# Patient Record
Sex: MALE | Race: WHITE | Employment: UNEMPLOYED | URBAN - METROPOLITAN AREA
[De-identification: names, ages, dates, MRNs, and addresses within clinical notes are randomized per-mention and may not be internally consistent; named-entity substitution may affect disease eponyms.]

---

## 2018-04-23 ENCOUNTER — HOSPITAL ENCOUNTER (OUTPATIENT)
Age: 43
Discharge: HOME | End: 2018-04-23
Payer: MEDICARE

## 2018-04-23 LAB
-: ABNORMAL
ABSOLUTE EOS #: 0.3 K/UL (ref 0–0.4)
ABSOLUTE IMMATURE GRANULOCYTE: ABNORMAL K/UL (ref 0–0.3)
ABSOLUTE LYMPH #: 1.9 K/UL (ref 1–4.8)
ABSOLUTE MONO #: 0.5 K/UL (ref 0.1–1.3)
ALBUMIN SERPL-MCNC: 4.3 G/DL (ref 3.5–5.2)
ALBUMIN/GLOBULIN RATIO: ABNORMAL (ref 1–2.5)
ALP BLD-CCNC: 121 U/L (ref 40–129)
ALT SERPL-CCNC: 29 U/L (ref 5–41)
AMORPHOUS: ABNORMAL
ANION GAP SERPL CALCULATED.3IONS-SCNC: 13 MMOL/L (ref 9–17)
AST SERPL-CCNC: 15 U/L
BACTERIA: ABNORMAL
BASOPHILS # BLD: 1 % (ref 0–2)
BASOPHILS ABSOLUTE: 0.1 K/UL (ref 0–0.2)
BILIRUB SERPL-MCNC: 0.73 MG/DL (ref 0.3–1.2)
BILIRUBIN URINE: ABNORMAL
BUN BLDV-MCNC: 9 MG/DL (ref 6–20)
BUN/CREAT BLD: ABNORMAL (ref 9–20)
CALCIUM SERPL-MCNC: 9.7 MG/DL (ref 8.6–10.4)
CASTS UA: ABNORMAL /LPF
CHLORIDE BLD-SCNC: 102 MMOL/L (ref 98–107)
CHOLESTEROL/HDL RATIO: 5.3
CHOLESTEROL: 155 MG/DL
CO2: 27 MMOL/L (ref 20–31)
COLOR: ABNORMAL
COMMENT UA: ABNORMAL
CREAT SERPL-MCNC: 0.9 MG/DL (ref 0.7–1.2)
CRYSTALS, UA: ABNORMAL /HPF
DIFFERENTIAL TYPE: ABNORMAL
EOSINOPHILS RELATIVE PERCENT: 3 % (ref 0–4)
EPITHELIAL CELLS UA: ABNORMAL /HPF
GFR AFRICAN AMERICAN: >60 ML/MIN
GFR NON-AFRICAN AMERICAN: >60 ML/MIN
GFR SERPL CREATININE-BSD FRML MDRD: ABNORMAL ML/MIN/{1.73_M2}
GFR SERPL CREATININE-BSD FRML MDRD: ABNORMAL ML/MIN/{1.73_M2}
GLUCOSE BLD-MCNC: 146 MG/DL (ref 70–99)
GLUCOSE URINE: NEGATIVE
HCT VFR BLD CALC: 50.1 % (ref 41–53)
HDLC SERPL-MCNC: 29 MG/DL
HEMOGLOBIN: 17.4 G/DL (ref 13.5–17.5)
IMMATURE GRANULOCYTES: ABNORMAL %
KETONES, URINE: NEGATIVE
LDL CHOLESTEROL: 63 MG/DL (ref 0–130)
LEUKOCYTE ESTERASE, URINE: NEGATIVE
LYMPHOCYTES # BLD: 19 % (ref 24–44)
MCH RBC QN AUTO: 31.2 PG (ref 26–34)
MCHC RBC AUTO-ENTMCNC: 34.7 G/DL (ref 31–37)
MCV RBC AUTO: 89.9 FL (ref 80–100)
MONOCYTES # BLD: 5 % (ref 1–7)
MUCUS: ABNORMAL
NITRITE, URINE: NEGATIVE
NRBC AUTOMATED: ABNORMAL PER 100 WBC
OTHER OBSERVATIONS UA: ABNORMAL
PDW BLD-RTO: 13.7 % (ref 11.5–14.9)
PH UA: 5.5 (ref 5–8)
PLATELET # BLD: 246 K/UL (ref 150–450)
PLATELET ESTIMATE: ABNORMAL
PMV BLD AUTO: 8.3 FL (ref 6–12)
POTASSIUM SERPL-SCNC: 3.9 MMOL/L (ref 3.7–5.3)
PROTEIN UA: NEGATIVE
RBC # BLD: 5.57 M/UL (ref 4.5–5.9)
RBC # BLD: ABNORMAL 10*6/UL
RBC UA: ABNORMAL /HPF
RENAL EPITHELIAL, UA: ABNORMAL /HPF
SEG NEUTROPHILS: 72 % (ref 36–66)
SEGMENTED NEUTROPHILS ABSOLUTE COUNT: 7.1 K/UL (ref 1.3–9.1)
SODIUM BLD-SCNC: 142 MMOL/L (ref 135–144)
SPECIFIC GRAVITY UA: 1.02 (ref 1–1.03)
THYROXINE, FREE: 1.47 NG/DL (ref 0.93–1.7)
TOTAL PROTEIN: 7.1 G/DL (ref 6.4–8.3)
TRICHOMONAS: ABNORMAL
TRIGL SERPL-MCNC: 314 MG/DL
TSH SERPL DL<=0.05 MIU/L-ACNC: 2.07 MIU/L (ref 0.3–5)
TURBIDITY: CLEAR
URINE HGB: NEGATIVE
UROBILINOGEN, URINE: NORMAL
VITAMIN D 25-HYDROXY: 23.3 NG/ML (ref 30–100)
VLDLC SERPL CALC-MCNC: ABNORMAL MG/DL (ref 1–30)
WBC # BLD: 10 K/UL (ref 3.5–11)
WBC # BLD: ABNORMAL 10*3/UL
WBC UA: ABNORMAL /HPF
YEAST: ABNORMAL

## 2018-04-23 PROCEDURE — 80061 LIPID PANEL: CPT

## 2018-04-23 PROCEDURE — 82306 VITAMIN D 25 HYDROXY: CPT

## 2018-04-23 PROCEDURE — 36415 COLL VENOUS BLD VENIPUNCTURE: CPT

## 2018-04-23 PROCEDURE — 80053 COMPREHEN METABOLIC PANEL: CPT

## 2018-04-23 PROCEDURE — 85025 COMPLETE CBC W/AUTO DIFF WBC: CPT

## 2018-04-23 PROCEDURE — 84443 ASSAY THYROID STIM HORMONE: CPT

## 2018-04-23 PROCEDURE — 81001 URINALYSIS AUTO W/SCOPE: CPT

## 2018-04-23 PROCEDURE — 84439 ASSAY OF FREE THYROXINE: CPT

## 2018-04-23 PROCEDURE — 83036 HEMOGLOBIN GLYCOSYLATED A1C: CPT

## 2018-04-24 LAB
ESTIMATED AVERAGE GLUCOSE: 114 MG/DL
HBA1C MFR BLD: 5.6 % (ref 4–6)

## 2018-07-12 ENCOUNTER — HOSPITAL ENCOUNTER (OUTPATIENT)
Dept: GENERAL RADIOLOGY | Age: 43
Discharge: HOME | End: 2018-07-14
Payer: MEDICARE

## 2018-07-12 ENCOUNTER — HOSPITAL ENCOUNTER (OUTPATIENT)
Age: 43
Discharge: HOME | End: 2018-07-12
Payer: MEDICARE

## 2018-07-12 ENCOUNTER — HOSPITAL ENCOUNTER (OUTPATIENT)
Age: 43
Discharge: HOME | End: 2018-07-14
Payer: MEDICARE

## 2018-07-12 DIAGNOSIS — R07.9 CHEST PAIN, UNSPECIFIED TYPE: ICD-10-CM

## 2018-07-12 LAB
EKG ATRIAL RATE: 83 BPM
EKG P AXIS: 23 DEGREES
EKG P-R INTERVAL: 166 MS
EKG Q-T INTERVAL: 390 MS
EKG QRS DURATION: 98 MS
EKG QTC CALCULATION (BAZETT): 458 MS
EKG R AXIS: 20 DEGREES
EKG T AXIS: 28 DEGREES
EKG VENTRICULAR RATE: 83 BPM

## 2018-07-12 PROCEDURE — 71046 X-RAY EXAM CHEST 2 VIEWS: CPT

## 2018-07-12 PROCEDURE — 93005 ELECTROCARDIOGRAM TRACING: CPT

## 2020-08-17 ENCOUNTER — HOSPITAL ENCOUNTER (OUTPATIENT)
Age: 45
Discharge: HOME | End: 2020-08-17
Payer: MEDICARE

## 2020-08-17 LAB
ABSOLUTE EOS #: 0.2 K/UL (ref 0–0.4)
ABSOLUTE IMMATURE GRANULOCYTE: ABNORMAL K/UL (ref 0–0.3)
ABSOLUTE LYMPH #: 2.3 K/UL (ref 1–4.8)
ABSOLUTE MONO #: 0.8 K/UL (ref 0.1–1.3)
ALBUMIN SERPL-MCNC: 3.9 G/DL (ref 3.5–5.2)
ALBUMIN/GLOBULIN RATIO: ABNORMAL (ref 1–2.5)
ALP BLD-CCNC: 108 U/L (ref 40–129)
ALT SERPL-CCNC: 31 U/L (ref 5–41)
ANION GAP SERPL CALCULATED.3IONS-SCNC: 9 MMOL/L (ref 9–17)
AST SERPL-CCNC: 14 U/L
BASOPHILS # BLD: 1 % (ref 0–2)
BASOPHILS ABSOLUTE: 0.1 K/UL (ref 0–0.2)
BILIRUB SERPL-MCNC: 0.6 MG/DL (ref 0.3–1.2)
BILIRUBIN URINE: NEGATIVE
BUN BLDV-MCNC: 12 MG/DL (ref 6–20)
BUN/CREAT BLD: ABNORMAL (ref 9–20)
CALCIUM SERPL-MCNC: 9.4 MG/DL (ref 8.6–10.4)
CHLORIDE BLD-SCNC: 99 MMOL/L (ref 98–107)
CHOLESTEROL/HDL RATIO: 6.1
CHOLESTEROL: 152 MG/DL
CO2: 27 MMOL/L (ref 20–31)
COLOR: YELLOW
COMMENT UA: NORMAL
CREAT SERPL-MCNC: 0.96 MG/DL (ref 0.7–1.2)
DIFFERENTIAL TYPE: ABNORMAL
EOSINOPHILS RELATIVE PERCENT: 2 % (ref 0–4)
ESTIMATED AVERAGE GLUCOSE: 134 MG/DL
GFR AFRICAN AMERICAN: >60 ML/MIN
GFR NON-AFRICAN AMERICAN: >60 ML/MIN
GFR SERPL CREATININE-BSD FRML MDRD: ABNORMAL ML/MIN/{1.73_M2}
GFR SERPL CREATININE-BSD FRML MDRD: ABNORMAL ML/MIN/{1.73_M2}
GLUCOSE BLD-MCNC: 181 MG/DL (ref 70–99)
GLUCOSE URINE: NEGATIVE
HBA1C MFR BLD: 6.3 % (ref 4–6)
HCT VFR BLD CALC: 46.8 % (ref 41–53)
HDLC SERPL-MCNC: 25 MG/DL
HEMOGLOBIN: 16.6 G/DL (ref 13.5–17.5)
IMMATURE GRANULOCYTES: ABNORMAL %
KETONES, URINE: NEGATIVE
LDL CHOLESTEROL DIRECT: 73 MG/DL
LDL CHOLESTEROL: ABNORMAL MG/DL (ref 0–130)
LEUKOCYTE ESTERASE, URINE: NEGATIVE
LYMPHOCYTES # BLD: 23 % (ref 24–44)
MCH RBC QN AUTO: 31.9 PG (ref 26–34)
MCHC RBC AUTO-ENTMCNC: 35.5 G/DL (ref 31–37)
MCV RBC AUTO: 90.1 FL (ref 80–100)
MONOCYTES # BLD: 8 % (ref 1–7)
NITRITE, URINE: NEGATIVE
NRBC AUTOMATED: ABNORMAL PER 100 WBC
PDW BLD-RTO: 13.5 % (ref 11.5–14.9)
PH UA: 6 (ref 5–8)
PLATELET # BLD: 234 K/UL (ref 150–450)
PLATELET ESTIMATE: ABNORMAL
PMV BLD AUTO: 7.7 FL (ref 6–12)
POTASSIUM SERPL-SCNC: 4.1 MMOL/L (ref 3.7–5.3)
PROTEIN UA: NEGATIVE
RBC # BLD: 5.19 M/UL (ref 4.5–5.9)
RBC # BLD: ABNORMAL 10*6/UL
SEG NEUTROPHILS: 66 % (ref 36–66)
SEGMENTED NEUTROPHILS ABSOLUTE COUNT: 6.7 K/UL (ref 1.3–9.1)
SODIUM BLD-SCNC: 135 MMOL/L (ref 135–144)
SPECIFIC GRAVITY UA: 1.02 (ref 1–1.03)
THYROXINE, FREE: 1.3 NG/DL (ref 0.93–1.7)
TOTAL PROTEIN: 6.8 G/DL (ref 6.4–8.3)
TRIGL SERPL-MCNC: 421 MG/DL
TSH SERPL DL<=0.05 MIU/L-ACNC: 1.42 MIU/L (ref 0.3–5)
TURBIDITY: CLEAR
URINE HGB: NEGATIVE
UROBILINOGEN, URINE: NORMAL
VITAMIN D 25-HYDROXY: 59.8 NG/ML (ref 30–100)
VLDLC SERPL CALC-MCNC: ABNORMAL MG/DL (ref 1–30)
WBC # BLD: 10.1 K/UL (ref 3.5–11)
WBC # BLD: ABNORMAL 10*3/UL

## 2020-08-17 PROCEDURE — 81003 URINALYSIS AUTO W/O SCOPE: CPT

## 2020-08-17 PROCEDURE — 84443 ASSAY THYROID STIM HORMONE: CPT

## 2020-08-17 PROCEDURE — 83036 HEMOGLOBIN GLYCOSYLATED A1C: CPT

## 2020-08-17 PROCEDURE — 80053 COMPREHEN METABOLIC PANEL: CPT

## 2020-08-17 PROCEDURE — 84439 ASSAY OF FREE THYROXINE: CPT

## 2020-08-17 PROCEDURE — 85025 COMPLETE CBC W/AUTO DIFF WBC: CPT

## 2020-08-17 PROCEDURE — 36415 COLL VENOUS BLD VENIPUNCTURE: CPT

## 2020-08-17 PROCEDURE — 82306 VITAMIN D 25 HYDROXY: CPT

## 2020-08-17 PROCEDURE — 80061 LIPID PANEL: CPT

## 2020-08-17 PROCEDURE — 83721 ASSAY OF BLOOD LIPOPROTEIN: CPT

## 2022-04-19 ENCOUNTER — HOSPITAL ENCOUNTER (OUTPATIENT)
Age: 47
Discharge: HOME | End: 2022-04-19
Payer: MEDICARE

## 2022-04-19 LAB
ABSOLUTE EOS #: 0.2 K/UL (ref 0–0.4)
ABSOLUTE LYMPH #: 2.4 K/UL (ref 1–4.8)
ABSOLUTE MONO #: 0.5 K/UL (ref 0.1–1.3)
ALBUMIN SERPL-MCNC: 4.2 G/DL (ref 3.5–5.2)
ALP BLD-CCNC: 130 U/L (ref 40–129)
ALT SERPL-CCNC: 24 U/L (ref 5–41)
ANION GAP SERPL CALCULATED.3IONS-SCNC: 14 MMOL/L (ref 9–17)
AST SERPL-CCNC: 13 U/L
BASOPHILS # BLD: 1 % (ref 0–2)
BASOPHILS ABSOLUTE: 0.1 K/UL (ref 0–0.2)
BILIRUB SERPL-MCNC: 0.97 MG/DL (ref 0.3–1.2)
BILIRUBIN URINE: NEGATIVE
BUN BLDV-MCNC: 8 MG/DL (ref 6–20)
CALCIUM SERPL-MCNC: 10 MG/DL (ref 8.6–10.4)
CHLORIDE BLD-SCNC: 97 MMOL/L (ref 98–107)
CHOLESTEROL/HDL RATIO: 6.1
CHOLESTEROL: 170 MG/DL
CO2: 27 MMOL/L (ref 20–31)
COLOR: YELLOW
COMMENT UA: ABNORMAL
CREAT SERPL-MCNC: 0.7 MG/DL (ref 0.7–1.2)
CREATININE URINE: 114.9 MG/DL (ref 39–259)
EOSINOPHILS RELATIVE PERCENT: 2 % (ref 0–4)
ESTIMATED AVERAGE GLUCOSE: 358 MG/DL
GFR AFRICAN AMERICAN: >60 ML/MIN
GFR NON-AFRICAN AMERICAN: >60 ML/MIN
GFR SERPL CREATININE-BSD FRML MDRD: ABNORMAL ML/MIN/{1.73_M2}
GLUCOSE BLD-MCNC: 350 MG/DL (ref 70–99)
GLUCOSE URINE: ABNORMAL
HBA1C MFR BLD: 14.1 % (ref 4–6)
HCT VFR BLD CALC: 51.2 % (ref 41–53)
HDLC SERPL-MCNC: 28 MG/DL
HEMOGLOBIN: 17.6 G/DL (ref 13.5–17.5)
KETONES, URINE: ABNORMAL
LDL CHOLESTEROL DIRECT: 83 MG/DL
LDL CHOLESTEROL: ABNORMAL MG/DL (ref 0–130)
LEUKOCYTE ESTERASE, URINE: NEGATIVE
LYMPHOCYTES # BLD: 27 % (ref 24–44)
MCH RBC QN AUTO: 30.6 PG (ref 26–34)
MCHC RBC AUTO-ENTMCNC: 34.5 G/DL (ref 31–37)
MCV RBC AUTO: 88.6 FL (ref 80–100)
MICROALBUMIN/CREAT 24H UR: <12 MG/L
MICROALBUMIN/CREAT UR-RTO: NORMAL MCG/MG CREAT
MONOCYTES # BLD: 5 % (ref 1–7)
NITRITE, URINE: NEGATIVE
PDW BLD-RTO: 13.6 % (ref 11.5–14.9)
PH UA: 5 (ref 5–8)
PLATELET # BLD: 272 K/UL (ref 150–450)
PMV BLD AUTO: 8.3 FL (ref 6–12)
POTASSIUM SERPL-SCNC: 4.3 MMOL/L (ref 3.7–5.3)
PROTEIN UA: NEGATIVE
RBC # BLD: 5.78 M/UL (ref 4.5–5.9)
SEG NEUTROPHILS: 65 % (ref 36–66)
SEGMENTED NEUTROPHILS ABSOLUTE COUNT: 5.8 K/UL (ref 1.3–9.1)
SODIUM BLD-SCNC: 138 MMOL/L (ref 135–144)
SPECIFIC GRAVITY UA: 1.05 (ref 1–1.03)
THYROXINE, FREE: 1.53 NG/DL (ref 0.93–1.7)
TOTAL PROTEIN: 7.2 G/DL (ref 6.4–8.3)
TRIGL SERPL-MCNC: 406 MG/DL
TSH SERPL DL<=0.05 MIU/L-ACNC: 1.27 UIU/ML (ref 0.3–5)
TURBIDITY: CLEAR
URINE HGB: NEGATIVE
UROBILINOGEN, URINE: NORMAL
VITAMIN D 25-HYDROXY: 66.6 NG/ML
WBC # BLD: 8.8 K/UL (ref 3.5–11)

## 2022-04-19 PROCEDURE — 80061 LIPID PANEL: CPT

## 2022-04-19 PROCEDURE — 36415 COLL VENOUS BLD VENIPUNCTURE: CPT

## 2022-04-19 PROCEDURE — 82043 UR ALBUMIN QUANTITATIVE: CPT

## 2022-04-19 PROCEDURE — 85025 COMPLETE CBC W/AUTO DIFF WBC: CPT

## 2022-04-19 PROCEDURE — 84443 ASSAY THYROID STIM HORMONE: CPT

## 2022-04-19 PROCEDURE — 82306 VITAMIN D 25 HYDROXY: CPT

## 2022-04-19 PROCEDURE — 84439 ASSAY OF FREE THYROXINE: CPT

## 2022-04-19 PROCEDURE — 80053 COMPREHEN METABOLIC PANEL: CPT

## 2022-04-19 PROCEDURE — 83721 ASSAY OF BLOOD LIPOPROTEIN: CPT

## 2022-04-19 PROCEDURE — 83036 HEMOGLOBIN GLYCOSYLATED A1C: CPT

## 2022-04-19 PROCEDURE — 82570 ASSAY OF URINE CREATININE: CPT

## 2022-04-19 PROCEDURE — 81003 URINALYSIS AUTO W/O SCOPE: CPT

## 2022-05-22 ENCOUNTER — APPOINTMENT (OUTPATIENT)
Dept: CT IMAGING | Age: 47
End: 2022-05-22
Payer: MEDICARE

## 2022-05-22 ENCOUNTER — HOSPITAL ENCOUNTER (OUTPATIENT)
Age: 47
Setting detail: OBSERVATION
Discharge: HOME | End: 2022-05-23
Attending: EMERGENCY MEDICINE | Admitting: INTERNAL MEDICINE
Payer: MEDICARE

## 2022-05-22 DIAGNOSIS — E83.42 HYPOMAGNESEMIA: Primary | ICD-10-CM

## 2022-05-22 LAB
ABSOLUTE EOS #: 0.1 K/UL (ref 0–0.4)
ABSOLUTE LYMPH #: 2.6 K/UL (ref 1–4.8)
ABSOLUTE MONO #: 0.7 K/UL (ref 0.1–1.3)
ALBUMIN SERPL-MCNC: 4 G/DL (ref 3.5–5.2)
ALP BLD-CCNC: 101 U/L (ref 40–129)
ALT SERPL-CCNC: 15 U/L (ref 5–41)
ANION GAP SERPL CALCULATED.3IONS-SCNC: 15 MMOL/L (ref 9–17)
AST SERPL-CCNC: 14 U/L
BASOPHILS # BLD: 1 % (ref 0–2)
BASOPHILS ABSOLUTE: 0.1 K/UL (ref 0–0.2)
BILIRUB SERPL-MCNC: 1.57 MG/DL (ref 0.3–1.2)
BILIRUBIN URINE: NEGATIVE
BUN BLDV-MCNC: 4 MG/DL (ref 6–20)
CALCIUM SERPL-MCNC: 9.1 MG/DL (ref 8.6–10.4)
CHLORIDE BLD-SCNC: 102 MMOL/L (ref 98–107)
CO2: 22 MMOL/L (ref 20–31)
COLOR: YELLOW
COMMENT UA: NORMAL
CREAT SERPL-MCNC: 0.51 MG/DL (ref 0.7–1.2)
EOSINOPHILS RELATIVE PERCENT: 1 % (ref 0–4)
GFR AFRICAN AMERICAN: >60 ML/MIN
GFR NON-AFRICAN AMERICAN: >60 ML/MIN
GFR SERPL CREATININE-BSD FRML MDRD: ABNORMAL ML/MIN/{1.73_M2}
GLUCOSE BLD-MCNC: 185 MG/DL (ref 75–110)
GLUCOSE BLD-MCNC: 187 MG/DL (ref 70–99)
GLUCOSE URINE: NEGATIVE
HCT VFR BLD CALC: 45.9 % (ref 41–53)
HEMOGLOBIN: 15.9 G/DL (ref 13.5–17.5)
INFLUENZA A: NOT DETECTED
INFLUENZA B: NOT DETECTED
INR BLD: 1
KETONES, URINE: NEGATIVE
LACTIC ACID: 1.8 MMOL/L (ref 0.5–2.2)
LEUKOCYTE ESTERASE, URINE: NEGATIVE
LIPASE: 17 U/L (ref 13–60)
LYMPHOCYTES # BLD: 23 % (ref 24–44)
MAGNESIUM: 1 MG/DL (ref 1.6–2.6)
MAGNESIUM: <0.4 MG/DL (ref 1.6–2.6)
MCH RBC QN AUTO: 30.5 PG (ref 26–34)
MCHC RBC AUTO-ENTMCNC: 34.6 G/DL (ref 31–37)
MCV RBC AUTO: 88.2 FL (ref 80–100)
MONOCYTES # BLD: 6 % (ref 1–7)
NITRITE, URINE: NEGATIVE
PARTIAL THROMBOPLASTIN TIME: 27.3 SEC (ref 24–36)
PDW BLD-RTO: 14 % (ref 11.5–14.9)
PH UA: 7.5 (ref 5–8)
PLATELET # BLD: 243 K/UL (ref 150–450)
PMV BLD AUTO: 8.4 FL (ref 6–12)
POTASSIUM SERPL-SCNC: 3.1 MMOL/L (ref 3.7–5.3)
PROTEIN UA: NEGATIVE
PROTHROMBIN TIME: 13 SEC (ref 11.8–14.6)
RBC # BLD: 5.21 M/UL (ref 4.5–5.9)
SARS-COV-2 RNA, RT PCR: NOT DETECTED
SEG NEUTROPHILS: 69 % (ref 36–66)
SEGMENTED NEUTROPHILS ABSOLUTE COUNT: 8.1 K/UL (ref 1.3–9.1)
SODIUM BLD-SCNC: 139 MMOL/L (ref 135–144)
SOURCE: NORMAL
SPECIFIC GRAVITY UA: 1.01 (ref 1–1.03)
SPECIMEN DESCRIPTION: NORMAL
TOTAL PROTEIN: 6.5 G/DL (ref 6.4–8.3)
TROPONIN, HIGH SENSITIVITY: 10 NG/L (ref 0–22)
TSH SERPL DL<=0.05 MIU/L-ACNC: 1.82 UIU/ML (ref 0.3–5)
TURBIDITY: CLEAR
URINE HGB: NEGATIVE
UROBILINOGEN, URINE: NORMAL
WBC # BLD: 11.6 K/UL (ref 3.5–11)

## 2022-05-22 PROCEDURE — 83735 ASSAY OF MAGNESIUM: CPT

## 2022-05-22 PROCEDURE — 6370000000 HC RX 637 (ALT 250 FOR IP): Performed by: STUDENT IN AN ORGANIZED HEALTH CARE EDUCATION/TRAINING PROGRAM

## 2022-05-22 PROCEDURE — 36415 COLL VENOUS BLD VENIPUNCTURE: CPT

## 2022-05-22 PROCEDURE — 83605 ASSAY OF LACTIC ACID: CPT

## 2022-05-22 PROCEDURE — 87636 SARSCOV2 & INF A&B AMP PRB: CPT

## 2022-05-22 PROCEDURE — 85730 THROMBOPLASTIN TIME PARTIAL: CPT

## 2022-05-22 PROCEDURE — 96366 THER/PROPH/DIAG IV INF ADDON: CPT

## 2022-05-22 PROCEDURE — 85610 PROTHROMBIN TIME: CPT

## 2022-05-22 PROCEDURE — 2060000000 HC ICU INTERMEDIATE R&B

## 2022-05-22 PROCEDURE — 82947 ASSAY GLUCOSE BLOOD QUANT: CPT

## 2022-05-22 PROCEDURE — 81003 URINALYSIS AUTO W/O SCOPE: CPT

## 2022-05-22 PROCEDURE — 83690 ASSAY OF LIPASE: CPT

## 2022-05-22 PROCEDURE — 87086 URINE CULTURE/COLONY COUNT: CPT

## 2022-05-22 PROCEDURE — 99223 1ST HOSP IP/OBS HIGH 75: CPT | Performed by: INTERNAL MEDICINE

## 2022-05-22 PROCEDURE — 2580000003 HC RX 258: Performed by: STUDENT IN AN ORGANIZED HEALTH CARE EDUCATION/TRAINING PROGRAM

## 2022-05-22 PROCEDURE — 6360000002 HC RX W HCPCS: Performed by: INTERNAL MEDICINE

## 2022-05-22 PROCEDURE — 84443 ASSAY THYROID STIM HORMONE: CPT

## 2022-05-22 PROCEDURE — 6370000000 HC RX 637 (ALT 250 FOR IP): Performed by: NURSE PRACTITIONER

## 2022-05-22 PROCEDURE — 70450 CT HEAD/BRAIN W/O DYE: CPT

## 2022-05-22 PROCEDURE — 99285 EMERGENCY DEPT VISIT HI MDM: CPT

## 2022-05-22 PROCEDURE — 96372 THER/PROPH/DIAG INJ SC/IM: CPT

## 2022-05-22 PROCEDURE — 6370000000 HC RX 637 (ALT 250 FOR IP): Performed by: INTERNAL MEDICINE

## 2022-05-22 PROCEDURE — 84484 ASSAY OF TROPONIN QUANT: CPT

## 2022-05-22 PROCEDURE — 2500000003 HC RX 250 WO HCPCS: Performed by: STUDENT IN AN ORGANIZED HEALTH CARE EDUCATION/TRAINING PROGRAM

## 2022-05-22 PROCEDURE — 85025 COMPLETE CBC W/AUTO DIFF WBC: CPT

## 2022-05-22 PROCEDURE — 93005 ELECTROCARDIOGRAM TRACING: CPT | Performed by: STUDENT IN AN ORGANIZED HEALTH CARE EDUCATION/TRAINING PROGRAM

## 2022-05-22 PROCEDURE — 80053 COMPREHEN METABOLIC PANEL: CPT

## 2022-05-22 PROCEDURE — 96365 THER/PROPH/DIAG IV INF INIT: CPT

## 2022-05-22 RX ORDER — NICOTINE 21 MG/24HR
1 PATCH, TRANSDERMAL 24 HOURS TRANSDERMAL DAILY
Status: DISCONTINUED | OUTPATIENT
Start: 2022-05-22 | End: 2022-05-23 | Stop reason: HOSPADM

## 2022-05-22 RX ORDER — ONDANSETRON 2 MG/ML
4 INJECTION INTRAMUSCULAR; INTRAVENOUS EVERY 6 HOURS PRN
Status: DISCONTINUED | OUTPATIENT
Start: 2022-05-22 | End: 2022-05-23 | Stop reason: HOSPADM

## 2022-05-22 RX ORDER — POTASSIUM CHLORIDE 7.45 MG/ML
10 INJECTION INTRAVENOUS PRN
Status: DISCONTINUED | OUTPATIENT
Start: 2022-05-22 | End: 2022-05-23 | Stop reason: HOSPADM

## 2022-05-22 RX ORDER — IBUPROFEN 200 MG
1250 CAPSULE ORAL 2 TIMES DAILY
Status: DISCONTINUED | OUTPATIENT
Start: 2022-05-22 | End: 2022-05-22 | Stop reason: RX

## 2022-05-22 RX ORDER — MAGNESIUM SULFATE 1 G/100ML
1000 INJECTION INTRAVENOUS PRN
Status: DISCONTINUED | OUTPATIENT
Start: 2022-05-22 | End: 2022-05-23 | Stop reason: HOSPADM

## 2022-05-22 RX ORDER — ENOXAPARIN SODIUM 100 MG/ML
30 INJECTION SUBCUTANEOUS 2 TIMES DAILY
Status: DISCONTINUED | OUTPATIENT
Start: 2022-05-22 | End: 2022-05-23 | Stop reason: HOSPADM

## 2022-05-22 RX ORDER — SODIUM CHLORIDE 9 MG/ML
INJECTION, SOLUTION INTRAVENOUS PRN
Status: DISCONTINUED | OUTPATIENT
Start: 2022-05-22 | End: 2022-05-23 | Stop reason: HOSPADM

## 2022-05-22 RX ORDER — ACETAMINOPHEN 650 MG/1
650 SUPPOSITORY RECTAL EVERY 6 HOURS PRN
Status: DISCONTINUED | OUTPATIENT
Start: 2022-05-22 | End: 2022-05-23 | Stop reason: HOSPADM

## 2022-05-22 RX ORDER — ONDANSETRON 4 MG/1
4 TABLET, ORALLY DISINTEGRATING ORAL EVERY 8 HOURS PRN
Status: DISCONTINUED | OUTPATIENT
Start: 2022-05-22 | End: 2022-05-23 | Stop reason: HOSPADM

## 2022-05-22 RX ORDER — ERGOCALCIFEROL 1.25 MG/1
50000 CAPSULE ORAL WEEKLY
Status: DISCONTINUED | OUTPATIENT
Start: 2022-05-22 | End: 2022-05-23 | Stop reason: HOSPADM

## 2022-05-22 RX ORDER — SODIUM CHLORIDE 0.9 % (FLUSH) 0.9 %
5-40 SYRINGE (ML) INJECTION EVERY 12 HOURS SCHEDULED
Status: DISCONTINUED | OUTPATIENT
Start: 2022-05-22 | End: 2022-05-23 | Stop reason: HOSPADM

## 2022-05-22 RX ORDER — CALCIUM CARBONATE 500(1250)
500 TABLET ORAL 2 TIMES DAILY
Status: DISCONTINUED | OUTPATIENT
Start: 2022-05-22 | End: 2022-05-23

## 2022-05-22 RX ORDER — POTASSIUM CHLORIDE 20 MEQ/1
40 TABLET, EXTENDED RELEASE ORAL PRN
Status: DISCONTINUED | OUTPATIENT
Start: 2022-05-22 | End: 2022-05-23 | Stop reason: HOSPADM

## 2022-05-22 RX ORDER — ACETAMINOPHEN 325 MG/1
650 TABLET ORAL EVERY 6 HOURS PRN
Status: DISCONTINUED | OUTPATIENT
Start: 2022-05-22 | End: 2022-05-23 | Stop reason: HOSPADM

## 2022-05-22 RX ORDER — POLYETHYLENE GLYCOL 3350 17 G/17G
17 POWDER, FOR SOLUTION ORAL DAILY PRN
Status: DISCONTINUED | OUTPATIENT
Start: 2022-05-22 | End: 2022-05-23 | Stop reason: HOSPADM

## 2022-05-22 RX ORDER — MAGNESIUM SULFATE HEPTAHYDRATE 40 MG/ML
2000 INJECTION, SOLUTION INTRAVENOUS ONCE
Status: COMPLETED | OUTPATIENT
Start: 2022-05-22 | End: 2022-05-22

## 2022-05-22 RX ORDER — POTASSIUM BICARBONATE 25 MEQ/1
50 TABLET, EFFERVESCENT ORAL ONCE
Status: COMPLETED | OUTPATIENT
Start: 2022-05-22 | End: 2022-05-22

## 2022-05-22 RX ORDER — 0.9 % SODIUM CHLORIDE 0.9 %
1000 INTRAVENOUS SOLUTION INTRAVENOUS ONCE
Status: COMPLETED | OUTPATIENT
Start: 2022-05-22 | End: 2022-05-22

## 2022-05-22 RX ORDER — SODIUM CHLORIDE 0.9 % (FLUSH) 0.9 %
10 SYRINGE (ML) INJECTION PRN
Status: DISCONTINUED | OUTPATIENT
Start: 2022-05-22 | End: 2022-05-23 | Stop reason: HOSPADM

## 2022-05-22 RX ORDER — LANOLIN ALCOHOL/MO/W.PET/CERES
3 CREAM (GRAM) TOPICAL NIGHTLY PRN
Status: DISCONTINUED | OUTPATIENT
Start: 2022-05-22 | End: 2022-05-23 | Stop reason: HOSPADM

## 2022-05-22 RX ORDER — MAGNESIUM SULFATE HEPTAHYDRATE 40 MG/ML
2000 INJECTION, SOLUTION INTRAVENOUS 3 TIMES DAILY PRN
Status: DISCONTINUED | OUTPATIENT
Start: 2022-05-22 | End: 2022-05-23 | Stop reason: HOSPADM

## 2022-05-22 RX ADMIN — CALCIUM 500 MG: 500 TABLET ORAL at 20:35

## 2022-05-22 RX ADMIN — MAGNESIUM SULFATE HEPTAHYDRATE 2000 MG: 40 INJECTION, SOLUTION INTRAVENOUS at 18:21

## 2022-05-22 RX ADMIN — ERGOCALCIFEROL 50000 UNITS: 1.25 CAPSULE ORAL at 20:39

## 2022-05-22 RX ADMIN — Medication 5 DROP: at 18:06

## 2022-05-22 RX ADMIN — MAGNESIUM SULFATE HEPTAHYDRATE 1000 MG: 1 INJECTION, SOLUTION INTRAVENOUS at 20:38

## 2022-05-22 RX ADMIN — SODIUM CHLORIDE 1000 ML: 9 INJECTION, SOLUTION INTRAVENOUS at 17:24

## 2022-05-22 RX ADMIN — ENOXAPARIN SODIUM 30 MG: 100 INJECTION SUBCUTANEOUS at 20:35

## 2022-05-22 RX ADMIN — POTASSIUM BICARBONATE 50 MEQ: 978 TABLET, EFFERVESCENT ORAL at 18:06

## 2022-05-22 RX ADMIN — MAGNESIUM SULFATE HEPTAHYDRATE 1000 MG: 1 INJECTION, SOLUTION INTRAVENOUS at 22:05

## 2022-05-22 RX ADMIN — MAGNESIUM SULFATE HEPTAHYDRATE 1000 MG: 1 INJECTION, SOLUTION INTRAVENOUS at 23:20

## 2022-05-22 ASSESSMENT — PAIN - FUNCTIONAL ASSESSMENT: PAIN_FUNCTIONAL_ASSESSMENT: NONE - DENIES PAIN

## 2022-05-22 ASSESSMENT — ENCOUNTER SYMPTOMS
DIARRHEA: 0
NAUSEA: 0
VOMITING: 0
SORE THROAT: 0
BACK PAIN: 0
SINUS PAIN: 0
COUGH: 0
SHORTNESS OF BREATH: 0
ABDOMINAL PAIN: 0
EYE PAIN: 0

## 2022-05-22 ASSESSMENT — LIFESTYLE VARIABLES: HOW OFTEN DO YOU HAVE A DRINK CONTAINING ALCOHOL: NEVER

## 2022-05-22 NOTE — ED TRIAGE NOTES
Dad brought pt for altered level of consciousness, dizziness and unsteady gait. Dad states pt has had vomiting and an illness for about a week and has steadily declined. Pt is unsteady in triage and needed to be assisted to a wheelchair.  Glucose done in triage and was 185

## 2022-05-22 NOTE — ED PROVIDER NOTES
[address]  Emergency Department Encounter  EmergencyMedicineResident     This patient was seen during the COVID-19 crisis. There were limited resources and those resources we did have had to be conserved for the sickest of patients. Pt Name: Gaurang England  MRN: [id number]  [place] 1975  Date of evaluation: 5/22/22  PCP: Doug Mercedes, [facility]       Chief Complaint   Patient presents with    Dizziness    Altered Mental Status       HISTORY OF PRESENT ILLNESS  (Location/Symptom, Timing/Onset, Context/Setting, Quality, Duration, Modifying Factors, Severity.)      Gaurang England is a 52 y.o. male who presents evaluation of altered mental status and unsteady gait. Patient is having past medical history of schizophrenia on risperidone and Cogentin. Patient also has history of type 2 diabetes on metformin. Dose was recently increased. Over the last 3 days the patient has had progressively worsening symptoms. He also had a few episodes of nausea and vomiting. The dad who is a primary guardian for the patient and who lives with the patient states that this is a typical behavior for him. He is concerned that his eyes are bugged out and that he is unable to walk. He states that he does not believe that the patient is safe to go home if he cannot walk safely. PAST MEDICAL / SURGICAL /SOCIAL / FAMILY HISTORY      has a past medical history of Hypertension and Schizophrenia ([facility] [address] has no past surgical history on file.       Social History     Socioeconomic History    Marital status: Single     Spouse name: Not on file    Number of children: Not on file    Years of education: Not on file    Highest education level: Not on file   Occupational History    Not on file   Tobacco Use    Smoking status: Current Every Day Smoker     Packs/day: 2.00     Years: 20.00     Pack years: 40.00    Smokeless tobacco: Never Used   Vaping Use    Vaping Use: Never used Substance and Sexual Activity    Alcohol use: No    Drug use: No    Sexual activity: Never   Other Topics Concern    Not on file   Social History Narrative    Not on file     Social Determinants of Health     Financial Resource Strain:     Difficulty of Paying Living Expenses: Not on file   Food Insecurity:     Worried About Running Out of Food in the Last Year: Not on file    Rehan of Food in the Last Year: Not on file   Transportation Needs:     Lack of Transportation (Medical): Not on file    Lack of Transportation (Non-Medical): Not on file   Physical Activity:     Days of Exercise per Week: Not on file    Minutes of Exercise per Session: Not on file   Stress:     Feeling of Stress : Not on file   Social Connections:     Frequency of Communication with Friends and Family: Not on file    Frequency of Social Gatherings with Friends and Family: Not on file    Attends [employer]ism Services: Not on file    Active Member of [address] [place] [address] [employer] or Organizations: Not on file    Attends Club or Organization Meetings: Not on file    Marital Status: Not on file   Intimate Partner Violence:     Fear of Current or Ex-Partner: Not on file    Emotionally Abused: Not on file    Physically Abused: Not on file    Sexually Abused: Not on file   Housing Stability:     Unable to Pay for Housing in the Last Year: Not on file    Number of [place] in the Last Year: Not on file    Unstable Housing in the Last Year: Not on file       Family History   Problem Relation Age of Onset    Diabetes Father     Substance Abuse Brother        Allergies:  Patient has no known allergies. Home Medications:  Prior to Admission medications    Medication Sig Start Date End Date Taking? Authorizing Provider   benztropine (COGENTIN) 1 MG tablet Take 1 tablet by mouth 2 times daily. 11/11/14   Maryanne Boateng MD   risperiDONE (RISPERDAL) 2 MG tablet Take 1 tablet by mouth 2 times daily.  11/11/14   Maraynne Boateng MD   risperiDONE (RISPERDAL) 3 MG tablet Take 1 tablet by mouth nightly. For total of 5 mg qhs 11/11/14   Harleen Garcia MD       REVIEW OF SYSTEMS    (2-9 systems for level 4, 10 or more forlevel 5)      Review of Systems   Constitutional: Negative for activity change, chills and fever. HENT: Negative for congestion, sinus pain and sore throat. Eyes: Negative for pain and visual disturbance. Proptosis   Respiratory: Negative for cough and shortness of breath. Cardiovascular: Negative for chest pain. Gastrointestinal: Negative for abdominal pain, diarrhea, nausea and vomiting. Genitourinary: Negative for difficulty urinating, dysuria and hematuria. Musculoskeletal: Positive for gait problem. Negative for back pain and myalgias. Skin: Negative for rash and wound. Neurological: Positive for dizziness. Negative for tremors, seizures, syncope, weakness, light-headedness, numbness and headaches. Psychiatric/Behavioral: Positive for agitation. Negative for confusion. PHYSICAL EXAM   (up to 7 for level 4, 8 or more forlevel 5)      ED TRIAGE VITALS BP: (!) 123/91, Temp: 98 °F (36.7 °C), Pulse: 71, Resp: 17, SpO2: 99 %    Vitals:    05/22/22 1623 05/22/22 1645 05/22/22 1651 05/22/22 1700   BP: (!) 123/91 (!) 125/100  (!) 127/90   Pulse: 71 118 122 117   Resp: 17 20 22   Temp: 98 °F (36.7 °C)      TempSrc: Tympanic      SpO2: 99% 97%  96%   Weight: 250 lb (113.4 kg)      Height: 6' 2\" (1.88 m)            Physical Exam  Vitals and nursing note reviewed. Constitutional:       Appearance: Normal appearance. HENT:      Head: Normocephalic and atraumatic. Nose: Nose normal.      Mouth/Throat:      Mouth: Mucous membranes are moist.   Eyes:      General: No visual field deficit. Extraocular Movements: Extraocular movements intact. Pupils: Pupils are equal, round, and reactive to light. Comments: Proptosis   Cardiovascular:      Rate and Rhythm: Normal rate and regular rhythm.       Pulses: Normal pulses. Heart sounds: Normal heart sounds. Pulmonary:      Effort: Pulmonary effort is normal.      Breath sounds: Normal breath sounds. Abdominal:      General: Abdomen is flat. Palpations: Abdomen is soft. Musculoskeletal:         General: Normal range of motion. Cervical back: Normal range of motion. Skin:     General: Skin is warm and dry. Capillary Refill: Capillary refill takes less than 2 seconds. Neurological:      General: No focal deficit present. Mental Status: He is alert and oriented to person, place, and time. Mental status is at baseline. GCS: GCS eye subscore is 4. GCS verbal subscore is 5. GCS motor subscore is 6. Cranial Nerves: No cranial nerve deficit or facial asymmetry. Sensory: No sensory deficit. Motor: No weakness.       Coordination: Coordination normal.   Psychiatric:         Mood and Affect: Mood normal.         Behavior: Behavior normal.           DIFFERENTIAL  DIAGNOSIS     PLAN (LABS / IMAGING / EKG):  Orders Placed This Encounter   Procedures    COVID-19 & Influenza Combo    Culture, Urine    CT Head WO Contrast    CBC with Auto Differential    Comprehensive Metabolic Panel w/ Reflex to MG    Lipase    Urinalysis with Reflex to Culture    TSH w/reflex to FT4    Troponin    Protime-INR    APTT    Lactic Acid    Magnesium    Inpatient consult to Internal Medicine    POC Glucose Fingerstick    EKG 12 Lead    ADMIT TO INPATIENT       MEDICATIONS ORDERED:  ED Medication Orders (From admission, onward)    Start Ordered     Status Ordering Provider    05/22/22 1815 05/22/22 1811  magnesium sulfate 2000 mg in dextrose 50 mL IVPB  ONCE         Last MAR action: New Bag - by Verl Seat on 05/22/22 at 2800 MYRON Amor    05/22/22 1800 05/22/22 1750  potassium bicarbonate (K-LYTE) disintegrating tablet 50 mEq  ONCE         Last MAR action: Given - by Verl Seat on 05/22/22 at 2 RuMYRON Raymond 05/22/22 1700 05/22/22 1653  0.9 % sodium chloride bolus  ONCE         Last MAR action: Stopped - by Ag Al on 05/22/22 at 3250 MYRON Tobar    05/22/22 1700 05/22/22 1653  carbamide peroxide (DEBROX) 6.5 % otic solution 5 drop  ONCE         Last MAR action: Given - by Ag Al on 05/22/22 at [address] [facility] / EMERGENCY DEPARTMENT COURSE / MDM     IMPRESSION & INITIAL PLAN:  49-year-old male seen for past medical history of schizophrenia on risperidone and Cogentin. Presenting today for evaluation of altered mental status and gait abnormality. He is also had nausea vomiting and discloses to me in private he had diarrhea for the last 3 days. 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 Concern for proptosis which is evident on exam and unstable gait which is evident on exam.  His reflexes are equivocal, he is got no abnormality in coordination with finger-nose testing or heel-to-shin testing. CT head was negative for any posterior circulation mass. Concern for NMS versus electrolyte abnormality. Patient found to have a magnesium level less than 0.4. Patient will be admitted to the medicine service for management of hypomagnesemia. He was provided 2 g of magnesium sulfate IV in the emergency department. LABS:  Results for orders placed or performed during the hospital encounter of 05/22/22   COVID-19 & Influenza Combo    Specimen: Nasopharyngeal Swab   Result Value Ref Range    Specimen Description . NASOPHARYNGEAL SWAB     Source . NASOPHARYNGEAL SWAB     SARS-CoV-2 RNA, RT PCR Not Detected Not Detected    INFLUENZA A Not Detected Not Detected    INFLUENZA B Not Detected Not Detected   CBC with Auto Differential   Result Value Ref Range    WBC 11.6 (H) 3.5 - 11.0 k/uL    RBC 5.21 4.5 - 5.9 m/uL    Hemoglobin 15.9 13.5 - 17.5 g/dL    Hematocrit 45.9 41 - 53 %    MCV 88.2 80 - 100 fL    MCH 30.5 26 - 34 pg    MCHC 34.6 31 - 37 Range    Protime 13.0 11.8 - 14.6 sec    INR 1.0    APTT   Result Value Ref Range    PTT 27.3 24.0 - 36.0 sec   Lactic Acid   Result Value Ref Range    Lactic Acid 1.8 0.5 - 2.2 mmol/L   Magnesium   Result Value Ref Range    Magnesium <0.4 (LL) 1.6 - 2.6 mg/dL   POC Glucose Fingerstick   Result Value Ref Range    POC Glucose 185 (H) 75 - 110 mg/dL   EKG 12 Lead   Result Value Ref Range    Ventricular Rate 120 BPM    Atrial Rate 120 BPM    P-R Interval 160 ms    QRS Duration 96 ms    Q-T Interval 324 ms    QTc Calculation (Bazett) 457 ms    P Axis 32 degrees    R Axis 47 degrees    T Axis 41 degrees       RADIOLOGY:  CT Head WO Contrast   Final Result   No acute intracranial abnormality. ECG: Sinus tachycardia ventricular 120, QTc 457, incomplete right bundle branch block, no other repolarization abnormalities, no T wave inversions, no ST elevation or depression    All EKG's are interpreted by the Emergency Department Physician who either signsor Co-signs this chart in the absence of a cardiologist.    ED Course as of 05/22/22 1859   Sun May 22, 2022   1717 WBC(!): 11.6 [TJ]      ED Course User Index  [TJ] Marge Ibarra MD        CONSULTS:  IP CONSULT TO INTERNAL MEDICINE  IP CONSULT TO GI    CRITICAL CARE:  See attending physician note    FINAL IMPRESSION      1. Hypomagnesemia          DISPOSITION / PLAN     DISPOSITION Admitted 05/22/2022 06:43:17 PM      PATIENT REFERRED TO:  No follow-up provider specified.     DISCHARGE MEDICATIONS:  New Prescriptions    No medications on file     Modified Medications    No medications on file        Marge Ibarra MD  Emergency Medicine Resident    (Please note that portions of this note were completed with a voice recognition program.  Efforts were made to edit the dictations but occasionally words are mis-transcribed.)       Marge Ibarra MD  Resident  05/22/22 1909

## 2022-05-22 NOTE — PROGRESS NOTES
Patient received to room 2101. Patient in bed, bed in lowest position and locked, side rails up x2. Patient provided call light and instructed on its use. Telemetry placed on patient and is on.

## 2022-05-22 NOTE — H&P
[address] [facility] Internal Medicine  Marita Sanchez MD; Polo Ireland MD; Edgard Chowdary MD; MD Truman Elliott MD; MD MARIO Griggs [facility] Internal Medicine   [facility]    HISTORY AND PHYSICAL EXAMINATION            Date:   5/22/2022  Patient name:  Faiza Borges  Date of admission:  5/22/2022  4:40 PM  MRN:   [id number]  Account:  [de-identified]  YOB: 1975  PCP:    GOYO Fraser CNP  Room:   12/12  Code Status:    Prior    Chief Complaint:     Chief Complaint   Patient presents with    Dizziness    Altered Mental Status       History Obtained From:     Patient his father medical records and ER attending    History of Present Illness:     Faiza Borges is a 52 y.o. Non- / non  male who presents with Dizziness and Altered Mental Status   and is admitted to the hospital for the management of Hypomagnesemia syndrome. 49-year-old gentleman class I obese who lives with his father history of gastroesophageal reflux disease his father has Ibarra's esophagus who has underlying schizophrenia  Admitted for severe fatigue not feeling well electrolytes check showed severe hypomagnesemia hypokalemia patient denies any nausea vomiting diarrhea admits to be overusing PPI for gastroesophageal reflux    Past Medical History:     Past Medical History:   Diagnosis Date    Hypertension     Schizophrenia ([address]         Past Surgical History:     History reviewed. No pertinent surgical history. Medications Prior to Admission:     Prior to Admission medications    Medication Sig Start Date End Date Taking? Authorizing Provider   benztropine (COGENTIN) 1 MG tablet Take 1 tablet by mouth 2 times daily. 11/11/14   Sonia Baptiste MD   risperiDONE (RISPERDAL) 2 MG tablet Take 1 tablet by mouth 2 times daily. 11/11/14   Sonia Baptiste MD   risperiDONE (RISPERDAL) 3 MG tablet Take 1 tablet by mouth nightly.  For total of 5 mg qhs 14   Andres Bae MD        Allergies:     Patient has no known allergies. Social History:     Tobacco:    reports that he has been smoking. He has a 40.00 pack-year smoking history. He has never used smokeless tobacco.  Alcohol:      reports no history of alcohol use. Drug Use:  reports no history of drug use. Family History:     Family History   Problem Relation Age of Onset    Diabetes Father     Substance Abuse Brother        Review of Systems:     Positive and Negative as described in HPI.     CONSTITUTIONAL:  negative for fevers, chills, sweats, positive for fatigue weight loss  HEENT:  negative for vision, hearing changes, runny nose, throat pain  RESPIRATORY:  negative for shortness of breath, cough, congestion, wheezing  CARDIOVASCULAR:  negative for chest pain, palpitations  GASTROINTESTINAL:  negative for nausea, vomiting, diarrhea, constipation, change in bowel habits, abdominal pain   GENITOURINARY:  negative for difficulty of urination, burning with urination, frequency   INTEGUMENT:  negative for rash, skin lesions, easy bruising   HEMATOLOGIC/LYMPHATIC:  negative for swelling/edema   ALLERGIC/IMMUNOLOGIC:  negative for urticaria , itching  ENDOCRINE:  negative increase in drinking, increase in urination, hot or cold intolerance  MUSCULOSKELETAL:  negative joint pains, muscle aches, swelling of joints  NEUROLOGICAL:  negative for headaches, dizziness, lightheadedness, numbness, pain, tingling extremities  BEHAVIOR/PSYCH: Patient has schizophrenia    Physical Exam:   BP (!) 127/90   Pulse 117   Temp 98 °F (36.7 °C) (Tympanic)   Resp 22   Ht 6' 2\" (1.88 m)   Wt 250 lb (113.4 kg)   SpO2 96%   BMI 32.10 kg/m²   Temp (24hrs), Av °F (36.7 °C), Min:98 °F (36.7 °C), Max:98 °F (36.7 °C)    Recent Labs     22  1623   POCGLU 185*       Intake/Output Summary (Last 24 hours) at 2022 1843  Last data filed at 2022 1814  Gross per 24 hour   Intake 1000 ml Output    Net 1000 ml     Poor personal hygiene  General Appearance: alert, well appearing, and in no acute distress  Mental status: oriented to person, place, and time  Head: normocephalic, atraumatic  Eye: no icterus, redness, pupils equal and reactive, extraocular eye movements intact, conjunctiva clear  Ear: normal external ear, no discharge, hearing intact  Nose: no drainage noted  Mouth: mucous membranes moist  Neck: supple, no carotid bruits, thyroid not palpable  Lungs: Bilateral equal air entry, clear to ausculation, no wheezing, rales or rhonchi, normal effort  Cardiovascular: normal rate, regular rhythm, no murmur, gallop, rub  Abdomen: Soft, nontender, nondistended, normal bowel sounds, no hepatomegaly or splenomegaly  Neurologic: There are no new focal motor or sensory deficits, normal muscle tone and bulk, no abnormal sensation, normal speech, cranial nerves II through XII grossly intact  Skin: No gross lesions, rashes, bruising or bleeding on exposed skin area  Extremities: peripheral pulses palpable, no pedal edema or calf pain with palpation  Psych: Flat affect    Investigations:      Laboratory Testing:  Recent Results (from the past 24 hour(s))   POC Glucose Fingerstick    Collection Time: 05/22/22  4:23 PM   Result Value Ref Range    POC Glucose 185 (H) 75 - 110 mg/dL   CBC with Auto Differential    Collection Time: 05/22/22  4:28 PM   Result Value Ref Range    WBC 11.6 (H) 3.5 - 11.0 k/uL    RBC 5.21 4.5 - 5.9 m/uL    Hemoglobin 15.9 13.5 - 17.5 g/dL    Hematocrit 45.9 41 - 53 %    MCV 88.2 80 - 100 fL    MCH 30.5 26 - 34 pg    MCHC 34.6 31 - 37 g/dL    RDW 14.0 11.5 - 14.9 %    Platelets 577 731 - 007 k/uL    MPV 8.4 6.0 - 12.0 fL    Seg Neutrophils 69 (H) 36 - 66 %    Lymphocytes 23 (L) 24 - 44 %    Monocytes 6 1 - 7 %    Eosinophils % 1 0 - 4 %    Basophils 1 0 - 2 %    Segs Absolute 8.10 1.3 - 9.1 k/uL    Absolute Lymph # 2.60 1.0 - 4.8 k/uL    Absolute Mono # 0.70 0.1 - 1.3 k/uL Value Ref Range    Specimen Description . NASOPHARYNGEAL SWAB     Source . NASOPHARYNGEAL SWAB     SARS-CoV-2 RNA, RT PCR Not Detected Not Detected    INFLUENZA A Not Detected Not Detected    INFLUENZA B Not Detected Not Detected   Urinalysis with Reflex to Culture    Collection Time: 05/22/22  6:12 PM    Specimen: Urine   Result Value Ref Range    Color, UA Yellow Yellow    Turbidity UA Clear Clear    Glucose, Ur NEGATIVE NEGATIVE    Bilirubin Urine NEGATIVE NEGATIVE    Ketones, Urine NEGATIVE NEGATIVE    Specific Gravity, UA 1.006 1.000 - 1.030    Urine Hgb NEGATIVE NEGATIVE    pH, UA 7.5 5.0 - 8.0    Protein, UA NEGATIVE NEGATIVE    Urobilinogen, Urine Normal Normal    Nitrite, Urine NEGATIVE NEGATIVE    Leukocyte Esterase, Urine NEGATIVE NEGATIVE    Urinalysis Comments       Microscopic exam not performed based on chemical results unless requested in original order. Imaging/Diagnostics:  CT Head WO Contrast    Result Date: 5/22/2022  No acute intracranial abnormality. Assessment :      Hospital Problems           Last Modified POA    * (Principal) Hypomagnesemia syndrome 5/22/2022 Yes          Plan:     Patient status inpatient in the Progressive Unit/Step down    Admitted with fatigue diagnosed with severe hypomagnesemia  Hypokalemia secondary to overuse of PPI  Discontinue PPI start famotidine  GI consult for possible EGD  Replace magnesium potassium  Admit inpatient for IV medication requires inpatient admission for above life-threatening hypomagnesemia at risk for cardiac arrhythmias    Consultations:   IP CONSULT TO INTERNAL MEDICINE  IP CONSULT TO GI     Patient is admitted as inpatient status because of co-morbidities listed above, severity of signs and symptoms as outlined, requirement for current medical therapies and most importantly because of direct risk to patient if care not provided in a hospital setting. Expected length of stay > 48 hours.     Amado Capellan MD  5/22/2022  6:43 PM    Copy sent to Dr. Levi Mcbride, APRN - CNP    Please note that this chart was generated using voice recognition Dragon dictation software. Although every effort was made to ensure the accuracy of this automated transcription, some errors in transcription may have occurred.

## 2022-05-22 NOTE — ED NOTES
SBAR report called to Carmelo Loyloa RN, updated to patient condition and plan of care, updated to pertinent diagnostic tests and labs, pt is ready for transport upstairs.       Olga Coppola RN  05/22/22 [id number]

## 2022-05-22 NOTE — ED PROVIDER NOTES
[address] [facility]  eMERGENCY dEPARTMENT eNCOUnter   Attending Attestation     Pt Name: Demetris Tillman  MRN: [id number]  [place] 1975  Date of evaluation: 5/22/22    History, EXAM, MDM:    Demetris Tillman is a 52 y.o. male who presents with Dizziness and Altered Mental Status  Pt has h/o schizophrenia. 3 days of acting strange. Unsteady on feet. Vomiting. Found to have hypomagnesemia. EKG shows a sinus tachycardia rhythm. HR is 120, , QRS 96, , no KASANDRA, No STD, No TWI, the axis is normal.  Patient started on IV magnesium supplementation. Discussed with internal medicine service. And admitted to the hospital.      Vitals:   Vitals:    05/22/22 1651 05/22/22 1700 05/22/22 1923 05/22/22 1952   BP:  (!) 127/90  (!) 133/90   Pulse: 122 117 108 102   Resp:  22 22 18   Temp:    99 °F (37.2 °C)   TempSrc:    Oral   SpO2:  96% 95% 95%   Weight:       Height:       PF:    106 L/min     I performed a history and physical examination of the patient and discussed management with the resident. I reviewed the residents note and agree with the documented findings and plan of care. Any areas of disagreement are noted on the chart. I was personally present for the key portions of any procedures. I have documented in the chart those procedures where I was not present during the key portions. I have personally reviewed all images and agree with the resident's interpretation. I have reviewed the emergency nurses triage note. I agree with the chief complaint, past medical history, past surgical history, allergies, medications, social and family history as documented unless otherwise noted below. Documentation of the HPI, Physical Exam and Medical Decision Making performed by medical students or scribes is based on my personal performance of the HPI, PE and MDM.  For Phys Assistant/ Nurse Practitioner cases/documentation I have had a face to face evaluation of this patient and have completed at least one if not all key elements of the E/M (history, physical exam, and MDM). Additional findings are as noted.     Lynda Bui MD  Attending Emergency  Physician               Lynda Bui MD  05/22/22 [id number]

## 2022-05-23 VITALS
WEIGHT: 172.4 LBS | RESPIRATION RATE: 18 BRPM | OXYGEN SATURATION: 97 % | BODY MASS INDEX: 22.13 KG/M2 | DIASTOLIC BLOOD PRESSURE: 95 MMHG | SYSTOLIC BLOOD PRESSURE: 133 MMHG | HEART RATE: 112 BPM | TEMPERATURE: 97.7 F | HEIGHT: 74 IN

## 2022-05-23 LAB
ANION GAP SERPL CALCULATED.3IONS-SCNC: 11 MMOL/L (ref 9–17)
BUN BLDV-MCNC: 3 MG/DL (ref 6–20)
CALCIUM SERPL-MCNC: 8.4 MG/DL (ref 8.6–10.4)
CHLORIDE BLD-SCNC: 103 MMOL/L (ref 98–107)
CO2: 26 MMOL/L (ref 20–31)
CREAT SERPL-MCNC: 0.49 MG/DL (ref 0.7–1.2)
CULTURE: NORMAL
EKG ATRIAL RATE: 120 BPM
EKG P AXIS: 32 DEGREES
EKG P-R INTERVAL: 160 MS
EKG Q-T INTERVAL: 324 MS
EKG QRS DURATION: 96 MS
EKG QTC CALCULATION (BAZETT): 457 MS
EKG R AXIS: 47 DEGREES
EKG T AXIS: 41 DEGREES
EKG VENTRICULAR RATE: 120 BPM
GFR AFRICAN AMERICAN: >60 ML/MIN
GFR NON-AFRICAN AMERICAN: >60 ML/MIN
GFR SERPL CREATININE-BSD FRML MDRD: ABNORMAL ML/MIN/{1.73_M2}
GLUCOSE BLD-MCNC: 134 MG/DL (ref 70–99)
INR BLD: 1
MAGNESIUM: 1.8 MG/DL (ref 1.6–2.6)
POTASSIUM SERPL-SCNC: 3 MMOL/L (ref 3.7–5.3)
PROTHROMBIN TIME: 13.1 SEC (ref 11.8–14.6)
SODIUM BLD-SCNC: 140 MMOL/L (ref 135–144)
SPECIMEN DESCRIPTION: NORMAL

## 2022-05-23 PROCEDURE — G0378 HOSPITAL OBSERVATION PER HR: HCPCS

## 2022-05-23 PROCEDURE — 6370000000 HC RX 637 (ALT 250 FOR IP): Performed by: NURSE PRACTITIONER

## 2022-05-23 PROCEDURE — 99239 HOSP IP/OBS DSCHRG MGMT >30: CPT | Performed by: INTERNAL MEDICINE

## 2022-05-23 PROCEDURE — 96366 THER/PROPH/DIAG IV INF ADDON: CPT

## 2022-05-23 PROCEDURE — 6360000002 HC RX W HCPCS: Performed by: INTERNAL MEDICINE

## 2022-05-23 PROCEDURE — 99222 1ST HOSP IP/OBS MODERATE 55: CPT | Performed by: INTERNAL MEDICINE

## 2022-05-23 PROCEDURE — 80048 BASIC METABOLIC PNL TOTAL CA: CPT

## 2022-05-23 PROCEDURE — 85610 PROTHROMBIN TIME: CPT

## 2022-05-23 PROCEDURE — 96375 TX/PRO/DX INJ NEW DRUG ADDON: CPT

## 2022-05-23 PROCEDURE — 97166 OT EVAL MOD COMPLEX 45 MIN: CPT

## 2022-05-23 PROCEDURE — APPNB30 APP NON BILLABLE TIME 0-30 MINS: Performed by: NURSE PRACTITIONER

## 2022-05-23 PROCEDURE — 36415 COLL VENOUS BLD VENIPUNCTURE: CPT

## 2022-05-23 PROCEDURE — 6370000000 HC RX 637 (ALT 250 FOR IP): Performed by: INTERNAL MEDICINE

## 2022-05-23 PROCEDURE — 93010 ELECTROCARDIOGRAM REPORT: CPT | Performed by: INTERNAL MEDICINE

## 2022-05-23 PROCEDURE — 83735 ASSAY OF MAGNESIUM: CPT

## 2022-05-23 PROCEDURE — 96376 TX/PRO/DX INJ SAME DRUG ADON: CPT

## 2022-05-23 PROCEDURE — 2580000003 HC RX 258: Performed by: INTERNAL MEDICINE

## 2022-05-23 PROCEDURE — 97161 PT EVAL LOW COMPLEX 20 MIN: CPT

## 2022-05-23 PROCEDURE — 96372 THER/PROPH/DIAG INJ SC/IM: CPT

## 2022-05-23 RX ORDER — FAMOTIDINE 20 MG/1
20 TABLET, FILM COATED ORAL 2 TIMES DAILY
Qty: 60 TABLET | Refills: 3 | Status: SHIPPED | OUTPATIENT
Start: 2022-05-23

## 2022-05-23 RX ORDER — BENZTROPINE MESYLATE 1 MG/1
1 TABLET ORAL 2 TIMES DAILY
Status: DISCONTINUED | OUTPATIENT
Start: 2022-05-23 | End: 2022-05-23 | Stop reason: HOSPADM

## 2022-05-23 RX ORDER — POTASSIUM CHLORIDE 20 MEQ/1
60 TABLET, EXTENDED RELEASE ORAL ONCE
Status: COMPLETED | OUTPATIENT
Start: 2022-05-23 | End: 2022-05-23

## 2022-05-23 RX ORDER — RISPERIDONE 1 MG/1
2 TABLET, FILM COATED ORAL 2 TIMES DAILY
Status: DISCONTINUED | OUTPATIENT
Start: 2022-05-23 | End: 2022-05-23 | Stop reason: HOSPADM

## 2022-05-23 RX ORDER — POTASSIUM BICARBONATE 25 MEQ/1
50 TABLET, EFFERVESCENT ORAL ONCE
Status: COMPLETED | OUTPATIENT
Start: 2022-05-23 | End: 2022-05-23

## 2022-05-23 RX ORDER — MAGNESIUM SULFATE HEPTAHYDRATE 40 MG/ML
2000 INJECTION, SOLUTION INTRAVENOUS ONCE
Status: DISCONTINUED | OUTPATIENT
Start: 2022-05-23 | End: 2022-05-23 | Stop reason: HOSPADM

## 2022-05-23 RX ORDER — LANOLIN ALCOHOL/MO/W.PET/CERES
800 CREAM (GRAM) TOPICAL DAILY
Status: DISCONTINUED | OUTPATIENT
Start: 2022-05-23 | End: 2022-05-23 | Stop reason: HOSPADM

## 2022-05-23 RX ORDER — ERGOCALCIFEROL 1.25 MG/1
50000 CAPSULE ORAL WEEKLY
Qty: 5 CAPSULE | Refills: 0 | Status: SHIPPED | OUTPATIENT
Start: 2022-05-29

## 2022-05-23 RX ADMIN — Medication 3 MG: at 01:19

## 2022-05-23 RX ADMIN — POTASSIUM CHLORIDE 10 MEQ: 7.46 INJECTION, SOLUTION INTRAVENOUS at 09:09

## 2022-05-23 RX ADMIN — RISPERIDONE 2 MG: 1 TABLET ORAL at 11:54

## 2022-05-23 RX ADMIN — POTASSIUM CHLORIDE 10 MEQ: 7.46 INJECTION, SOLUTION INTRAVENOUS at 10:36

## 2022-05-23 RX ADMIN — POTASSIUM BICARBONATE 50 MEQ: 978 TABLET, EFFERVESCENT ORAL at 11:49

## 2022-05-23 RX ADMIN — CALCIUM 500 MG: 500 TABLET ORAL at 08:58

## 2022-05-23 RX ADMIN — ENOXAPARIN SODIUM 30 MG: 100 INJECTION SUBCUTANEOUS at 08:57

## 2022-05-23 RX ADMIN — BENZTROPINE MESYLATE 1 MG: 1 TABLET ORAL at 11:48

## 2022-05-23 RX ADMIN — POTASSIUM CHLORIDE 10 MEQ: 7.46 INJECTION, SOLUTION INTRAVENOUS at 14:20

## 2022-05-23 RX ADMIN — POTASSIUM CHLORIDE 60 MEQ: 20 TABLET, EXTENDED RELEASE ORAL at 15:44

## 2022-05-23 RX ADMIN — Medication 800 MG: at 15:44

## 2022-05-23 RX ADMIN — POTASSIUM CHLORIDE 10 MEQ: 7.46 INJECTION, SOLUTION INTRAVENOUS at 13:02

## 2022-05-23 RX ADMIN — MAGNESIUM SULFATE HEPTAHYDRATE 1000 MG: 1 INJECTION, SOLUTION INTRAVENOUS at 00:26

## 2022-05-23 RX ADMIN — SODIUM CHLORIDE, PRESERVATIVE FREE 10 ML: 5 INJECTION INTRAVENOUS at 08:58

## 2022-05-23 RX ADMIN — POTASSIUM CHLORIDE 10 MEQ: 7.46 INJECTION, SOLUTION INTRAVENOUS at 11:48

## 2022-05-23 NOTE — PROGRESS NOTES
Patient refuses to keep telemetry leads on. Redirection multiple times was unsuccessful. RN left message with Sean Coley (father) to call hospital. Patient is being discharged.

## 2022-05-23 NOTE — PROGRESS NOTES
Patients father had expressed to this RN that patient has a tendency to wander. Wander mat has been placed in front of patients door for safety.

## 2022-05-23 NOTE — CONSULTS
INITIAL CONSULTATION     HISTORY OF PRESENT ILLNESS: Talita Frankel is a 52 y.o. male admitted to the hospital on 5/22/2022 for dizziness. He was found to have hypomagnesemia. Reportedly he has been taking acid reflux medications for a while. Indication is not clear. No reports of any prior EGD. Currently has no symptoms. No abdominal pain. No nausea or vomiting. No heartburn. No dysphagia. No blood in the stool or melena. No weight loss. No prior EGD or colonoscopy. No family history of GI pathology. He smokes. No alcohol or drugs. PAST MEDICAL HISTORY:     Past Medical History:   Diagnosis Date    Hypertension     Schizophrenia ([facility] [address]         History reviewed. No pertinent surgical history.        CURRENT MEDICATIONS:       Current Facility-Administered Medications:     benztropine (COGENTIN) tablet 1 mg, 1 mg, Oral, BID, Jeevan Shaneka Gustafson MD    risperiDONE (RISPERDAL) tablet 2 mg, 2 mg, Oral, BID, Omar Tillman MD    potassium bicarbonate (K-LYTE) disintegrating tablet 50 mEq, 50 mEq, Oral, Once, Omar Tillman MD    magnesium sulfate 2000 mg in dextrose 50 mL IVPB, 2,000 mg, IntraVENous, Once, Omar Tillman MD    sodium chloride flush 0.9 % injection 5-40 mL, 5-40 mL, IntraVENous, 2 times per day, Omar Tillman MD, 10 mL at 05/23/22 0858    sodium chloride flush 0.9 % injection 10 mL, 10 mL, IntraVENous, PRN, Omar Tillman MD    0.9 % sodium chloride infusion, , IntraVENous, PRN, Omar Tillman MD    potassium chloride (KLOR-CON M) extended release tablet 40 mEq, 40 mEq, Oral, PRN **OR** potassium bicarb-citric acid (EFFER-K) effervescent tablet 40 mEq, 40 mEq, Oral, PRN **OR** potassium chloride 10 mEq/100 mL IVPB (Peripheral Line), 10 mEq, IntraVENous, PRN, Omar Tillman MD, Last Rate: 100 mL/hr at 05/23/22 1036, 10 mEq at 05/23/22 1036    magnesium sulfate 1000 mg in dextrose 5% 100 mL IVPB, 1,000 mg, IntraVENous, PRN, Omar Tillman MD, Stopped at 05/23/22 0126    enoxaparin Sodium (LOVENOX) injection 30 mg, 30 mg, SubCUTAneous, BID, Deniz Sharma MD, 30 mg at 05/23/22 0857    ondansetron (ZOFRAN-ODT) disintegrating tablet 4 mg, 4 mg, Oral, Q8H PRN **OR** ondansetron (ZOFRAN) injection 4 mg, 4 mg, IntraVENous, Q6H PRN, Deniz Sharma MD    polyethylene glycol (GLYCOLAX) packet 17 g, 17 g, Oral, Daily PRN, Deniz Sharma MD    acetaminophen (TYLENOL) tablet 650 mg, 650 mg, Oral, Q6H PRN **OR** acetaminophen (TYLENOL) suppository 650 mg, 650 mg, Rectal, Q6H PRN, Deniz Sharma MD    magnesium sulfate 2000 mg in dextrose 50 mL IVPB, 2,000 mg, IntraVENous, TID PRN, Deniz Sharma MD    vitamin D (ERGOCALCIFEROL) capsule 50,000 Units, 50,000 Units, Oral, Weekly, Deniz Sharma MD, 50,000 Units at 05/22/22 2039    nicotine (NICODERM CQ) 14 MG/24HR 1 patch, 1 patch, TransDERmal, Daily, Tori Krueger APRN - CNP, 1 patch at 05/23/22 0858    melatonin tablet 3 mg, 3 mg, Oral, Nightly PRN, Ana Garcia APRN - CNP, 3 mg at 05/23/22 0119       ALLERGIES:   No Known Allergies       FAMILY HISTORY: The patient's family history was reviewed.        SOCIAL HISTORY:   Social History     Socioeconomic History    Marital status: Single     Spouse name: Not on file    Number of children: Not on file    Years of education: Not on file    Highest education level: Not on file   Occupational History    Not on file   Tobacco Use    Smoking status: Current Every Day Smoker     Packs/day: 2.00     Years: 20.00     Pack years: 40.00    Smokeless tobacco: Never Used   Vaping Use    Vaping Use: Never used   Substance and Sexual Activity    Alcohol use: No    Drug use: No    Sexual activity: Never   Other Topics Concern    Not on file   Social History Narrative    Not on file     Social Determinants of Health     Financial Resource Strain:     Difficulty of Paying Living Expenses: Not on file   Food Insecurity:     Worried About Running Out of Food in the Last Year: Not on file    Ran Out of Food in the Last Year: Not on file   Transportation Needs:     Lack of Transportation (Medical): Not on file    Lack of Transportation (Non-Medical): Not on file   Physical Activity:     Days of Exercise per Week: Not on file    Minutes of Exercise per Session: Not on file   Stress:     Feeling of Stress : Not on file   Social Connections:     Frequency of Communication with Friends and Family: Not on file    Frequency of Social Gatherings with Friends and Family: Not on file    Attends [employer] Services: Not on file    Active Member of [address] [place] [address] [email or web address] or Organizations: Not on file    Attends Club or Organization Meetings: Not on file    Marital Status: Not on file   Intimate Partner Violence:     Fear of Current or Ex-Partner: Not on file    Emotionally Abused: Not on file    Physically Abused: Not on file    Sexually Abused: Not on file   Housing Stability:     Unable to Pay for Housing in the Last Year: Not on file    Number of [place] in the Last Year: Not on file    Unstable Housing in the Last Year: Not on file         REVIEW OF SYSTEMS: A 14-point review of systems was obtained and pertinent positives andnegatives were enumerated above in the history of present illness. All other reviewed systems / symptoms were negative. Review of Systems      PHYSICAL EXAMINATION: Vital signs reviewed per the nursing documentation. BP (!) 137/93   Pulse 108   Temp 97.9 °F (36.6 °C) (Oral)   Resp 18   Ht 6' 2\" (1.88 m)   Wt 172 lb 6.4 oz (78.2 kg)   SpO2 97%    L/min   BMI 22.13 kg/m²    [unfilled]   Body mass index is 22.13 kg/m². General:  A O x 3 in NAD   Psych: . Normal affect. Mentation normal   HEENT: PERRLA. Clear conjunctivae and sclerae. Moist oral mucosae, no lesions orulcers. The neck is supple, without lymphadenopathy or jugular venous distension. No masses. Normal thyroid.    Cardiovascular: S1 S2 RRR no rubs or murmurs. Pulmonary: clear BL. No accessory muscle usage. Abd Exam: Soft, NT ND, no hepato or spleno megaly, +BS, no ascites. No groin masses or lymphadenopathy. Extremities: No edema. Skin: Warm skin. No skin rash. No spider nevi palmar erythema naildystrophy. Joint: No joint swelling or deformity. Neurological: intact sensory. DTR+. No asterixis     LABORATORY DATA: Reviewed   Lab Results   Component Value Date    WBC 11.6 (H) 05/22/2022    HGB 15.9 05/22/2022    HCT 45.9 05/22/2022    MCV 88.2 05/22/2022     05/22/2022     05/23/2022    K 3.0 (L) 05/23/2022     05/23/2022    CO2 26 05/23/2022    BUN 3 (L) 05/23/2022    CREATININE 0.49 (L) 05/23/2022    LABALBU 4.0 05/22/2022    BILITOT 1.57 (H) 05/22/2022    ALKPHOS 101 05/22/2022    AST 14 05/22/2022    ALT 15 05/22/2022    INR 1.0 05/23/2022      Lab Results   Component Value Date    RBC 5.21 05/22/2022    HGB 15.9 05/22/2022    MCV 88.2 05/22/2022    MCH 30.5 05/22/2022    MCHC 34.6 05/22/2022    RDW 14.0 05/22/2022    MPV 8.4 05/22/2022    BASOPCT 1 05/22/2022    LYMPHSABS 2.60 05/22/2022    MONOSABS 0.70 05/22/2022    NEUTROABS 8.10 05/22/2022    EOSABS 0.10 05/22/2022    BASOSABS 0.10 05/22/2022          DIAGNOSTIC TESTING:   CT Head WO Contrast    Result Date: 5/22/2022  EXAMINATION: CT OF THE HEAD WITHOUT CONTRAST  5/22/2022 5:16 pm TECHNIQUE: CT of the head was performed without the administration of intravenous contrast. Automated exposure control, iterative reconstruction, and/or weight based adjustment of the mA/kV was utilized to reduce the radiation dose to as low as reasonably achievable. COMPARISON: None.  HISTORY: ORDERING SYSTEM PROVIDED HISTORY: AMS, unsteady gait TECHNOLOGIST PROVIDED HISTORY: AMS, unsteady gait Decision Support Exception - unselect if not a suspected or confirmed emergency medical condition->Emergency Medical Condition (MA) Reason for Exam: ams, dizziness FINDINGS: BRAIN/VENTRICLES: There is no acute intracranial hemorrhage, mass effect or midline shift. No abnormal extra-axial fluid collection. The gray-white differentiation is maintained without evidence of an acute infarct. There is no evidence of hydrocephalus. ORBITS: The visualized portion of the orbits demonstrate no acute abnormality. SINUSES: The visualized paranasal sinuses and mastoid air cells demonstrate no acute abnormality. SOFT TISSUES/SKULL:  No acute abnormality of the visualized skull or soft tissues. No acute intracranial abnormality. IMPRESSION: Mr. Dee Joyce is a 52 y.o. male with hypomagnesemia thought to be related to PPI use. He currently does not have any upper GI symptoms. No need for EGD. Keep off acid reflux medications. Increase he has acid related dyspepsia we will start him on low-dose H2 blocker and wean off slowly. Thank you for allowing me to participate in the care of Mr. Dee Joyce. For any further questions please do not hesitate to contact me.        MD Saji Drake

## 2022-05-23 NOTE — DISCHARGE INSTR - COC
otinuity of Care Form  Patient Name: Yves Jackson   :  1975  MRN:  [id number]    Admit date:  2022  Discharge date:  2022    Code Status Order: Full Code   Advance Directives:      Admitting Physician:  Sami Byers MD  PCP: Gaby Bill, APRN - CNP    Discharging Nurse: [facility] Unit/Room#: 2101/2101-01  Discharging Unit Phone Number: [phone]    Emergency Contact:   Extended Emergency Contact Information  Primary Emergency Contact: MANOLO BARTH [facility]  Address: [address]           [place]  Home Phone: [phone]  Mobile Phone: [phone]  Relation: Legal Guardian  Secondary Emergency Contact: Amber [address] Phone: [phone]  Mobile Phone: [phone]  Relation: Parent    Past Surgical History:  History reviewed. No pertinent surgical history. Immunization History: There is no immunization history on file for this patient.     Active Problems:  Patient Active Problem List   Diagnosis Code    Schizophrenia ([facility] [address] F20.9    Hypomagnesemia E83.42    Hypomagnesemia syndrome E83.42    Gastroesophageal reflux disease K21.9       Isolation/Infection:   Isolation            No Isolation          Patient Infection Status       Infection Onset Added Last Indicated Last Indicated By Review Planned Expiration Resolved Resolved By    None active    Resolved    COVID-19 (Rule Out) 22 COVID-19 & Influenza Combo (Ordered)   22 Rule-Out Test Resulted            Nurse Assessment:  Last Vital Signs: BP (!) 158/100   Pulse 122   Temp 97.5 °F (36.4 °C) (Oral)   Resp 18   Ht 6' 2\" (1.88 m)   Wt 172 lb 6.4 oz (78.2 kg)   SpO2 96%    L/min   BMI 22.13 kg/m²     Last documented pain score (0-10 scale):    Last Weight:   Wt Readings from Last 1 Encounters:   22 172 lb 6.4 oz (78.2 kg)     Mental Status:  disoriented    IV Access:  - None    Nursing Mobility/ADLs:  Walking   Independent  Transfer Independent  Bathing  Independent  Dressing  Independent  Toileting  Independent  Feeding  Independent  Med [address]  Independent  Med Delivery   whole    Wound Care Documentation and Therapy:        Elimination:  Continence: Bowel: Yes  Bladder: Yes  Urinary Catheter: None   Colostomy/Ileostomy/Ileal Conduit: No       Date of Last BM: ***    Intake/Output Summary (Last 24 hours) at 5/23/2022 1554  Last data filed at 5/23/2022 0515  Gross per 24 hour   Intake 2200 ml   Output --   Net 2200 ml     I/O last 3 completed shifts: In: 2200 [P.O.:1200; IV Piggyback:1000]  Out: -     Safety Concerns:     None    Impairments/Disabilities:      Speech    Nutrition Therapy:  Current Nutrition Therapy:   - Oral Diet:  General    Routes of Feeding: Oral  Liquids: Thin Liquids  Daily Fluid Restriction: no  Last Modified Barium Swallow with Video (Video Swallowing Test): not done    Treatments at the Time of Hospital Discharge:   Respiratory Treatments: n/a  Oxygen Therapy:  is not on home oxygen therapy.   Ventilator:    - No ventilator support    Rehab Therapies: Physical Therapy and Occupational Therapy  Weight Bearing Status/Restrictions: No weight bearing restrictions  Other Medical Equipment (for information only, NOT a DME order):  ***  Other Treatments: ***    Patient's personal belongings (please select all that are sent with patient):  None    RN SIGNATURE:  Electronically signed by Vernon Dejesus RN on 5/23/22 at 4:01 PM EDT    CASE MANAGEMENT/SOCIAL WORK SECTION    Inpatient Status Date: ***    Readmission Risk Assessment Score:  Readmission Risk              Risk of Unplanned Readmission:  12           Discharging to Facility/ Agency   Name:   Address:  Phone:  Fax:    Dialysis Facility (if applicable)   Name:  Address:  Dialysis Schedule:  Phone:  Fax:    / signature: {Esignature:[id number]}    PHYSICIAN SECTION    Prognosis: {Prognosis:1328994280}    Condition at Discharge: Roscoe Aviles Patient Condition:625509401}    Rehab Potential (if transferring to Rehab): {Prognosis:2156615108}    Recommended Labs or Other Treatments After Discharge: ***    Physician Certification: I certify the above information and transfer of Naga Femi  is necessary for the continuing treatment of the diagnosis listed and that he requires {Admit to Appropriate Level of Care:88032} for {GREATER/LESS:251211590} 30 days.      Update Admission H&P: {CHP DME Changes in WIDNP:853378341}    PHYSICIAN SIGNATURE:  {Esignature:[id number]}

## 2022-05-23 NOTE — PROGRESS NOTES
Occupational Therapy  Facility/Department: [address] [place] [address]  Occupational Therapy Initial Assessment    Name: Jonna Ashley  : 1975  MRN: [id number]  Date of Service: 2022    Discharge Recommendations:  Patient would benefit from continued therapy after discharge          Patient Diagnosis(es): The encounter diagnosis was Hypomagnesemia. Past Medical History:  has a past medical history of Hypertension and Schizophrenia ([facility] [address] Past Surgical History:  has no past surgical history on file. Treatment Diagnosis: Impaired self-care status. Assessment   Performance deficits / Impairments: Decreased functional mobility ; Decreased ADL status; Decreased strength;Decreased safe awareness;Decreased cognition;Decreased endurance;Decreased balance;Decreased high-level IADLs  Treatment Diagnosis: Impaired self-care status. Prognosis: Good  Decision Making: Medium Complexity  Activity Tolerance  Activity Tolerance: Patient Tolerated treatment well        Plan   Plan  Times per Week: 4-6  Times per Day: Daily  Current Treatment Recommendations: Self-Care / ADL,Strengthening,Balance training,Functional mobility training,Endurance training,Pain management,Safety education & training,Patient/Caregiver education & training,Equipment evaluation, education, & procurement,Cognitive reorientation,Cognitive/Perceptual training,Home management training     Restrictions  Restrictions/Precautions  Restrictions/Precautions: Fall Risk  Required Braces or Orthoses?: No    Subjective   General  Chart Reviewed: Yes,Orders,Progress Notes,History and Physical  Patient assessed for rehabilitation services?: Yes  Family / Caregiver Present: No  Referring Practitioner: Ozzy Liriano MD  Diagnosis: Hypomagnesemia  Subjective  Subjective: \"My legs buckled and I fell\" patient reports 1 fall prior to admission. Pain: Patients pain in R ribs this date - states 4/10. Patient reports increased pain with deep breathing or coughing. Social/Functional History  Social/Functional History  Lives With: Family (mom and dad)  Type of Home: House  Home Layout: Two level (patient reports bedroom/bathroom in basement)  Home Access: Stairs to enter with rails  Entrance Stairs - Number of Steps: 20 steps to basement with R HR; patient states level entry to house  Bathroom Shower/Tub: Walk-in shower,Shower chair with back  Bathroom Toilet: Standard  Bathroom Equipment: Shower chair  Home Equipment:  (no DME)  Has the patient had two or more falls in the past year or any fall with injury in the past year?: Yes (fell prior to admission and hurt ribs)  Receives Help From: Family  ADL Assistance: Independent  Homemaking Assistance:  (mom is primary)  Ambulation Assistance: Independent  Transfer Assistance: Independent  Active : No  Patient's  Info: Patient's dad  Mode of Transportation: Truck  Occupation: On disability  Additional Comments: Patient is a questionable historian with no family present to verify above information. Patient reports his parents work and do not provide 24/7 support. Patient denies additional supports to visit during day while parents are working. Objective   Pulse: 108  Heart Rate Source: Monitor  BP: (!) 137/93  BP Location: Left upper arm  Patient Position: Sitting  MAP (Calculated): 107.67  Resp: 18  SpO2: 97 %  O2 Device: None (Room air)  Hearing: Within functional limits          Safety Devices  Type of Devices: Call light within reach; Bed alarm in place; Left in bed;Patient at risk for falls           ADL  Feeding: Setup  Grooming: Stand by assistance  UE Bathing: Stand by assistance  LE Bathing: Contact guard assistance  UE Dressing: Stand by assistance  LE Dressing: Contact guard assistance  Toileting: Contact guard assistance  Additional Comments: CGA for safety wtih all self-care while standing due to balance deficits. Patient doffed/donned bilateral socks with SBA while seated EOB.  ADL scores based on skilled observations and clinical reasonign unless otherwise noted. Tone RUE  RUE Tone: Normotonic  Tone LUE  LUE Tone: Normotonic  Coordination  Movements Are Fluid And Coordinated: No  Coordination and Movement Description: Left UE;Right UE;Fine motor impairments  Activity Tolerance  Activity Tolerance: Patient tolerated evaluation without incident  Bed mobility  Supine to Sit: Modified independent  Sit to Supine: Modified independent  Bed Mobility Comments: head of bed slightly elevated  Transfers  Sit to stand: Contact guard assistance  Stand to sit: Contact guard assistance  Vision - Basic Assessment  Prior Vision: No visual deficits  Patient Visual Report: No visual complaint reported. Perception  Overall Perceptual Status: WFL     Sensation  Overall Sensation Status: WFL (denies)            LUE AROM (degrees)  LUE AROM : WFL  Left Hand AROM (degrees)  Left Hand AROM: WFL  RUE AROM (degrees)  RUE AROM : WFL  Right Hand AROM (degrees)  Right Hand AROM: WFL  LUE Strength  Gross LUE Strength: WFL  L Hand General: 4/5  LUE Strength Comment: Grossly 4/5  RUE Strength  Gross RUE Strength: WFL  R Hand General: 4/5  RUE Strength Comment: Grossly 4/5               AM-PAC Score        AM-[facility] Inpatient Daily Activity Raw Score: 16 (05/23/22 1052)  AM-PAC Inpatient ADL T-Scale Score : 35.96 (05/23/22 1052)  ADL Inpatient CMS 0-100% Score: 53.32 (05/23/22 1052)  ADL Inpatient CMS G-Code Modifier : CK (05/23/22 1052)    Goals  Short Term Goals  Time Frame for Short term goals: By discharge  Short Term Goal 1: Patient will perform verbalize/demonstrate Good understanding of Fall Prevention Strategies for increased IND with self-care and mobility. Short Term Goal 2: Patient will perform BADLs with Supervision and Fair safety. Short Term Goal 3: Patient will perform functional mobility and transfers with Supervision and Fair safety.   Short Term Goal 4: Patient will actively participate in 15-25 minutes of therapeutic exercise/functional activities to promote increased IND with self-care and mobility. Patient Goals   Patient goals :  To feel better       Therapy Time   Individual Concurrent Group Co-treatment   Time In [place] [address]         Time Out 0937         Minutes [address], [place]

## 2022-05-23 NOTE — DISCHARGE INSTR - DIET

## 2022-05-23 NOTE — PROGRESS NOTES
Attempted to review home medications with patient and patients father Jaron Gonzalez. Patient and father unsure of medications and doses patient takes.

## 2022-05-23 NOTE — PLAN OF CARE
PRE CONSULT ROUNDING NOTE  HPI  52year old male with pmh of schizophrenia htn who presented to the ED for dizziness and AMS, he is being treated for severe hypomagnesemia. Our service is consulted for need for egd. The pt is poor historian no family is at the bedside, hpi per electronic record. Per the notes he developed severe fatigue and was found to have mg level of <0.4. apparently he was taking excessive ppi for gerd. The pt does not know what medication he was taking, he currently denies dysphagia dyspepsia odynophagia abdominal pain and nausea. Ct head negative for acute findings, no abdominal imaging has been done. Mg at 1.8 k 3 wbc 11.6 no anemia, bili 1.57. he denies fevers chills weight loss change in the bowel habits rash melena hematemesis hematochezia.      Endoscopy none  Family reports no hx of liver pancreatic stomach or colon cancer no uc/crohns  Social admits to  smoking no etoh or illicit drugs   BP (!) 137/93   Pulse 108   Temp 97.9 °F (36.6 °C) (Oral)   Resp 18   Ht 6' 2\" (1.88 m)   Wt 172 lb 6.4 oz (78.2 kg)   SpO2 97%    L/min   BMI 22.13 kg/m²     ROS as above meds labs imaging and past medical records were reviewed    Exam  General Appearance: alert and oriented to person, place and time, well-developed and well-nourished, in no acute distress  Skin: warm and dry, no rash or erythema  Head: normocephalic and atraumatic  Eyes: pupils equal, round, and reactive to light, extraocular eye movements intact, conjunctivae normal  ENT: hearing grossly normal bilaterally  Neck: neck supple and non tender without mass, no thyromegaly or thyroid nodules, no cervical lymphadenopathy   Pulmonary/Chest: clear to auscultation bilaterally- no wheezes, rales or rhonchi, normal air movement, no respiratory distress  Cardiovascular: normal rate, regular rhythm, normal S1 and S2, no murmurs, rubs, clicks or gallops, distal pulses intact, no carotid bruits  Abdomen: soft,  non tender, non-distended, normal bowel sounds, no masses or organomegaly no ascites  Extremities: no cyanosis, clubbing or edema  Musculoskeletal: normal range of motion, no joint swelling, deformity or tenderness  Neurologic: no cranial nerve deficit and muscle strength normal    Assessment  Severe hypomagnesemia  Hypokalemia  Reported overuse of ppi  Elevated bilirubin  leucocytosis    Plan  Will d/w md, pt ate breakfast today, unable to do egd, he currently is asymptomatic from a gi standpoint  Electrolyte replacement per primary service  Formal gi consult to follow  . Aashish Pike , APRN - CNP

## 2022-05-23 NOTE — PROGRESS NOTES
Physical Therapy  Facility/Department: [address] [facility]  Physical Therapy Initial Assessment    Name: Earline Torres  : 1975  MRN: [id number]  Date of Service: 2022    Discharge Recommendations:  Patient would benefit from continued therapy after discharge   PT Equipment Recommendations  Equipment Needed: No      Patient Diagnosis(es): The encounter diagnosis was Hypomagnesemia. Past Medical History:  has a past medical history of Hypertension and Schizophrenia ([facility] [address] Past Surgical History:  has no past surgical history on file. Assessment   Body Structures, Functions, Activity Limitations Requiring Skilled Therapeutic Intervention: Decreased functional mobility ; Decreased balance;Decreased safe awareness;Decreased cognition  Assessment: Impaired mobility due to safety issues of decreased balance, cognition and safety awareness  Decision Making: Low Complexity  History: Schizophrenia  Exam: decreased balance, cognition  Clinical Presentation: stable  Requires PT Follow-Up: Yes  Activity Tolerance  Activity Tolerance: Patient tolerated evaluation without incident     Plan   Plan  Plan: 5-7 times per week  Current Treatment Recommendations: Balance training,Stair training,Gait training,Transfer training,Functional mobility training  Safety Devices  Type of Devices: Call light within reach,Bed alarm in place,Left in bed,Patient at risk for falls     Restrictions  Restrictions/Precautions  Restrictions/Precautions: Fall Risk  Required Braces or Orthoses?: No     Subjective   Pain: Patients pain in R ribs this date - states 4/10. Patient reports increased pain with deep breathing or coughing.   General  Patient assessed for rehabilitation services?: Yes  Family / Caregiver Present: Yes  Follows Commands: Within Functional Limits  Subjective  Subjective: pleasant and cooperative         Social/Functional History  Social/Functional History  Lives With: Family (mom and dad)  Type of Home: [facility] Layout: Two level (patient reports bedroom/bathroom in basement)  Home Access: Stairs to enter with rails  Entrance Stairs - Number of Steps: 20 steps to basement with R HR; patient states level entry to house  Bathroom Shower/Tub: Walk-in shower,Shower chair with back  Bathroom Toilet: Standard  Bathroom Equipment: Shower chair  Home Equipment:  (no DME)  Has the patient had two or more falls in the past year or any fall with injury in the past year?: Yes (fell prior to admission and hurt ribs)  Receives Help From: Family  ADL Assistance: Independent  Homemaking Assistance:  (mom is primary)  Ambulation Assistance: Independent  Transfer Assistance: Independent  Active : No  Patient's  Info: Patient's dad  Mode of Transportation: Truck  Occupation: On disability  Additional Comments: Patient is a questionable historian with no family present to verify above information. Patient reports his parents work and do not provide 24/7 support. Patient denies additional supports to visit during day while parents are working.   Vision/Hearing  Hearing: Within functional limits        Objective     SpO2: 97 %  O2 Device: None (Room air)     AROM RLE (degrees)  RLE AROM: WNL  AROM LLE (degrees)  LLE AROM : WNL  Strength RLE  Strength RLE: WNL  Strength LLE  Strength LLE: WNL           Bed mobility  Supine to Sit: Modified independent  Sit to Supine: Modified independent  Scooting: Independent  Bed Mobility Comments: head of bed slightly elevated  Transfers  Sit to Stand: Contact guard assistance  Stand to sit: Contact guard assistance  Ambulation  Device: No Device  Assistance: Contact guard assistance  Gait Deviations: Increased SURESH  Distance: 15ft  Comments: pt ambulated in room due to pt difficult to redirect if refuses to return to room  More Ambulation?: No  Stairs/Curb  Stairs?: No     Balance  Sitting - Static: Good  Sitting - Dynamic: Good  Standing - Static: Fair;+ (SBA)  Standing - Dynamic: Fair (CGA) Goals  Short Term Goals  Time Frame for Short term goals: 3-4 days  Short term goal 1: mod-I transfers  Short term goal 2: mod-I ambulation x 200ft for safe mobility  Short term goal 3: standing balance to good for safe ADLs  Short term goal 4: pt able to negotiate flight of stairs with rail for safe navigation within the home           Therapy Time   Individual Concurrent Group Co-treatment   Time In [facility] 58         Time Out 0936         Minutes [facility] Leisa Martin [place], [place]

## 2022-05-23 NOTE — PROGRESS NOTES
CLINICAL PHARMACY NOTE: MEDS TO BEDS    Total # of Prescriptions Filled: 1   The following medications were delivered to the patient:  · Famotidine 20mg    Additional Documentation:  Delivered medication to patients room.   Otc's not covered, Effer-k, Magnesium, Vitamin D

## 2022-05-23 NOTE — CARE COORDINATION
CASE MANAGEMENT NOTE:    Admission Date:  5/22/2022 Ibrahima Armando is a 52 y.o.  male    Admitted for : Hypomagnesemia [E83.42]  Hypomagnesemia syndrome [E83.42]    Spoke with pt's father, Tereso Sever, over the phone. PCP:  Lindsay King NP                                Insurance:  Medicare      Current Residence/ Living Arrangements:  Pt lives at home with his father and is somewhat independent. Has someone with him 24/7. Current Services PTA:  No    Does patient go to outpatient dialysis: No  If yes, location and chair time: n/a    Is patient agreeable to VNS: No    Freedom of choice provided:  Yes    List of [address] provided: No    VNS chosen:  NA    DME:  none    Home Oxygen: No    Nebulizer: No    CPAP/BIPAP: No    Supplier: N/A    Potential Assistance Needed: No    SNF needed: No    Freedom of choice and list provided: NA    Pharmacy:  [facility]       Is patient currently receiving oral anticoagulation therapy? No    Is the Patient an [facility] with Readmission Risk Score greater than 14%? No  If yes, pt needs a follow up appointment made within 7 days. Family Members/Caregivers that pt would like involved in their care:    Yes    If yes, list name here:  Tereso Sever    Transportation Provider:  Family             Discharge Plan:  Home without needs.                  Electronically signed by: Eliseo Taylor RN on 5/23/2022 at 12:03 PM

## 2022-05-23 NOTE — PROGRESS NOTES
Patient has gotten out of bed multiple times without using call light. This RN has educated patient to use the call light when wanting to get out of bed so they can be assisted and to help prevent possible falls or injury. Bed alarm remains on and wander mat in front of door, along with non-skid socks on.

## 2022-06-16 RX ORDER — FAMOTIDINE 20 MG/1
TABLET, FILM COATED ORAL
Qty: 180 TABLET | OUTPATIENT
Start: 2022-06-16

## 2022-06-23 ENCOUNTER — HOSPITAL ENCOUNTER (OUTPATIENT)
Age: 47
Discharge: HOME | End: 2022-06-23
Payer: MEDICARE

## 2022-06-23 LAB
ANION GAP SERPL CALCULATED.3IONS-SCNC: 12 MMOL/L (ref 9–17)
BUN BLDV-MCNC: 8 MG/DL (ref 6–20)
CALCIUM SERPL-MCNC: 9.7 MG/DL (ref 8.6–10.4)
CHLORIDE BLD-SCNC: 100 MMOL/L (ref 98–107)
CO2: 26 MMOL/L (ref 20–31)
CREAT SERPL-MCNC: 0.67 MG/DL (ref 0.7–1.2)
GFR AFRICAN AMERICAN: >60 ML/MIN
GFR NON-AFRICAN AMERICAN: >60 ML/MIN
GFR SERPL CREATININE-BSD FRML MDRD: ABNORMAL ML/MIN/{1.73_M2}
GLUCOSE BLD-MCNC: 100 MG/DL (ref 70–99)
MAGNESIUM: 1.8 MG/DL (ref 1.6–2.6)
POTASSIUM SERPL-SCNC: 4.3 MMOL/L (ref 3.7–5.3)
SODIUM BLD-SCNC: 138 MMOL/L (ref 135–144)

## 2022-06-23 PROCEDURE — 36415 COLL VENOUS BLD VENIPUNCTURE: CPT

## 2022-06-23 PROCEDURE — 83735 ASSAY OF MAGNESIUM: CPT

## 2022-06-23 PROCEDURE — 80048 BASIC METABOLIC PNL TOTAL CA: CPT

## 2022-09-15 RX ORDER — FAMOTIDINE 20 MG/1
TABLET, FILM COATED ORAL
Qty: 60 TABLET | Refills: 3 | OUTPATIENT
Start: 2022-09-15

## 2023-10-05 ENCOUNTER — HOSPITAL ENCOUNTER (OUTPATIENT)
Age: 48
Discharge: HOME | End: 2023-10-05
Payer: MEDICARE

## 2023-10-05 LAB
25(OH)D3 SERPL-MCNC: 56.3 NG/ML
ALBUMIN SERPL-MCNC: 4 G/DL (ref 3.5–5.2)
ALP SERPL-CCNC: 125 U/L (ref 40–129)
ALT SERPL-CCNC: 47 U/L (ref 5–41)
ANION GAP SERPL CALCULATED.3IONS-SCNC: 11 MMOL/L (ref 9–17)
AST SERPL-CCNC: 20 U/L
BASOPHILS # BLD: 0.1 K/UL (ref 0–0.2)
BASOPHILS NFR BLD: 1 % (ref 0–2)
BILIRUB SERPL-MCNC: 1 MG/DL (ref 0.3–1.2)
BUN SERPL-MCNC: 11 MG/DL (ref 6–20)
CALCIUM SERPL-MCNC: 9.4 MG/DL (ref 8.6–10.4)
CHLORIDE SERPL-SCNC: 105 MMOL/L (ref 98–107)
CHOLEST SERPL-MCNC: 116 MG/DL
CHOLESTEROL/HDL RATIO: 4.6
CO2 SERPL-SCNC: 24 MMOL/L (ref 20–31)
CREAT SERPL-MCNC: 0.8 MG/DL (ref 0.7–1.2)
CREAT UR-MCNC: 102.5 MG/DL (ref 39–259)
EOSINOPHIL # BLD: 0.2 K/UL (ref 0–0.4)
EOSINOPHILS RELATIVE PERCENT: 2 % (ref 0–4)
ERYTHROCYTE [DISTWIDTH] IN BLOOD BY AUTOMATED COUNT: 13.9 % (ref 11.5–14.9)
EST. AVERAGE GLUCOSE BLD GHB EST-MCNC: 97 MG/DL
GFR SERPL CREATININE-BSD FRML MDRD: >60 ML/MIN/1.73M2
GLUCOSE SERPL-MCNC: 127 MG/DL (ref 70–99)
HBA1C MFR BLD: 5 % (ref 4–6)
HCT VFR BLD AUTO: 47.6 % (ref 41–53)
HDLC SERPL-MCNC: 25 MG/DL
HGB BLD-MCNC: 16.3 G/DL (ref 13.5–17.5)
LDLC SERPL CALC-MCNC: 46 MG/DL (ref 0–130)
LYMPHOCYTES NFR BLD: 2.3 K/UL (ref 1–4.8)
LYMPHOCYTES RELATIVE PERCENT: 26 % (ref 24–44)
MAGNESIUM SERPL-MCNC: 1.9 MG/DL (ref 1.6–2.6)
MCH RBC QN AUTO: 31.1 PG (ref 26–34)
MCHC RBC AUTO-ENTMCNC: 34.3 G/DL (ref 31–37)
MCV RBC AUTO: 90.7 FL (ref 80–100)
MICROALBUMIN UR-MCNC: <12 MG/L
MICROALBUMIN/CREAT UR-RTO: NORMAL MCG/MG CREAT
MONOCYTES NFR BLD: 0.6 K/UL (ref 0.1–1.3)
MONOCYTES NFR BLD: 7 % (ref 1–7)
NEUTROPHILS NFR BLD: 64 % (ref 36–66)
NEUTS SEG NFR BLD: 6 K/UL (ref 1.3–9.1)
PLATELET # BLD AUTO: 212 K/UL (ref 150–450)
PMV BLD AUTO: 7.7 FL (ref 6–12)
POTASSIUM SERPL-SCNC: 3.9 MMOL/L (ref 3.7–5.3)
PROT SERPL-MCNC: 7 G/DL (ref 6.4–8.3)
RBC # BLD AUTO: 5.25 M/UL (ref 4.5–5.9)
SODIUM SERPL-SCNC: 140 MMOL/L (ref 135–144)
T4 FREE SERPL-MCNC: 1.6 NG/DL (ref 0.9–1.7)
TRIGL SERPL-MCNC: 227 MG/DL
TSH SERPL DL<=0.05 MIU/L-ACNC: 1.33 UIU/ML (ref 0.3–5)
WBC OTHER # BLD: 9.1 K/UL (ref 3.5–11)

## 2023-10-05 PROCEDURE — 82570 ASSAY OF URINE CREATININE: CPT

## 2023-10-05 PROCEDURE — 82043 UR ALBUMIN QUANTITATIVE: CPT

## 2023-10-05 PROCEDURE — 80061 LIPID PANEL: CPT

## 2023-10-05 PROCEDURE — 82306 VITAMIN D 25 HYDROXY: CPT

## 2023-10-05 PROCEDURE — 85025 COMPLETE CBC W/AUTO DIFF WBC: CPT

## 2023-10-05 PROCEDURE — 83735 ASSAY OF MAGNESIUM: CPT

## 2023-10-05 PROCEDURE — 80053 COMPREHEN METABOLIC PANEL: CPT

## 2023-10-05 PROCEDURE — 84439 ASSAY OF FREE THYROXINE: CPT

## 2023-10-05 PROCEDURE — 36415 COLL VENOUS BLD VENIPUNCTURE: CPT

## 2023-10-05 PROCEDURE — 83036 HEMOGLOBIN GLYCOSYLATED A1C: CPT

## 2023-10-05 PROCEDURE — 84443 ASSAY THYROID STIM HORMONE: CPT

## 2024-07-03 ENCOUNTER — HOSPITAL ENCOUNTER (OUTPATIENT)
Age: 49
Discharge: HOME | End: 2024-07-03
Payer: MEDICARE

## 2024-07-03 LAB
25(OH)D3 SERPL-MCNC: 37.4 NG/ML (ref 30–100)
ALBUMIN SERPL-MCNC: 3.6 G/DL (ref 3.5–5.2)
ALP SERPL-CCNC: 139 U/L (ref 40–129)
ALT SERPL-CCNC: 25 U/L (ref 5–41)
ANION GAP SERPL CALCULATED.3IONS-SCNC: 15 MMOL/L (ref 9–17)
AST SERPL-CCNC: 12 U/L
BASOPHILS # BLD: 0 K/UL (ref 0–0.2)
BASOPHILS NFR BLD: 0 % (ref 0–2)
BILIRUB SERPL-MCNC: 0.7 MG/DL (ref 0.3–1.2)
BUN SERPL-MCNC: 5 MG/DL (ref 6–20)
CALCIUM SERPL-MCNC: 9.7 MG/DL (ref 8.6–10.4)
CHLORIDE SERPL-SCNC: 95 MMOL/L (ref 98–107)
CHOLEST SERPL-MCNC: 97 MG/DL
CHOLESTEROL/HDL RATIO: 3.9
CO2 SERPL-SCNC: 25 MMOL/L (ref 20–31)
CREAT SERPL-MCNC: 0.6 MG/DL (ref 0.7–1.2)
CREAT UR-MCNC: 112 MG/DL (ref 39–259)
EOSINOPHIL # BLD: 0.1 K/UL (ref 0–0.4)
EOSINOPHILS RELATIVE PERCENT: 1 % (ref 0–4)
ERYTHROCYTE [DISTWIDTH] IN BLOOD BY AUTOMATED COUNT: 14.3 % (ref 11.5–14.9)
EST. AVERAGE GLUCOSE BLD GHB EST-MCNC: 146 MG/DL
FERRITIN SERPL-MCNC: 637 NG/ML (ref 30–400)
FOLATE SERPL-MCNC: 3.7 NG/ML (ref 4.8–24.2)
GFR, ESTIMATED: >90 ML/MIN/1.73M2
GLUCOSE SERPL-MCNC: 181 MG/DL (ref 70–99)
HBA1C MFR BLD: 6.7 % (ref 4–6)
HCT VFR BLD AUTO: 41.9 % (ref 41–53)
HDLC SERPL-MCNC: 25 MG/DL
HGB BLD-MCNC: 14.3 G/DL (ref 13.5–17.5)
IRON SATN MFR SERPL: 16 % (ref 20–55)
IRON SERPL-MCNC: 30 UG/DL (ref 61–157)
LDLC SERPL CALC-MCNC: 41 MG/DL (ref 0–130)
LYMPHOCYTES NFR BLD: 1.8 K/UL (ref 1–4.8)
LYMPHOCYTES RELATIVE PERCENT: 14 % (ref 24–44)
MAGNESIUM SERPL-MCNC: 1.8 MG/DL (ref 1.6–2.6)
MCH RBC QN AUTO: 29.6 PG (ref 26–34)
MCHC RBC AUTO-ENTMCNC: 34.2 G/DL (ref 31–37)
MCV RBC AUTO: 86.4 FL (ref 80–100)
MICROALBUMIN UR-MCNC: <12 MG/L (ref 0–20)
MICROALBUMIN/CREAT UR-RTO: NORMAL MCG/MG CREAT (ref 0–17)
MONOCYTES NFR BLD: 0.9 K/UL (ref 0.1–1.3)
MONOCYTES NFR BLD: 6 % (ref 1–7)
NEUTROPHILS NFR BLD: 79 % (ref 36–66)
NEUTS SEG NFR BLD: 10.6 K/UL (ref 1.3–9.1)
PLATELET # BLD AUTO: 381 K/UL (ref 150–450)
PMV BLD AUTO: 6.7 FL (ref 6–12)
POTASSIUM SERPL-SCNC: 4.3 MMOL/L (ref 3.7–5.3)
PROT SERPL-MCNC: 7.5 G/DL (ref 6.4–8.3)
PSA SERPL-MCNC: 0.8 NG/ML (ref 0–4)
RBC # BLD AUTO: 4.85 M/UL (ref 4.5–5.9)
SODIUM SERPL-SCNC: 135 MMOL/L (ref 135–144)
T4 FREE SERPL-MCNC: 1.6 NG/DL (ref 0.9–1.7)
TIBC SERPL-MCNC: 192 UG/DL (ref 250–450)
TRIGL SERPL-MCNC: 157 MG/DL
TSH SERPL DL<=0.05 MIU/L-ACNC: 3.1 UIU/ML (ref 0.3–5)
UNSATURATED IRON BINDING CAPACITY: 162 UG/DL (ref 112–347)
VIT B12 SERPL-MCNC: 499 PG/ML (ref 232–1245)
WBC OTHER # BLD: 13.4 K/UL (ref 3.5–11)

## 2024-07-03 PROCEDURE — 82746 ASSAY OF FOLIC ACID SERUM: CPT

## 2024-07-03 PROCEDURE — 82728 ASSAY OF FERRITIN: CPT

## 2024-07-03 PROCEDURE — 85025 COMPLETE CBC W/AUTO DIFF WBC: CPT

## 2024-07-03 PROCEDURE — 82607 VITAMIN B-12: CPT

## 2024-07-03 PROCEDURE — 82043 UR ALBUMIN QUANTITATIVE: CPT

## 2024-07-03 PROCEDURE — 83735 ASSAY OF MAGNESIUM: CPT

## 2024-07-03 PROCEDURE — 83036 HEMOGLOBIN GLYCOSYLATED A1C: CPT

## 2024-07-03 PROCEDURE — 80053 COMPREHEN METABOLIC PANEL: CPT

## 2024-07-03 PROCEDURE — 84439 ASSAY OF FREE THYROXINE: CPT

## 2024-07-03 PROCEDURE — 84443 ASSAY THYROID STIM HORMONE: CPT

## 2024-07-03 PROCEDURE — 80061 LIPID PANEL: CPT

## 2024-07-03 PROCEDURE — 82570 ASSAY OF URINE CREATININE: CPT

## 2024-07-03 PROCEDURE — 83540 ASSAY OF IRON: CPT

## 2024-07-03 PROCEDURE — 36415 COLL VENOUS BLD VENIPUNCTURE: CPT

## 2024-07-03 PROCEDURE — 82306 VITAMIN D 25 HYDROXY: CPT

## 2024-07-03 PROCEDURE — 83550 IRON BINDING TEST: CPT

## 2024-07-03 PROCEDURE — 84153 ASSAY OF PSA TOTAL: CPT

## 2024-07-09 ENCOUNTER — HOSPITAL ENCOUNTER (EMERGENCY)
Age: 49
Discharge: ANOTHER HOSPITAL | End: 2024-07-10
Attending: EMERGENCY MEDICINE
Payer: MEDICARE

## 2024-07-09 ENCOUNTER — APPOINTMENT (OUTPATIENT)
Dept: CT IMAGING | Age: 49
End: 2024-07-09
Payer: MEDICARE

## 2024-07-09 ENCOUNTER — APPOINTMENT (OUTPATIENT)
Dept: GENERAL RADIOLOGY | Age: 49
End: 2024-07-09
Payer: MEDICARE

## 2024-07-09 DIAGNOSIS — M84.551A PATHOLOGICAL FRACTURE OF RIGHT HIP DUE TO NEOPLASTIC DISEASE, INITIAL ENCOUNTER (HCC): Primary | ICD-10-CM

## 2024-07-09 DIAGNOSIS — R91.8 PULMONARY NODULES/LESIONS, MULTIPLE: ICD-10-CM

## 2024-07-09 LAB
ALBUMIN SERPL-MCNC: 3.3 G/DL (ref 3.5–5.2)
ALP SERPL-CCNC: 152 U/L (ref 40–129)
ALT SERPL-CCNC: 44 U/L (ref 5–41)
ANION GAP SERPL CALCULATED.3IONS-SCNC: 14 MMOL/L (ref 9–17)
AST SERPL-CCNC: 12 U/L
BASOPHILS # BLD: 0 K/UL (ref 0–0.2)
BASOPHILS NFR BLD: 0 % (ref 0–2)
BILIRUB SERPL-MCNC: 0.6 MG/DL (ref 0.3–1.2)
BUN SERPL-MCNC: 6 MG/DL (ref 6–20)
CALCIUM SERPL-MCNC: 9.9 MG/DL (ref 8.6–10.4)
CHLORIDE SERPL-SCNC: 94 MMOL/L (ref 98–107)
CO2 SERPL-SCNC: 25 MMOL/L (ref 20–31)
CREAT SERPL-MCNC: 0.6 MG/DL (ref 0.7–1.2)
EOSINOPHIL # BLD: 0 K/UL (ref 0–0.4)
EOSINOPHILS RELATIVE PERCENT: 0 % (ref 0–4)
ERYTHROCYTE [DISTWIDTH] IN BLOOD BY AUTOMATED COUNT: 13.9 % (ref 11.5–14.9)
GFR, ESTIMATED: >90 ML/MIN/1.73M2
GLUCOSE SERPL-MCNC: 187 MG/DL (ref 70–99)
HCT VFR BLD AUTO: 41.5 % (ref 41–53)
HGB BLD-MCNC: 14.1 G/DL (ref 13.5–17.5)
LYMPHOCYTES NFR BLD: 1.36 K/UL (ref 1–4.8)
LYMPHOCYTES RELATIVE PERCENT: 9 % (ref 24–44)
MAGNESIUM SERPL-MCNC: 1.7 MG/DL (ref 1.6–2.6)
MCH RBC QN AUTO: 28.9 PG (ref 26–34)
MCHC RBC AUTO-ENTMCNC: 33.9 G/DL (ref 31–37)
MCV RBC AUTO: 85.4 FL (ref 80–100)
MONOCYTES NFR BLD: 0.45 K/UL (ref 0.1–1.3)
MONOCYTES NFR BLD: 3 % (ref 1–7)
MORPHOLOGY: NORMAL
NEUTROPHILS NFR BLD: 88 % (ref 36–66)
NEUTS SEG NFR BLD: 13.29 K/UL (ref 1.3–9.1)
PLATELET # BLD AUTO: 345 K/UL (ref 150–450)
PMV BLD AUTO: 6.4 FL (ref 6–12)
POTASSIUM SERPL-SCNC: 4.2 MMOL/L (ref 3.7–5.3)
PROT SERPL-MCNC: 7.3 G/DL (ref 6.4–8.3)
RBC # BLD AUTO: 4.86 M/UL (ref 4.5–5.9)
SODIUM SERPL-SCNC: 133 MMOL/L (ref 135–144)
TROPONIN I SERPL HS-MCNC: 8 NG/L (ref 0–22)
TROPONIN I SERPL HS-MCNC: 8 NG/L (ref 0–22)
TSH SERPL DL<=0.05 MIU/L-ACNC: 2.14 UIU/ML (ref 0.3–5)
WBC OTHER # BLD: 15.1 K/UL (ref 3.5–11)

## 2024-07-09 PROCEDURE — 6370000000 HC RX 637 (ALT 250 FOR IP): Performed by: STUDENT IN AN ORGANIZED HEALTH CARE EDUCATION/TRAINING PROGRAM

## 2024-07-09 PROCEDURE — 2580000003 HC RX 258: Performed by: EMERGENCY MEDICINE

## 2024-07-09 PROCEDURE — 71260 CT THORAX DX C+: CPT

## 2024-07-09 PROCEDURE — 36415 COLL VENOUS BLD VENIPUNCTURE: CPT

## 2024-07-09 PROCEDURE — 73502 X-RAY EXAM HIP UNI 2-3 VIEWS: CPT

## 2024-07-09 PROCEDURE — 6360000002 HC RX W HCPCS: Performed by: STUDENT IN AN ORGANIZED HEALTH CARE EDUCATION/TRAINING PROGRAM

## 2024-07-09 PROCEDURE — 84443 ASSAY THYROID STIM HORMONE: CPT

## 2024-07-09 PROCEDURE — 70450 CT HEAD/BRAIN W/O DYE: CPT

## 2024-07-09 PROCEDURE — 96361 HYDRATE IV INFUSION ADD-ON: CPT

## 2024-07-09 PROCEDURE — 83735 ASSAY OF MAGNESIUM: CPT

## 2024-07-09 PROCEDURE — 80053 COMPREHEN METABOLIC PANEL: CPT

## 2024-07-09 PROCEDURE — 84484 ASSAY OF TROPONIN QUANT: CPT

## 2024-07-09 PROCEDURE — 99285 EMERGENCY DEPT VISIT HI MDM: CPT

## 2024-07-09 PROCEDURE — 85025 COMPLETE CBC W/AUTO DIFF WBC: CPT

## 2024-07-09 PROCEDURE — 6360000004 HC RX CONTRAST MEDICATION: Performed by: EMERGENCY MEDICINE

## 2024-07-09 PROCEDURE — 93005 ELECTROCARDIOGRAM TRACING: CPT | Performed by: EMERGENCY MEDICINE

## 2024-07-09 PROCEDURE — 96374 THER/PROPH/DIAG INJ IV PUSH: CPT

## 2024-07-09 RX ORDER — PALIPERIDONE PALMITATE 546 MG/1.75ML
INJECTION, SUSPENSION, EXTENDED RELEASE INTRAMUSCULAR
Status: ON HOLD | COMMUNITY
Start: 2024-05-22

## 2024-07-09 RX ORDER — ATORVASTATIN CALCIUM 80 MG/1
80 TABLET, FILM COATED ORAL NIGHTLY
Status: ON HOLD | COMMUNITY
Start: 2024-07-01

## 2024-07-09 RX ORDER — 0.9 % SODIUM CHLORIDE 0.9 %
100 INTRAVENOUS SOLUTION INTRAVENOUS ONCE
Status: COMPLETED | OUTPATIENT
Start: 2024-07-09 | End: 2024-07-09

## 2024-07-09 RX ORDER — SODIUM CHLORIDE 0.9 % (FLUSH) 0.9 %
10 SYRINGE (ML) INJECTION PRN
Status: DISCONTINUED | OUTPATIENT
Start: 2024-07-09 | End: 2024-07-10 | Stop reason: HOSPADM

## 2024-07-09 RX ORDER — TRAZODONE HYDROCHLORIDE 50 MG/1
100 TABLET ORAL ONCE
Status: COMPLETED | OUTPATIENT
Start: 2024-07-10 | End: 2024-07-09

## 2024-07-09 RX ORDER — PROPRANOLOL HYDROCHLORIDE 20 MG/1
20 TABLET ORAL 2 TIMES DAILY
Status: ON HOLD | COMMUNITY
Start: 2024-06-27

## 2024-07-09 RX ORDER — MORPHINE SULFATE 4 MG/ML
4 INJECTION, SOLUTION INTRAMUSCULAR; INTRAVENOUS ONCE
Status: COMPLETED | OUTPATIENT
Start: 2024-07-10 | End: 2024-07-09

## 2024-07-09 RX ORDER — TRAZODONE HYDROCHLORIDE 100 MG/1
100 TABLET ORAL NIGHTLY
Status: ON HOLD | COMMUNITY
Start: 2024-06-27

## 2024-07-09 RX ADMIN — SODIUM CHLORIDE, PRESERVATIVE FREE 10 ML: 5 INJECTION INTRAVENOUS at 20:51

## 2024-07-09 RX ADMIN — MORPHINE SULFATE 4 MG: 4 INJECTION, SOLUTION INTRAMUSCULAR; INTRAVENOUS at 23:52

## 2024-07-09 RX ADMIN — SODIUM CHLORIDE 100 ML: 9 INJECTION, SOLUTION INTRAVENOUS at 20:51

## 2024-07-09 RX ADMIN — TRAZODONE HYDROCHLORIDE 100 MG: 50 TABLET ORAL at 23:51

## 2024-07-09 RX ADMIN — IOPAMIDOL 100 ML: 755 INJECTION, SOLUTION INTRAVENOUS at 20:50

## 2024-07-09 ASSESSMENT — PAIN SCALES - GENERAL
PAINLEVEL_OUTOF10: 10
PAINLEVEL_OUTOF10: 8

## 2024-07-09 ASSESSMENT — PAIN - FUNCTIONAL ASSESSMENT: PAIN_FUNCTIONAL_ASSESSMENT: 0-10

## 2024-07-09 ASSESSMENT — LIFESTYLE VARIABLES
HOW OFTEN DO YOU HAVE A DRINK CONTAINING ALCOHOL: NEVER
HOW MANY STANDARD DRINKS CONTAINING ALCOHOL DO YOU HAVE ON A TYPICAL DAY: PATIENT DOES NOT DRINK

## 2024-07-09 NOTE — PLAN OF CARE
I signed up for this patient in error. I did not see this patient. I did not participate in the evlauation or treatment of this patient.    Electronically signed by Michel Brooke DO on 7/9/2024 at 7:15 PM

## 2024-07-09 NOTE — ED PROVIDER NOTES
[facility] ED  EMERGENCY DEPARTMENT ENCOUNTER      Pt Name: Isiah Duncan  MRN: [id number]  Birthdate 1975  Date of evaluation: 7/9/24      CHIEF COMPLAINT       Chief Complaint   Patient presents with    Leg Pain    Weight Loss     HISTORY OF PRESENT ILLNESS   HPI 49 y.o. male presents with c/o hip pain, fall, weight loss.   Pt reports that he has been having r. Hip for about 1 to 2 months.  Pain worsened when he tries walking on it.  Pain has been causing him to fall.  His balance has been off for the last month.   Or so.  He has had about a 70 pound unintentional weight loss over the last year.  He says he is just not eating much.  He does have a chronic cough.  He reports a slow most of his adult life.  No vomiting or diarrhea.  No chest pain.  No headache.  Says he does have a bump on his scalp he thinks it was from a fall.  Patient has schizophrenia his dad's guardian and dad accompany him at his bedside.  Dad reports that a few years ago the patient had really low magnesium, he thinks that that might be going on again.    REVIEW OF SYSTEMS       Review of Systems  10 systems reviewed and negative unless otherwise noted in the HPI    PAST MEDICAL HISTORY     Past Medical History:   Diagnosis Date    Hypertension     Schizophrenia (HCC)        SURGICAL HISTORY     History reviewed. No pertinent surgical history.    CURRENT MEDICATIONS       Previous Medications    BENZTROPINE (COGENTIN) 1 MG TABLET    Take 1 tablet by mouth 2 times daily.    ERGOCALCIFEROL (VITAMIN D) 22700 UNITS CAPS    Take 50,000 Units by mouth once a week    FAMOTIDINE (PEPCID) 20 MG TABLET    Take 1 tablet by mouth 2 times daily    MAGNESIUM 400 MG CAPS    Take 1 capsule by mouth in the morning and at bedtime    POTASSIUM BICARB-CITRIC ACID (EFFER-K) 20 MEQ TBEF EFFERVESCENT TABLET    Take 1 tablet by mouth daily for 15 days    RISPERIDONE (RISPERDAL) 2 MG TABLET    Take 1 tablet by mouth 2 times daily.       ALLERGIES     has No  infiltration of the liver.   8. Mild constipation.   Findings discussed with Dr. Brian Chapman on 07/09/2024 at 9:30 p.m.      RECOMMENDATIONS:   Oncology consult.         CT HEAD WO CONTRAST   Final Result   No acute intracranial abnormality.      Large heterogeneous mixed solid and cystic soft tissue mass at the vertex   measuring 10.1 x 9.1 x 5.9 cm resulting in bony destruction of the adjacent   skull and mass effect on the bilateral frontal parietal lobes.  Finding is   concerning for a malignancy.  Correlate with past medical history.  Recommend   tissue sampling if not already obtained.         XR HIP 2-3 VW W PELVIS RIGHT   Preliminary Result   No acute fracture or dislocation.             LABS: Lab orders shown below, the results are reviewed by myself, and all abnormals are listed below.  Labs Reviewed   CBC WITH AUTO DIFFERENTIAL - Abnormal; Notable for the following components:       Result Value    WBC 15.1 (*)     Neutrophils % 88 (*)     Lymphocytes % 9 (*)     Neutrophils Absolute 13.29 (*)     All other components within normal limits   COMPREHENSIVE METABOLIC PANEL - Abnormal; Notable for the following components:    Sodium 133 (*)     Chloride 94 (*)     Glucose 187 (*)     Creatinine 0.6 (*)     Albumin 3.3 (*)     Alkaline Phosphatase 152 (*)     ALT 44 (*)     All other components within normal limits   MAGNESIUM   TSH   TROPONIN   TROPONIN       Vitals Reviewed:    Vitals:    07/09/24 2130 07/09/24 2145 07/09/24 2200 07/09/24 2215   BP: 117/72 (!) 120/90 129/86 112/69   Pulse:       Resp:       Temp:       SpO2: 95% 95% 94% 93%   Weight:       Height:         MEDICATIONS GIVEN TO PATIENT THIS ENCOUNTER:  Orders Placed This Encounter   Medications    iopamidol (ISOVUE-370) 76 % injection 100 mL    sodium chloride 0.9 % bolus 100 mL    sodium chloride flush 0.9 % injection 10 mL     DISCHARGE PRESCRIPTIONS:  New Prescriptions    No medications on file     PHYSICIAN CONSULTS ORDERED THIS

## 2024-07-10 ENCOUNTER — HOSPITAL ENCOUNTER (INPATIENT)
Age: 49
LOS: 8 days | Discharge: NURSING, REHAB OR LONG-TERM CARE | End: 2024-07-18
Attending: FAMILY MEDICINE | Admitting: STUDENT IN AN ORGANIZED HEALTH CARE EDUCATION/TRAINING PROGRAM
Payer: MEDICARE

## 2024-07-10 VITALS
HEIGHT: 74 IN | OXYGEN SATURATION: 95 % | RESPIRATION RATE: 27 BRPM | TEMPERATURE: 98 F | DIASTOLIC BLOOD PRESSURE: 72 MMHG | WEIGHT: 188 LBS | BODY MASS INDEX: 24.13 KG/M2 | HEART RATE: 101 BPM | SYSTOLIC BLOOD PRESSURE: 118 MMHG

## 2024-07-10 DIAGNOSIS — S72.009A CLOSED FRACTURE OF NECK OF FEMUR, UNSPECIFIED LATERALITY, INITIAL ENCOUNTER (HCC): ICD-10-CM

## 2024-07-10 PROBLEM — S72.001A CLOSED RIGHT HIP FRACTURE, INITIAL ENCOUNTER (HCC): Status: ACTIVE | Noted: 2024-07-10

## 2024-07-10 LAB
EKG ATRIAL RATE: 118 BPM
EKG ATRIAL RATE: 118 BPM
EKG ATRIAL RATE: 130 BPM
EKG P AXIS: 33 DEGREES
EKG P AXIS: 48 DEGREES
EKG P AXIS: 49 DEGREES
EKG P-R INTERVAL: 128 MS
EKG P-R INTERVAL: 136 MS
EKG P-R INTERVAL: 146 MS
EKG Q-T INTERVAL: 320 MS
EKG Q-T INTERVAL: 320 MS
EKG Q-T INTERVAL: 330 MS
EKG QRS DURATION: 88 MS
EKG QRS DURATION: 90 MS
EKG QRS DURATION: 98 MS
EKG QTC CALCULATION (BAZETT): 448 MS
EKG QTC CALCULATION (BAZETT): 462 MS
EKG QTC CALCULATION (BAZETT): 470 MS
EKG R AXIS: 42 DEGREES
EKG R AXIS: 43 DEGREES
EKG R AXIS: 47 DEGREES
EKG T AXIS: 45 DEGREES
EKG T AXIS: 55 DEGREES
EKG T AXIS: 60 DEGREES
EKG VENTRICULAR RATE: 118 BPM
EKG VENTRICULAR RATE: 118 BPM
EKG VENTRICULAR RATE: 130 BPM

## 2024-07-10 PROCEDURE — 93010 ELECTROCARDIOGRAM REPORT: CPT | Performed by: INTERNAL MEDICINE

## 2024-07-10 PROCEDURE — 93005 ELECTROCARDIOGRAM TRACING: CPT | Performed by: STUDENT IN AN ORGANIZED HEALTH CARE EDUCATION/TRAINING PROGRAM

## 2024-07-10 PROCEDURE — 6370000000 HC RX 637 (ALT 250 FOR IP): Performed by: EMERGENCY MEDICINE

## 2024-07-10 PROCEDURE — 6370000000 HC RX 637 (ALT 250 FOR IP): Performed by: NURSE PRACTITIONER

## 2024-07-10 PROCEDURE — 1200000000 HC SEMI PRIVATE

## 2024-07-10 PROCEDURE — 6370000000 HC RX 637 (ALT 250 FOR IP): Performed by: STUDENT IN AN ORGANIZED HEALTH CARE EDUCATION/TRAINING PROGRAM

## 2024-07-10 PROCEDURE — 96376 TX/PRO/DX INJ SAME DRUG ADON: CPT

## 2024-07-10 PROCEDURE — 99223 1ST HOSP IP/OBS HIGH 75: CPT | Performed by: INTERNAL MEDICINE

## 2024-07-10 PROCEDURE — 96375 TX/PRO/DX INJ NEW DRUG ADDON: CPT

## 2024-07-10 PROCEDURE — 2580000003 HC RX 258: Performed by: STUDENT IN AN ORGANIZED HEALTH CARE EDUCATION/TRAINING PROGRAM

## 2024-07-10 PROCEDURE — 6360000002 HC RX W HCPCS: Performed by: STUDENT IN AN ORGANIZED HEALTH CARE EDUCATION/TRAINING PROGRAM

## 2024-07-10 PROCEDURE — 96361 HYDRATE IV INFUSION ADD-ON: CPT

## 2024-07-10 PROCEDURE — 2580000003 HC RX 258: Performed by: NURSE PRACTITIONER

## 2024-07-10 RX ORDER — ONDANSETRON 4 MG/1
4 TABLET, ORALLY DISINTEGRATING ORAL EVERY 8 HOURS PRN
Status: DISCONTINUED | OUTPATIENT
Start: 2024-07-10 | End: 2024-07-18 | Stop reason: HOSPADM

## 2024-07-10 RX ORDER — ACETAMINOPHEN 325 MG/1
650 TABLET ORAL EVERY 6 HOURS PRN
Status: DISCONTINUED | OUTPATIENT
Start: 2024-07-10 | End: 2024-07-18 | Stop reason: HOSPADM

## 2024-07-10 RX ORDER — ONDANSETRON 2 MG/ML
4 INJECTION INTRAMUSCULAR; INTRAVENOUS EVERY 6 HOURS PRN
Status: DISCONTINUED | OUTPATIENT
Start: 2024-07-10 | End: 2024-07-18 | Stop reason: HOSPADM

## 2024-07-10 RX ORDER — PROPRANOLOL HYDROCHLORIDE 20 MG/1
20 TABLET ORAL 2 TIMES DAILY
Status: DISCONTINUED | OUTPATIENT
Start: 2024-07-10 | End: 2024-07-10 | Stop reason: HOSPADM

## 2024-07-10 RX ORDER — FENTANYL CITRATE 0.05 MG/ML
50 INJECTION, SOLUTION INTRAMUSCULAR; INTRAVENOUS ONCE
Status: COMPLETED | OUTPATIENT
Start: 2024-07-10 | End: 2024-07-10

## 2024-07-10 RX ORDER — 0.9 % SODIUM CHLORIDE 0.9 %
1000 INTRAVENOUS SOLUTION INTRAVENOUS ONCE
Status: COMPLETED | OUTPATIENT
Start: 2024-07-10 | End: 2024-07-10

## 2024-07-10 RX ORDER — SODIUM CHLORIDE 0.9 % (FLUSH) 0.9 %
10 SYRINGE (ML) INJECTION PRN
Status: DISCONTINUED | OUTPATIENT
Start: 2024-07-10 | End: 2024-07-18 | Stop reason: HOSPADM

## 2024-07-10 RX ORDER — POTASSIUM CHLORIDE 7.45 MG/ML
10 INJECTION INTRAVENOUS PRN
Status: DISCONTINUED | OUTPATIENT
Start: 2024-07-10 | End: 2024-07-18 | Stop reason: HOSPADM

## 2024-07-10 RX ORDER — SODIUM CHLORIDE 9 MG/ML
INJECTION, SOLUTION INTRAVENOUS CONTINUOUS
Status: DISCONTINUED | OUTPATIENT
Start: 2024-07-10 | End: 2024-07-12

## 2024-07-10 RX ORDER — LANOLIN ALCOHOL/MO/W.PET/CERES
400 CREAM (GRAM) TOPICAL DAILY
Status: DISCONTINUED | OUTPATIENT
Start: 2024-07-10 | End: 2024-07-10 | Stop reason: HOSPADM

## 2024-07-10 RX ORDER — SODIUM CHLORIDE 0.9 % (FLUSH) 0.9 %
5-40 SYRINGE (ML) INJECTION EVERY 12 HOURS SCHEDULED
Status: DISCONTINUED | OUTPATIENT
Start: 2024-07-10 | End: 2024-07-18 | Stop reason: HOSPADM

## 2024-07-10 RX ORDER — NICOTINE 21 MG/24HR
1 PATCH, TRANSDERMAL 24 HOURS TRANSDERMAL DAILY
Status: DISCONTINUED | OUTPATIENT
Start: 2024-07-10 | End: 2024-07-10 | Stop reason: HOSPADM

## 2024-07-10 RX ORDER — PROPRANOLOL HYDROCHLORIDE 20 MG/1
20 TABLET ORAL ONCE
Status: COMPLETED | OUTPATIENT
Start: 2024-07-10 | End: 2024-07-10

## 2024-07-10 RX ORDER — MAGNESIUM SULFATE 1 G/100ML
1000 INJECTION INTRAVENOUS PRN
Status: DISCONTINUED | OUTPATIENT
Start: 2024-07-10 | End: 2024-07-18 | Stop reason: HOSPADM

## 2024-07-10 RX ORDER — ENOXAPARIN SODIUM 100 MG/ML
40 INJECTION SUBCUTANEOUS DAILY
Status: DISCONTINUED | OUTPATIENT
Start: 2024-07-11 | End: 2024-07-18 | Stop reason: HOSPADM

## 2024-07-10 RX ORDER — SODIUM CHLORIDE 9 MG/ML
INJECTION, SOLUTION INTRAVENOUS PRN
Status: DISCONTINUED | OUTPATIENT
Start: 2024-07-10 | End: 2024-07-18 | Stop reason: HOSPADM

## 2024-07-10 RX ORDER — ACETAMINOPHEN 650 MG/1
650 SUPPOSITORY RECTAL EVERY 6 HOURS PRN
Status: DISCONTINUED | OUTPATIENT
Start: 2024-07-10 | End: 2024-07-18 | Stop reason: HOSPADM

## 2024-07-10 RX ORDER — POLYETHYLENE GLYCOL 3350 17 G/17G
17 POWDER, FOR SOLUTION ORAL DAILY PRN
Status: DISCONTINUED | OUTPATIENT
Start: 2024-07-10 | End: 2024-07-18 | Stop reason: HOSPADM

## 2024-07-10 RX ORDER — POTASSIUM CHLORIDE 20 MEQ/1
40 TABLET, EXTENDED RELEASE ORAL PRN
Status: DISCONTINUED | OUTPATIENT
Start: 2024-07-10 | End: 2024-07-18 | Stop reason: HOSPADM

## 2024-07-10 RX ORDER — HYDROCODONE BITARTRATE AND ACETAMINOPHEN 5; 325 MG/1; MG/1
2 TABLET ORAL EVERY 4 HOURS PRN
Status: DISCONTINUED | OUTPATIENT
Start: 2024-07-10 | End: 2024-07-18 | Stop reason: HOSPADM

## 2024-07-10 RX ORDER — HYDROCODONE BITARTRATE AND ACETAMINOPHEN 5; 325 MG/1; MG/1
1 TABLET ORAL EVERY 4 HOURS PRN
Status: DISCONTINUED | OUTPATIENT
Start: 2024-07-10 | End: 2024-07-18 | Stop reason: HOSPADM

## 2024-07-10 RX ADMIN — Medication 500 MG: at 17:00

## 2024-07-10 RX ADMIN — Medication 400 MG: at 12:31

## 2024-07-10 RX ADMIN — Medication 500 MG: at 12:31

## 2024-07-10 RX ADMIN — FENTANYL CITRATE 50 MCG: 0.05 INJECTION, SOLUTION INTRAMUSCULAR; INTRAVENOUS at 04:41

## 2024-07-10 RX ADMIN — PROPRANOLOL HYDROCHLORIDE 20 MG: 20 TABLET ORAL at 05:25

## 2024-07-10 RX ADMIN — SODIUM CHLORIDE: 9 INJECTION, SOLUTION INTRAVENOUS at 22:28

## 2024-07-10 RX ADMIN — HYDROCODONE BITARTRATE AND ACETAMINOPHEN 2 TABLET: 5; 325 TABLET ORAL at 22:24

## 2024-07-10 RX ADMIN — PROPRANOLOL HYDROCHLORIDE 20 MG: 20 TABLET ORAL at 21:06

## 2024-07-10 RX ADMIN — FENTANYL CITRATE 50 MCG: 0.05 INJECTION, SOLUTION INTRAMUSCULAR; INTRAVENOUS at 03:10

## 2024-07-10 RX ADMIN — SODIUM CHLORIDE, PRESERVATIVE FREE 10 ML: 5 INJECTION INTRAVENOUS at 22:37

## 2024-07-10 RX ADMIN — PROPRANOLOL HYDROCHLORIDE 20 MG: 20 TABLET ORAL at 12:44

## 2024-07-10 RX ADMIN — SODIUM CHLORIDE 1000 ML: 9 INJECTION, SOLUTION INTRAVENOUS at 05:27

## 2024-07-10 ASSESSMENT — PAIN - FUNCTIONAL ASSESSMENT
PAIN_FUNCTIONAL_ASSESSMENT: PREVENTS OR INTERFERES SOME ACTIVE ACTIVITIES AND ADLS
PAIN_FUNCTIONAL_ASSESSMENT: PREVENTS OR INTERFERES SOME ACTIVE ACTIVITIES AND ADLS

## 2024-07-10 ASSESSMENT — PAIN DESCRIPTION - LOCATION
LOCATION: HIP
LOCATION: LEG

## 2024-07-10 ASSESSMENT — PAIN DESCRIPTION - ORIENTATION
ORIENTATION: RIGHT
ORIENTATION: RIGHT
ORIENTATION: RIGHT;LEFT
ORIENTATION: RIGHT

## 2024-07-10 ASSESSMENT — PAIN SCALES - GENERAL
PAINLEVEL_OUTOF10: 3
PAINLEVEL_OUTOF10: 10
PAINLEVEL_OUTOF10: 10
PAINLEVEL_OUTOF10: 8
PAINLEVEL_OUTOF10: 10

## 2024-07-10 ASSESSMENT — PAIN DESCRIPTION - DESCRIPTORS
DESCRIPTORS: ACHING;SORE;SHARP
DESCRIPTORS: CRAMPING;ACHING;SHARP
DESCRIPTORS: SHARP
DESCRIPTORS: SHARP

## 2024-07-10 NOTE — ED NOTES
Report given to MALIA Freitas from Norma Castellanos RN.   Report method in person   The following was reviewed with receiving RN:   Current vital signs:  BP (!) 148/108   Pulse (!) 107   Temp 98 °F (36.7 °C)   Resp 23   Ht 1.88 m (6' 2.02\")   Wt 85.3 kg (188 lb)   SpO2 95%   BMI 24.13 kg/m²                MEWS Score: 4     Any medication or safety alerts were reviewed. Any pending diagnostics and notifications were also reviewed, as well as any safety concerns or issues, abnormal labs, abnormal imaging, and abnormal assessment findings. Questions were answered.

## 2024-07-10 NOTE — CONSULTS
Today's Date: 7/10/2024  Patient Name: Isiah Duncan  Date of admission: 7/9/2024  6:38 PM  Patient's age: 49 y.o., 1975  Admission Dx: No admission diagnoses are documented for this encounter.    Reason for Consult: Metastatic carcinoma  Requesting Physician: No admitting provider for patient encounter.    CHIEF COMPLAINT: Weight loss and hip pain    History Obtained From:  patient/father    HISTORY OF PRESENT ILLNESS:      The patient is a 49 y.o.  male who is admitted to the hospital for hip pain on the right side which has been going on for 2 months, Pain has been causing him to fall.  His balance has been off for the last month.    has had about a 70 pound unintentional weight loss over the last year.  He says he is just not eating much.  He does have a chronic cough. No vomiting or diarrhea.  No chest pain.  No headache.  Says he does have a bump on his scalp he thinks it was from a fall.  Patient has schizophrenia his dad's guardian  CT chest abdomen pelvis done and did show left upper lobe infiltrate with possible postobstructive changes or metastatic disease there is left hilar mass with left scapular mass and lytic/destructive lesion of the right femoral neck and a CT of the head large soft tissue mass at the vertex      Past Medical History:   has a past medical history of Hypertension and Schizophrenia (HCC).    Past Surgical History:   has no past surgical history on file.     Medications:    Reviewed in Epic     Allergies:  Patient has no known allergies.    Social History:   reports that he has been smoking. He has a 40.0 pack-year smoking history. He has never used smokeless tobacco. He reports that he does not drink alcohol and does not use drugs.     Family History: family history includes Diabetes in his father; Substance Abuse in his brother.    REVIEW OF SYSTEMS:    14 point review of system has been obtained unremarkable although it was mentioned above    PHYSICAL EXAM:      BP  picture compatible with metastatic carcinoma discussed with the patient and his father about metastatic carcinoma in general as not curative disease and recommend palliative systemic treatment pending on pathology and molecular marker  Orthopedic consult and can get the biopsy during the orthopedic intervention otherwise a biopsy of the most accessible area  MRI of the brain with and without contrast  Nurse navigator consult  Follow-up with oncology after discharge      Discussed with patient and Nurse.      Thank you for asking us to see this patient.                                    Calin Strong MD                          [facility]                            This note is created with the assistance of a speech recognition program.  While intending to generate a document that actually reflects the content of the visit, the document can still have some errors including those of syntax and sound a like substitutions which may escape proof reading.  It such instances, actual meaning can be extrapolated by contextual diversion.     Hematologist/Medical Oncologist

## 2024-07-10 NOTE — ED NOTES
Report given to MALIA Ashley from ED .   Report method in person   The following was reviewed with receiving RN:   Current vital signs:  BP (!) 144/98   Pulse (!) 109   Temp 98 °F (36.7 °C)   Resp 27   Ht 1.88 m (6' 2.02\")   Wt 85.3 kg (188 lb)   SpO2 94%   BMI 24.13 kg/m²                MEWS Score: 4     Any medication or safety alerts were reviewed. Any pending diagnostics and notifications were also reviewed, as well as any safety concerns or issues, abnormal labs, abnormal imaging, and abnormal assessment findings. Questions were answered.

## 2024-07-11 ENCOUNTER — APPOINTMENT (OUTPATIENT)
Dept: GENERAL RADIOLOGY | Age: 49
End: 2024-07-11
Attending: FAMILY MEDICINE
Payer: MEDICARE

## 2024-07-11 ENCOUNTER — APPOINTMENT (OUTPATIENT)
Dept: MRI IMAGING | Age: 49
End: 2024-07-11
Attending: FAMILY MEDICINE
Payer: MEDICARE

## 2024-07-11 ENCOUNTER — ANESTHESIA EVENT (OUTPATIENT)
Dept: OPERATING ROOM | Age: 49
End: 2024-07-11
Payer: MEDICARE

## 2024-07-11 ENCOUNTER — HOSPITAL ENCOUNTER (OUTPATIENT)
Dept: RADIATION ONCOLOGY | Age: 49
Discharge: HOME | End: 2024-07-11

## 2024-07-11 ENCOUNTER — ANESTHESIA (OUTPATIENT)
Dept: OPERATING ROOM | Age: 49
End: 2024-07-11
Payer: MEDICARE

## 2024-07-11 ENCOUNTER — TELEPHONE (OUTPATIENT)
Dept: ONCOLOGY | Age: 49
End: 2024-07-11

## 2024-07-11 PROBLEM — R91.8 HILAR MASS: Status: ACTIVE | Noted: 2024-07-11

## 2024-07-11 PROBLEM — S72.009A CLOSED FRACTURE OF NECK OF FEMUR (HCC): Status: ACTIVE | Noted: 2024-07-10

## 2024-07-11 PROBLEM — Z71.89 ACP (ADVANCE CARE PLANNING): Status: ACTIVE | Noted: 2024-07-11

## 2024-07-11 PROBLEM — M79.604 ACUTE LEG PAIN, RIGHT: Status: ACTIVE | Noted: 2024-07-11

## 2024-07-11 PROBLEM — R59.0 AXILLARY LYMPHADENOPATHY: Status: ACTIVE | Noted: 2024-07-11

## 2024-07-11 PROBLEM — Z51.5 PALLIATIVE CARE ENCOUNTER: Status: ACTIVE | Noted: 2024-07-11

## 2024-07-11 LAB
25(OH)D3 SERPL-MCNC: 29 NG/ML (ref 30–100)
ABO + RH BLD: NORMAL
ARM BAND NUMBER: NORMAL
BLOOD BANK SAMPLE EXPIRATION: NORMAL
BLOOD GROUP ANTIBODIES SERPL: NEGATIVE
GLUCOSE BLD-MCNC: 211 MG/DL (ref 75–110)
INR PPP: 1.2
PROCALCITONIN SERPL-MCNC: 0.35 NG/ML (ref 0–0.09)
PROCALCITONIN SERPL-MCNC: 0.41 NG/ML (ref 0–0.09)
PROTHROMBIN TIME: 14.6 SEC (ref 11.7–14.9)

## 2024-07-11 PROCEDURE — 6360000002 HC RX W HCPCS: Performed by: NURSE PRACTITIONER

## 2024-07-11 PROCEDURE — 6360000002 HC RX W HCPCS

## 2024-07-11 PROCEDURE — 36415 COLL VENOUS BLD VENIPUNCTURE: CPT

## 2024-07-11 PROCEDURE — 86900 BLOOD TYPING SEROLOGIC ABO: CPT

## 2024-07-11 PROCEDURE — 6370000000 HC RX 637 (ALT 250 FOR IP): Performed by: INTERNAL MEDICINE

## 2024-07-11 PROCEDURE — 6360000002 HC RX W HCPCS: Performed by: INTERNAL MEDICINE

## 2024-07-11 PROCEDURE — 3700000001 HC ADD 15 MINUTES (ANESTHESIA): Performed by: STUDENT IN AN ORGANIZED HEALTH CARE EDUCATION/TRAINING PROGRAM

## 2024-07-11 PROCEDURE — 3600000014 HC SURGERY LEVEL 4 ADDTL 15MIN: Performed by: STUDENT IN AN ORGANIZED HEALTH CARE EDUCATION/TRAINING PROGRAM

## 2024-07-11 PROCEDURE — 85610 PROTHROMBIN TIME: CPT

## 2024-07-11 PROCEDURE — 6360000002 HC RX W HCPCS: Performed by: STUDENT IN AN ORGANIZED HEALTH CARE EDUCATION/TRAINING PROGRAM

## 2024-07-11 PROCEDURE — 2500000003 HC RX 250 WO HCPCS

## 2024-07-11 PROCEDURE — 99223 1ST HOSP IP/OBS HIGH 75: CPT | Performed by: INTERNAL MEDICINE

## 2024-07-11 PROCEDURE — 99223 1ST HOSP IP/OBS HIGH 75: CPT | Performed by: STUDENT IN AN ORGANIZED HEALTH CARE EDUCATION/TRAINING PROGRAM

## 2024-07-11 PROCEDURE — 1200000000 HC SEMI PRIVATE

## 2024-07-11 PROCEDURE — 7100000001 HC PACU RECOVERY - ADDTL 15 MIN: Performed by: STUDENT IN AN ORGANIZED HEALTH CARE EDUCATION/TRAINING PROGRAM

## 2024-07-11 PROCEDURE — 6370000000 HC RX 637 (ALT 250 FOR IP): Performed by: NURSE PRACTITIONER

## 2024-07-11 PROCEDURE — 87040 BLOOD CULTURE FOR BACTERIA: CPT

## 2024-07-11 PROCEDURE — 27495 REINFORCE THIGH: CPT | Performed by: STUDENT IN AN ORGANIZED HEALTH CARE EDUCATION/TRAINING PROGRAM

## 2024-07-11 PROCEDURE — 73552 X-RAY EXAM OF FEMUR 2/>: CPT

## 2024-07-11 PROCEDURE — 27130 TOTAL HIP ARTHROPLASTY: CPT | Performed by: STUDENT IN AN ORGANIZED HEALTH CARE EDUCATION/TRAINING PROGRAM

## 2024-07-11 PROCEDURE — 6370000000 HC RX 637 (ALT 250 FOR IP)

## 2024-07-11 PROCEDURE — 99223 1ST HOSP IP/OBS HIGH 75: CPT | Performed by: NURSE PRACTITIONER

## 2024-07-11 PROCEDURE — 2500000003 HC RX 250 WO HCPCS: Performed by: NURSE PRACTITIONER

## 2024-07-11 PROCEDURE — 6360000004 HC RX CONTRAST MEDICATION: Performed by: STUDENT IN AN ORGANIZED HEALTH CARE EDUCATION/TRAINING PROGRAM

## 2024-07-11 PROCEDURE — 88342 IMHCHEM/IMCYTCHM 1ST ANTB: CPT

## 2024-07-11 PROCEDURE — 2580000003 HC RX 258: Performed by: NURSE PRACTITIONER

## 2024-07-11 PROCEDURE — 2709999900 HC NON-CHARGEABLE SUPPLY: Performed by: STUDENT IN AN ORGANIZED HEALTH CARE EDUCATION/TRAINING PROGRAM

## 2024-07-11 PROCEDURE — 82306 VITAMIN D 25 HYDROXY: CPT

## 2024-07-11 PROCEDURE — 2580000003 HC RX 258

## 2024-07-11 PROCEDURE — 86850 RBC ANTIBODY SCREEN: CPT

## 2024-07-11 PROCEDURE — 88305 TISSUE EXAM BY PATHOLOGIST: CPT

## 2024-07-11 PROCEDURE — C1776 JOINT DEVICE (IMPLANTABLE): HCPCS | Performed by: STUDENT IN AN ORGANIZED HEALTH CARE EDUCATION/TRAINING PROGRAM

## 2024-07-11 PROCEDURE — 84145 PROCALCITONIN (PCT): CPT

## 2024-07-11 PROCEDURE — 73562 X-RAY EXAM OF KNEE 3: CPT

## 2024-07-11 PROCEDURE — 2580000003 HC RX 258: Performed by: STUDENT IN AN ORGANIZED HEALTH CARE EDUCATION/TRAINING PROGRAM

## 2024-07-11 PROCEDURE — 73502 X-RAY EXAM HIP UNI 2-3 VIEWS: CPT

## 2024-07-11 PROCEDURE — 94640 AIRWAY INHALATION TREATMENT: CPT

## 2024-07-11 PROCEDURE — 73523 X-RAY EXAM HIPS BI 5/> VIEWS: CPT

## 2024-07-11 PROCEDURE — 88311 DECALCIFY TISSUE: CPT

## 2024-07-11 PROCEDURE — 3700000000 HC ANESTHESIA ATTENDED CARE: Performed by: STUDENT IN AN ORGANIZED HEALTH CARE EDUCATION/TRAINING PROGRAM

## 2024-07-11 PROCEDURE — 86901 BLOOD TYPING SEROLOGIC RH(D): CPT

## 2024-07-11 PROCEDURE — 99233 SBSQ HOSP IP/OBS HIGH 50: CPT | Performed by: INTERNAL MEDICINE

## 2024-07-11 PROCEDURE — 70546 MR ANGIOGRAPH HEAD W/O&W/DYE: CPT

## 2024-07-11 PROCEDURE — A9576 INJ PROHANCE MULTIPACK: HCPCS | Performed by: STUDENT IN AN ORGANIZED HEALTH CARE EDUCATION/TRAINING PROGRAM

## 2024-07-11 PROCEDURE — 2700000000 HC OXYGEN THERAPY PER DAY

## 2024-07-11 PROCEDURE — 88341 IMHCHEM/IMCYTCHM EA ADD ANTB: CPT

## 2024-07-11 PROCEDURE — 70553 MRI BRAIN STEM W/O & W/DYE: CPT

## 2024-07-11 PROCEDURE — 0SR90JA REPLACEMENT OF RIGHT HIP JOINT WITH SYNTHETIC SUBSTITUTE, UNCEMENTED, OPEN APPROACH: ICD-10-PCS | Performed by: STUDENT IN AN ORGANIZED HEALTH CARE EDUCATION/TRAINING PROGRAM

## 2024-07-11 PROCEDURE — 7100000000 HC PACU RECOVERY - FIRST 15 MIN: Performed by: STUDENT IN AN ORGANIZED HEALTH CARE EDUCATION/TRAINING PROGRAM

## 2024-07-11 PROCEDURE — P9041 ALBUMIN (HUMAN),5%, 50ML: HCPCS

## 2024-07-11 PROCEDURE — 82947 ASSAY GLUCOSE BLOOD QUANT: CPT

## 2024-07-11 PROCEDURE — 3600000004 HC SURGERY LEVEL 4 BASE: Performed by: STUDENT IN AN ORGANIZED HEALTH CARE EDUCATION/TRAINING PROGRAM

## 2024-07-11 PROCEDURE — 94761 N-INVAS EAR/PLS OXIMETRY MLT: CPT

## 2024-07-11 DEVICE — CABLE SURG L750MM DIA1.7MM CERCLAGE CO CHROM SMOOTH W/ CRMP: Type: IMPLANTABLE DEVICE | Site: HIP | Status: FUNCTIONAL

## 2024-07-11 DEVICE — STEM FEM SZ 6 L107MM 12/14 TAPR HI OFFSET HIP DUOFIX CLLRD: Type: IMPLANTABLE DEVICE | Site: HIP | Status: FUNCTIONAL

## 2024-07-11 DEVICE — HEAD FEM DIA28MM +5MM OFFSET 12/14 TAPR HIP CERAMIC BIOLOX: Type: IMPLANTABLE DEVICE | Site: HIP | Status: FUNCTIONAL

## 2024-07-11 DEVICE — SCREW BNE L40MM DIA6.5MM CANC HIP DOME PINN: Type: IMPLANTABLE DEVICE | Site: HIP | Status: FUNCTIONAL

## 2024-07-11 DEVICE — HIP H3 TOT ADV DUAL MOB IMPL CAPPED H3: Type: IMPLANTABLE DEVICE | Site: HIP | Status: FUNCTIONAL

## 2024-07-11 DEVICE — IMPLANTABLE DEVICE: Type: IMPLANTABLE DEVICE | Site: HIP | Status: FUNCTIONAL

## 2024-07-11 DEVICE — LINER ACETABULAR DM PINNACLE: Type: IMPLANTABLE DEVICE | Site: HIP | Status: FUNCTIONAL

## 2024-07-11 DEVICE — PINNACLE GRIPTION ACETABULAR SHELL SECTOR 54MM OD
Type: IMPLANTABLE DEVICE | Site: HIP | Status: FUNCTIONAL
Brand: PINNACLE GRIPTION

## 2024-07-11 RX ORDER — TRANEXAMIC ACID 10 MG/ML
INJECTION, SOLUTION INTRAVENOUS PRN
Status: DISCONTINUED | OUTPATIENT
Start: 2024-07-11 | End: 2024-07-11 | Stop reason: SDUPTHER

## 2024-07-11 RX ORDER — PROCHLORPERAZINE EDISYLATE 5 MG/ML
5 INJECTION INTRAMUSCULAR; INTRAVENOUS
Status: DISCONTINUED | OUTPATIENT
Start: 2024-07-11 | End: 2024-07-11 | Stop reason: HOSPADM

## 2024-07-11 RX ORDER — SODIUM CHLORIDE 0.9 % (FLUSH) 0.9 %
5-40 SYRINGE (ML) INJECTION 2 TIMES DAILY
Status: DISCONTINUED | OUTPATIENT
Start: 2024-07-11 | End: 2024-07-18 | Stop reason: HOSPADM

## 2024-07-11 RX ORDER — RISPERIDONE 2 MG/1
2 TABLET ORAL 2 TIMES DAILY
Status: DISCONTINUED | OUTPATIENT
Start: 2024-07-11 | End: 2024-07-11

## 2024-07-11 RX ORDER — METOPROLOL TARTRATE 1 MG/ML
5 INJECTION, SOLUTION INTRAVENOUS ONCE
Status: COMPLETED | OUTPATIENT
Start: 2024-07-11 | End: 2024-07-11

## 2024-07-11 RX ORDER — TRAZODONE HYDROCHLORIDE 50 MG/1
100 TABLET ORAL NIGHTLY
Status: DISCONTINUED | OUTPATIENT
Start: 2024-07-11 | End: 2024-07-18 | Stop reason: HOSPADM

## 2024-07-11 RX ORDER — SODIUM CHLORIDE, SODIUM LACTATE, POTASSIUM CHLORIDE, CALCIUM CHLORIDE 600; 310; 30; 20 MG/100ML; MG/100ML; MG/100ML; MG/100ML
INJECTION, SOLUTION INTRAVENOUS CONTINUOUS PRN
Status: DISCONTINUED | OUTPATIENT
Start: 2024-07-11 | End: 2024-07-11 | Stop reason: SDUPTHER

## 2024-07-11 RX ORDER — LANOLIN ALCOHOL/MO/W.PET/CERES
400 CREAM (GRAM) TOPICAL 2 TIMES DAILY
Status: DISCONTINUED | OUTPATIENT
Start: 2024-07-11 | End: 2024-07-11

## 2024-07-11 RX ORDER — TOBRAMYCIN 1.2 G/30ML
INJECTION, POWDER, LYOPHILIZED, FOR SOLUTION INTRAVENOUS PRN
Status: DISCONTINUED | OUTPATIENT
Start: 2024-07-11 | End: 2024-07-11 | Stop reason: HOSPADM

## 2024-07-11 RX ORDER — ESMOLOL HYDROCHLORIDE 10 MG/ML
INJECTION INTRAVENOUS PRN
Status: DISCONTINUED | OUTPATIENT
Start: 2024-07-11 | End: 2024-07-11 | Stop reason: SDUPTHER

## 2024-07-11 RX ORDER — LIDOCAINE HYDROCHLORIDE 10 MG/ML
INJECTION, SOLUTION EPIDURAL; INFILTRATION; INTRACAUDAL; PERINEURAL PRN
Status: DISCONTINUED | OUTPATIENT
Start: 2024-07-11 | End: 2024-07-11 | Stop reason: SDUPTHER

## 2024-07-11 RX ORDER — GUAIFENESIN 600 MG/1
600 TABLET, EXTENDED RELEASE ORAL 2 TIMES DAILY
Status: DISCONTINUED | OUTPATIENT
Start: 2024-07-11 | End: 2024-07-17

## 2024-07-11 RX ORDER — NALOXONE HYDROCHLORIDE 0.4 MG/ML
INJECTION, SOLUTION INTRAMUSCULAR; INTRAVENOUS; SUBCUTANEOUS PRN
Status: DISCONTINUED | OUTPATIENT
Start: 2024-07-11 | End: 2024-07-11 | Stop reason: HOSPADM

## 2024-07-11 RX ORDER — FAMOTIDINE 20 MG/1
20 TABLET, FILM COATED ORAL 2 TIMES DAILY
Status: DISCONTINUED | OUTPATIENT
Start: 2024-07-11 | End: 2024-07-11

## 2024-07-11 RX ORDER — MAGNESIUM HYDROXIDE 1200 MG/15ML
LIQUID ORAL CONTINUOUS PRN
Status: DISCONTINUED | OUTPATIENT
Start: 2024-07-11 | End: 2024-07-11 | Stop reason: HOSPADM

## 2024-07-11 RX ORDER — CEFAZOLIN SODIUM 1 G/3ML
INJECTION, POWDER, FOR SOLUTION INTRAMUSCULAR; INTRAVENOUS PRN
Status: DISCONTINUED | OUTPATIENT
Start: 2024-07-11 | End: 2024-07-11 | Stop reason: SDUPTHER

## 2024-07-11 RX ORDER — PREDNISONE 20 MG/1
40 TABLET ORAL DAILY
Status: COMPLETED | OUTPATIENT
Start: 2024-07-11 | End: 2024-07-15

## 2024-07-11 RX ORDER — METOCLOPRAMIDE HYDROCHLORIDE 5 MG/ML
10 INJECTION INTRAMUSCULAR; INTRAVENOUS
Status: DISCONTINUED | OUTPATIENT
Start: 2024-07-11 | End: 2024-07-11 | Stop reason: HOSPADM

## 2024-07-11 RX ORDER — IPRATROPIUM BROMIDE AND ALBUTEROL SULFATE 2.5; .5 MG/3ML; MG/3ML
1 SOLUTION RESPIRATORY (INHALATION) EVERY 4 HOURS PRN
Status: DISCONTINUED | OUTPATIENT
Start: 2024-07-11 | End: 2024-07-16

## 2024-07-11 RX ORDER — ONDANSETRON 2 MG/ML
INJECTION INTRAMUSCULAR; INTRAVENOUS PRN
Status: DISCONTINUED | OUTPATIENT
Start: 2024-07-11 | End: 2024-07-11 | Stop reason: SDUPTHER

## 2024-07-11 RX ORDER — ALBUMIN, HUMAN INJ 5% 5 %
SOLUTION INTRAVENOUS PRN
Status: DISCONTINUED | OUTPATIENT
Start: 2024-07-11 | End: 2024-07-11 | Stop reason: SDUPTHER

## 2024-07-11 RX ORDER — PROPRANOLOL HYDROCHLORIDE 20 MG/1
20 TABLET ORAL 2 TIMES DAILY
Status: DISCONTINUED | OUTPATIENT
Start: 2024-07-11 | End: 2024-07-18 | Stop reason: HOSPADM

## 2024-07-11 RX ORDER — IPRATROPIUM BROMIDE AND ALBUTEROL SULFATE 2.5; .5 MG/3ML; MG/3ML
1 SOLUTION RESPIRATORY (INHALATION)
Status: DISCONTINUED | OUTPATIENT
Start: 2024-07-11 | End: 2024-07-13

## 2024-07-11 RX ORDER — BENZONATATE 100 MG/1
200 CAPSULE ORAL 3 TIMES DAILY PRN
Status: DISCONTINUED | OUTPATIENT
Start: 2024-07-11 | End: 2024-07-18 | Stop reason: HOSPADM

## 2024-07-11 RX ORDER — LANOLIN ALCOHOL/MO/W.PET/CERES
400 CREAM (GRAM) TOPICAL 2 TIMES DAILY
Status: DISCONTINUED | OUTPATIENT
Start: 2024-07-11 | End: 2024-07-18 | Stop reason: HOSPADM

## 2024-07-11 RX ORDER — ATORVASTATIN CALCIUM 80 MG/1
80 TABLET, FILM COATED ORAL NIGHTLY
Status: DISCONTINUED | OUTPATIENT
Start: 2024-07-11 | End: 2024-07-18 | Stop reason: HOSPADM

## 2024-07-11 RX ORDER — PROPRANOLOL HYDROCHLORIDE 20 MG/1
20 TABLET ORAL 2 TIMES DAILY
Status: DISCONTINUED | OUTPATIENT
Start: 2024-07-11 | End: 2024-07-11

## 2024-07-11 RX ORDER — SODIUM CHLORIDE 0.9 % (FLUSH) 0.9 %
5-40 SYRINGE (ML) INJECTION EVERY 12 HOURS SCHEDULED
Status: DISCONTINUED | OUTPATIENT
Start: 2024-07-11 | End: 2024-07-11 | Stop reason: HOSPADM

## 2024-07-11 RX ORDER — LABETALOL HYDROCHLORIDE 5 MG/ML
10 INJECTION, SOLUTION INTRAVENOUS
Status: DISCONTINUED | OUTPATIENT
Start: 2024-07-11 | End: 2024-07-11 | Stop reason: HOSPADM

## 2024-07-11 RX ORDER — SODIUM CHLORIDE, SODIUM LACTATE, POTASSIUM CHLORIDE, CALCIUM CHLORIDE 600; 310; 30; 20 MG/100ML; MG/100ML; MG/100ML; MG/100ML
INJECTION, SOLUTION INTRAVENOUS CONTINUOUS
Status: DISCONTINUED | OUTPATIENT
Start: 2024-07-11 | End: 2024-07-11 | Stop reason: HOSPADM

## 2024-07-11 RX ORDER — ERGOCALCIFEROL 1.25 MG/1
50000 CAPSULE ORAL WEEKLY
Status: DISCONTINUED | OUTPATIENT
Start: 2024-07-11 | End: 2024-07-18 | Stop reason: HOSPADM

## 2024-07-11 RX ORDER — FENTANYL CITRATE 50 UG/ML
25 INJECTION, SOLUTION INTRAMUSCULAR; INTRAVENOUS EVERY 5 MIN PRN
Status: DISCONTINUED | OUTPATIENT
Start: 2024-07-11 | End: 2024-07-11 | Stop reason: HOSPADM

## 2024-07-11 RX ORDER — VANCOMYCIN HYDROCHLORIDE 1 G/20ML
INJECTION, POWDER, LYOPHILIZED, FOR SOLUTION INTRAVENOUS PRN
Status: DISCONTINUED | OUTPATIENT
Start: 2024-07-11 | End: 2024-07-11 | Stop reason: HOSPADM

## 2024-07-11 RX ORDER — FENTANYL CITRATE 50 UG/ML
INJECTION, SOLUTION INTRAMUSCULAR; INTRAVENOUS PRN
Status: DISCONTINUED | OUTPATIENT
Start: 2024-07-11 | End: 2024-07-11 | Stop reason: SDUPTHER

## 2024-07-11 RX ORDER — MIDAZOLAM HYDROCHLORIDE 1 MG/ML
INJECTION INTRAMUSCULAR; INTRAVENOUS PRN
Status: DISCONTINUED | OUTPATIENT
Start: 2024-07-11 | End: 2024-07-11 | Stop reason: SDUPTHER

## 2024-07-11 RX ORDER — PROPOFOL 10 MG/ML
INJECTION, EMULSION INTRAVENOUS PRN
Status: DISCONTINUED | OUTPATIENT
Start: 2024-07-11 | End: 2024-07-11 | Stop reason: SDUPTHER

## 2024-07-11 RX ORDER — VITAMIN B COMPLEX
1000 TABLET ORAL DAILY
Status: DISCONTINUED | OUTPATIENT
Start: 2024-07-11 | End: 2024-07-18 | Stop reason: HOSPADM

## 2024-07-11 RX ORDER — RISPERIDONE 2 MG/1
2 TABLET ORAL 2 TIMES DAILY
Status: DISCONTINUED | OUTPATIENT
Start: 2024-07-11 | End: 2024-07-15

## 2024-07-11 RX ORDER — DEXAMETHASONE SODIUM PHOSPHATE 10 MG/ML
INJECTION, SOLUTION INTRAMUSCULAR; INTRAVENOUS PRN
Status: DISCONTINUED | OUTPATIENT
Start: 2024-07-11 | End: 2024-07-11 | Stop reason: SDUPTHER

## 2024-07-11 RX ORDER — ROCURONIUM BROMIDE 10 MG/ML
INJECTION, SOLUTION INTRAVENOUS PRN
Status: DISCONTINUED | OUTPATIENT
Start: 2024-07-11 | End: 2024-07-11 | Stop reason: SDUPTHER

## 2024-07-11 RX ORDER — BENZTROPINE MESYLATE 1 MG/1
1 TABLET ORAL 2 TIMES DAILY
Status: DISCONTINUED | OUTPATIENT
Start: 2024-07-11 | End: 2024-07-11

## 2024-07-11 RX ORDER — TRAZODONE HYDROCHLORIDE 50 MG/1
100 TABLET ORAL NIGHTLY
Status: DISCONTINUED | OUTPATIENT
Start: 2024-07-11 | End: 2024-07-11

## 2024-07-11 RX ORDER — GUAIFENESIN 600 MG/1
600 TABLET, EXTENDED RELEASE ORAL 2 TIMES DAILY
Status: DISCONTINUED | OUTPATIENT
Start: 2024-07-11 | End: 2024-07-11

## 2024-07-11 RX ORDER — FAMOTIDINE 20 MG/1
20 TABLET, FILM COATED ORAL 2 TIMES DAILY
Status: DISCONTINUED | OUTPATIENT
Start: 2024-07-11 | End: 2024-07-18 | Stop reason: HOSPADM

## 2024-07-11 RX ORDER — METOPROLOL TARTRATE 1 MG/ML
INJECTION, SOLUTION INTRAVENOUS PRN
Status: DISCONTINUED | OUTPATIENT
Start: 2024-07-11 | End: 2024-07-11 | Stop reason: SDUPTHER

## 2024-07-11 RX ORDER — ATORVASTATIN CALCIUM 80 MG/1
80 TABLET, FILM COATED ORAL NIGHTLY
Status: DISCONTINUED | OUTPATIENT
Start: 2024-07-11 | End: 2024-07-11

## 2024-07-11 RX ORDER — BENZTROPINE MESYLATE 1 MG/1
1 TABLET ORAL 2 TIMES DAILY
Status: DISCONTINUED | OUTPATIENT
Start: 2024-07-11 | End: 2024-07-15

## 2024-07-11 RX ADMIN — ROCURONIUM BROMIDE 20 MG: 10 INJECTION, SOLUTION INTRAVENOUS at 20:10

## 2024-07-11 RX ADMIN — IPRATROPIUM BROMIDE AND ALBUTEROL SULFATE 1 DOSE: 2.5; .5 SOLUTION RESPIRATORY (INHALATION) at 17:10

## 2024-07-11 RX ADMIN — GUAIFENESIN 600 MG: 600 TABLET, EXTENDED RELEASE ORAL at 23:20

## 2024-07-11 RX ADMIN — CHOLECALCIFEROL TAB 25 MCG (1000 UNIT) 1000 UNITS: 25 TAB at 10:01

## 2024-07-11 RX ADMIN — GUAIFENESIN 600 MG: 600 TABLET, EXTENDED RELEASE ORAL at 03:34

## 2024-07-11 RX ADMIN — TRAZODONE HYDROCHLORIDE 100 MG: 50 TABLET ORAL at 23:21

## 2024-07-11 RX ADMIN — PROPRANOLOL HYDROCHLORIDE 20 MG: 20 TABLET ORAL at 10:01

## 2024-07-11 RX ADMIN — ERGOCALCIFEROL 50000 UNITS: 1.25 CAPSULE ORAL at 10:01

## 2024-07-11 RX ADMIN — ACETAMINOPHEN 650 MG: 325 TABLET ORAL at 03:38

## 2024-07-11 RX ADMIN — RISPERIDONE 2 MG: 2 TABLET, FILM COATED ORAL at 23:20

## 2024-07-11 RX ADMIN — METOPROLOL TARTRATE 5 MG: 5 INJECTION INTRAVENOUS at 05:25

## 2024-07-11 RX ADMIN — FENTANYL CITRATE 50 MCG: 0.05 INJECTION, SOLUTION INTRAMUSCULAR; INTRAVENOUS at 20:10

## 2024-07-11 RX ADMIN — FENTANYL CITRATE 50 MCG: 0.05 INJECTION, SOLUTION INTRAMUSCULAR; INTRAVENOUS at 19:18

## 2024-07-11 RX ADMIN — LIDOCAINE HYDROCHLORIDE 50 MG: 10 INJECTION, SOLUTION EPIDURAL; INFILTRATION; INTRACAUDAL; PERINEURAL at 19:15

## 2024-07-11 RX ADMIN — METOPROLOL TARTRATE 1 MG: 5 INJECTION INTRAVENOUS at 19:50

## 2024-07-11 RX ADMIN — FENTANYL CITRATE 50 MCG: 0.05 INJECTION, SOLUTION INTRAMUSCULAR; INTRAVENOUS at 21:06

## 2024-07-11 RX ADMIN — PREDNISONE 40 MG: 20 TABLET ORAL at 10:02

## 2024-07-11 RX ADMIN — ALBUMIN (HUMAN) 25 G: 12.5 INJECTION, SOLUTION INTRAVENOUS at 20:47

## 2024-07-11 RX ADMIN — GUAIFENESIN 600 MG: 600 TABLET, EXTENDED RELEASE ORAL at 10:01

## 2024-07-11 RX ADMIN — FAMOTIDINE 20 MG: 20 TABLET, FILM COATED ORAL at 10:01

## 2024-07-11 RX ADMIN — METFORMIN HYDROCHLORIDE 1000 MG: 500 TABLET ORAL at 10:01

## 2024-07-11 RX ADMIN — METOPROLOL TARTRATE 1 MG: 5 INJECTION INTRAVENOUS at 20:04

## 2024-07-11 RX ADMIN — HYDROCODONE BITARTRATE AND ACETAMINOPHEN 2 TABLET: 5; 325 TABLET ORAL at 23:42

## 2024-07-11 RX ADMIN — ONDANSETRON 4 MG: 2 INJECTION INTRAMUSCULAR; INTRAVENOUS at 21:03

## 2024-07-11 RX ADMIN — FENTANYL CITRATE 150 MCG: 0.05 INJECTION, SOLUTION INTRAMUSCULAR; INTRAVENOUS at 19:15

## 2024-07-11 RX ADMIN — HYDROCODONE BITARTRATE AND ACETAMINOPHEN 2 TABLET: 5; 325 TABLET ORAL at 10:02

## 2024-07-11 RX ADMIN — PROPRANOLOL HYDROCHLORIDE 20 MG: 20 TABLET ORAL at 23:21

## 2024-07-11 RX ADMIN — FAMOTIDINE 20 MG: 20 TABLET, FILM COATED ORAL at 23:20

## 2024-07-11 RX ADMIN — SODIUM CHLORIDE, SODIUM LACTATE, POTASSIUM CHLORIDE, CALCIUM CHLORIDE: 600; 310; 30; 20 INJECTION, SOLUTION INTRAVENOUS at 19:20

## 2024-07-11 RX ADMIN — FENTANYL CITRATE 50 MCG: 0.05 INJECTION, SOLUTION INTRAMUSCULAR; INTRAVENOUS at 19:40

## 2024-07-11 RX ADMIN — ROCURONIUM BROMIDE 50 MG: 10 INJECTION, SOLUTION INTRAVENOUS at 19:15

## 2024-07-11 RX ADMIN — ROCURONIUM BROMIDE 30 MG: 10 INJECTION, SOLUTION INTRAVENOUS at 19:40

## 2024-07-11 RX ADMIN — ESMOLOL HYDROCHLORIDE 10 MG: 100 INJECTION, SOLUTION INTRAVENOUS at 19:39

## 2024-07-11 RX ADMIN — FENTANYL CITRATE 50 MCG: 0.05 INJECTION, SOLUTION INTRAMUSCULAR; INTRAVENOUS at 21:25

## 2024-07-11 RX ADMIN — SODIUM CHLORIDE, PRESERVATIVE FREE 10 ML: 5 INJECTION INTRAVENOUS at 10:02

## 2024-07-11 RX ADMIN — SODIUM CHLORIDE, POTASSIUM CHLORIDE, SODIUM LACTATE AND CALCIUM CHLORIDE: 600; 310; 30; 20 INJECTION, SOLUTION INTRAVENOUS at 19:08

## 2024-07-11 RX ADMIN — RISPERIDONE 2 MG: 2 TABLET, FILM COATED ORAL at 10:01

## 2024-07-11 RX ADMIN — GADOTERIDOL 16 ML: 279.3 INJECTION, SOLUTION INTRAVENOUS at 16:04

## 2024-07-11 RX ADMIN — Medication 1000 MG: at 03:59

## 2024-07-11 RX ADMIN — METOPROLOL TARTRATE 1 MG: 5 INJECTION INTRAVENOUS at 19:47

## 2024-07-11 RX ADMIN — SUGAMMADEX 200 MG: 100 INJECTION, SOLUTION INTRAVENOUS at 21:18

## 2024-07-11 RX ADMIN — MAGNESIUM GLUCONATE 500 MG ORAL TABLET 400 MG: 500 TABLET ORAL at 10:02

## 2024-07-11 RX ADMIN — TRANEXAMIC ACID 1 G: 10 INJECTION, SOLUTION INTRAVENOUS at 19:36

## 2024-07-11 RX ADMIN — ESMOLOL HYDROCHLORIDE 10 MG: 100 INJECTION, SOLUTION INTRAVENOUS at 19:29

## 2024-07-11 RX ADMIN — METFORMIN HYDROCHLORIDE 1000 MG: 500 TABLET ORAL at 23:21

## 2024-07-11 RX ADMIN — PROPOFOL 200 MG: 10 INJECTION, EMULSION INTRAVENOUS at 19:15

## 2024-07-11 RX ADMIN — HYDROCODONE BITARTRATE AND ACETAMINOPHEN 2 TABLET: 5; 325 TABLET ORAL at 02:24

## 2024-07-11 RX ADMIN — IPRATROPIUM BROMIDE AND ALBUTEROL SULFATE 1 DOSE: 2.5; .5 SOLUTION RESPIRATORY (INHALATION) at 09:10

## 2024-07-11 RX ADMIN — DEXAMETHASONE SODIUM PHOSPHATE 10 MG: 10 INJECTION, SOLUTION INTRAMUSCULAR; INTRAVENOUS at 19:25

## 2024-07-11 RX ADMIN — BENZTROPINE MESYLATE 1 MG: 1 TABLET ORAL at 23:49

## 2024-07-11 RX ADMIN — MAGNESIUM GLUCONATE 500 MG ORAL TABLET 400 MG: 500 TABLET ORAL at 23:21

## 2024-07-11 RX ADMIN — CEFAZOLIN 2 G: 1 INJECTION, POWDER, FOR SOLUTION INTRAMUSCULAR; INTRAVENOUS at 19:25

## 2024-07-11 RX ADMIN — IPRATROPIUM BROMIDE AND ALBUTEROL SULFATE 1 DOSE: 2.5; .5 SOLUTION RESPIRATORY (INHALATION) at 12:05

## 2024-07-11 RX ADMIN — MIDAZOLAM 2 MG: 1 INJECTION INTRAMUSCULAR; INTRAVENOUS at 19:10

## 2024-07-11 RX ADMIN — ATORVASTATIN CALCIUM 80 MG: 80 TABLET, FILM COATED ORAL at 23:21

## 2024-07-11 RX ADMIN — FENTANYL CITRATE 50 MCG: 0.05 INJECTION, SOLUTION INTRAMUSCULAR; INTRAVENOUS at 21:30

## 2024-07-11 RX ADMIN — ROCURONIUM BROMIDE 20 MG: 10 INJECTION, SOLUTION INTRAVENOUS at 20:36

## 2024-07-11 RX ADMIN — SODIUM CHLORIDE: 9 INJECTION, SOLUTION INTRAVENOUS at 12:05

## 2024-07-11 RX ADMIN — BENZTROPINE MESYLATE 1 MG: 1 TABLET ORAL at 10:01

## 2024-07-11 ASSESSMENT — PAIN SCALES - GENERAL
PAINLEVEL_OUTOF10: 8
PAINLEVEL_OUTOF10: 10
PAINLEVEL_OUTOF10: 5
PAINLEVEL_OUTOF10: 8
PAINLEVEL_OUTOF10: 3
PAINLEVEL_OUTOF10: 10
PAINLEVEL_OUTOF10: 10

## 2024-07-11 ASSESSMENT — PAIN DESCRIPTION - DESCRIPTORS: DESCRIPTORS: ACHING

## 2024-07-11 ASSESSMENT — PAIN DESCRIPTION - ORIENTATION
ORIENTATION: RIGHT
ORIENTATION: RIGHT

## 2024-07-11 ASSESSMENT — PAIN DESCRIPTION - LOCATION
LOCATION: HIP
LOCATION: HIP
LOCATION: GENERALIZED
LOCATION: HIP

## 2024-07-11 NOTE — PROGRESS NOTES
Comprehensive Nutrition Assessment    Type and Reason for Visit:  Initial, Positive Nutrition Screen    Nutrition Recommendations/Plan:   Recommend Regular diet with able  Recommend standard high calorie, high protein oral nutrition supplement twice daily with meals (once diet advances)     Malnutrition Assessment:  Malnutrition Status:  Insufficient data (07/11/24 1201)    Context:  Acute Illness     Findings of the 6 clinical characteristics of malnutrition:  Energy Intake:  75% or less of estimated energy requirements for 7 or more days  Weight Loss:  Unable to assess     Body Fat Loss:  No significant body fat loss     Muscle Mass Loss:  No significant muscle mass loss    Fluid Accumulation:  No significant fluid accumulation     Strength:  Not Performed    Nutrition Assessment:    Pt admitted with pathalogical fracture of right hip, found to have metastatic disease with Hx HTN and schizophrenia.  Physician notes show large weight loss of ~70# x 1 year, which Pt confirms.  However, Pt seems to change story frequently during visit, first confirming weight loss of 70# over 1 year, than reporting UBW of 208# (94.3 kg) with weight loss over the past 2 weeks, then later stating that he has lost weight over the past 5-6 weeks.  Unfortunately no recent weight history is available for review.  Pt does not appear to be malnourished or to have significant fat/muscle loss upon observation.  Of note, writer does not see any history of DM though labs show A1c of 6.7 on 7/3.  Pt denies any chewing/swallowing problems, nausea or vomiting either PTA or currently.  Pt states he has been eating poorly at home d/t pain with his hip, and this is the only reason for poor appetite/intake.  Pt agrees to try ONS while here, and writer encouraged Pt to keep ONS at home to drink when he is in too much pain to eat.  Currently Pt is NPO, but would benefit from ONS with meals when able.    Nutrition Related Findings:    Meds/Labs  reviewed.  HgbA1c 6.7 (7/3/24) Wound Type: None       Current Nutrition Intake & Therapies:    Average Meal Intake: NPO  Average Supplements Intake: NPO  Diet NPO    Anthropometric Measures:  Height: 188 cm (6' 2.02\")  Ideal Body Weight (IBW): 190 lbs (86 kg)    Admission Body Weight: 85.3 kg (188 lb 0.8 oz)  Current Body Weight: 85.3 kg (188 lb 0.8 oz), 99 % IBW. Weight Source: Standing Scale  Current BMI (kg/m2): 24.1  Usual Body Weight: 94.3 kg (208 lb) (per patient report)  % Weight Change (Calculated): -9.6  Weight Adjustment For: No Adjustment                 BMI Categories: Normal Weight (BMI 22.0 to 24.9) age over 65    Estimated Daily Nutrient Needs:  Energy Requirements Based On: Kcal/kg  Weight Used for Energy Requirements: Admission  Energy (kcal/day): 2038-2825 kcal/day  Weight Used for Protein Requirements: Admission  Protein (g/day): 115-125 g/day  Method Used for Fluid Requirements: ml/Kg  Fluid (ml/day): 5682-9827 ml/day (~25 ml/kg)    Nutrition Diagnosis:   Inadequate oral intake related to pain as evidenced by NPO or clear liquid status due to medical condition, poor intake prior to admission, weight loss    Nutrition Interventions:   Food and/or Nutrient Delivery: Start Oral Diet, Start Oral Nutrition Supplement  Nutrition Education/Counseling: No recommendation at this time  Coordination of Nutrition Care: Continue to monitor while inpatient  Plan of Care discussed with: Patient    Goals:     Goals: Initiate PO diet, by next RD assessment       Nutrition Monitoring and Evaluation:   Behavioral-Environmental Outcomes: None Identified  Food/Nutrient Intake Outcomes: Diet Advancement/Tolerance, Food and Nutrient Intake, Supplement Intake  Physical Signs/Symptoms Outcomes: Biochemical Data, GI Status, Nutrition Focused Physical Findings, Weight    Discharge Planning:    Too soon to determine     Guerline Linda RD  Contact: [phone]

## 2024-07-11 NOTE — CONSULTS
PULMONARY PROGRESS NOTE      Patient:  Isiah Duncan  YOB: 1975    MRN: [id number]     Acct: [id number]     Admit date: 7/10/2024    REASON FOR CONSULT:- Lung mass    Pt seen and Chart reviewed.    Subjective:     49-year-old male with past medical history of schizophrenia, hypertension initially presented to [facility] on 7/9 with worsening right hip pain, fall and unintentional weight loss.    He reports that he has been having hip pain for the past 1 to 2 months and frequent falls.  X-ray of the right hip showed lytic lesion in the right femoral neck with pathologic fracture.      CT of the chest shows possible left upper lobe infiltrate with possible postobstructive changes or metastatic disease, left hilar mass, left scapular mass.  The CT head is also showing large soft tissue mass at the vertex    On exam patient is afebrile, hemodynamically stable, maintaining saturations on 2 L nasal cannula, father is in the room he is also the legal guardian. Patient reports that he had a fall about 5 days ago and has not been able to walk since. He reports productive cough, denies any hemoptysis or any shortness of breath. He smokes 45 cigarettes per day      Review of Systems -   Review of Systems   Constitutional:  Negative for fever.   Respiratory:  Positive for cough. Negative for shortness of breath.    Cardiovascular:  Negative for chest pain and leg swelling.   Gastrointestinal:  Negative for abdominal pain, diarrhea, nausea and vomiting.   Musculoskeletal:  Positive for arthralgias.   Neurological:  Negative for dizziness and weakness.           Physical Exam:  Vitals: /80   Pulse (!) 109   Temp 97.5 °F (36.4 °C) (Oral)   Resp 15   SpO2 97%   24 hour intake/output:  Intake/Output Summary (Last 24 hours) at 7/11/2024 1003  Last data filed at 7/11/2024 0637  Gross per 24 hour   Intake 570.46 ml   Output --   Net 570.46 ml     Last 3 weights:  Wt Readings from Last 3 Encounters:  HYDROcodone 5 mg - acetaminophen **OR** HYDROcodone 5 mg - acetaminophen    Objective:    CBC:   Recent Labs     07/09/24 1920   WBC 15.1*   HGB 14.1        BMP:    Recent Labs     07/09/24 1920   *   K 4.2   CL 94*   CO2 25   BUN 6   CREATININE 0.6*   GLUCOSE 187*     Calcium:  Recent Labs     07/09/24 1920   CALCIUM 9.9     Ionized Calcium:Invalid input(s): \"IONCA\"  Magnesium:  Recent Labs     07/09/24 1920   MG 1.7     Phosphorus:No results for input(s): \"PHOS\" in the last 72 hours.  BNP:No results for input(s): \"BNP\" in the last 72 hours.  Glucose:No results for input(s): \"POCGLU\" in the last 72 hours.  HgbA1C: No results for input(s): \"LABA1C\" in the last 72 hours.  INR:   Recent Labs     07/11/24  0236   INR 1.2     Hepatic:   Recent Labs     07/09/24 1920   ALKPHOS 152*   ALT 44*   AST 12   BILITOT 0.6     Amylase and Lipase:No results for input(s): \"LACTA\", \"AMYLASE\" in the last 72 hours.  Lactic Acid: No results for input(s): \"LACTA\" in the last 72 hours.  CARDIAC ENZYMES:No results for input(s): \"CKTOTAL\", \"CKMB\", \"CKMBINDEX\", \"TROPONINI\" in the last 72 hours.  BNP: No results for input(s): \"BNP\" in the last 72 hours.  Lipids: No results for input(s): \"CHOL\", \"TRIG\", \"HDL\", \"LDL\" in the last 72 hours.    Invalid input(s): \"LDLCALC\"  ABGs: No results found for: \"PH\", \"PCO2\", \"PO2\", \"HCO3\", \"O2SAT\"  Thyroid:   Lab Results   Component Value Date/Time    TSH 2.14 07/09/2024 07:20 PM      Urinalysis: No results for input(s): \"BACTERIA\", \"BLOODU\", \"CLARITYU\", \"COLORU\", \"PHUR\", \"PROTEINU\", \"RBCUA\", \"SPECGRAV\", \"BILIRUBINUR\", \"NITRU\", \"WBCUA\", \"LEUKOCYTESUR\", \"GLUCOSEU\" in the last 72 hours.          Assessment and Plan:    Patient Active Problem List   Diagnosis    Schizophrenia ([facility])    Hypomagnesemia    Hypomagnesemia syndrome    Gastroesophageal reflux disease    Closed right hip fracture, initial encounter ([facility])       Assessment and Plan:     Left hilar mass  Left upper lobe nodular

## 2024-07-11 NOTE — CONSULTS
Department of Neurosurgery                                            Nurse Practitioner Consult Note      Reason for Consult:  Brain mass with metastatic disease   Requesting Physician:  Paola  Neurosurgeon:   [] Dr. Hansen  [] Dr. Rivera  [] Dr. Moore  [x] Dr. Coyle    Neurosurgery notified of consult at:0200  Neurosurgery evaluation begun: 0215    History Obtained From:  patient, electronic medical record    CHIEF COMPLAINT:         No chief complaint on file.      HISTORY OF PRESENT ILLNESS:       The patient is a 49 y.o. male with significant past medical history of hypertension and schizophrenia who presented to [facility] with complaints of right hip pain, fall, weight loss.  At that time patient did report that he had been having right hip pain for approximately 1 to 2 months and had frequent falls when attempting to walk on it he also reported that his balance been off for the last month.  Patient does have a legal guardian and stays with his father who reports that patient has had an approximately 7 pound unintended unintentional weight loss over the last year reports poor appetite and a chronic cough although father does report that he has a 40-pack-year smoker.  CT scan of the head was obtained that showed a large heterogeneous mixed solid and cystic soft tissue mass with bony destruction concerning for malignancy CT of the chest abdomen pelvis showed multiple lesions concerning for malignancy Case was discussed with oncology who suspects primary lung.  Patient was transferred to [facility] for necessary oncology neurosurgery and orthopedic consults.  On exam patient has very flat affect is a poor historian, is able to state name and year disoriented to place and situation.  Patient does deny any blurry vision or diplopia.  No focal neurological deficit noted      PAST MEDICAL HISTORY :       Past Medical History:        Diagnosis Date    Hypertension              A/P:  This is a 49 y.o. male with past medical history of schizophrenia and hypertension with right hip pain, unintentional weight loss found to have a left hilar mass, left scapular mass right lower lobe nodule destructive metastatic bony lesion the pathological fracture of the right femoral neck.  Transferred to [facility] for orthopedic and oncological and neurosurgery workup  Patient care will be discussed with attending, will reevaluated patient along with attending.      - Neurosurgical intervention pending  review by attending neurosurgeon     - HOB: 30 degrees   -Obtain MRI with and without contrast with stealth protocol   - Neuro checks per protocol  - Hold all antiplatelets and anticoagulants  -Further neurosurgical recommendations once additional imaging obtained      Additional recommendations may follow    Please contact neurosurgery with any changes in patients neurologic status.     Thank you for your consult.       [facility]     Neuroscience [place]   7/11/2024  2:48 AM

## 2024-07-11 NOTE — CONSULTS
Today's Date: 7/11/2024  Patient Name: Isiah Duncan  Date of admission: 7/10/2024  9:47 PM  Patient's age: 49 y.o., 1975  Admission Dx: Closed right hip fracture, initial encounter ([facility]) [S72.001A]    Reason for Consult: Metastatic carcinoma  Requesting Physician: Love Finn MD    CHIEF COMPLAINT: Weight loss and hip pain    History Obtained From:  patient/father    HISTORY OF PRESENT ILLNESS:      The patient is a 49 y.o.  male who was initially seen at [facility] by Dr. Strong on 7/10/2024.  He presented to the ED for hip pain on the right side which has been going on for 2 months, causing him to fall.  His balance has been off for the last month.  He also has had about a 70 pound unintentional weight loss over the last year.  He says he is just not eating much.  He does have a chronic cough. No vomiting or diarrhea.  No chest pain.  No headache.  Says he does have a bump on his scalp he thinks it was from a fall.  Patient has schizophrenia, his dad is his legal guardian.    CT chest abdomen pelvis done demonstrated left upper lobe infiltrate with possible postobstructive changes or metastatic disease there is left hilar mass with left scapular mass and lytic/destructive lesion of the right femoral neck and a CT of the head large soft tissue mass at the vertex.  He was ultimately transferred to [facility] for further workup and management of closed right hip fracture.    Past Medical History:   has a past medical history of Hypertension and Schizophrenia ([facility]).    Past Surgical History:   has no past surgical history on file.     Medications:    Reviewed in Epic     Allergies:  Patient has no known allergies.    Social History:   reports that he has been smoking. He has a 40.0 pack-year smoking history. He has never used smokeless tobacco. He reports that he does not drink alcohol and does not use drugs.     Family History: family history includes Diabetes in his father;

## 2024-07-11 NOTE — H&P
VW W PELVIS RIGHT    Result Date: 7/10/2024  1. No acute fracture or dislocation. 2. Lytic lesion in the right femoral neck with pathologic fracture seen on the CT evaluation.     CT CHEST ABDOMEN PELVIS W CONTRAST Additional Contrast? None    Result Date: 7/10/2024  1. Left upper lobe nodular infiltrate may represents atypical pneumonia, postobstructive changes or metastatic disease. 2. Left hilar mass may represent lymphadenopathy or neoplasm.  There is encasement and likely occlusion of the left upper lobe bronchus. 3. Left scapular mass with destruction of a portion of the scapula measures 3.3 x 3.1 cm. This likely represents metastatic disease. 4. Ill-defined nodule right lower lobe measures 7 mm.  Metastatic disease should be considered. 5. Lytic/destructive lesion in the region of the right femoral neck measures 3.2 x 3.2 cm with a pathologic fracture in this area. This likely represents metastatic disease. 6. Cholelithiasis but no acute cholecystitis. 7. Mild fatty infiltration of the liver. 8. Mild constipation. Findings discussed with Dr. Brian Chapman on 07/09/2024 at 9:30 p.m. RECOMMENDATIONS: Oncology consult.     CT HEAD WO CONTRAST    Result Date: 7/9/2024  No acute intracranial abnormality. Large heterogeneous mixed solid and cystic soft tissue mass at the vertex measuring 10.1 x 9.1 x 5.9 cm resulting in bony destruction of the adjacent skull and mass effect on the bilateral frontal parietal lobes.  Finding is concerning for a malignancy.  Correlate with past medical history.  Recommend tissue sampling if not already obtained.       Assessment :      Hospital Problems             Last Modified POA    * (Principal) Closed right hip fracture, initial encounter ([facility]) 7/10/2024 Yes       Plan:     Patient status inpatient in the Progressive Unit/Step down    Pathologic right femoral neck fracture discussed with orthopedics with plan for OR later today.  Continue supportive care.  Discussed with  orthopedic resident oncology recommendation for tissue biopsy intraoperatively with ORIF.   Acute bronchitis with bronchospasms with acute hypoxic respiratory failure, pulmonary metastatic disease with obstructive pneumonia/pulmonary mass.  Empiric antibiotics, pulmonary hygiene, bronchodilators.  Start prednisone burst.  Pending pulmonary evaluation with obstructing lung mass.    Brain metastatic lesion pending neurosurgery evaluation.    Schizophrenia resume home psychiatric medications  Acute tachycardia with history of hypertension resume propranolol.  Suspect tachycardia likely multifactorial.  Consider further cardiac workup if persistent concerns  Metastatic disease new diagnosis pending oncology, radiation oncology to evaluate options.      Patient likely low to moderate risk of perioperative cardiopulmonary complications, RCRI=0.       Discussed with nursing.  Question concerns and treatment plan discussed with patient.  No family present during H&P.  Discussed concerns regarding malignancy pending further workup    Prior records and chart reviewed and pending by myself, Home medications reconciled.      Likely discharge in 3 to 4 days contingent on clinical progress    Consultations:   IP CONSULT TO PALLIATIVE CARE  IP CONSULT TO NEUROSURGERY  IP CONSULT TO ORTHOPEDIC SURGERY  IP CONSULT TO RADIATION ONCOLOGY  IP CONSULT TO PULMONOLOGY     Patient is admitted as inpatient status because of co-morbidities listed above, severity of signs and symptoms as outlined, requirement for current medical therapies and most importantly because of direct risk to patient if care not provided in a hospital setting.  Expected length of stay > 48 hours.    Margarette Guevara MD  7/11/2024  4:26 AM    Copy sent to Lolita Kong APRN - CNP

## 2024-07-11 NOTE — TELEPHONE ENCOUNTER
Name: Isiah Duncan  : 1975  MRN: [id number]    Oncology Navigation- Initial Note:  (Unknown)  Intake-  Contact Type: Telephone    Continuum of Care: Diagnosis/Active Treatment    Smoking hx: Everyday    Notes:   Navigator received referral and adding self to care team. After chart review pt. Remains inpt. Ortho consulted for Bx, otherwise IR most likely area. Need MRI Brain. Father is Legal Guardian.     Electronically signed by Fiona Villafana RN on 2024 at 10:38 AM

## 2024-07-11 NOTE — ED NOTES
Left message with ROSALIA Santana notifying him of transport to [place]   home    PHYSICAL EXAM    (up to 7 for level 4, 8 or more forlevel 5)     ED Triage Vitals [11/13/18 1410]   BP Temp Temp Source Pulse Resp SpO2 Height Weight   112/65 102.9 °F (39.4 °C) Oral 104 18 95 % 5' 10\" (1.778 m) 201 lb (91.2 kg)     Vitals:    Vitals:    11/13/18 2114 11/13/18 2330 11/14/18 0230 11/14/18 0745   BP: (!) 106/55 (!) 95/58  (!) 97/52   Pulse: 114 104  74   Resp: 20 18  18   Temp: 102.2 °F (39 °C) 100 °F (37.8 °C)  98.6 °F (37 °C)   TempSrc: Oral Oral  Oral   SpO2: 96% 96%  97%   Weight: 206 lb 8 oz (93.7 kg)  209 lb (94.8 kg)    Height: 5' 10\" (1.778 m)        Physical Exam   Constitutional/General: Alert and oriented, NAD  Head: NC/AT  Eyes: PERRL, EOMI  Mouth: Normal mucosa, no thrush   Neck: Supple, full ROM,    Pulmonary: Lungs clear to auscultation bilaterally. Not in respiratory distress  Cardiovascular:  Regular rate and rhythm, no murmurs, gallops, or rubs. Abdomen: Soft, + BS. No distension. Nontender. Extremities: Moves all extremities x 4. Warm and well perfused  Pulses:  Distal pulses intact  Skin: Warm and dry without rash tattoos  Neurologic:    No focal deficits  Psych: Normal Affect  Right mediport     DIAGNOSTICRESULTS   RADIOLOGY:   Xr Chest Portable    Result Date: 11/13/2018  LOCATION: [facility] EXAM: XR CHEST PORTABLE COMPARISON: May 19, 2018 HISTORY: Shortness of breath TECHNIQUE: Single frontal view of the chest was obtained. FINDINGS:  SUPPORT DEVICES: Portacatheter projects at the cavoatrial junction. . LUNGS: Clear with no areas of consolidation. PLEURA: No effusions or pneumothorax. LUNG VOLUMES: Satisfactory inspirator effort. MEDIASTINAL STRUCTURES: No lymphadenopathy. Normal aortic contour. HEART SIZE: Normal. BONES AND SOFT TISSUES: No fracture or soft tissue abnormality.      1. Negative portable chest.      LABS:    No results found for: [id number]  No results found for: HAV, HEPAIGM, HEPBIGM, HEPBCAB, HBEAG, HEPCAB  Results for orders placed or performed Array Respiratory Syncitial Virus       Result: Not Detected  *  *  Normal Range: Not Detected      Film Array Rhinovirus/Enterovirus       Result: Not Detected  *  *  Normal Range: Not Detected      Film Array Bordetella Pertusis       Result: Not Detected  *  *  Normal Range: Not Detected      Film Array Chlamydophilia Pneumoniae       Result: Not Detected  *  *  Normal Range: Not Detected      Film Array Mycoplasma Pneumoniae       Result: Not Detected  *  *  Normal Range: Not Detected     CBC Auto Differential   Result Value Ref Range    WBC 1.1 (L) 4.5 - 11.5 E9/L    RBC 4.30 3.80 - 5.80 E12/L    Hemoglobin 11.8 (L) 12.5 - 16.5 g/dL    Hematocrit 36.7 (L) 37.0 - 54.0 %    MCV 85.3 80.0 - 99.9 fL    MCH 27.4 26.0 - 35.0 pg    MCHC 32.2 32.0 - 34.5 %    RDW 13.4 11.5 - 15.0 fL    Platelets 610 455 - 651 E9/L    MPV 8.7 7.0 - 12.0 fL    Neutrophils % 32.0 (L) 43.0 - 80.0 %    Lymphocytes % 49.0 (H) 20.0 - 42.0 %    Monocytes % 16.0 (H) 2.0 - 12.0 %    Eosinophils % 2.0 0.0 - 6.0 %    Basophils % 1.0 0.0 - 2.0 %    Neutrophils # 0.35 (L) 1.80 - 7.30 E9/L    Lymphocytes # 0.54 (L) 1.50 - 4.00 E9/L    Monocytes # 0.18 0.10 - 0.95 E9/L    Eosinophils # 0.02 (L) 0.05 - 0.50 E9/L    Basophils # 0.01 0.00 - 0.20 E9/L    Polychromasia 1+     Poikilocytes 1+     Target Cells 1+    Comprehensive Metabolic Panel   Result Value Ref Range    Sodium 140 132 - 146 mmol/L    Potassium 3.4 (L) 3.5 - 5.0 mmol/L    Chloride 100 98 - 107 mmol/L    CO2 27 22 - 29 mmol/L    Anion Gap 13 7 - 16 mmol/L    Glucose 91 74 - 99 mg/dL    BUN 7 6 - 20 mg/dL    CREATININE 0.9 0.7 - 1.2 mg/dL    GFR Non-African American >60 >=60 mL/min/1.73    GFR African American >60     Calcium 8.8 8.6 - 10.2 mg/dL    Total Protein 6.5 6.4 - 8.3 g/dL    Alb 3.6 3.5 - 5.2 g/dL    Total Bilirubin 0.5 0.0 - 1.2 mg/dL    Alkaline Phosphatase 143 (H) 40 - 129 U/L    ALT 24 0 - 40 U/L    AST 22 0 - 39 U/L   Phosphorus   Result Value Ref Range    Phosphorus 1.4 (L)

## 2024-07-11 NOTE — ED NOTES
Report given to MALIA Stiles from [place].   Report method by phone   The following was reviewed with receiving RN:   Current vital signs:  /72   Pulse (!) 101   Temp 98 °F (36.7 °C)   Resp 27   Ht 1.88 m (6' 2.02\")   Wt 85.3 kg (188 lb)   SpO2 94%   BMI 24.13 kg/m²                MEWS Score: 4     Any medication or safety alerts were reviewed. Any pending diagnostics and notifications were also reviewed, as well as any safety concerns or issues, abnormal labs, abnormal imaging, and abnormal assessment findings. Questions were answered.

## 2024-07-11 NOTE — CONSULTS
IR consulted for biopsy of lung mass/scapular mass/hip.  As per notes it appears patient is going to the OR today for ORIF of pathologic hip fracture.  Per the notes bx will be performed intraoperatively during this time.  Reconsult IR if bx is inconclusive.

## 2024-07-11 NOTE — PLAN OF CARE
Problem: Respiratory - Adult  Goal: Achieves optimal ventilation and oxygenation  Outcome: Progressing   PROVIDE ADEQUATE OXYGENATION WITH ACCEPTABLE SP02/ABG'S    [x]  IDENTIFY APPROPRIATE OXYGEN THERAPY  [x]   MONITOR SP02/ABG'S AS NEEDED   [x]   PATIENT EDUCATION AS NEEDED  BRONCHOSPASM/BRONCHOCONSTRICTION     [x]         IMPROVE AERATION/BREATH SOUNDS  [x]   ADMINISTER BRONCHODILATOR THERAPY AS APPROPRIATE  [x]   ASSESS BREATH SOUNDS  []   IMPLEMENT AEROSOL/MDI PROTOCOL  [x]   PATIENT EDUCATION AS NEEDED

## 2024-07-11 NOTE — DISCHARGE INSTRUCTIONS
Orthopedic Instructions:  Weight bearing status: Weight bearing as tolerated for the right leg, Protected weight bearing with walker to left leg.  Keep dressing clean and dry.  Starting 3 days after surgery, Ok for daily dressing changes until wound is dry. Then leave open to air.  Starting 3 days after surgery, if wound is no longer leaking, Ok to shower but no soaks in a bath, hot tub, pool, etc.  Physical Therapy for strengthening, range of motion, and gait training.  Ice (20 minutes on and off 1 hour) to reduce swelling and throbbing pain.  Drink plenty of fluids.  Call the office or come to Emergency Room if signs of infection appear (hot, swollen, red, draining pus, fever)  Take medications as prescribed.  Wean off narcotics (percocet/norco) as soon as possible. Do not take tylenol if still taking narcotics.  Follow up with Dr. Cary in his office  7/26/24 at 8:45 am  after surgery. Call [phone] to schedule/confirm.

## 2024-07-11 NOTE — ANESTHESIA PRE PROCEDURE
Department of Anesthesiology  Preprocedure Note       Name:  Isiah Duncan   Age:  49 y.o.  :  1975                                          MRN:  [id number]         Date:  2024      Surgeon: Surgeon(s):  Jose Cary DO    Procedure: Procedure(s):  ** ADD ON** RIGHT TOTAL HIP ARTHROPLASTY ( C-ARM, HANA TABLE, DEPUY)  ** ADD ON**FEMORAL INTRAMEDULLARY NAIL INSERTION( HANA TABLE, C-ARM, TFNA)    Medications prior to admission:   Prior to Admission medications    Medication Sig Start Date End Date Taking? Authorizing Provider   traZODone (DESYREL) 100 MG tablet Take 1 tablet by mouth nightly 24   Melecio Guerrero MD   propranolol (INDERAL) 20 MG tablet Take 1 tablet by mouth 2 times daily 24   Melecio Guerrero MD   INVEGA TRINZA 546 MG/1.75ML ALEXSANDER IM injection  24   Melecio Guerrero MD   metFORMIN (GLUCOPHAGE) 1000 MG tablet Take 1 tablet by mouth 2 times daily 24   Melecio Guerrero MD   atorvastatin (LIPITOR) 80 MG tablet Take 1 tablet by mouth nightly at bedtime. 24   Melecio Guerrero MD   Ergocalciferol (VITAMIN D) 67168 units CAPS Take 50,000 Units by mouth once a week 22   Jeevan Rodríguez MD   potassium bicarb-citric acid (EFFER-K) 20 MEQ TBEF effervescent tablet Take 1 tablet by mouth daily for 15 days 22  Jeevan Rodríguez MD   Magnesium 400 MG CAPS Take 1 capsule by mouth in the morning and at bedtime 22  Jeevan Rodríguez MD   famotidine (PEPCID) 20 MG tablet Take 1 tablet by mouth 2 times daily 22   Jeevan Rodríguez MD   benztropine (COGENTIN) 1 MG tablet Take 1 tablet by mouth 2 times daily. 14   Jarret Appiah MD   risperiDONE (RISPERDAL) 2 MG tablet Take 1 tablet by mouth 2 times daily. 14   Jarret Appiah MD       Current medications:    Current Facility-Administered Medications   Medication Dose Route Frequency Provider Last Rate Last Admin   • cefTRIAXone (ROCEPHIN) 1000 mg in

## 2024-07-11 NOTE — CONSULTS
tablet Take 1 tablet by mouth 2 times daily      atorvastatin (LIPITOR) 80 MG tablet Take 1 tablet by mouth nightly at bedtime.      Ergocalciferol (VITAMIN D) 90723 units CAPS Take 50,000 Units by mouth once a week 5 capsule 0    potassium bicarb-citric acid (EFFER-K) 20 MEQ TBEF effervescent tablet Take 1 tablet by mouth daily for 15 days 15 tablet 0    Magnesium 400 MG CAPS Take 1 capsule by mouth in the morning and at bedtime 60 capsule 1    famotidine (PEPCID) 20 MG tablet Take 1 tablet by mouth 2 times daily 60 tablet 3    benztropine (COGENTIN) 1 MG tablet Take 1 tablet by mouth 2 times daily. 60 tablet 0    risperiDONE (RISPERDAL) 2 MG tablet Take 1 tablet by mouth 2 times daily. 60 tablet 0       Data         /80   Pulse (!) 109   Temp 97.5 °F (36.4 °C) (Oral)   Resp 15   SpO2 97%     Wt Readings from Last 3 Encounters:   07/09/24 85.3 kg (188 lb)   05/23/22 78.2 kg (172 lb 6.4 oz)        Lab Results   Component Value Date    WBC 15.1 (H) 07/09/2024    HGB 14.1 07/09/2024    HCT 41.5 07/09/2024    MCV 85.4 07/09/2024     07/09/2024    ,   Lab Results   Component Value Date/Time     07/09/2024 07:20 PM    K 4.2 07/09/2024 07:20 PM    CL 94 07/09/2024 07:20 PM    CO2 25 07/09/2024 07:20 PM    BUN 6 07/09/2024 07:20 PM    CREATININE 0.6 07/09/2024 07:20 PM    GLUCOSE 187 07/09/2024 07:20 PM    CALCIUM 9.9 07/09/2024 07:20 PM    ,   Lab Results   Component Value Date    INR 1.2 07/11/2024    INR 1.0 05/23/2022    INR 1.0 05/22/2022    PROTIME 14.6 07/11/2024    PROTIME 13.1 05/23/2022    PROTIME 13.0 05/22/2022   , .  Lab Results   Component Value Date    ALT 44 (H) 07/09/2024    AST 12 07/09/2024    ALKPHOS 152 (H) 07/09/2024    BILITOT 0.6 07/09/2024   , No results found for: \"CKTOTAL\", \"CKMB\", \"CKMBINDEX\", \"TROPONINI\",   Lab Results   Component Value Date/Time    COLORU Yellow 05/22/2022 06:12 PM    NITRU NEGATIVE 05/22/2022 06:12 PM    GLUCOSEU NEGATIVE 05/22/2022 06:12 PM    KETUA  Bed Bound; Extensive disease; Total care; intake minimal; Drowsy/coma  ___10%  Bed Bound; Extensive disease; Total care; Mouth care only; Drowsy/coma  ___0       Death    Principle Problem/Diagnosis:  Principal Problem:    Closed right hip fracture, initial encounter ([facility])  Resolved Problems:    * No resolved hospital problems. *      Please call with any palliative questions or concerns.  Palliative Care Team is available via perfect serve or via phone.     This note has been dictated by dragon, typing errors may be a possibility.  The total time I spent in seeing the patient, discussing goals of care, advanced directives, code status, greater than 50% time in counseling and other major issues was more than 80 minutes      Total time in talking with family, discussing with patient, chart review, and writing note: 80  Complexity: 2      Electronically signed by     LOLA Garcia  Hospice/Palliative Care  [facility] [place], [place]  7/11/2024 9:33 AM  Palliative care office: [phone]

## 2024-07-11 NOTE — CARE COORDINATION
.Case Management Assessment  Initial Evaluation    Date/Time of Evaluation: 7/11/2024 10:14 AM  Assessment Completed by: Shameka Callejas RN    If patient is discharged prior to next notation, then this note serves as note for discharge by case management.    Patient Name: Isiah Duncan                   YOB: 1975  Diagnosis: Closed right hip fracture, initial encounter ([facility]) [S72.001A]                   Date / Time: 7/10/2024  9:47 PM    Patient Admission Status: Inpatient   Readmission Risk (Low < 19, Mod (19-27), High > 27): Readmission Risk Score: 13.4    Current PCP: Lolita Mcclain APRN - CNP  PCP verified by CM? Yes    Chart Reviewed: Yes      History Provided by: Patient  Patient Orientation:      Patient Cognition: Alert    Hospitalization in the last 30 days (Readmission):  No    If yes, Readmission Assessment in CM Navigator will be completed.    Advance Directives:      Code Status: Full Code   Patient's Primary Decision Maker is: Named in Scanned ACP Document    Primary Decision Maker: Max Duncan - Legal Guardian, Legal Guardian - [phone]    Discharge Planning:    Patient lives with: (P) Family Members Type of Home: (P) House  Primary Care Giver: Family  Patient Support Systems include: Parent   Current Financial resources: (P) Medicare  Current community resources: (P) None  Current services prior to admission: (P) None            Current DME:              Type of Home Care services:  (P) None    ADLS  Prior functional level: (P) Independent in ADLs/IADLs  Current functional level: (P) Assistance with the following:, Mobility, Shopping, Housework, Cooking, Bathing, Dressing, Toileting    PT AM-PAC:   /24  OT AM-PAC:   /24    Family can provide assistance at DC: (P) Yes  Would you like Case Management to discuss the discharge plan with any other family members/significant others, and if so, who? (P) Yes  Plans to Return to Present Housing: (P) No  Other Identified Issues/Barriers

## 2024-07-11 NOTE — CONSULTS
ORTHOPAEDIC SURGERY   CONSULT NOTE  [Dr. Cary]    CC/Reason for Consult:    Right pathologic femoral neck fracture    HPI     49 y.o. male presented to the [facility] Emergency Department on 7/9/24 for evaluation of their right hip pain. He worsening right hip pain over the past couple of days, with soreness about his hip for the last couple of months. The pain causes him to fall, he denies any lightheadedness/dizziness preceding the falls. He reports smoking 1-2 PPD cigarettes, was found to have a lung lesion, and it is suspected that he has metastatic lung cancer, however unconfirmed on chart review. Orthopedic surgery was consulted for his right pathologic femoral neck fracture. He reports being unable to ambulate due to pain, but normally ambulates without any assistance devices. Patient denies any numbness, burning, tingling sensations. There are no other orthopaedic complaints at this time.     Patient denies taking chemical anticoagulation. Denies any history of diabetes. Has history of HTN and schizophrenia. No prior orthopedic history.     Past Medical Hx:    has a past medical history of Hypertension and Schizophrenia (HCC).  has no past surgical history on file.    Past Surgical Hx:  No past surgical history on file.    Medications:  Prior to Admission medications    Medication Sig Start Date End Date Taking? Authorizing Provider   traZODone (DESYREL) 100 MG tablet Take 1 tablet by mouth nightly 6/27/24   Melecio Guerrero MD   propranolol (INDERAL) 20 MG tablet Take 1 tablet by mouth 2 times daily 6/27/24   Provider, Historical, MD   INVEGA TRINZA 546 MG/1.75ML ALEXSANDER IM injection  5/22/24   Melecio Guerrero MD   metFORMIN (GLUCOPHAGE) 1000 MG tablet Take 1 tablet by mouth 2 times daily 7/1/24   Melecio Guerrero MD   atorvastatin (LIPITOR) 80 MG tablet Take 1 tablet by mouth nightly at bedtime. 7/1/24   Melecio Guerrero MD   Ergocalciferol (VITAMIN D) 64557 units CAPS Take 50,000

## 2024-07-12 PROBLEM — J18.9 LEFT UPPER LOBE PNEUMONIA: Status: ACTIVE | Noted: 2024-07-12

## 2024-07-12 PROBLEM — E55.9 VITAMIN D DEFICIENCY: Status: ACTIVE | Noted: 2024-07-12

## 2024-07-12 PROBLEM — C79.9 METASTATIC DISEASE (HCC): Status: ACTIVE | Noted: 2024-07-12

## 2024-07-12 PROBLEM — M89.9 LYTIC BONE LESIONS ON XRAY: Status: ACTIVE | Noted: 2024-07-12

## 2024-07-12 PROBLEM — M89.8X9 LYTIC BONE LESIONS ON XRAY: Status: ACTIVE | Noted: 2024-07-12

## 2024-07-12 PROBLEM — S72.001A FRACTURE OF RIGHT HIP (HCC): Status: ACTIVE | Noted: 2024-07-12

## 2024-07-12 LAB
ALBUMIN SERPL-MCNC: 2.7 G/DL (ref 3.5–5.2)
ALBUMIN/GLOB SERPL: 1 {RATIO} (ref 1–2.5)
ALP SERPL-CCNC: 106 U/L (ref 40–129)
ALT SERPL-CCNC: 18 U/L (ref 10–50)
ANION GAP SERPL CALCULATED.3IONS-SCNC: 11 MMOL/L (ref 9–16)
AST SERPL-CCNC: 22 U/L (ref 10–50)
BASOPHILS # BLD: <0.03 K/UL (ref 0–0.2)
BASOPHILS NFR BLD: 0 % (ref 0–2)
BILIRUB DIRECT SERPL-MCNC: 0.3 MG/DL (ref 0–0.2)
BILIRUB INDIRECT SERPL-MCNC: 0.3 MG/DL (ref 0–1)
BILIRUB SERPL-MCNC: 0.6 MG/DL (ref 0–1.2)
BUN SERPL-MCNC: 16 MG/DL (ref 6–20)
CALCIUM SERPL-MCNC: 8.8 MG/DL (ref 8.6–10.4)
CHLORIDE SERPL-SCNC: 101 MMOL/L (ref 98–107)
CO2 SERPL-SCNC: 24 MMOL/L (ref 20–31)
CREAT SERPL-MCNC: 0.7 MG/DL (ref 0.7–1.2)
EOSINOPHIL # BLD: <0.03 K/UL (ref 0–0.44)
EOSINOPHILS RELATIVE PERCENT: 0 % (ref 1–4)
ERYTHROCYTE [DISTWIDTH] IN BLOOD BY AUTOMATED COUNT: 13.5 % (ref 11.8–14.4)
GFR, ESTIMATED: >90 ML/MIN/1.73M2
GLOBULIN SER CALC-MCNC: 2.9 G/DL
GLUCOSE BLD-MCNC: 166 MG/DL (ref 75–110)
GLUCOSE BLD-MCNC: 168 MG/DL (ref 75–110)
GLUCOSE BLD-MCNC: 208 MG/DL (ref 75–110)
GLUCOSE BLD-MCNC: 260 MG/DL (ref 75–110)
GLUCOSE SERPL-MCNC: 208 MG/DL (ref 74–99)
HCT VFR BLD AUTO: 32.5 % (ref 40.7–50.3)
HGB BLD-MCNC: 10.3 G/DL (ref 13–17)
IMM GRANULOCYTES # BLD AUTO: 0.12 K/UL (ref 0–0.3)
IMM GRANULOCYTES NFR BLD: 1 %
LYMPHOCYTES NFR BLD: 1.21 K/UL (ref 1.1–3.7)
LYMPHOCYTES RELATIVE PERCENT: 8 % (ref 24–43)
MCH RBC QN AUTO: 28.6 PG (ref 25.2–33.5)
MCHC RBC AUTO-ENTMCNC: 31.7 G/DL (ref 28.4–34.8)
MCV RBC AUTO: 90.3 FL (ref 82.6–102.9)
MONOCYTES NFR BLD: 0.71 K/UL (ref 0.1–1.2)
MONOCYTES NFR BLD: 5 % (ref 3–12)
NEUTROPHILS NFR BLD: 86 % (ref 36–65)
NEUTS SEG NFR BLD: 13.09 K/UL (ref 1.5–8.1)
NRBC BLD-RTO: 0 PER 100 WBC
PLATELET # BLD AUTO: 257 K/UL (ref 138–453)
PMV BLD AUTO: 8.7 FL (ref 8.1–13.5)
POTASSIUM SERPL-SCNC: 4.1 MMOL/L (ref 3.7–5.3)
PROT SERPL-MCNC: 5.6 G/DL (ref 6.6–8.7)
RBC # BLD AUTO: 3.6 M/UL (ref 4.21–5.77)
SODIUM SERPL-SCNC: 136 MMOL/L (ref 136–145)
WBC OTHER # BLD: 15.2 K/UL (ref 3.5–11.3)

## 2024-07-12 PROCEDURE — 99232 SBSQ HOSP IP/OBS MODERATE 35: CPT | Performed by: INTERNAL MEDICINE

## 2024-07-12 PROCEDURE — 85025 COMPLETE CBC W/AUTO DIFF WBC: CPT

## 2024-07-12 PROCEDURE — 97162 PT EVAL MOD COMPLEX 30 MIN: CPT

## 2024-07-12 PROCEDURE — 97530 THERAPEUTIC ACTIVITIES: CPT

## 2024-07-12 PROCEDURE — 6360000002 HC RX W HCPCS

## 2024-07-12 PROCEDURE — 6370000000 HC RX 637 (ALT 250 FOR IP)

## 2024-07-12 PROCEDURE — 36415 COLL VENOUS BLD VENIPUNCTURE: CPT

## 2024-07-12 PROCEDURE — 2580000003 HC RX 258

## 2024-07-12 PROCEDURE — 94761 N-INVAS EAR/PLS OXIMETRY MLT: CPT

## 2024-07-12 PROCEDURE — 94640 AIRWAY INHALATION TREATMENT: CPT

## 2024-07-12 PROCEDURE — 80076 HEPATIC FUNCTION PANEL: CPT

## 2024-07-12 PROCEDURE — 80048 BASIC METABOLIC PNL TOTAL CA: CPT

## 2024-07-12 PROCEDURE — 1200000000 HC SEMI PRIVATE

## 2024-07-12 PROCEDURE — 6370000000 HC RX 637 (ALT 250 FOR IP): Performed by: NURSE PRACTITIONER

## 2024-07-12 PROCEDURE — 97166 OT EVAL MOD COMPLEX 45 MIN: CPT

## 2024-07-12 PROCEDURE — 97535 SELF CARE MNGMENT TRAINING: CPT

## 2024-07-12 PROCEDURE — 99232 SBSQ HOSP IP/OBS MODERATE 35: CPT | Performed by: STUDENT IN AN ORGANIZED HEALTH CARE EDUCATION/TRAINING PROGRAM

## 2024-07-12 PROCEDURE — 2700000000 HC OXYGEN THERAPY PER DAY

## 2024-07-12 PROCEDURE — 82947 ASSAY GLUCOSE BLOOD QUANT: CPT

## 2024-07-12 RX ADMIN — FAMOTIDINE 20 MG: 20 TABLET, FILM COATED ORAL at 09:45

## 2024-07-12 RX ADMIN — METFORMIN HYDROCHLORIDE 1000 MG: 500 TABLET ORAL at 21:20

## 2024-07-12 RX ADMIN — TRAZODONE HYDROCHLORIDE 100 MG: 50 TABLET ORAL at 21:20

## 2024-07-12 RX ADMIN — SODIUM CHLORIDE, PRESERVATIVE FREE 10 ML: 5 INJECTION INTRAVENOUS at 20:33

## 2024-07-12 RX ADMIN — BENZTROPINE MESYLATE 1 MG: 1 TABLET ORAL at 09:43

## 2024-07-12 RX ADMIN — PROPRANOLOL HYDROCHLORIDE 20 MG: 20 TABLET ORAL at 09:44

## 2024-07-12 RX ADMIN — BENZTROPINE MESYLATE 1 MG: 1 TABLET ORAL at 21:23

## 2024-07-12 RX ADMIN — CHOLECALCIFEROL TAB 25 MCG (1000 UNIT) 1000 UNITS: 25 TAB at 09:43

## 2024-07-12 RX ADMIN — IPRATROPIUM BROMIDE AND ALBUTEROL SULFATE 1 DOSE: 2.5; .5 SOLUTION RESPIRATORY (INHALATION) at 11:11

## 2024-07-12 RX ADMIN — IPRATROPIUM BROMIDE AND ALBUTEROL SULFATE 1 DOSE: 2.5; .5 SOLUTION RESPIRATORY (INHALATION) at 20:16

## 2024-07-12 RX ADMIN — ATORVASTATIN CALCIUM 80 MG: 80 TABLET, FILM COATED ORAL at 21:21

## 2024-07-12 RX ADMIN — HYDROCODONE BITARTRATE AND ACETAMINOPHEN 2 TABLET: 5; 325 TABLET ORAL at 20:32

## 2024-07-12 RX ADMIN — MAGNESIUM GLUCONATE 500 MG ORAL TABLET 400 MG: 500 TABLET ORAL at 09:43

## 2024-07-12 RX ADMIN — GUAIFENESIN 600 MG: 600 TABLET, EXTENDED RELEASE ORAL at 21:20

## 2024-07-12 RX ADMIN — MAGNESIUM GLUCONATE 500 MG ORAL TABLET 400 MG: 500 TABLET ORAL at 21:20

## 2024-07-12 RX ADMIN — IPRATROPIUM BROMIDE AND ALBUTEROL SULFATE 1 DOSE: 2.5; .5 SOLUTION RESPIRATORY (INHALATION) at 16:10

## 2024-07-12 RX ADMIN — Medication 1000 MG: at 02:45

## 2024-07-12 RX ADMIN — IPRATROPIUM BROMIDE AND ALBUTEROL SULFATE 1 DOSE: 2.5; .5 SOLUTION RESPIRATORY (INHALATION) at 07:41

## 2024-07-12 RX ADMIN — PROPRANOLOL HYDROCHLORIDE 20 MG: 20 TABLET ORAL at 21:23

## 2024-07-12 RX ADMIN — SODIUM CHLORIDE, PRESERVATIVE FREE 10 ML: 5 INJECTION INTRAVENOUS at 09:45

## 2024-07-12 RX ADMIN — RISPERIDONE 2 MG: 2 TABLET, FILM COATED ORAL at 09:43

## 2024-07-12 RX ADMIN — PREDNISONE 40 MG: 20 TABLET ORAL at 09:44

## 2024-07-12 RX ADMIN — METFORMIN HYDROCHLORIDE 1000 MG: 500 TABLET ORAL at 09:44

## 2024-07-12 RX ADMIN — RISPERIDONE 2 MG: 2 TABLET, FILM COATED ORAL at 21:23

## 2024-07-12 RX ADMIN — ENOXAPARIN SODIUM 40 MG: 100 INJECTION SUBCUTANEOUS at 09:41

## 2024-07-12 RX ADMIN — HYDROCODONE BITARTRATE AND ACETAMINOPHEN 2 TABLET: 5; 325 TABLET ORAL at 05:45

## 2024-07-12 RX ADMIN — FAMOTIDINE 20 MG: 20 TABLET, FILM COATED ORAL at 21:21

## 2024-07-12 RX ADMIN — GUAIFENESIN 600 MG: 600 TABLET, EXTENDED RELEASE ORAL at 09:44

## 2024-07-12 RX ADMIN — HYDROCODONE BITARTRATE AND ACETAMINOPHEN 2 TABLET: 5; 325 TABLET ORAL at 14:38

## 2024-07-12 RX ADMIN — HYDROCODONE BITARTRATE AND ACETAMINOPHEN 2 TABLET: 5; 325 TABLET ORAL at 09:44

## 2024-07-12 ASSESSMENT — PAIN SCALES - GENERAL
PAINLEVEL_OUTOF10: 10
PAINLEVEL_OUTOF10: 6
PAINLEVEL_OUTOF10: 10

## 2024-07-12 ASSESSMENT — PAIN DESCRIPTION - DESCRIPTORS
DESCRIPTORS: ACHING
DESCRIPTORS: ACHING

## 2024-07-12 ASSESSMENT — PAIN DESCRIPTION - ORIENTATION
ORIENTATION: RIGHT
ORIENTATION: RIGHT

## 2024-07-12 ASSESSMENT — PAIN DESCRIPTION - LOCATION
LOCATION: HIP
LOCATION: HIP
LOCATION: FLANK
LOCATION: HIP

## 2024-07-12 NOTE — OP NOTE
Operative Note    Isiah Duncan  YOB: 1975  [id number]      DOS: 7/11/24    Pre-operative Diagnosis: Right pathologic femoral neck fracture     Post-operative Diagnosis: Right pathologic femoral neck fracture    Procedure:  Right Total Hip Arthroplasty   Prophylactic cabling right proximal femur    Anesthesia: General    Surgeons: Jose Cary DO    Assistants: Joni Watson    Estimated Blood Loss:  300 cc    IVF: 1400 cc crystalloid    Complications: None    Specimens: Was Obtained: Right femoral neck    Findings: Right pathologic femoral neck fracture    Implants: Depuy Actis Size 6 Stem, HI offset, 54 Acetabulum, Dual Mobility    Indications:  This is a 48 yo Male with initially unknown cancer, that was found to have lesion within the right proximal femur and left proximal femur concerning for metastatic lesion.  Patient had a right femoral neck fracture for which orthopedics was consulted. All treatment options, including conservative and operative, were discussed with the patient in detail including the risks and benefits of each. Risks including but not limited to  bleeding, blood clots, infection, damage to nearby tissues, vessels and nerves, wound healing complications, failed procedure, stiffness, loss of motion, intraoperative fracture, anesthesia risk, leg length inequality, loss of life were all discussed with the patient.  Knowing these risks, the patient wished to proceed with surgery as indicated. Consent was obtained. Site Marked. All questions answered to the patient's satisfaction.          Operative:  The patient was taken to the operative suite. The pateint was administered IV antibiotics perioperatively.  The patient was also  administered 1 gm of IV tranexamic acid just prior to incision and a  second 1-gm TXA just after closure of incision.  General anesthesia  was affected.  The bilateral feet and ankles were well padded and placed in a  traction boot saab for the

## 2024-07-12 NOTE — CONSULTS
80 minutes. This time includes combination of one or more of the following - review of necessary tests, review of pertinent medical records from the EMR, performing medically appropriate examination and evaluation, counseling and educating the patient/family/caregiver, ordering necessary medical tests, procedures etc., documenting the clinical information in the electronic medical record, care coordination, referring and communicating with other health care providers and interpretation of results independently. This note is created with the assistance of a speech recognition program.  While intending to generate a document that actually reflects the content of the visit, the document can still have some errors including those of syntax and sound a like substitutions which may escape proof reading.  It such instances, actual meaning can be extrapolated by contextual diversion.

## 2024-07-12 NOTE — PLAN OF CARE
Problem: Safety - Adult  Goal: Free from fall injury  Outcome: Progressing     Problem: Pain  Goal: Verbalizes/displays adequate comfort level or baseline comfort level  Outcome: Progressing     Problem: Skin/Tissue Integrity  Goal: Absence of new skin breakdown  Description: 1.  Monitor for areas of redness and/or skin breakdown  2.  Assess vascular access sites hourly  3.  Every 4-6 hours minimum:  Change oxygen saturation probe site  4.  Every 4-6 hours:  If on nasal continuous positive airway pressure, respiratory therapy assess nares and determine need for appliance change or resting period.  Outcome: Progressing     Problem: Respiratory - Adult  Goal: Achieves optimal ventilation and oxygenation  Outcome: Progressing     Problem: Nutrition Deficit:  Goal: Optimize nutritional status  Outcome: Progressing     Problem: Discharge Planning  Goal: Discharge to home or other facility with appropriate resources  Outcome: Progressing       Lory Victor RN

## 2024-07-12 NOTE — PLAN OF CARE
Problem: Respiratory - Adult  Goal: Achieves optimal ventilation and oxygenation  7/12/2024 0745 by Amaya Aburto, GEOVANI  Outcome: Progressing   BRONCHOSPASM/BRONCHOCONSTRICTION     [x]         IMPROVE AERATION/BREATH SOUNDS  [x]   ADMINISTER BRONCHODILATOR THERAPY AS APPROPRIATE  [x]   ASSESS BREATH SOUNDS  []   IMPLEMENT AEROSOL/MDI PROTOCOL  [x]   PATIENT EDUCATION AS NEEDED  PROVIDE ADEQUATE OXYGENATION WITH ACCEPTABLE SP02/ABG'S    [x]  IDENTIFY APPROPRIATE OXYGEN THERAPY  [x]   MONITOR SP02/ABG'S AS NEEDED   [x]   PATIENT EDUCATION AS NEEDED

## 2024-07-12 NOTE — ACP (ADVANCE CARE PLANNING)
Advance Care Planning      Palliative Medicine Provider (MD/NP)  Advance Care Planning (ACP) Conversation      Date of Conversation: 07/11/24  The patient and/or authorized decision maker consented to a voluntary Advance Care Planning conversation.   Individuals present for the conversation:   Patient and Legal healthcare agent named below    Legal Healthcare Agent(s):    Primary Decision Maker: JamalneMax salazar - Legal Guardian, Legal Guardian - [phone]    ACP documents available in EMR prior to discussion:  -None    Primary Palliative Diagnosis(es):  Metastatic cancer    Conversation Summary:  Discussed at bedside with patient's Father Max.  He has paperwork at home for legal guardianship and will bring copies to the hospital.  Patient to remain full code at this time.    Resuscitation Status:    Code Status: Full Code    Outcomes / Completed Documentation:  An explanation of advance directives and their importance was provided and the following forms completed:    -No new documents completed.    If new document completed, original was provided to patient and/or family member.    Copy was placed for scanning into the [facility] EMR.      I spent 10 minutes providing separately identifiable ACP services with the patient and/or surrogate decision maker in a voluntary, in-person conversation discussing the patient's wishes and goals as detailed in the above note.       Nadia Dias, APRN - CNP

## 2024-07-12 NOTE — PROGRESS NOTES
Assistance: Independent  Transfer Assistance: Independent  Active : No  Patient's  Info: Father  Occupation: Unemployed  Additional Comments: Father is retired and home at all times     Objective   Safety Devices  Type of Devices: Call light within reach;Nurse notified;Gait belt;Patient at risk for falls;All fall risk precautions in place;Left in bed;Bed alarm in place  Restraints  Restraints Initially in Place: No  Balance  Sitting: Without support (EOB for ~8-9 minutes with 0 LOB, participating in static/dynamic tasks)  Standing: Without support (~4-5 minutes using RW for support at [facility], maintaining B UE support on RW throughout)     AROM: Within functional limits  Strength: Within functional limits  Coordination: Generally decreased, functional  Tone: Normal  Sensation: Intact  ADL  Feeding: Independent;Beverage management  Feeding Skilled Clinical Factors: Pt completed beverage mgmt independently  Grooming: Independent  UE Bathing: Supervision  LE Bathing: Minimal assistance;Increased time to complete  UE Dressing: Supervision  UE Dressing Skilled Clinical Factors: Pt donned gown in supine prior to completing bed mobility at Supervision  LE Dressing: Minimal assistance;Increased time to complete  LE Dressing Skilled Clinical Factors: Pt completed donning sock seated EOB at Max A to maintain precautions d/t approach not yet clarified with surgeon precautions.  Toileting: Contact guard assistance;Increased time to complete  Functional Mobility: Contact guard assistance;Adaptive equipment  Functional Mobility Skilled Clinical Factors: using RW for support; Pt completed side steps to HOB x3 steps with good tolerance at [facility]     Activity Tolerance  Activity Tolerance: Patient tolerated treatment well;Patient limited by fatigue;Patient limited by pain  Bed mobility  Supine to Sit: Contact guard assistance  Sit to Supine: Minimal assistance  Scooting: Contact guard assistance  Transfers  Sit to stand:  demo toileting tasks at Supervision to participate in ADL tasks  Short Term Goal 3: demo 10+ min of dynamic standing tolerance at Supervision to participate in ADLs  Short Term Goal 4: demo functional transfers/mobility using LRAD at Supervision to participate in ADL tasks  Short Term Goal 5: verbalize LOLLY precautions independently with 0 VCs     Therapy Time   Individual Concurrent Group Co-treatment   Time In 1119         Time Out 1142         Minutes 23         Timed Code Treatment Minutes: 9 Minutes     Ally Freeman, OTR/L

## 2024-07-12 NOTE — PROGRESS NOTES
Orthopedic Progress Note    Patient:  Isiah Duncan  YOB: 1975     49 y.o. male    Subjective:  Patient seen and examined this morning. No complaints or concerns. No issues overnight per nursing. Pain is well controlled on current regimen. Denies fever, HA, CP, SOB, N/V, dysuria, new numbness/tingling. PT should work with PT/OT today.     Vitals reviewed, afebrile    Objective:   Vitals:    07/12/24 0409   BP: 100/73   Pulse: 92   Resp: 12   Temp: 97.9 °F (36.6 °C)   SpO2: 93%     Gen: NAD, cooperative    Cardiovascular: Regular rate   Respiratory: No acute respiratory distress, breathing comfortably    Orthopedic Exam  RLE:    Optifoam dressing about the right hip clean/dry/intact without strikethrough.  Patient appropriately tender to palpation about the right hip.  Compartments soft and compressible.  Patient able to flex and extend the knee, decreased range of motion secondary to hip pain.  TA/FHL/CSL/GSC motor complexes intact.  Sural/saphenous/SPN/DPN/plantar nerve sensation intact to light touch.  Extremities warm well-perfused BCR.    LLE:    Skin intact.  No abrasions, lacerations, ecchymosis, nor deformity.  Compartments soft and compressible.  Patient is nontender to palpation about the left lower extremity.  Patient able to actively straight leg raise, flex and extend the knee, plantar/dorsiflex at the ankle, flex and extend the digits without pain.  TA/FHL/EHL/GSC motor complex intact.  Sural/saphenous/SPN/DPN/plantar nerve sensation intact to light touch.  Extremities warm well-perfused BCR.    Recent Labs     07/09/24  1920 07/11/24  0236   WBC 15.1*  --    HGB 14.1  --    HCT 41.5  --      --    INR  --  1.2   *  --    K 4.2  --    BUN 6  --    CREATININE 0.6*  --    GLUCOSE 187*  --       Meds:   Abx: Ceftriaxone  DVT ppx: Lovenox  See rec for complete list    Impression 49 y.o. male being seen for:    -Right pathologic femoral neck fracture s/p LOLLY. DOS:

## 2024-07-12 NOTE — ANESTHESIA POSTPROCEDURE EVALUATION
Department of Anesthesiology  Postprocedure Note    Patient: Isiah Duncan  MRN: [id number]  YOB: 1975  Date of evaluation: 7/12/2024    Procedure Summary       Date: 07/11/24 Room / Location: [address]    Anesthesia Start: 1908 Anesthesia Stop: 2134    Procedures:       RIGHT TOTAL HIP ARTHROPLASTY (Right: Hip)      FEMORAL INTRAMEDULLARY NAIL INSERTION (Left: Hip) Diagnosis:       Closed fracture of neck of femur, unspecified laterality, initial encounter ([facility])      (Closed fracture of neck of femur, unspecified laterality, initial encounter ([facility]) [S72.009A])    Surgeons: Jose Cary DO Responsible Provider: uLcinda Ojeda MD    Anesthesia Type: general ASA Status: 4            Anesthesia Type: No value filed.    Michele Phase I: Michele Score: 9    Michele Phase II:      Anesthesia Post Evaluation    Patient location during evaluation: PACU  Patient participation: complete - patient participated  Level of consciousness: awake and alert  Airway patency: patent  Nausea & Vomiting: no nausea and no vomiting  Cardiovascular status: blood pressure returned to baseline  Respiratory status: acceptable  Hydration status: euvolemic  Comments: No known anesthesia related complication  Multimodal analgesia pain management approach  Pain management: adequate    No notable events documented.

## 2024-07-12 NOTE — PROGRESS NOTES
Today's Date: 7/12/2024  Patient Name: Isiah Duncan  Date of admission: 7/10/2024  9:47 PM  Patient's age: 49 y.o., 1975  Admission Dx: Closed right hip fracture, initial encounter ([facility]) [S72.001A]    Reason for Consult: Metastatic carcinoma   Requesting Physician: Oly Anton Sra, MD    Chief Complaint:  Weight loss and hip pain     Interval Changes:  Patient seen and examined.  Vitals reviewed.  Afebrile, tolerating 4 L nasal cannula.  No labs to review today.  Status post right total hip arthroplasty with biopsy 7/11/2024  Pathology pending  MRI brain shows large heterogeneously enhancing mass up to 10 cm with both intracranial and extracranial extraosseous extension; mild mass effect on bilateral high parietal lobes with involvement of the overlying scalp    History of Present Illness:    The patient is a 49 y.o.  male who was initially seen at [facility] by Dr. Strong on 7/10/2024.  He presented to the ED for hip pain on the right side which has been going on for 2 months, causing him to fall.  His balance has been off for the last month.  He also has had about a 70 pound unintentional weight loss over the last year.  He says he is just not eating much.  He does have a chronic cough. No vomiting or diarrhea.  No chest pain.  No headache.  Says he does have a bump on his scalp he thinks it was from a fall.  Patient has schizophrenia, his dad is his legal guardian.     CT chest abdomen pelvis done demonstrated left upper lobe infiltrate with possible postobstructive changes or metastatic disease there is left hilar mass with left scapular mass and lytic/destructive lesion of the right femoral neck and a CT of the head large soft tissue mass at the vertex.  He was ultimately transferred to [facility] for further workup and management of closed right hip fracture.    Past Medical History:   has a past medical history of Hypertension and Schizophrenia (HCC).    Past Surgical  all parts of the encounter with the nurse practitioner/resident.  I agree with the assessment, plan and orders as documented by the above health care provider.  I have made necessary changes as deemed appropriate directly in the note.     More than 50% of the time was spent taking care of this patient in addition to the nurse practitioner time.  That also included history taking follow-up physical examination and review of system.    Electronically signed by Calin Strong MD          This note is created with the assistance of a speech recognition program.  While intending to generate a document that actually reflects the content of the visit, the document can still have some errors including those of syntax and sound a like substitutions which may escape proof reading.  It such instances, actual meaning can be extrapolated by contextual diversion.

## 2024-07-12 NOTE — PROGRESS NOTES
Secondary  Cleaning Responsibility: Secondary  Shopping Responsibility: Secondary  Ambulation Assistance: Independent  Transfer Assistance: Independent  Active : No  Patient's  Info: Father  Occupation: Unemployed  Additional Comments: Father is retired and home at all times  Vision/Hearing  Vision  Vision: Within Functional Limits  Hearing  Hearing: Within functional limits    Cognition   Orientation  Orientation Level: Oriented to person;Oriented to place;Oriented to situation;Disoriented to time  Cognition  Overall Cognitive Status: Exceptions  Arousal/Alertness: Appropriate responses to stimuli  Following Commands: Appears intact  Attention Span: Attends with cues to redirect  Safety Judgement: Decreased awareness of need for assistance  Problem Solving: Decreased awareness of errors  Insights: Not aware of deficits  Initiation: Requires cues for some  Sequencing: Requires cues for some     Objective       AROM RLE (degrees)  RLE AROM: WFL  RLE General AROM: within precautions  AROM LLE (degrees)  LLE AROM : WFL  AROM RUE (degrees)  RUE General AROM: see OT  AROM LUE (degrees)  LUE General AROM: see OT  Strength RLE  Comment: antigravity observed, pain limiting formal assessment, no buckling with standing  Strength LLE  Comment: antigravity observed, pain limiting formal assessment, no buckling with standing  Strength RUE  Comment: antigravity observed, see OT  Strength LUE  Comment: antigravity observed, see OT           Bed mobility  Supine to Sit: Contact guard assistance  Sit to Supine: Minimal assistance  Scooting: Contact guard assistance  Transfers  Sit to Stand: Minimal Assistance  Stand to Sit: Contact guard assistance  Ambulation  Surface: Level tile  Device: Rolling Walker  Assistance: Contact guard assistance  Quality of Gait: slowed, no LOB or buckling, minorly unsteady, c/o pain  Distance: L lateral 2'  More Ambulation?: No  Stairs/Curb  Stairs?: No     Balance  Posture: Fair  Sitting -

## 2024-07-12 NOTE — PROGRESS NOTES
@[facility] HospitalEDLOGO@    [facility]   IN-PATIENT SERVICE   [facility]    Progress Note    7/12/2024    6:19 PM    Name:   Isiah Duncan  MRN:     [id number]     Acct:      [id number]   Room:   0446/0446-01   Day:  2  Admit Date:  7/10/2024  9:47 PM    PCP:   Lolita Mcclain APRN - CNP  Code Status:  Full Code    Subjective:     C/C: No chief complaint on file.  Right hip pain  Interval History Status: not changed.     Seen at bedside, hemodynamically stable, currently on nasal cannula  Complaining of right hip pain today, denies any other complaints,  Labs reviewed  Continues to be on Rocephin, steroids, steroids  Brief History:     49-year-old male with history of schizophrenia,  came in bed right-sided hip pain causing falls, poor appetite and weight loss also complained of a bump on his scalp, CT imaging concerning for extensive metastatic disease, showed pathological Right femoral fracture in addition to extensive lytic lesions, lung lesions/hilar mass with post obstructive pneumonia, scalp lesions, patient was transferred to [facility] for further management underwent right total hip arthroplasty with biopsy on 7/11/2024.  Orthopedic surgery, neurosurgery, heme-onc service, palliative service, pulm service has been following, patient also seen by radiation oncology.    Medications:     Allergies:  No Known Allergies    Current Meds:   Scheduled Meds:    cefTRIAXone (ROCEPHIN) IV  1,000 mg IntraVENous Q24H    vitamin D  50,000 Units Oral Weekly    predniSONE  40 mg Oral Daily    ipratropium 0.5 mg-albuterol 2.5 mg  1 Dose Inhalation Q4H WA RT    Vitamin D  1,000 Units Oral Daily    sodium chloride flush  5-40 mL IntraVENous BID    sodium chloride flush  5-40 mL IntraVENous BID    atorvastatin  80 mg Oral Nightly    benztropine  1 mg Oral BID    famotidine  20 mg Oral BID    guaiFENesin  600 mg Oral BID    magnesium oxide  400 mg Oral BID    metFORMIN  1,000 mg Oral  07/12/24  1101   *  --  136   K 4.2  --  4.1   CL 94*  --  101   CO2 25  --  24   GLUCOSE 187*  --  208*   BUN 6  --  16   CREATININE 0.6*  --  0.7   MG 1.7  --   --    ANIONGAP 14  --  11   LABGLOM >90  --  >90   CALCIUM 9.9  --  8.8   TROPHS 8 8  --      Recent Labs     07/09/24  1920 07/11/24  2303 07/12/24  0742 07/12/24  1101 07/12/24  1208 07/12/24  1605   TSH 2.14  --   --   --   --   --    AST 12  --   --  22  --   --    ALT 44*  --   --  18  --   --    ALKPHOS 152*  --   --  106  --   --    BILITOT 0.6  --   --  0.6  --   --    BILIDIR  --   --   --  0.3*  --   --    POCGLU  --  211* 166*  --  168* 208*     ABG:No results found for: \"POCPH\", \"PHART\", \"PH\", \"POCPCO2\", \"VDW6RCR\", \"PCO2\", \"POCPO2\", \"PO2ART\", \"PO2\", \"POCHCO3\", \"MHY0GFF\", \"HCO3\", \"NBEA\", \"PBEA\", \"BEART\", \"BE\", \"THGBART\", \"THB\", \"LAK9UGY\", \"PELP8CKS\", \"V1ZEFLAN\", \"O2SAT\", \"FIO2\"  Lab Results   Component Value Date/Time    SPECIAL RT HAND 3 ML 07/11/2024 11:08 AM     Lab Results   Component Value Date/Time    CULTURE NO GROWTH 1 DAY 07/11/2024 11:08 AM       Radiology:  XR HIP 2-3 VW W PELVIS RIGHT    Result Date: 7/12/2024  Postsurgical changes related to interval total right hip arthroplasty with proximal femoral cerclage wire in place.     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 There is mild mass effect on the subjacent bilateral high parietal lobes without subjacent parenchymal signal abnormality. The mass abuts the superior sagittal sinus, which remains patent. There is involvement of the overlying scalp.  Findings are concerning for malignancy, particularly metastatic disease.     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

## 2024-07-12 NOTE — PROGRESS NOTES
Neurosurgery MARTIN/Resident    Daily Progress Note   No chief complaint on file.    7/12/2024  9:43 AM    Chart reviewed.  No acute events overnight.  No new complaints. OR for ORIF yesterday of right hip pathological fracture. Biopsy obtained in OR. Pathology results pending.     Vitals:    07/12/24 0012 07/12/24 0409 07/12/24 0740 07/12/24 0741   BP:  100/73 100/64    Pulse:  92 90    Resp: 18 12 11    Temp:  97.9 °F (36.6 °C) 97.7 °F (36.5 °C)    TempSrc:  Oral Axillary    SpO2:  93% 96% 94%   Height:             PE:   AOx3   CNII-XII intact   PERRL, EOMI   Motor   L deltoid 5/5; R deltoid 5/5  L biceps 5/5; R biceps 5/5  L triceps 5/5; R triceps 5/5  L wrist extension 5/5; R wrist extension 5/5  L intrinsics 5/5; R intrinsics 5/5      L iliopsoas 5/5 , R iliopsoas 5/5  L quadriceps 5/5; R quadriceps 5/5  L Dorsiflexion 5/5; R dorsiflexion 5/5  L Plantarflexion 5/5; R plantarflexion 5/5  L EHL 5/5; R EHL 5/5    Sensation: intact      Lab Results   Component Value Date    WBC 15.1 (H) 07/09/2024    HGB 14.1 07/09/2024    HCT 41.5 07/09/2024     07/09/2024    CHOL 97 07/03/2024    TRIG 157 (H) 07/03/2024    HDL 25 (L) 07/03/2024    LDLDIRECT 83 04/19/2022    ALT 44 (H) 07/09/2024    AST 12 07/09/2024     (L) 07/09/2024    K 4.2 07/09/2024    CL 94 (L) 07/09/2024    CREATININE 0.6 (L) 07/09/2024    BUN 6 07/09/2024    CO2 25 07/09/2024    TSH 2.14 07/09/2024    PSA 0.80 07/03/2024    INR 1.2 07/11/2024    LABA1C 6.7 (H) 07/03/2024       Radiology   XR HIP 2-3 VW W PELVIS RIGHT    Result Date: 7/12/2024  EXAMINATION: ONE XRAY VIEW OF THE PELVIS AND TWO XRAY VIEWS RIGHT HIP 7/11/2024 9:32 pm COMPARISON: Pelvis radiograph performed earlier the same day HISTORY: ORDERING SYSTEM PROVIDED HISTORY: s/p right total hip replacement, in PACU please TECHNOLOGIST PROVIDED HISTORY: s/p right total hip replacement, in PACU please FINDINGS: There postsurgical changes related to interval total right hip arthroplasty with  demonstrate no acute abnormality. SINUSES: There are bilateral maxillary sinus mucous retention cyst. BONES/SOFT TISSUES: The bone marrow signal intensity appears normal. The soft tissues demonstrate no acute abnormality. MRV HEAD: The above large mass abuts and demonstrates mass effect on the superior sagittal sinus, which remains patent. No focal stenosis or thrombus is seen of the remaining major dural venous sinuses.     Large heterogeneously enhancing mass involving the calvarium at the vertex measuring up to 10 cm with both intracranial and extracranial extraosseous extension. There is mild mass effect on the subjacent bilateral high parietal lobes without subjacent parenchymal signal abnormality. The mass abuts the superior sagittal sinus, which remains patent. There is involvement of the overlying scalp.  Findings are concerning for malignancy, particularly metastatic disease.     MRV HEAD W WO CONTRAST    Result Date: 7/11/2024  EXAMINATION: MRV OF THE HEAD WITH AND WITHOUT CONTRAST; MRI OF THE BRAIN WITHOUT AND WITH CONTRAST 7/11/2024 2:28 pm TECHNIQUE: Multiplanar multisequence MRV of the head was performed with and without the administration of intravenous contrast.; Multiplanar multisequence MRI of the head/brain was performed without and with the administration of intravenous contrast. COMPARISON: CT brain 07/09/2024 HISTORY: ORDERING SYSTEM PROVIDED HISTORY: large brain mass TECHNOLOGIST PROVIDED HISTORY: large brain mass What is the sedation requirement?->None Reason for Exam: eval metastatic brain lesion FINDINGS: MRI BRAIN: INTRACRANIAL STRUCTURES/VENTRICLES: There is a large heterogeneously enhancing mass involving the calvarium at the vertex measuring up to 9.0 x 5.6 x 10 cm.  There is both intracranial and extracranial extraosseous extension with extra-axial intracranial component causing mild mass effect on the subjacent bilateral high parietal lobes.  There is no subjacent parenchymal signal

## 2024-07-12 NOTE — PROGRESS NOTES
PULMONARY PROGRESS NOTE      Patient:  Isiah Duncan  YOB: 1975    MRN: [id number]     Acct: [id number]     Admit date: 7/10/2024    REASON FOR CONSULT:- Lung mass    Pt seen and Chart reviewed.    Subjective:     49-year-old male with past medical history of schizophrenia, hypertension initially presented to [facility] on 7/9 with worsening right hip pain, fall and unintentional weight loss.    He reports that he has been having hip pain for the past 1 to 2 months and frequent falls.  X-ray of the right hip showed lytic lesion in the right femoral neck with pathologic fracture.      CT of the chest shows possible left upper lobe infiltrate with possible postobstructive changes or metastatic disease, left hilar mass, left scapular mass.  The CT head is also showing large soft tissue mass at the vertex    On exam patient is afebrile, hemodynamically stable, maintaining saturations on 2 L nasal cannula, father is in the room he is also the legal guardian. Patient reports that he had a fall about 5 days ago and has not been able to walk since. He reports productive cough, denies any hemoptysis or any shortness of breath. He smokes 45 cigarettes per day    7/12/2024   Subjective    Port Rt ORIF and bx     Review of Systems -   Review of Systems   Constitutional:  Negative for fever.   Respiratory:  Positive for cough. Negative for shortness of breath.    Cardiovascular:  Negative for chest pain and leg swelling.   Gastrointestinal:  Negative for abdominal pain, diarrhea, nausea and vomiting.   Neurological:  Negative for dizziness and weakness.           Physical Exam:  Vitals: /64   Pulse 90   Temp 97.7 °F (36.5 °C) (Axillary)   Resp 11   Ht 1.88 m (6' 2.02\")   SpO2 94%   BMI 24.13 kg/m²   24 hour intake/output:  Intake/Output Summary (Last 24 hours) at 7/12/2024 1042  Last data filed at 7/12/2024 0957  Gross per 24 hour   Intake 3838.55 ml   Output 3580 ml   Net 258.55 ml       Last 3

## 2024-07-13 PROBLEM — F17.200 TOBACCO DEPENDENCE: Status: ACTIVE | Noted: 2024-07-13

## 2024-07-13 PROBLEM — R59.0 MEDIASTINAL LYMPHADENOPATHY: Status: ACTIVE | Noted: 2024-07-13

## 2024-07-13 PROBLEM — R90.0 INTRACRANIAL MASS: Status: ACTIVE | Noted: 2024-07-13

## 2024-07-13 LAB
ANION GAP SERPL CALCULATED.3IONS-SCNC: 10 MMOL/L (ref 9–16)
BASOPHILS # BLD: <0.03 K/UL (ref 0–0.2)
BASOPHILS NFR BLD: 0 % (ref 0–2)
BUN SERPL-MCNC: 12 MG/DL (ref 6–20)
CALCIUM SERPL-MCNC: 8.8 MG/DL (ref 8.6–10.4)
CHLORIDE SERPL-SCNC: 100 MMOL/L (ref 98–107)
CO2 SERPL-SCNC: 26 MMOL/L (ref 20–31)
CREAT SERPL-MCNC: 0.6 MG/DL (ref 0.7–1.2)
EOSINOPHIL # BLD: <0.03 K/UL (ref 0–0.44)
EOSINOPHILS RELATIVE PERCENT: 0 % (ref 1–4)
ERYTHROCYTE [DISTWIDTH] IN BLOOD BY AUTOMATED COUNT: 13.7 % (ref 11.8–14.4)
GFR, ESTIMATED: >90 ML/MIN/1.73M2
GLUCOSE BLD-MCNC: 162 MG/DL (ref 75–110)
GLUCOSE BLD-MCNC: 226 MG/DL (ref 75–110)
GLUCOSE SERPL-MCNC: 151 MG/DL (ref 74–99)
HCT VFR BLD AUTO: 30.5 % (ref 40.7–50.3)
HGB BLD-MCNC: 9.8 G/DL (ref 13–17)
IMM GRANULOCYTES # BLD AUTO: 0.1 K/UL (ref 0–0.3)
IMM GRANULOCYTES NFR BLD: 1 %
LYMPHOCYTES NFR BLD: 2.27 K/UL (ref 1.1–3.7)
LYMPHOCYTES RELATIVE PERCENT: 19 % (ref 24–43)
MAGNESIUM SERPL-MCNC: 1.9 MG/DL (ref 1.6–2.6)
MCH RBC QN AUTO: 28.6 PG (ref 25.2–33.5)
MCHC RBC AUTO-ENTMCNC: 32.1 G/DL (ref 28.4–34.8)
MCV RBC AUTO: 88.9 FL (ref 82.6–102.9)
MONOCYTES NFR BLD: 0.67 K/UL (ref 0.1–1.2)
MONOCYTES NFR BLD: 6 % (ref 3–12)
NEUTROPHILS NFR BLD: 74 % (ref 36–65)
NEUTS SEG NFR BLD: 8.65 K/UL (ref 1.5–8.1)
NRBC BLD-RTO: 0 PER 100 WBC
PLATELET # BLD AUTO: 256 K/UL (ref 138–453)
PMV BLD AUTO: 8.7 FL (ref 8.1–13.5)
POTASSIUM SERPL-SCNC: 3.3 MMOL/L (ref 3.7–5.3)
RBC # BLD AUTO: 3.43 M/UL (ref 4.21–5.77)
SODIUM SERPL-SCNC: 136 MMOL/L (ref 136–145)
WBC OTHER # BLD: 11.7 K/UL (ref 3.5–11.3)

## 2024-07-13 PROCEDURE — 1200000000 HC SEMI PRIVATE

## 2024-07-13 PROCEDURE — 94761 N-INVAS EAR/PLS OXIMETRY MLT: CPT

## 2024-07-13 PROCEDURE — 6370000000 HC RX 637 (ALT 250 FOR IP): Performed by: STUDENT IN AN ORGANIZED HEALTH CARE EDUCATION/TRAINING PROGRAM

## 2024-07-13 PROCEDURE — 2580000003 HC RX 258

## 2024-07-13 PROCEDURE — 80048 BASIC METABOLIC PNL TOTAL CA: CPT

## 2024-07-13 PROCEDURE — 82947 ASSAY GLUCOSE BLOOD QUANT: CPT

## 2024-07-13 PROCEDURE — 99233 SBSQ HOSP IP/OBS HIGH 50: CPT | Performed by: INTERNAL MEDICINE

## 2024-07-13 PROCEDURE — 6370000000 HC RX 637 (ALT 250 FOR IP)

## 2024-07-13 PROCEDURE — 94640 AIRWAY INHALATION TREATMENT: CPT

## 2024-07-13 PROCEDURE — 6370000000 HC RX 637 (ALT 250 FOR IP): Performed by: INTERNAL MEDICINE

## 2024-07-13 PROCEDURE — 99232 SBSQ HOSP IP/OBS MODERATE 35: CPT | Performed by: STUDENT IN AN ORGANIZED HEALTH CARE EDUCATION/TRAINING PROGRAM

## 2024-07-13 PROCEDURE — 99232 SBSQ HOSP IP/OBS MODERATE 35: CPT | Performed by: INTERNAL MEDICINE

## 2024-07-13 PROCEDURE — 6360000002 HC RX W HCPCS

## 2024-07-13 PROCEDURE — 83735 ASSAY OF MAGNESIUM: CPT

## 2024-07-13 PROCEDURE — 85025 COMPLETE CBC W/AUTO DIFF WBC: CPT

## 2024-07-13 PROCEDURE — 6370000000 HC RX 637 (ALT 250 FOR IP): Performed by: NURSE PRACTITIONER

## 2024-07-13 PROCEDURE — 36415 COLL VENOUS BLD VENIPUNCTURE: CPT

## 2024-07-13 PROCEDURE — 2700000000 HC OXYGEN THERAPY PER DAY

## 2024-07-13 RX ORDER — POTASSIUM CHLORIDE 20 MEQ/1
40 TABLET, EXTENDED RELEASE ORAL ONCE
Status: COMPLETED | OUTPATIENT
Start: 2024-07-13 | End: 2024-07-13

## 2024-07-13 RX ORDER — IPRATROPIUM BROMIDE AND ALBUTEROL SULFATE 2.5; .5 MG/3ML; MG/3ML
1 SOLUTION RESPIRATORY (INHALATION)
Status: DISCONTINUED | OUTPATIENT
Start: 2024-07-13 | End: 2024-07-14

## 2024-07-13 RX ADMIN — FAMOTIDINE 20 MG: 20 TABLET, FILM COATED ORAL at 08:42

## 2024-07-13 RX ADMIN — IPRATROPIUM BROMIDE AND ALBUTEROL SULFATE 1 DOSE: 2.5; .5 SOLUTION RESPIRATORY (INHALATION) at 12:09

## 2024-07-13 RX ADMIN — POTASSIUM CHLORIDE 40 MEQ: 1500 TABLET, EXTENDED RELEASE ORAL at 10:11

## 2024-07-13 RX ADMIN — SODIUM CHLORIDE, PRESERVATIVE FREE 10 ML: 5 INJECTION INTRAVENOUS at 21:36

## 2024-07-13 RX ADMIN — IPRATROPIUM BROMIDE AND ALBUTEROL SULFATE 1 DOSE: 2.5; .5 SOLUTION RESPIRATORY (INHALATION) at 08:31

## 2024-07-13 RX ADMIN — CHOLECALCIFEROL TAB 25 MCG (1000 UNIT) 1000 UNITS: 25 TAB at 08:42

## 2024-07-13 RX ADMIN — METFORMIN HYDROCHLORIDE 1000 MG: 500 TABLET ORAL at 21:35

## 2024-07-13 RX ADMIN — MAGNESIUM GLUCONATE 500 MG ORAL TABLET 400 MG: 500 TABLET ORAL at 21:35

## 2024-07-13 RX ADMIN — RISPERIDONE 2 MG: 2 TABLET, FILM COATED ORAL at 08:41

## 2024-07-13 RX ADMIN — ENOXAPARIN SODIUM 40 MG: 100 INJECTION SUBCUTANEOUS at 08:42

## 2024-07-13 RX ADMIN — GUAIFENESIN 600 MG: 600 TABLET, EXTENDED RELEASE ORAL at 08:42

## 2024-07-13 RX ADMIN — BENZTROPINE MESYLATE 1 MG: 1 TABLET ORAL at 21:35

## 2024-07-13 RX ADMIN — SODIUM CHLORIDE, PRESERVATIVE FREE 10 ML: 5 INJECTION INTRAVENOUS at 21:35

## 2024-07-13 RX ADMIN — Medication 1000 MG: at 02:22

## 2024-07-13 RX ADMIN — TRAZODONE HYDROCHLORIDE 100 MG: 50 TABLET ORAL at 21:35

## 2024-07-13 RX ADMIN — PROPRANOLOL HYDROCHLORIDE 20 MG: 20 TABLET ORAL at 08:41

## 2024-07-13 RX ADMIN — ATORVASTATIN CALCIUM 80 MG: 80 TABLET, FILM COATED ORAL at 21:35

## 2024-07-13 RX ADMIN — PREDNISONE 40 MG: 20 TABLET ORAL at 08:42

## 2024-07-13 RX ADMIN — RISPERIDONE 2 MG: 2 TABLET, FILM COATED ORAL at 21:35

## 2024-07-13 RX ADMIN — BENZTROPINE MESYLATE 1 MG: 1 TABLET ORAL at 08:41

## 2024-07-13 RX ADMIN — PROPRANOLOL HYDROCHLORIDE 20 MG: 20 TABLET ORAL at 21:35

## 2024-07-13 RX ADMIN — IPRATROPIUM BROMIDE AND ALBUTEROL SULFATE 1 DOSE: .5; 2.5 SOLUTION RESPIRATORY (INHALATION) at 21:46

## 2024-07-13 RX ADMIN — MAGNESIUM GLUCONATE 500 MG ORAL TABLET 400 MG: 500 TABLET ORAL at 08:42

## 2024-07-13 RX ADMIN — FAMOTIDINE 20 MG: 20 TABLET, FILM COATED ORAL at 21:35

## 2024-07-13 RX ADMIN — GUAIFENESIN 600 MG: 600 TABLET, EXTENDED RELEASE ORAL at 21:35

## 2024-07-13 RX ADMIN — METFORMIN HYDROCHLORIDE 1000 MG: 500 TABLET ORAL at 08:42

## 2024-07-13 NOTE — PROGRESS NOTES
Today's Date: 7/13/2024  Patient Name: Isiah Duncan  Date of admission: 7/10/2024  9:47 PM  Patient's age: 49 y.o., 1975  Admission Dx: Closed right hip fracture, initial encounter ([facility]) [S72.001A]    Reason for Consult: Metastatic carcinoma   Requesting Physician: Oly Anton Sra, MD    Chief Complaint:  Weight loss and hip pain     Interval Changes:  Patient seen and examined.  Vitals reviewed.  Afebrile, tolerating 4 L nasal cannula.  No labs to review today.  Status post right total hip arthroplasty with biopsy 7/11/2024  Pathology pending  Patient is able to answer some questions but at the moment I started talking about the malignancy he looks away and he is unengaged    History of Present Illness:    The patient is a 49 y.o.  male who was initially seen at [facility] by Dr. Strong on 7/10/2024.  He presented to the ED for hip pain on the right side which has been going on for 2 months, causing him to fall.  His balance has been off for the last month.  He also has had about a 70 pound unintentional weight loss over the last year.  He says he is just not eating much.  He does have a chronic cough. No vomiting or diarrhea.  No chest pain.  No headache.  Says he does have a bump on his scalp he thinks it was from a fall.  Patient has schizophrenia, his dad is his legal guardian.     CT chest abdomen pelvis done demonstrated left upper lobe infiltrate with possible postobstructive changes or metastatic disease there is left hilar mass with left scapular mass and lytic/destructive lesion of the right femoral neck and a CT of the head large soft tissue mass at the vertex.  He was ultimately transferred to [facility] for further workup and management of closed right hip fracture.    Past Medical History:   has a past medical history of Hypertension and Schizophrenia (HCC).    Past Surgical History:   has a past surgical history that includes Hip Arthroplasty (Right, 07/11/2024);  lymphadenopathy [R59.0] 07/11/2024    Closed fracture of neck of femur (HCC) [S72.009A] 07/10/2024    Schizophrenia (HCC) [F20.9] 10/31/2014       Assessment:  Left hilar mass  Postobstructive pneumonia  Left scapular mass  Right lower lobe nodule  Destructive metastatic bony lesion  Pathological fracture of the right femoral neck  Schizophrenia    Plan:  I reviewed labs, imaging studies, related electronic medical records including outside records and discussed the diagnosis, prognosis and treatment recommendations   49-year-old man presented with weight loss and found to have right lower lobe lung nodule with lymphadenopathy and bone lesions with pathologic fracture of the right femoral neck picture compatible with metastatic carcinoma.  Discussed with the patient and his father about metastatic carcinoma in general as not curative disease and recommend palliative systemic treatment pending on pathology and molecular marker  Status post right total hip arthroplasty with biopsy 7/11/2024.  Await pathology results  MRI brain shows large heterogeneously enhancing mass up to 10 cm with both intracranial and extracranial extraosseous extension; mild mass effect on bilateral high parietal lobes with involvement of the overlying scalp  Await neurosurgery recommendations  Palliative care following.  Encourage smoking cessation  The patient needs to make a decision about surgery versus palliative care versus radiation.  I think he might be best served with palliative care however, the patient is unwilling to engage in any conversation about his diagnosis and prognosis.           Electronically signed by Stewart Narayanan MD          This note is created with the assistance of a speech recognition program.  While intending to generate a document that actually reflects the content of the visit, the document can still have some errors including those of syntax and sound a like substitutions which may escape proof reading.

## 2024-07-13 NOTE — PROGRESS NOTES
Neurosurgery MARTIN/Resident    Daily Progress Note   No chief complaint on file.    7/13/2024  12:43 PM    Chart reviewed.  No acute events overnight.  No new complaints.    Vitals:    07/13/24 0835 07/13/24 0900 07/13/24 1146 07/13/24 1209   BP:   124/79    Pulse:  (!) 112 (!) 112    Resp:  21 18    Temp:   97.3 °F (36.3 °C)    TempSrc:   Oral    SpO2: 93%  96% 95%   Height:             PE:   AOx3   CNII-XII intact   PERRL, EOMI   Motor   L deltoid 5/5; R deltoid 5/5  L biceps 5/5; R biceps 5/5  L triceps 5/5; R triceps 5/5  L wrist extension 5/5; R wrist extension 5/5  L intrinsics 5/5; R intrinsics 5/5      L iliopsoas 5/5 , R iliopsoas 5/5  L quadriceps 5/5; R quadriceps 5/5  L Dorsiflexion 5/5; R dorsiflexion 5/5  L Plantarflexion 5/5; R plantarflexion 5/5  L EHL 5/5; R EHL 5/5     Sensation: intact      Lab Results   Component Value Date    WBC 11.7 (H) 07/13/2024    HGB 9.8 (L) 07/13/2024    HCT 30.5 (L) 07/13/2024     07/13/2024    CHOL 97 07/03/2024    TRIG 157 (H) 07/03/2024    HDL 25 (L) 07/03/2024    LDLDIRECT 83 04/19/2022    ALT 18 07/12/2024    AST 22 07/12/2024     07/13/2024    K 3.3 (L) 07/13/2024     07/13/2024    CREATININE 0.6 (L) 07/13/2024    BUN 12 07/13/2024    CO2 26 07/13/2024    TSH 2.14 07/09/2024    PSA 0.80 07/03/2024    INR 1.2 07/11/2024    LABA1C 6.7 (H) 07/03/2024     A/P  49 y.o. male who presents with a large intracranial and extracranial mass.      Heme/onc following- plan for outpatient PET scan  Rad/onc following- plans for radiation to brain and hip  Protected weightbearing to LLE, WBAT to RLE  Okay for PT/OT  Pathology results pending from biopsy in OR  Lovenox for DVT prophylaxis      Please contact neurosurgery with any changes in patients neurologic status.       Electronically signed by GOYO Mendes CNP on 7/13/2024 at 12:44 PM

## 2024-07-13 NOTE — PROGRESS NOTES
@[email or web address]@    [facility]   IN-PATIENT SERVICE   [facility]    Progress Note    7/13/2024    4:17 PM    Name:   Isiah Duncan  MRN:     [id number]     Acct:      [id number]   Room:   0446/0446-01   Day:  3  Admit Date:  7/10/2024  9:47 PM    PCP:   Lolita Mcclain APRN - CNP  Code Status:  Full Code    Subjective:     C/C: No chief complaint on file.  Right hip pain  Interval History Status: not changed.     Seen at bedside, hemodynamically stable, currently on nasal cannula  Complaining of right hip pain otherwise denies any complaints  Labs reviewed, potassium replaced,   Continues to be on Rocephin, steroids,  Multiple services following  Brief History:     49-year-old male with history of schizophrenia,  came in bed right-sided hip pain causing falls, poor appetite and weight loss also complained of a bump on his scalp, CT imaging concerning for extensive metastatic disease, showed pathological Right femoral fracture in addition to extensive lytic lesions, lung lesions/hilar mass with post obstructive pneumonia, scalp lesions, patient was transferred to [facility] for further management underwent right total hip arthroplasty with biopsy on 7/11/2024.  Orthopedic surgery, neurosurgery, heme-onc service, palliative service, pulm service has been following, patient also seen by radiation oncology.    Medications:     Allergies:  No Known Allergies    Current Meds:   Scheduled Meds:    cefTRIAXone (ROCEPHIN) IV  1,000 mg IntraVENous Q24H    vitamin D  50,000 Units Oral Weekly    predniSONE  40 mg Oral Daily    ipratropium 0.5 mg-albuterol 2.5 mg  1 Dose Inhalation Q4H WA RT    Vitamin D  1,000 Units Oral Daily    sodium chloride flush  5-40 mL IntraVENous BID    sodium chloride flush  5-40 mL IntraVENous BID    atorvastatin  80 mg Oral Nightly    benztropine  1 mg Oral BID    famotidine  20 mg Oral BID    guaiFENesin  600 mg Oral BID    magnesium oxide  400 mg  7/12/2024 Yes    Left upper lobe pneumonia 7/12/2024 Yes    Vitamin D deficiency 7/12/2024 Yes     Plan:      -S/p right total hip arthroplasty with biopsy 7/11/2024, await pathology result, PT/OT  -orthopedic surgery following, plan to defer left femur intervention for now  -Heme-onc service recommending palliative systemic treatment pending on pathology/tumor markers, seen by radiation oncology as well, plan to complete PET scan as outpatient  -Has been on antibiotics for postobstructive pneumonia, pulm service following as well  -Neurosurgery following as well, appreciate recommendations  -Palliative care has been following as well, continued discussion regarding goals of care  -Continue vitamin D supplementation  -Home meds as tolerated    Oly Anton Sra, MD  7/13/2024  4:17 PM

## 2024-07-13 NOTE — PROGRESS NOTES
07/12/24 2016   Care Plan - Respiratory Goals   Achieves optimal ventilation and oxygenation Respiratory therapy support as indicated;Assess and instruct to report shortness of breath or any respiratory difficulty;Assess the need for suctioning and aspirate as needed;Encourage broncho-pulmonary hygiene including cough, deep breathe, incentive spirometry;Initiate smoking cessation protocol as indicated;Oxygen supplementation based on oxygen saturation or arterial blood gases;Position to facilitate oxygenation and minimize respiratory effort;Assess for changes in mentation and behavior;Assess for changes in respiratory status

## 2024-07-13 NOTE — PLAN OF CARE
Problem: Respiratory - Adult  Goal: Achieves optimal ventilation and oxygenation  7/13/2024 0835 by Nicole Restrepo RCP  Flowsheets (Taken 7/13/2024 0835)  Achieves optimal ventilation and oxygenation:   Assess for changes in respiratory status   Respiratory therapy support as indicated   Assess for changes in mentation and behavior   Oxygen supplementation based on oxygen saturation or arterial blood gases   Encourage broncho-pulmonary hygiene including cough, deep breathe, incentive spirometry   Assess and instruct to report shortness of breath or any respiratory difficulty

## 2024-07-13 NOTE — PLAN OF CARE
Problem: Safety - Adult  Goal: Free from fall injury  Outcome: Progressing     Problem: Pain  Goal: Verbalizes/displays adequate comfort level or baseline comfort level  Outcome: Progressing     Problem: Skin/Tissue Integrity  Goal: Absence of new skin breakdown  Description: 1.  Monitor for areas of redness and/or skin breakdown  2.  Assess vascular access sites hourly  3.  Every 4-6 hours minimum:  Change oxygen saturation probe site  4.  Every 4-6 hours:  If on nasal continuous positive airway pressure, respiratory therapy assess nares and determine need for appliance change or resting period.  Outcome: Progressing     Problem: Respiratory - Adult  Goal: Achieves optimal ventilation and oxygenation  Outcome: Progressing  Achieves optimal ventilation and oxygenation:   Respiratory therapy support as indicated   Assess and instruct to report shortness of breath or any respiratory difficulty   Assess the need for suctioning and aspirate as needed   Encourage broncho-pulmonary hygiene including cough, deep breathe, incentive spirometry   Initiate smoking cessation protocol as indicated   Oxygen supplementation based on oxygen saturation or arterial blood gases   Position to facilitate oxygenation and minimize respiratory effort   Assess for changes in mentation and behavior   Assess for changes in respiratory status     Problem: Nutrition Deficit:  Goal: Optimize nutritional status  Outcome: Progressing     Problem: Discharge Planning  Goal: Discharge to home or other facility with appropriate resources  Outcome: Progressing     Lory Victor RN

## 2024-07-13 NOTE — PROGRESS NOTES
Orthopedic Progress Note    Patient:  Isiah Duncan  YOB: 1975     49 y.o. male    Subjective:  Patient seen and examined this morning. No complaints or concerns, states right hip feel well and has no pain in left hip. No issues overnight per nursing. Pain is well controlled on current regimen. Denies fever, HA, CP, SOB, N/V, dysuria, new numbness/tingling. PT should work with PT/OT today.     Vitals reviewed, afebrile    Objective:   Vitals:    07/13/24 0444   BP: 117/76   Pulse: (!) 102   Resp: 19   Temp: 98.8 °F (37.1 °C)   SpO2: 94%     Gen: NAD, cooperative    Cardiovascular: Regular rate   Respiratory: No acute respiratory distress, breathing comfortably    Orthopedic Exam  RLE:    Optifoam dressing about the right hip clean/dry/intact without strikethrough.  Patient appropriately tender to palpation about the right hip.  Compartments soft and compressible. TA/FHL/CSL/GSC motor complexes intact.  AROM of right knee 0-50 deg. Sural/saphenous/SPN/DPN/plantar nerve sensation intact to light touch.  Extremities warm well-perfused BCR.    LLE:    Skin intact.  No abrasions, lacerations, ecchymosis, nor deformity.  Compartments soft and compressible.  Patient is nontender to palpation about the left lower extremity.  Patient able to actively straight leg raise, flex and extend the knee, plantar/dorsiflex at the ankle, flex and extend the digits without pain.  TA/FHL/EHL/GSC motor complex intact.  Sural/saphenous/SPN/DPN/plantar nerve sensation intact to light touch.  Extremities warm well-perfused BCR.    Recent Labs     07/11/24  0236 07/12/24  1101   WBC  --  15.2*   HGB  --  10.3*   HCT  --  32.5*   PLT  --  257   INR 1.2  --    NA  --  136   K  --  4.1   BUN  --  16   CREATININE  --  0.7   GLUCOSE  --  208*        Meds:   Abx: Ceftriaxone  DVT ppx: Lovenox  See rec for complete list    Impression 49 y.o. male being seen for:    -Right pathologic femoral neck fracture s/p LOLLY. DOS:

## 2024-07-13 NOTE — RT PROTOCOL NOTE
RT Inhaler-Nebulizer Bronchodilator Protocol Note    There is a bronchodilator order in the chart from a provider indicating to follow the RT Bronchodilator Protocol and there is an “Initiate RT Inhaler-Nebulizer Bronchodilator Protocol” order as well (see protocol at bottom of note).    CXR Findings:  No results found.    The findings from the last RT Protocol Assessment were as follows:   History Pulmonary Disease: Smoker 15 pack years or more  Respiratory Pattern: Mild dyspnea at rest, irregular pattern, or RR 21-25 bpm  Breath Sounds: Slightly diminished and/or crackles  Cough: Strong, spontaneous, non-productive  Indication for Bronchodilator Therapy: Decreased or absent breath sounds  Bronchodilator Assessment Score: 7    Aerosolized bronchodilator medication orders have been revised according to the RT Inhaler-Nebulizer Bronchodilator Protocol below.    Respiratory Therapist to perform RT Therapy Protocol Assessment initially then follow the protocol.  Repeat RT Therapy Protocol Assessment PRN for score 0-3 or on second treatment, BID, and PRN for scores above 3.    No Indications - adjust the frequency to every 6 hours PRN wheezing or bronchospasm, if no treatments needed after 48 hours then discontinue using Per Protocol order mode.     If indication present, adjust the RT bronchodilator orders based on the Bronchodilator Assessment Score as indicated below.  Use Inhaler orders unless patient has one or more of the following: on home nebulizer, not able to hold breath for 10 seconds, is not alert and oriented, cannot activate and use MDI correctly, or respiratory rate 25 breaths per minute or more, then use the equivalent nebulizer order(s) with same Frequency and PRN reasons based on the score.  If a patient is on this medication at home then do not decrease Frequency below that used at home.    0-3 - enter or revise RT bronchodilator order(s) to equivalent RT Bronchodilator order with Frequency of every 4  hours PRN for wheezing or increased work of breathing using Per Protocol order mode.        4-6 - enter or revise RT Bronchodilator order(s) to two equivalent RT bronchodilator orders with one order with BID Frequency and one order with Frequency of every 4 hours PRN wheezing or increased work of breathing using Per Protocol order mode.        7-10 - enter or revise RT Bronchodilator order(s) to two equivalent RT bronchodilator orders with one order with TID Frequency and one order with Frequency of every 4 hours PRN wheezing or increased work of breathing using Per Protocol order mode.       11-13 - enter or revise RT Bronchodilator order(s) to one equivalent RT bronchodilator order with QID Frequency and an Albuterol order with Frequency of every 4 hours PRN wheezing or increased work of breathing using Per Protocol order mode.      Greater than 13 - enter or revise RT Bronchodilator order(s) to one equivalent RT bronchodilator order with every 4 hours Frequency and an Albuterol order with Frequency of every 2 hours PRN wheezing or increased work of breathing using Per Protocol order mode.     RT to enter RT Home Evaluation for COPD & MDI Assessment order using Per Protocol order mode.    Electronically signed by Nicole Restrepo RCP on 7/13/2024 at 12:15 PM

## 2024-07-13 NOTE — PROGRESS NOTES
PULMONARY PROGRESS NOTE      Patient:  Isiah Duncan  YOB: 1975    MRN: [id number]     Acct: [id number]     Admit date: 7/10/2024    REASON FOR CONSULT:- Lung mass    Pt seen and Chart reviewed.    Subjective:     49-year-old male with past medical history of schizophrenia, hypertension initially presented to [facility] on 7/9 with worsening right hip pain, fall and unintentional weight loss.    He reports that he has been having hip pain for the past 1 to 2 months and frequent falls.  X-ray of the right hip showed lytic lesion in the right femoral neck with pathologic fracture.      CT of the chest shows possible left upper lobe infiltrate with possible postobstructive changes or metastatic disease, left hilar mass, left scapular mass.  The CT head is also showing large soft tissue mass at the vertex    On exam patient is afebrile, hemodynamically stable, maintaining saturations on 2 L nasal cannula, father is in the room he is also the legal guardian. Patient reports that he had a fall about 5 days ago and has not been able to walk since. He reports productive cough, denies any hemoptysis or any shortness of breath. He smokes 45 cigarettes per day        Interval history  07/13/24      No acute events overnight  Afebrile, hemodynamically stable, maintaining saturations on room air    Right total hip arthroplasty, prophylactic cabling right proximal femur, tumor biopsy on 7/13  MRI brain shows large heterogeneously enhancing mass up to 10 cm with both intracranial and extracranial extraosseous extension:mild mass effect on b/l high parietal lobes with involvement of overlying scalp.     Cultures negative so far    Review of Systems -   Review of Systems   Constitutional:  Negative for fever.   Respiratory:  Positive for cough. Negative for shortness of breath.    Cardiovascular:  Negative for chest pain and leg swelling.   Gastrointestinal:  Negative for abdominal pain, diarrhea, nausea and  I believe the symbicort is the one she is already on, please advise or send a different inhaler. possible right hilar lymph node and there is mediastinal lymph node present.  Patient has had surgery and likely pathological fracture.  Patient also have scapular mass and vertex mass.  He is currently on room air and has been maintaining saturation.  Denies symptoms of shortness of breath intermittently patient is on 2 L nasal cannula.  He is currently on Rocephin empirically and on short course of prednisone..  Continue with bronchodilators.  Follow-up on the biopsy report.  Follow-up with oncology and neurosurgery.    Please note that this chart was generated using voice recognition Dragon dictation software. Although every effort was made to ensure the accuracy of this automated transcription, some errors in transcription may have occurred.     Denver Mcpherson MD  7/13/2024 6:00 PM

## 2024-07-13 NOTE — PROGRESS NOTES
Just letting you know- we put a sitter at bedside because he is repeatedly trying to get oob to go smoke and ripping leads off- he is aware of being at hospital, just still seems to be a bit confused. thank you

## 2024-07-13 NOTE — PLAN OF CARE
Problem: Safety - Adult  Goal: Free from fall injury  7/13/2024 0957 by Dahlia Landis RN  Outcome: Progressing  7/13/2024 0609 by Lory Victor RN  Outcome: Progressing     Problem: Pain  Goal: Verbalizes/displays adequate comfort level or baseline comfort level  7/13/2024 0957 by Dahlia Landis RN  Outcome: Progressing  7/13/2024 0609 by Lory Victor RN  Outcome: Progressing     Problem: Skin/Tissue Integrity  Goal: Absence of new skin breakdown  Description: 1.  Monitor for areas of redness and/or skin breakdown  2.  Assess vascular access sites hourly  3.  Every 4-6 hours minimum:  Change oxygen saturation probe site  4.  Every 4-6 hours:  If on nasal continuous positive airway pressure, respiratory therapy assess nares and determine need for appliance change or resting period.  7/13/2024 0957 by Dahlia Landis RN  Outcome: Progressing  7/13/2024 0609 by Lory Victor RN  Outcome: Progressing     Problem: Respiratory - Adult  Goal: Achieves optimal ventilation and oxygenation  7/13/2024 0957 by Dahlia Landis RN  Outcome: Progressing  7/13/2024 0835 by Nicole Restrepo RCP  Flowsheets (Taken 7/13/2024 0835)  Achieves optimal ventilation and oxygenation:   Assess for changes in respiratory status   Respiratory therapy support as indicated   Assess for changes in mentation and behavior   Oxygen supplementation based on oxygen saturation or arterial blood gases   Encourage broncho-pulmonary hygiene including cough, deep breathe, incentive spirometry   Assess and instruct to report shortness of breath or any respiratory difficulty  7/13/2024 0609 by Lory Victor RN  Outcome: Progressing  Flowsheets (Taken 7/12/2024 2016 by Abimael Blankenship RCP)  Achieves optimal ventilation and oxygenation:   Respiratory therapy support as indicated   Assess and instruct to report shortness of breath or any respiratory difficulty   Assess the need for suctioning and aspirate as needed   Encourage broncho-pulmonary hygiene  including cough, deep breathe, incentive spirometry   Initiate smoking cessation protocol as indicated   Oxygen supplementation based on oxygen saturation or arterial blood gases   Position to facilitate oxygenation and minimize respiratory effort   Assess for changes in mentation and behavior   Assess for changes in respiratory status     Problem: Nutrition Deficit:  Goal: Optimize nutritional status  7/13/2024 0957 by Dahlia Landis, RN  Outcome: Progressing  7/13/2024 0609 by Lory Victor RN  Outcome: Progressing     Problem: Discharge Planning  Goal: Discharge to home or other facility with appropriate resources  7/13/2024 0957 by Dahlia Landis, RN  Outcome: Progressing  7/13/2024 0609 by Lory Victor RN  Outcome: Progressing

## 2024-07-14 ENCOUNTER — APPOINTMENT (OUTPATIENT)
Dept: GENERAL RADIOLOGY | Age: 49
End: 2024-07-14
Attending: FAMILY MEDICINE
Payer: MEDICARE

## 2024-07-14 LAB
ANION GAP SERPL CALCULATED.3IONS-SCNC: 10 MMOL/L (ref 9–16)
BASOPHILS # BLD: <0.03 K/UL (ref 0–0.2)
BASOPHILS NFR BLD: 0 % (ref 0–2)
BUN SERPL-MCNC: 12 MG/DL (ref 6–20)
CALCIUM SERPL-MCNC: 8.8 MG/DL (ref 8.6–10.4)
CHLORIDE SERPL-SCNC: 97 MMOL/L (ref 98–107)
CO2 SERPL-SCNC: 27 MMOL/L (ref 20–31)
CREAT SERPL-MCNC: 0.6 MG/DL (ref 0.7–1.2)
EOSINOPHIL # BLD: 0.04 K/UL (ref 0–0.44)
EOSINOPHILS RELATIVE PERCENT: 0 % (ref 1–4)
ERYTHROCYTE [DISTWIDTH] IN BLOOD BY AUTOMATED COUNT: 13.5 % (ref 11.8–14.4)
FIO2: 15
GFR, ESTIMATED: >90 ML/MIN/1.73M2
GLUCOSE SERPL-MCNC: 221 MG/DL (ref 74–99)
HCT VFR BLD AUTO: 30.9 % (ref 40.7–50.3)
HGB BLD-MCNC: 10.2 G/DL (ref 13–17)
IMM GRANULOCYTES # BLD AUTO: 0.11 K/UL (ref 0–0.3)
IMM GRANULOCYTES NFR BLD: 1 %
LYMPHOCYTES NFR BLD: 1.31 K/UL (ref 1.1–3.7)
LYMPHOCYTES RELATIVE PERCENT: 13 % (ref 24–43)
MCH RBC QN AUTO: 28.7 PG (ref 25.2–33.5)
MCHC RBC AUTO-ENTMCNC: 33 G/DL (ref 28.4–34.8)
MCV RBC AUTO: 87 FL (ref 82.6–102.9)
MONOCYTES NFR BLD: 0.65 K/UL (ref 0.1–1.2)
MONOCYTES NFR BLD: 6 % (ref 3–12)
NEUTROPHILS NFR BLD: 80 % (ref 36–65)
NEUTS SEG NFR BLD: 8.34 K/UL (ref 1.5–8.1)
NRBC BLD-RTO: 0 PER 100 WBC
PLATELET # BLD AUTO: 243 K/UL (ref 138–453)
PMV BLD AUTO: 8.6 FL (ref 8.1–13.5)
POC HCO3: 28.7 MMOL/L (ref 21–28)
POC O2 SATURATION: 99.9 % (ref 94–98)
POC PCO2: 37.9 MM HG (ref 35–48)
POC PH: 7.49 (ref 7.35–7.45)
POC PO2: 235.3 MM HG (ref 83–108)
POSITIVE BASE EXCESS, ART: 5 MMOL/L (ref 0–3)
POTASSIUM SERPL-SCNC: 3.9 MMOL/L (ref 3.7–5.3)
RBC # BLD AUTO: 3.55 M/UL (ref 4.21–5.77)
SODIUM SERPL-SCNC: 134 MMOL/L (ref 136–145)
WBC OTHER # BLD: 10.5 K/UL (ref 3.5–11.3)

## 2024-07-14 PROCEDURE — 2700000000 HC OXYGEN THERAPY PER DAY

## 2024-07-14 PROCEDURE — 36415 COLL VENOUS BLD VENIPUNCTURE: CPT

## 2024-07-14 PROCEDURE — 1200000000 HC SEMI PRIVATE

## 2024-07-14 PROCEDURE — 6370000000 HC RX 637 (ALT 250 FOR IP)

## 2024-07-14 PROCEDURE — 6370000000 HC RX 637 (ALT 250 FOR IP): Performed by: NURSE PRACTITIONER

## 2024-07-14 PROCEDURE — 6360000002 HC RX W HCPCS

## 2024-07-14 PROCEDURE — 80048 BASIC METABOLIC PNL TOTAL CA: CPT

## 2024-07-14 PROCEDURE — 94761 N-INVAS EAR/PLS OXIMETRY MLT: CPT

## 2024-07-14 PROCEDURE — 2580000003 HC RX 258

## 2024-07-14 PROCEDURE — 6370000000 HC RX 637 (ALT 250 FOR IP): Performed by: INTERNAL MEDICINE

## 2024-07-14 PROCEDURE — 99231 SBSQ HOSP IP/OBS SF/LOW 25: CPT | Performed by: INTERNAL MEDICINE

## 2024-07-14 PROCEDURE — 71045 X-RAY EXAM CHEST 1 VIEW: CPT

## 2024-07-14 PROCEDURE — 99232 SBSQ HOSP IP/OBS MODERATE 35: CPT | Performed by: STUDENT IN AN ORGANIZED HEALTH CARE EDUCATION/TRAINING PROGRAM

## 2024-07-14 PROCEDURE — 94640 AIRWAY INHALATION TREATMENT: CPT

## 2024-07-14 PROCEDURE — 85025 COMPLETE CBC W/AUTO DIFF WBC: CPT

## 2024-07-14 PROCEDURE — 99232 SBSQ HOSP IP/OBS MODERATE 35: CPT | Performed by: INTERNAL MEDICINE

## 2024-07-14 PROCEDURE — 82803 BLOOD GASES ANY COMBINATION: CPT

## 2024-07-14 PROCEDURE — 36600 WITHDRAWAL OF ARTERIAL BLOOD: CPT

## 2024-07-14 RX ORDER — IPRATROPIUM BROMIDE AND ALBUTEROL SULFATE 2.5; .5 MG/3ML; MG/3ML
1 SOLUTION RESPIRATORY (INHALATION)
Status: DISCONTINUED | OUTPATIENT
Start: 2024-07-14 | End: 2024-07-16

## 2024-07-14 RX ADMIN — CHOLECALCIFEROL TAB 25 MCG (1000 UNIT) 1000 UNITS: 25 TAB at 07:36

## 2024-07-14 RX ADMIN — MAGNESIUM GLUCONATE 500 MG ORAL TABLET 400 MG: 500 TABLET ORAL at 07:37

## 2024-07-14 RX ADMIN — Medication 1000 MG: at 02:35

## 2024-07-14 RX ADMIN — SODIUM CHLORIDE, PRESERVATIVE FREE 10 ML: 5 INJECTION INTRAVENOUS at 22:24

## 2024-07-14 RX ADMIN — METFORMIN HYDROCHLORIDE 1000 MG: 500 TABLET ORAL at 22:16

## 2024-07-14 RX ADMIN — HYDROCODONE BITARTRATE AND ACETAMINOPHEN 2 TABLET: 5; 325 TABLET ORAL at 04:43

## 2024-07-14 RX ADMIN — RISPERIDONE 2 MG: 2 TABLET, FILM COATED ORAL at 07:36

## 2024-07-14 RX ADMIN — GUAIFENESIN 600 MG: 600 TABLET, EXTENDED RELEASE ORAL at 07:36

## 2024-07-14 RX ADMIN — FAMOTIDINE 20 MG: 20 TABLET, FILM COATED ORAL at 07:37

## 2024-07-14 RX ADMIN — IPRATROPIUM BROMIDE AND ALBUTEROL SULFATE 1 DOSE: .5; 2.5 SOLUTION RESPIRATORY (INHALATION) at 08:10

## 2024-07-14 RX ADMIN — METFORMIN HYDROCHLORIDE 1000 MG: 500 TABLET ORAL at 07:36

## 2024-07-14 RX ADMIN — PROPRANOLOL HYDROCHLORIDE 20 MG: 20 TABLET ORAL at 07:36

## 2024-07-14 RX ADMIN — MAGNESIUM GLUCONATE 500 MG ORAL TABLET 400 MG: 500 TABLET ORAL at 22:16

## 2024-07-14 RX ADMIN — IPRATROPIUM BROMIDE AND ALBUTEROL SULFATE 1 DOSE: .5; 2.5 SOLUTION RESPIRATORY (INHALATION) at 14:12

## 2024-07-14 RX ADMIN — PREDNISONE 40 MG: 20 TABLET ORAL at 07:37

## 2024-07-14 RX ADMIN — FAMOTIDINE 20 MG: 20 TABLET, FILM COATED ORAL at 22:15

## 2024-07-14 RX ADMIN — RISPERIDONE 2 MG: 2 TABLET, FILM COATED ORAL at 22:14

## 2024-07-14 RX ADMIN — IPRATROPIUM BROMIDE AND ALBUTEROL SULFATE 1 DOSE: .5; 2.5 SOLUTION RESPIRATORY (INHALATION) at 02:57

## 2024-07-14 RX ADMIN — BENZTROPINE MESYLATE 1 MG: 1 TABLET ORAL at 23:00

## 2024-07-14 RX ADMIN — ATORVASTATIN CALCIUM 80 MG: 80 TABLET, FILM COATED ORAL at 22:15

## 2024-07-14 RX ADMIN — SODIUM CHLORIDE, PRESERVATIVE FREE 10 ML: 5 INJECTION INTRAVENOUS at 07:42

## 2024-07-14 RX ADMIN — TRAZODONE HYDROCHLORIDE 100 MG: 50 TABLET ORAL at 22:15

## 2024-07-14 RX ADMIN — ENOXAPARIN SODIUM 40 MG: 100 INJECTION SUBCUTANEOUS at 07:37

## 2024-07-14 RX ADMIN — BENZTROPINE MESYLATE 1 MG: 1 TABLET ORAL at 07:36

## 2024-07-14 RX ADMIN — GUAIFENESIN 600 MG: 600 TABLET, EXTENDED RELEASE ORAL at 22:15

## 2024-07-14 ASSESSMENT — PAIN SCALES - GENERAL
PAINLEVEL_OUTOF10: 10
PAINLEVEL_OUTOF10: 8

## 2024-07-14 NOTE — PLAN OF CARE
Problem: Safety - Adult  Goal: Free from fall injury  7/14/2024 1212 by Dahlia Landis RN  Outcome: Progressing  7/14/2024 0619 by Lory Victor RN  Outcome: Progressing     Problem: Pain  Goal: Verbalizes/displays adequate comfort level or baseline comfort level  7/14/2024 1212 by Dahlia Landis RN  Outcome: Progressing  7/14/2024 0619 by Lory Victor RN  Outcome: Progressing     Problem: Skin/Tissue Integrity  Goal: Absence of new skin breakdown  Description: 1.  Monitor for areas of redness and/or skin breakdown  2.  Assess vascular access sites hourly  3.  Every 4-6 hours minimum:  Change oxygen saturation probe site  4.  Every 4-6 hours:  If on nasal continuous positive airway pressure, respiratory therapy assess nares and determine need for appliance change or resting period.  7/14/2024 1212 by Dahlia Landis RN  Outcome: Progressing  7/14/2024 0619 by Lory Victor RN  Outcome: Progressing     Problem: Respiratory - Adult  Goal: Achieves optimal ventilation and oxygenation  7/14/2024 1212 by Dahlia Landis RN  Outcome: Progressing  7/14/2024 0818 by Nicole Restrepo RCP  Flowsheets (Taken 7/14/2024 0818)  Achieves optimal ventilation and oxygenation:   Assess for changes in respiratory status   Respiratory therapy support as indicated   Assess for changes in mentation and behavior   Oxygen supplementation based on oxygen saturation or arterial blood gases   Encourage broncho-pulmonary hygiene including cough, deep breathe, incentive spirometry   Assess and instruct to report shortness of breath or any respiratory difficulty  7/14/2024 0619 by Lory Victro RN  Outcome: Progressing  Flowsheets (Taken 7/13/2024 2147 by Jacqui Rivas RCP)  Achieves optimal ventilation and oxygenation:   Respiratory therapy support as indicated   Assess and instruct to report shortness of breath or any respiratory difficulty   Assess the need for suctioning and aspirate as needed   Encourage broncho-pulmonary hygiene  including cough, deep breathe, incentive spirometry   Initiate smoking cessation protocol as indicated   Oxygen supplementation based on oxygen saturation or arterial blood gases   Position to facilitate oxygenation and minimize respiratory effort   Assess for changes in mentation and behavior   Assess for changes in respiratory status     Problem: Nutrition Deficit:  Goal: Optimize nutritional status  7/14/2024 1212 by Dahlia Landis, RN  Outcome: Progressing  7/14/2024 0619 by Lory Victor RN  Outcome: Progressing     Problem: Discharge Planning  Goal: Discharge to home or other facility with appropriate resources  7/14/2024 1212 by Dahlia Landis RN  Outcome: Progressing  7/14/2024 0619 by Lory Victor RN  Outcome: Progressing

## 2024-07-14 NOTE — PLAN OF CARE
Problem: Respiratory - Adult  Goal: Achieves optimal ventilation and oxygenation  7/14/2024 0818 by Nicole Restrepo RCP  Flowsheets (Taken 7/14/2024 0818)  Achieves optimal ventilation and oxygenation:   Assess for changes in respiratory status   Respiratory therapy support as indicated   Assess for changes in mentation and behavior   Oxygen supplementation based on oxygen saturation or arterial blood gases   Encourage broncho-pulmonary hygiene including cough, deep breathe, incentive spirometry   Assess and instruct to report shortness of breath or any respiratory difficulty

## 2024-07-14 NOTE — PROGRESS NOTES
appearance - well appearing, no in pain or distress. Staring off.  Mental status - alert and cooperative   Eyes - pupils equal and reactive, extraocular eye movements intact   Mouth - mucous membranes moist, pharynx normal without lesions   Neck - supple, no significant adenopathy   Lymphatics - no palpable lymphadenopathy, no hepatosplenomegaly   Chest - clear to auscultation (left more diminished), no wheezes, rales or rhonchi, symmetric air entry   Heart - normal rate, regular rhythm, normal S1, S2, no murmurs  Abdomen - soft, nontender, nondistended, no masses or organomegaly   Neurological - alert, oriented, normal speech, no focal findings or movement disorder noted   Musculoskeletal - no joint tenderness, deformity or swelling   Extremities - peripheral pulses normal, no pedal edema, no clubbing or cyanosis   Skin - normal coloration and turgor, no rashes, no suspicious skin lesions noted    Labs:   Complete Blood Count:   Recent Labs     07/12/24  1101 07/13/24  0742 07/14/24  0643   WBC 15.2* 11.7* 10.5   HGB 10.3* 9.8* 10.2*   MCV 90.3 88.9 87.0    256 243   RBC 3.60* 3.43* 3.55*   HCT 32.5* 30.5* 30.9*   MCH 28.6 28.6 28.7   MCHC 31.7 32.1 33.0   RDW 13.5 13.7 13.5   MPV 8.7 8.7 8.6      PT/INR:    Lab Results   Component Value Date/Time    PROTIME 14.6 07/11/2024 02:36 AM    INR 1.2 07/11/2024 02:36 AM     PTT:    Lab Results   Component Value Date/Time    APTT 27.3 05/22/2022 04:28 PM     Basal Metabolic Profile:   Recent Labs     07/12/24  1101 07/13/24  0742 07/14/24  0643    136 134*   K 4.1 3.3* 3.9   BUN 16 12 12   CREATININE 0.7 0.6* 0.6*    100 97*   CO2 24 26 27      LFTS  Recent Labs     07/12/24  1101   ALKPHOS 106   ALT 18   AST 22   BILITOT 0.6   BILIDIR 0.3*       Imaging:  XR HIP 2-3 VW W PELVIS RIGHT    Result Date: 7/12/2024  Postsurgical changes related to interval total right hip arthroplasty with proximal femoral cerclage wire in place.     MRI BRAIN W WO  HISTORY: fall, head injury Decision Support Exception - unselect if not a suspected or confirmed emergency medical condition->Emergency Medical Condition (MA) Reason for Exam: fall, head injury;  ** 1st scan sent through but was repeated due to clipping soft tissue on top of head. Additional signs and symptoms: Right leg/hip keeps giving out and he fell. FINDINGS: BRAIN/VENTRICLES: There is no acute intracranial hemorrhage, mass effect or midline shift.  No abnormal extra-axial fluid collection.  The gray-white differentiation is maintained without evidence of an acute infarct.  There is no evidence of hydrocephalus. ORBITS: The visualized portion of the orbits demonstrate no acute abnormality. SINUSES: Mucous retention cyst versus polyps are identified in the bilateral maxillary sinuses. SOFT TISSUES/SKULL:  Large heterogeneous mass is seen at the vertex resulting in bony destruction of the adjacent skull.  The mass measures approximately 10.1 x 9.1 x 5.9 cm.  Mass effect on the frontal parietal lobes is seen.     No acute intracranial abnormality. Large heterogeneous mixed solid and cystic soft tissue mass at the vertex measuring 10.1 x 9.1 x 5.9 cm resulting in bony destruction of the adjacent skull and mass effect on the bilateral frontal parietal lobes.  Finding is concerning for a malignancy.  Correlate with past medical history.  Recommend tissue sampling if not already obtained.        Impression:   Primary Problem  Closed fracture of neck of femur (HCC)    Active Hospital Problems    Diagnosis Date Noted    Tobacco dependence [F17.200] 07/13/2024    Intracranial mass [R90.0] 07/13/2024    Mediastinal lymphadenopathy [R59.0] 07/13/2024    Metastatic disease (HCC) [C79.9] 07/12/2024    Fracture of right hip (HCC) [S72.001A] 07/12/2024    Lytic bone lesions on xray [M89.9] 07/12/2024    Left upper lobe pneumonia [J18.9] 07/12/2024    Vitamin D deficiency [E55.9] 07/12/2024    Hilar mass [R91.8] 07/11/2024

## 2024-07-14 NOTE — CARE COORDINATION
Transitional planning note: Patient's father is listed as legal guardian. Per previous CM notes, plan was SNF vs ARU and lists had been given. Patient currently with no family at bedside. Call placed to patient's father Max Duncan who is listed as his legal guardian @ [phone] and left vm message requesting call back to discuss transitional planning. CM left direct call back number on voicemail message.     1700: received call back from patient's father. Patient's father Max provided choices for rehab for patient. For ARU, he requests referral to [facility]. For SNF level, he requests ref[facility]. Referrals sent as requested.

## 2024-07-14 NOTE — PROGRESS NOTES
07/13/24 2445   Care Plan - Respiratory Goals   Achieves optimal ventilation and oxygenation Respiratory therapy support as indicated;Assess and instruct to report shortness of breath or any respiratory difficulty;Assess the need for suctioning and aspirate as needed;Encourage broncho-pulmonary hygiene including cough, deep breathe, incentive spirometry;Initiate smoking cessation protocol as indicated;Oxygen supplementation based on oxygen saturation or arterial blood gases;Position to facilitate oxygenation and minimize respiratory effort;Assess for changes in mentation and behavior;Assess for changes in respiratory status

## 2024-07-14 NOTE — PROGRESS NOTES
PULMONARY PROGRESS NOTE      Patient:  Isiah Duncan  YOB: 1975    MRN: [id number]     Acct: [id number]     Admit date: 7/10/2024    REASON FOR CONSULT:- Lung mass    Pt seen and Chart reviewed.    Subjective:     49-year-old male with past medical history of schizophrenia, hypertension initially presented to [facility] on 7/9 with worsening right hip pain, fall and unintentional weight loss.    He reports that he has been having hip pain for the past 1 to 2 months and frequent falls.  X-ray of the right hip showed lytic lesion in the right femoral neck with pathologic fracture.      CT of the chest shows possible left upper lobe infiltrate with possible postobstructive changes or metastatic disease, left hilar mass, left scapular mass.  The CT head is also showing large soft tissue mass at the vertex    On exam patient is afebrile, hemodynamically stable, maintaining saturations on 2 L nasal cannula, father is in the room he is also the legal guardian. Patient reports that he had a fall about 5 days ago and has not been able to walk since. He reports productive cough, denies any hemoptysis or any shortness of breath. He smokes 45 cigarettes per day        Interval history  07/14/24      I have reviewed the chart, overnight events noted.  Patient is not able to provide much information denies much symptoms complain of right hip pain.  Patient has been intermittently on oxygen as he was desaturating and intermittently he take off oxygen on review of the chart he was on high flow oxygen he had been on 2 L yesterday and then gradually require more FiO2.  Patient does not look in any distress and comfortable when he take off oxygen saturation is between 86 to 90%.  Bedside sitter is present.      Right total hip arthroplasty, prophylactic cabling right proximal femur, tumor biopsy on 7/13  MRI brain shows large heterogeneously enhancing mass up to 10 cm with both intracranial and extracranial

## 2024-07-14 NOTE — PROGRESS NOTES
RT changed pulse ox sensor to get a better reading. The old one was not on very well. RT moved pt down to 6L and pt is saturating at 94%

## 2024-07-14 NOTE — PROGRESS NOTES
Neurosurgery MARTIN/Resident    Daily Progress Note   No chief complaint on file.    7/14/2024  10:27 AM    Chart reviewed.  No new complaints. Patient was saturating in the 80s overnight and was placed on High Flow. Currently now back on nasal cannula. Patient denies any headaches or vision changes. Denies any new or worsening weakness or numbness. Pathology still pending. Attempted to call father yesterday, will try again today    Vitals:    07/14/24 0619 07/14/24 0623 07/14/24 0810 07/14/24 0818   BP:       Pulse: (!) 106 (!) 106     Resp:       Temp:       TempSrc:       SpO2: (!) 84% (!) 87% (!) 88% 94%   Height:             PE:   Aox2-3   CNII-XII intact   PERRL, EOMI   Motor   L deltoid 5/5; R deltoid 5/5  L biceps 5/5; R biceps 5/5  L triceps 5/5; R triceps 5/5  L wrist extension 5/5; R wrist extension 5/5  L intrinsics 5/5; R intrinsics 5/5      L iliopsoas 3/5 , R iliopsoas 3/5  L quadriceps 5/5; R quadriceps 5/5  L Dorsiflexion 5/5; R dorsiflexion 5/5  L Plantarflexion 5/5; R plantarflexion 5/5  L EHL 5/5; R EHL 5/5     Sensation: intact      Lab Results   Component Value Date    WBC 10.5 07/14/2024    HGB 10.2 (L) 07/14/2024    HCT 30.9 (L) 07/14/2024     07/14/2024    CHOL 97 07/03/2024    TRIG 157 (H) 07/03/2024    HDL 25 (L) 07/03/2024    LDLDIRECT 83 04/19/2022    ALT 18 07/12/2024    AST 22 07/12/2024     (L) 07/14/2024    K 3.9 07/14/2024    CL 97 (L) 07/14/2024    CREATININE 0.6 (L) 07/14/2024    BUN 12 07/14/2024    CO2 27 07/14/2024    TSH 2.14 07/09/2024    PSA 0.80 07/03/2024    INR 1.2 07/11/2024    LABA1C 6.7 (H) 07/03/2024       A/P  49 y.o. male who presents with a large intracranial and extracranial mass.      Heme/onc following- plan for outpatient PET scan  Rad/onc following- plans for radiation to brain and hip  Protected weightbearing to LLE, WBAT to RLE  Okay for PT/OT  Pathology results pending from biopsy in OR  Lovenox for DVT prophylaxis       Please contact

## 2024-07-14 NOTE — PLAN OF CARE
Oxygen saturation dropping into the 80s- patient placed on nasal cannula and then eventually put on seltzer high flow 15L- Yuridia Cherry ordered ABGs and CXR- PO2 high on ABG- patient weaned down on high flow to 6L- meghan Cherry RT can adjust as they see fit- respiratory updated- current O2 saturation reading 87%. Current vitals are as follows: BP (!) 146/92   Pulse (!) 104   Temp 98.4 °F (36.9 °C) (Oral)   Resp 20   Ht 1.88 m (6' 2.02\")   SpO2 92%   BMI 24.13 kg/m²   Plan of care ongoing.     Problem: Safety - Adult  Goal: Free from fall injury  Outcome: Progressing     Problem: Pain  Goal: Verbalizes/displays adequate comfort level or baseline comfort level  Outcome: Progressing     Problem: Skin/Tissue Integrity  Goal: Absence of new skin breakdown  Description: 1.  Monitor for areas of redness and/or skin breakdown  2.  Assess vascular access sites hourly  3.  Every 4-6 hours minimum:  Change oxygen saturation probe site  4.  Every 4-6 hours:  If on nasal continuous positive airway pressure, respiratory therapy assess nares and determine need for appliance change or resting period.  Outcome: Progressing     Problem: Respiratory - Adult  Goal: Achieves optimal ventilation and oxygenation  Outcome: Progressing  Achieves optimal ventilation and oxygenation:   Respiratory therapy support as indicated   Assess and instruct to report shortness of breath or any respiratory difficulty   Assess the need for suctioning and aspirate as needed   Encourage broncho-pulmonary hygiene including cough, deep breathe, incentive spirometry   Initiate smoking cessation protocol as indicated   Oxygen supplementation based on oxygen saturation or arterial blood gases   Position to facilitate oxygenation and minimize respiratory effort   Assess for changes in mentation and behavior   Assess for changes in respiratory status     Problem: Nutrition Deficit:  Goal: Optimize nutritional status  Outcome: Progressing     Problem: Discharge

## 2024-07-15 ENCOUNTER — TELEPHONE (OUTPATIENT)
Dept: ORTHOPEDIC SURGERY | Age: 49
End: 2024-07-15

## 2024-07-15 LAB
ANION GAP SERPL CALCULATED.3IONS-SCNC: 11 MMOL/L (ref 9–16)
BASOPHILS # BLD: <0.03 K/UL (ref 0–0.2)
BASOPHILS NFR BLD: 0 % (ref 0–2)
BUN SERPL-MCNC: 13 MG/DL (ref 6–20)
CALCIUM SERPL-MCNC: 8.6 MG/DL (ref 8.6–10.4)
CHLORIDE SERPL-SCNC: 99 MMOL/L (ref 98–107)
CO2 SERPL-SCNC: 26 MMOL/L (ref 20–31)
CREAT SERPL-MCNC: 0.7 MG/DL (ref 0.7–1.2)
EKG ATRIAL RATE: 123 BPM
EKG P AXIS: 43 DEGREES
EKG P-R INTERVAL: 144 MS
EKG Q-T INTERVAL: 328 MS
EKG QRS DURATION: 96 MS
EKG QTC CALCULATION (BAZETT): 469 MS
EKG R AXIS: 33 DEGREES
EKG T AXIS: 48 DEGREES
EKG VENTRICULAR RATE: 123 BPM
EOSINOPHIL # BLD: 0.13 K/UL (ref 0–0.44)
EOSINOPHILS RELATIVE PERCENT: 1 % (ref 1–4)
ERYTHROCYTE [DISTWIDTH] IN BLOOD BY AUTOMATED COUNT: 13.9 % (ref 11.8–14.4)
GFR, ESTIMATED: >90 ML/MIN/1.73M2
GLUCOSE BLD-MCNC: 168 MG/DL (ref 75–110)
GLUCOSE BLD-MCNC: 282 MG/DL (ref 75–110)
GLUCOSE SERPL-MCNC: 313 MG/DL (ref 74–99)
HCT VFR BLD AUTO: 32.8 % (ref 40.7–50.3)
HGB BLD-MCNC: 10.3 G/DL (ref 13–17)
IMM GRANULOCYTES # BLD AUTO: 0.1 K/UL (ref 0–0.3)
IMM GRANULOCYTES NFR BLD: 1 %
LYMPHOCYTES NFR BLD: 1.82 K/UL (ref 1.1–3.7)
LYMPHOCYTES RELATIVE PERCENT: 20 % (ref 24–43)
MCH RBC QN AUTO: 28.3 PG (ref 25.2–33.5)
MCHC RBC AUTO-ENTMCNC: 31.4 G/DL (ref 28.4–34.8)
MCV RBC AUTO: 90.1 FL (ref 82.6–102.9)
MONOCYTES NFR BLD: 0.54 K/UL (ref 0.1–1.2)
MONOCYTES NFR BLD: 6 % (ref 3–12)
NEUTROPHILS NFR BLD: 72 % (ref 36–65)
NEUTS SEG NFR BLD: 6.58 K/UL (ref 1.5–8.1)
NRBC BLD-RTO: 0 PER 100 WBC
PLATELET # BLD AUTO: ABNORMAL K/UL (ref 138–453)
PLATELET, FLUORESCENCE: ABNORMAL K/UL (ref 138–453)
POTASSIUM SERPL-SCNC: 4.2 MMOL/L (ref 3.7–5.3)
RBC # BLD AUTO: 3.64 M/UL (ref 4.21–5.77)
SODIUM SERPL-SCNC: 136 MMOL/L (ref 136–145)
WBC OTHER # BLD: 9.2 K/UL (ref 3.5–11.3)

## 2024-07-15 PROCEDURE — 94761 N-INVAS EAR/PLS OXIMETRY MLT: CPT

## 2024-07-15 PROCEDURE — 99232 SBSQ HOSP IP/OBS MODERATE 35: CPT | Performed by: INTERNAL MEDICINE

## 2024-07-15 PROCEDURE — 99232 SBSQ HOSP IP/OBS MODERATE 35: CPT | Performed by: STUDENT IN AN ORGANIZED HEALTH CARE EDUCATION/TRAINING PROGRAM

## 2024-07-15 PROCEDURE — 6370000000 HC RX 637 (ALT 250 FOR IP): Performed by: NURSE PRACTITIONER

## 2024-07-15 PROCEDURE — 85055 RETICULATED PLATELET ASSAY: CPT

## 2024-07-15 PROCEDURE — 82947 ASSAY GLUCOSE BLOOD QUANT: CPT

## 2024-07-15 PROCEDURE — 6360000002 HC RX W HCPCS

## 2024-07-15 PROCEDURE — 36415 COLL VENOUS BLD VENIPUNCTURE: CPT

## 2024-07-15 PROCEDURE — 6370000000 HC RX 637 (ALT 250 FOR IP)

## 2024-07-15 PROCEDURE — 94640 AIRWAY INHALATION TREATMENT: CPT

## 2024-07-15 PROCEDURE — 2580000003 HC RX 258

## 2024-07-15 PROCEDURE — 6370000000 HC RX 637 (ALT 250 FOR IP): Performed by: INTERNAL MEDICINE

## 2024-07-15 PROCEDURE — 97530 THERAPEUTIC ACTIVITIES: CPT

## 2024-07-15 PROCEDURE — 1200000000 HC SEMI PRIVATE

## 2024-07-15 PROCEDURE — 99223 1ST HOSP IP/OBS HIGH 75: CPT | Performed by: PHYSICAL MEDICINE & REHABILITATION

## 2024-07-15 PROCEDURE — 85025 COMPLETE CBC W/AUTO DIFF WBC: CPT

## 2024-07-15 PROCEDURE — 97110 THERAPEUTIC EXERCISES: CPT

## 2024-07-15 PROCEDURE — 80048 BASIC METABOLIC PNL TOTAL CA: CPT

## 2024-07-15 PROCEDURE — 2700000000 HC OXYGEN THERAPY PER DAY

## 2024-07-15 RX ADMIN — FAMOTIDINE 20 MG: 20 TABLET, FILM COATED ORAL at 21:52

## 2024-07-15 RX ADMIN — MAGNESIUM GLUCONATE 500 MG ORAL TABLET 400 MG: 500 TABLET ORAL at 21:52

## 2024-07-15 RX ADMIN — Medication 1000 MG: at 03:30

## 2024-07-15 RX ADMIN — TRAZODONE HYDROCHLORIDE 100 MG: 50 TABLET ORAL at 21:51

## 2024-07-15 RX ADMIN — IPRATROPIUM BROMIDE AND ALBUTEROL SULFATE 1 DOSE: .5; 2.5 SOLUTION RESPIRATORY (INHALATION) at 12:26

## 2024-07-15 RX ADMIN — HYDROCODONE BITARTRATE AND ACETAMINOPHEN 1 TABLET: 5; 325 TABLET ORAL at 04:20

## 2024-07-15 RX ADMIN — PROPRANOLOL HYDROCHLORIDE 20 MG: 20 TABLET ORAL at 12:18

## 2024-07-15 RX ADMIN — FAMOTIDINE 20 MG: 20 TABLET, FILM COATED ORAL at 09:12

## 2024-07-15 RX ADMIN — METFORMIN HYDROCHLORIDE 1000 MG: 500 TABLET ORAL at 21:51

## 2024-07-15 RX ADMIN — RISPERIDONE 2 MG: 2 TABLET, FILM COATED ORAL at 09:11

## 2024-07-15 RX ADMIN — METFORMIN HYDROCHLORIDE 1000 MG: 500 TABLET ORAL at 09:11

## 2024-07-15 RX ADMIN — GUAIFENESIN 600 MG: 600 TABLET, EXTENDED RELEASE ORAL at 09:12

## 2024-07-15 RX ADMIN — PREDNISONE 40 MG: 20 TABLET ORAL at 09:12

## 2024-07-15 RX ADMIN — HYDROCODONE BITARTRATE AND ACETAMINOPHEN 2 TABLET: 5; 325 TABLET ORAL at 21:52

## 2024-07-15 RX ADMIN — SODIUM CHLORIDE, PRESERVATIVE FREE 10 ML: 5 INJECTION INTRAVENOUS at 21:50

## 2024-07-15 RX ADMIN — IPRATROPIUM BROMIDE AND ALBUTEROL SULFATE 1 DOSE: .5; 2.5 SOLUTION RESPIRATORY (INHALATION) at 20:14

## 2024-07-15 RX ADMIN — BENZTROPINE MESYLATE 1 MG: 1 TABLET ORAL at 09:12

## 2024-07-15 RX ADMIN — PROPRANOLOL HYDROCHLORIDE 20 MG: 20 TABLET ORAL at 21:55

## 2024-07-15 RX ADMIN — ENOXAPARIN SODIUM 40 MG: 100 INJECTION SUBCUTANEOUS at 09:12

## 2024-07-15 RX ADMIN — IPRATROPIUM BROMIDE AND ALBUTEROL SULFATE 1 DOSE: .5; 2.5 SOLUTION RESPIRATORY (INHALATION) at 16:02

## 2024-07-15 RX ADMIN — ATORVASTATIN CALCIUM 80 MG: 80 TABLET, FILM COATED ORAL at 21:51

## 2024-07-15 RX ADMIN — GUAIFENESIN 600 MG: 600 TABLET, EXTENDED RELEASE ORAL at 21:51

## 2024-07-15 RX ADMIN — MAGNESIUM GLUCONATE 500 MG ORAL TABLET 400 MG: 500 TABLET ORAL at 09:12

## 2024-07-15 RX ADMIN — SODIUM CHLORIDE, PRESERVATIVE FREE 10 ML: 5 INJECTION INTRAVENOUS at 09:12

## 2024-07-15 RX ADMIN — CHOLECALCIFEROL TAB 25 MCG (1000 UNIT) 1000 UNITS: 25 TAB at 09:11

## 2024-07-15 RX ADMIN — IPRATROPIUM BROMIDE AND ALBUTEROL SULFATE 1 DOSE: .5; 2.5 SOLUTION RESPIRATORY (INHALATION) at 08:02

## 2024-07-15 ASSESSMENT — PAIN SCALES - GENERAL
PAINLEVEL_OUTOF10: 8
PAINLEVEL_OUTOF10: 7
PAINLEVEL_OUTOF10: 6

## 2024-07-15 ASSESSMENT — ENCOUNTER SYMPTOMS
ABDOMINAL PAIN: 0
SHORTNESS OF BREATH: 0
ALLERGIC/IMMUNOLOGIC NEGATIVE: 1
NAUSEA: 0
COUGH: 1
VOMITING: 0
DIARRHEA: 0

## 2024-07-15 ASSESSMENT — PAIN DESCRIPTION - ORIENTATION
ORIENTATION: RIGHT;LEFT
ORIENTATION: RIGHT

## 2024-07-15 ASSESSMENT — PAIN - FUNCTIONAL ASSESSMENT: PAIN_FUNCTIONAL_ASSESSMENT: PREVENTS OR INTERFERES SOME ACTIVE ACTIVITIES AND ADLS

## 2024-07-15 ASSESSMENT — PAIN DESCRIPTION - LOCATION
LOCATION: HIP
LOCATION: HIP

## 2024-07-15 ASSESSMENT — PAIN DESCRIPTION - DESCRIPTORS: DESCRIPTORS: ACHING;DISCOMFORT

## 2024-07-15 NOTE — PROGRESS NOTES
@[facility]@    [facility]   IN-PATIENT SERVICE   [facility]    Progress Note    7/14/2024    8:57 PM    Name:   Isiah Duncan  MRN:     [id number]     Acct:      [id number]   Room:   0446/0446-01   Day:  4  Admit Date:  7/10/2024  9:47 PM    PCP:   Lolita Mcclain APRN - CNP  Code Status:  Full Code    Subjective:     C/C: No chief complaint on file.  Right hip pain  Interval History Status: not changed.     Seen at bedside, hemodynamically stable, currently on nasal cannula  Complaining of right hip pain otherwise denies any complaints  Working on placement  Brief History:     49-year-old male with history of schizophrenia,  came in bed right-sided hip pain causing falls, poor appetite and weight loss also complained of a bump on his scalp, CT imaging concerning for extensive metastatic disease, showed pathological Right femoral fracture in addition to extensive lytic lesions, lung lesions/hilar mass with post obstructive pneumonia, scalp lesions, patient was transferred to [facility] for further management underwent right total hip arthroplasty with biopsy on 7/11/2024.  Orthopedic surgery, neurosurgery, heme-onc service, palliative service, pulm service has been following, patient also seen by radiation oncology.    Medications:     Allergies:  No Known Allergies    Current Meds:   Scheduled Meds:    ipratropium 0.5 mg-albuterol 2.5 mg  1 Dose Inhalation 4x Daily RT    cefTRIAXone (ROCEPHIN) IV  1,000 mg IntraVENous Q24H    vitamin D  50,000 Units Oral Weekly    predniSONE  40 mg Oral Daily    Vitamin D  1,000 Units Oral Daily    sodium chloride flush  5-40 mL IntraVENous BID    sodium chloride flush  5-40 mL IntraVENous BID    atorvastatin  80 mg Oral Nightly    benztropine  1 mg Oral BID    famotidine  20 mg Oral BID    guaiFENesin  600 mg Oral BID    magnesium oxide  400 mg Oral BID    metFORMIN  1,000 mg Oral BID    propranolol  20 mg Oral BID

## 2024-07-15 NOTE — CARE COORDINATION
Transitional Planning:   Message rec'd from Jacky at [facility] with questions re: referral and pt   1015- Called Jacky at [facility] and answered questions re: cancer tx per [facility] notes plans to consult pallative care . States he is sending someone for on site today and will get back with CM

## 2024-07-15 NOTE — PROGRESS NOTES
Physical Therapy  Facility/Department: [address] ONC/MED SURG  Daily treatment note    Name: Isiah Duncan  : 1975  MRN: [id number]  Date of Service: 7/15/2024    Discharge Recommendations: Further therapy recommended at discharge.        PT Equipment Recommendations  Equipment Needed: Yes  Mobility Devices: Walker  Walker: Rolling  Other: Pt unsafe to amb any distance without assistance.      Patient Diagnosis(es): The encounter diagnosis was Closed fracture of neck of femur, unspecified laterality, initial encounter ([facility]).  Past Medical History:  has a past medical history of Hypertension and Schizophrenia ([facility]).  Past Surgical History:  has a past surgical history that includes Hip Arthroplasty (Right, 2024); hip surgery (Right, 2024); and Tibia fracture surgery (Left, 2024).    Assessment   Body Structures, Functions, Activity Limitations Requiring Skilled Therapeutic Intervention: Decreased functional mobility ;Decreased strength;Decreased safe awareness;Decreased balance;Increased pain;Decreased cognition  Assessment: Pt ambulates 100 ft with RW and CGA. Pt negotiates 3 steps with B handrail and CGA. Pt currently is a high fall risk d/t decreased balance, safety awareness, and endurance, requiring 24 hr support for safety with functional mobility. Pt would benefit from continued therapy to promote endurance, balance, and strengthening.  Therapy Prognosis: Good  Decision Making: Medium Complexity  Barriers to Learning: cognition  Requires PT Follow-Up: Yes  Activity Tolerance  Activity Tolerance: Patient limited by fatigue;Patient limited by pain     Plan   Physical Therapy Plan  General Plan:  (5-6x/wk)  Current Treatment Recommendations: Strengthening, Balance training, Gait training, Functional mobility training, Stair training, Transfer training, Endurance training, Home exercise program, Safety education & training, Patient/Caregiver education & training, Equipment evaluation, education,

## 2024-07-15 NOTE — PROGRESS NOTES
Today's Date: 7/15/2024  Patient Name: Isiah Duncan  Date of admission: 7/10/2024  9:47 PM  Patient's age: 49 y.o., 1975  Admission Dx: Closed right hip fracture, initial encounter ([facility]) [S72.001A]    Reason for Consult: Metastatic carcinoma   Requesting Physician: Oly Anton Sra, MD    Chief Complaint:  Weight loss and hip pain     Interval Changes:  Patient seen and examined.  Vitals reviewed.    Afebrile.  Hemodynamically stable.  On room air  Denies any headaches  No plan for neurosurgical intervention of brain mass per neurosurgery note  Glucose 282  Hemoglobin stable at 10.3    History of Present Illness:    The patient is a 49 y.o.  male who was initially seen at [facility] by Dr. Strong on 7/10/2024.  He presented to the ED for hip pain on the right side which has been going on for 2 months, causing him to fall.  His balance has been off for the last month.  He also has had about a 70 pound unintentional weight loss over the last year.  He says he is just not eating much.  He does have a chronic cough. No vomiting or diarrhea.  No chest pain.  No headache.  Says he does have a bump on his scalp he thinks it was from a fall.  Patient has schizophrenia, his dad is his legal guardian.     CT chest abdomen pelvis done demonstrated left upper lobe infiltrate with possible postobstructive changes or metastatic disease there is left hilar mass with left scapular mass and lytic/destructive lesion of the right femoral neck and a CT of the head large soft tissue mass at the vertex.  He was ultimately transferred to [facility] for further workup and management of closed right hip fracture.    Past Medical History:   has a past medical history of Hypertension and Schizophrenia ([facility]).    Past Surgical History:   has a past surgical history that includes Hip Arthroplasty (Right, 07/11/2024); hip surgery (Right, 7/11/2024); and Tibia fracture surgery (Left, 7/11/2024).     Medications:

## 2024-07-15 NOTE — PLAN OF CARE
Problem: Respiratory - Adult  Goal: Achieves optimal ventilation and oxygenation  Outcome: Progressing  Flowsheets (Taken 7/15/2024 0802)  Achieves optimal ventilation and oxygenation:   Assess for changes in mentation and behavior   Assess for changes in respiratory status   Position to facilitate oxygenation and minimize respiratory effort   Oxygen supplementation based on oxygen saturation or arterial blood gases   Encourage broncho-pulmonary hygiene including cough, deep breathe, incentive spirometry   Assess the need for suctioning and aspirate as needed   Assess and instruct to report shortness of breath or any respiratory difficulty   Respiratory therapy support as indicated

## 2024-07-15 NOTE — PROGRESS NOTES
@[facility] [email or web address]@    [facility]   IN-PATIENT SERVICE   [facility]    Progress Note    7/15/2024    5:43 PM    Name:   Isiah Duncan  MRN:     [id number]     Acct:      [id number]   Room:   [facility]6/0446-01   Day:  5  Admit Date:  7/10/2024  9:47 PM    PCP:   Lolita Mcclain APRN - CNP  Code Status:  Full Code    Subjective:     C/C: No chief complaint on file.  Right hip pain  Interval History Status: not changed.     Seen at bedside, hemodynamically stable, currently on room air  No acute events overnight, no new complaints, continues to have persistent right hip pain  Working on placement  Brief History:     49-year-old male with history of schizophrenia,  came in with right-sided hip pain causing falls, poor appetite and weight loss also complained of a bump on his scalp, CT imaging concerning for extensive metastatic disease, showed pathological Right femoral fracture in addition to extensive lytic lesions, lung lesions/hilar mass with post obstructive pneumonia, scalp lesions, patient was transferred to [facility] for further management underwent right total hip arthroplasty with biopsy on 7/11/2024.  Orthopedic surgery, neurosurgery, heme-onc service, palliative service, pulm service has been following, patient also seen by radiation oncology.    Medications:     Allergies:  No Known Allergies    Current Meds:   Scheduled Meds:    ipratropium 0.5 mg-albuterol 2.5 mg  1 Dose Inhalation 4x Daily RT    cefTRIAXone (ROCEPHIN) IV  1,000 mg IntraVENous Q24H    vitamin D  50,000 Units Oral Weekly    Vitamin D  1,000 Units Oral Daily    sodium chloride flush  5-40 mL IntraVENous BID    sodium chloride flush  5-40 mL IntraVENous BID    atorvastatin  80 mg Oral Nightly    benztropine  1 mg Oral BID    famotidine  20 mg Oral BID    guaiFENesin  600 mg Oral BID    magnesium oxide  400 mg Oral BID    metFORMIN  1,000 mg Oral BID    propranolol  20 mg Oral BID    risperiDONE  2  right total hip arthroplasty with biopsy 7/11/2024, await pathology result, PT/OT, pain meds as needed  -orthopedic surgery following, plan to defer left femur intervention for now  -Heme-onc service recommending palliative systemic treatment pending on pathology/tumor markers, seen by radiation oncology as well, plan to complete PET scan as outpatient  -Has been on antibiotics for postobstructive pneumonia, completed course of steroids, pulm service following as well, recommended aspiration precautions, continue scheduled breathing treatments,  -Neurosurgery has been following as well, recommended against resection of scalp lesion, as it would not provide longer life or improve his quality of life at this time, signed off  -Palliative care has been following as well, continued discussion regarding goals of care  -Continue vitamin D supplementation  -Home meds as tolerated    Oly Anton Sra, MD  7/15/2024  5:43 PM

## 2024-07-15 NOTE — CONSULTS
Physical Medicine & Rehabilitation  Consult Note      Admitting Physician:   Oly Elizabeth MD    Primary Care Provider:   Lolita Mcclain APRN - DEAN     Reason for Consult:  Acute Inpatient Rehabilitation    Chief Complaint: Hip pain and weakness    History of Present Illness:  Referring Provider is requesting an evaluation for appropriate placement upon discharge from acute care. History from chart review and patient.    Isiah Duncan is a 49 y.o.  male admitted to [facility] on 7/10/2024.      Patient admitted with 2 month history of worsening R hip pain and instability impairing his ambulation and causing a fall. He was originally evaluated at [place] showing widespread metastatic cancer. Diagnostic studies showed pathologic R femoral neck fracture and extensive lytic lesions as well as pulmonary and brain masses. He was treated with R LOLLY 7/11/24 with biopsy.     Hem/Onc: L hilar mass, L scapular mass, RLL nodule, destructive metastatic bony lesion, pathologic fracture R femoral neck, brain lesion involving overlying scalp. Consulting palliative care to discuss goals of care.     Orthopedics: Dr. Cary performed R LOLLY and prophylactic cabling R proximal femur 7/11.    Pulmonology: Using  oxygen NC. On 5 ays prednisone and empiric Rocephin.     Radiation Oncology: presumed Stage IV L lung carcinoma with bone metastasis. Pending biopsy confirmation. Anticipating possible radiation treatment to scalp lesions and R femur/hip.     Neurosurgery: no current plan for resection of scalp lesion. Recommending palliative chemo/radiation treatment.     Palliative care following.     Review of Systems:  Constitutional: negative for anorexia, chills, fatigue, fevers, sweats and weight loss  Eyes: negative for redness and visual disturbance  Ears, nose, mouth, throat, and face: negative for earaches, sore throat and tinnitus  Respiratory: negative for cough and shortness of breath  Cardiovascular: negative  input(s): \"PROTIME\", \"INR\" in the last 72 hours.  APTT: No results for input(s): \"APTT\" in the last 72 hours.  CARDIAC ENZYMES: No results for input(s): \"CKMB\", \"CKMBINDEX\", \"TROPONINT\", \"TROPHS\", \"TROPII\" in the last 72 hours.    Invalid input(s): \"CKTOTAL;3\"   FASTING LIPID PANEL:  Lab Results   Component Value Date    CHOL 97 07/03/2024    HDL 25 (L) 07/03/2024    TRIG 157 (H) 07/03/2024     LIVER PROFILE: No results for input(s): \"AST\", \"ALT\", \"BILIDIR\", \"BILITOT\", \"ALKPHOS\" in the last 72 hours.    Invalid input(s): \"ALB\"     Radiology:      XR CHEST PORTABLE    Result Date: 7/14/2024  EXAMINATION: ONE XRAY VIEW OF THE CHEST 7/14/2024 6:31 am COMPARISON: 12 July 2018 HISTORY: ORDERING SYSTEM PROVIDED HISTORY: hypoxia TECHNOLOGIST PROVIDED HISTORY: hypoxia FINDINGS: AP portable view of the chest time stamped at 553 hours demonstrates overlying cardiac monitoring electrodes.  Heart size is stable. Granulomatous changes are noted.  There has been interval development of diffuse interstitial opacities with more hazy opacity at the lung bases.  No effusion or extrapleural air is noted.  Findings favoring bilateral pneumonitis over developing pulmonary edema.     Interval development of diffuse interstitial opacities with more hazy opacity at the lung bases. Findings favoring bilateral pneumonitis over developing pulmonary edema.     XR HIP 2-3 VW W PELVIS RIGHT    Result Date: 7/12/2024  EXAMINATION: ONE XRAY VIEW OF THE PELVIS AND TWO XRAY VIEWS RIGHT HIP 7/11/2024 9:32 pm COMPARISON: Pelvis radiograph performed earlier the same day HISTORY: ORDERING SYSTEM PROVIDED HISTORY: s/p right total hip replacement, in PACU please TECHNOLOGIST PROVIDED HISTORY: s/p right total hip replacement, in PACU please FINDINGS: There postsurgical changes related to interval total right hip arthroplasty with proximal femoral cerclage wire in place.  Hardware appears intact. Alignment is near anatomic.  No additional fracture

## 2024-07-15 NOTE — PROGRESS NOTES
PULMONARY PROGRESS NOTE      Patient:  Isiah Duncan  YOB: 1975    MRN: [id number]     Acct: [id number]     Admit date: 7/10/2024    REASON FOR CONSULT:- Lung mass    Pt seen and Chart reviewed.    Subjective:     49-year-old male with past medical history of schizophrenia, hypertension initially presented to [facility] on 7/9 with worsening right hip pain, fall and unintentional weight loss.    He reports that he has been having hip pain for the past 1 to 2 months and frequent falls.  X-ray of the right hip showed lytic lesion in the right femoral neck with pathologic fracture.      CT of the chest shows possible left upper lobe infiltrate with possible postobstructive changes or metastatic disease, left hilar mass, left scapular mass.  The CT head is also showing large soft tissue mass at the vertex    On exam patient is afebrile, hemodynamically stable, maintaining saturations on 2 L nasal cannula, father is in the room he is also the legal guardian. Patient reports that he had a fall about 5 days ago and has not been able to walk since. He reports productive cough, denies any hemoptysis or any shortness of breath. He smokes 45 cigarettes per day        Interval history  07/15/24      I have reviewed the chart, overnight events noted.  Patient is not able to provide much information denies much symptoms complain of right hip pain.  Biopsy results pending      Right total hip arthroplasty, prophylactic cabling right proximal femur, tumor biopsy on 7/13  MRI brain shows large heterogeneously enhancing mass up to 10 cm with both intracranial and extracranial extraosseous extension:mild mass effect on b/l high parietal lobes with involvement of overlying scalp.       Review of Systems -Limited  Review of Systems   Constitutional:  Negative for fever.   Respiratory:  Positive for cough. Negative for shortness of breath.    Cardiovascular:  Negative for chest pain and leg swelling.  nasal cannula and wean nasal cannula to keep saturation 90%.  Recommend strict aspiration precaution keep head of bed up all the time.  If any concern for aspiration would recommend swallow evaluation if patient is cooperative  Continue with bronchodilators with DuoNeb aerosol.  Completed 5 day course of prednisone  He is currently on Rocephin empirically  Follow-up with oncology and neurosurgery.  Follow-up with palliative care  Follow biopsy results from right femur   Cultures have been negative so far      Rocio Pennington  PGY-3, Internal Medicine Resident  [facility] [place]         7/15/2024, 7:57 AM       Attending Physician Statement  I have discussed the care of Isiah Duncan, including pertinent history and exam findings with the resident. I have reviewed the key elements of all parts of the encounter with the resident. I have seen and examined the patient with the resident.  I agree with the assessment and plan and status of the problem list as documented.    Saw the patient overnight events noted chart reviewed.  He is currently on room air and maintaining saturation intermittently he is on nasal cannula he has been off high flow nasal cannula.  He was arousable not in distress does not complain of dysphagia.  Continue with bronchodilators with DuoNeb aerosol.  If any concern for aspiration would recommend speech therapy/swallow evaluation.  Biopsy is still awaited.     Please note that this chart was generated using voice recognition Dragon dictation software. Although every effort was made to ensure the accuracy of this automated transcription, some errors in transcription may have occurred.     Denver Mcpherson MD  7/15/2024 7:46 PM

## 2024-07-16 LAB
GLUCOSE BLD-MCNC: 173 MG/DL (ref 75–110)
MICROORGANISM SPEC CULT: NORMAL
MICROORGANISM SPEC CULT: NORMAL
SERVICE CMNT-IMP: NORMAL
SERVICE CMNT-IMP: NORMAL
SPECIMEN DESCRIPTION: NORMAL
SPECIMEN DESCRIPTION: NORMAL

## 2024-07-16 PROCEDURE — 82947 ASSAY GLUCOSE BLOOD QUANT: CPT

## 2024-07-16 PROCEDURE — 6370000000 HC RX 637 (ALT 250 FOR IP): Performed by: INTERNAL MEDICINE

## 2024-07-16 PROCEDURE — 6370000000 HC RX 637 (ALT 250 FOR IP)

## 2024-07-16 PROCEDURE — 94640 AIRWAY INHALATION TREATMENT: CPT

## 2024-07-16 PROCEDURE — 99231 SBSQ HOSP IP/OBS SF/LOW 25: CPT | Performed by: NURSE PRACTITIONER

## 2024-07-16 PROCEDURE — 99232 SBSQ HOSP IP/OBS MODERATE 35: CPT | Performed by: INTERNAL MEDICINE

## 2024-07-16 PROCEDURE — 99232 SBSQ HOSP IP/OBS MODERATE 35: CPT | Performed by: STUDENT IN AN ORGANIZED HEALTH CARE EDUCATION/TRAINING PROGRAM

## 2024-07-16 PROCEDURE — 2580000003 HC RX 258

## 2024-07-16 PROCEDURE — 6360000002 HC RX W HCPCS

## 2024-07-16 PROCEDURE — 6370000000 HC RX 637 (ALT 250 FOR IP): Performed by: NURSE PRACTITIONER

## 2024-07-16 PROCEDURE — 1200000000 HC SEMI PRIVATE

## 2024-07-16 RX ORDER — IPRATROPIUM BROMIDE AND ALBUTEROL SULFATE 2.5; .5 MG/3ML; MG/3ML
1 SOLUTION RESPIRATORY (INHALATION) EVERY 4 HOURS PRN
Status: DISCONTINUED | OUTPATIENT
Start: 2024-07-16 | End: 2024-07-18 | Stop reason: HOSPADM

## 2024-07-16 RX ORDER — SENNA AND DOCUSATE SODIUM 50; 8.6 MG/1; MG/1
2 TABLET, FILM COATED ORAL DAILY
Status: DISCONTINUED | OUTPATIENT
Start: 2024-07-16 | End: 2024-07-18 | Stop reason: HOSPADM

## 2024-07-16 RX ADMIN — CHOLECALCIFEROL TAB 25 MCG (1000 UNIT) 1000 UNITS: 25 TAB at 10:26

## 2024-07-16 RX ADMIN — METFORMIN HYDROCHLORIDE 1000 MG: 500 TABLET ORAL at 20:41

## 2024-07-16 RX ADMIN — GUAIFENESIN 600 MG: 600 TABLET, EXTENDED RELEASE ORAL at 20:41

## 2024-07-16 RX ADMIN — Medication 1000 MG: at 02:13

## 2024-07-16 RX ADMIN — METFORMIN HYDROCHLORIDE 1000 MG: 500 TABLET ORAL at 10:25

## 2024-07-16 RX ADMIN — MAGNESIUM GLUCONATE 500 MG ORAL TABLET 400 MG: 500 TABLET ORAL at 20:41

## 2024-07-16 RX ADMIN — PROPRANOLOL HYDROCHLORIDE 20 MG: 20 TABLET ORAL at 20:42

## 2024-07-16 RX ADMIN — ENOXAPARIN SODIUM 40 MG: 100 INJECTION SUBCUTANEOUS at 10:24

## 2024-07-16 RX ADMIN — SODIUM CHLORIDE, PRESERVATIVE FREE 10 ML: 5 INJECTION INTRAVENOUS at 10:26

## 2024-07-16 RX ADMIN — GUAIFENESIN 600 MG: 600 TABLET, EXTENDED RELEASE ORAL at 10:25

## 2024-07-16 RX ADMIN — ATORVASTATIN CALCIUM 80 MG: 80 TABLET, FILM COATED ORAL at 20:41

## 2024-07-16 RX ADMIN — MAGNESIUM GLUCONATE 500 MG ORAL TABLET 400 MG: 500 TABLET ORAL at 10:25

## 2024-07-16 RX ADMIN — PROPRANOLOL HYDROCHLORIDE 20 MG: 20 TABLET ORAL at 10:25

## 2024-07-16 RX ADMIN — FAMOTIDINE 20 MG: 20 TABLET, FILM COATED ORAL at 10:24

## 2024-07-16 RX ADMIN — SODIUM CHLORIDE, PRESERVATIVE FREE 10 ML: 5 INJECTION INTRAVENOUS at 20:41

## 2024-07-16 RX ADMIN — SENNOSIDES AND DOCUSATE SODIUM 2 TABLET: 50; 8.6 TABLET ORAL at 11:29

## 2024-07-16 RX ADMIN — IPRATROPIUM BROMIDE AND ALBUTEROL SULFATE 1 DOSE: .5; 2.5 SOLUTION RESPIRATORY (INHALATION) at 08:04

## 2024-07-16 RX ADMIN — TRAZODONE HYDROCHLORIDE 100 MG: 50 TABLET ORAL at 20:41

## 2024-07-16 RX ADMIN — FAMOTIDINE 20 MG: 20 TABLET, FILM COATED ORAL at 20:41

## 2024-07-16 ASSESSMENT — ENCOUNTER SYMPTOMS
ALLERGIC/IMMUNOLOGIC NEGATIVE: 1
COUGH: 1
NAUSEA: 0
SHORTNESS OF BREATH: 0
DIARRHEA: 0
ABDOMINAL PAIN: 0
VOMITING: 0

## 2024-07-16 ASSESSMENT — PAIN DESCRIPTION - ORIENTATION: ORIENTATION: RIGHT

## 2024-07-16 ASSESSMENT — PAIN DESCRIPTION - DESCRIPTORS: DESCRIPTORS: ACHING;DISCOMFORT

## 2024-07-16 ASSESSMENT — PAIN DESCRIPTION - LOCATION: LOCATION: HIP

## 2024-07-16 ASSESSMENT — PAIN DESCRIPTION - FREQUENCY: FREQUENCY: CONTINUOUS

## 2024-07-16 ASSESSMENT — PAIN DESCRIPTION - ONSET: ONSET: ON-GOING

## 2024-07-16 ASSESSMENT — PAIN - FUNCTIONAL ASSESSMENT: PAIN_FUNCTIONAL_ASSESSMENT: PREVENTS OR INTERFERES SOME ACTIVE ACTIVITIES AND ADLS

## 2024-07-16 ASSESSMENT — PAIN SCALES - GENERAL: PAINLEVEL_OUTOF10: 10

## 2024-07-16 NOTE — PROGRESS NOTES
07/16/24 0809   RT Protocol   History Pulmonary Disease 0   Respiratory pattern 0   Breath sounds 0   Cough 0   Bronchodilator Assessment Score 0

## 2024-07-16 NOTE — PROGRESS NOTES
Comprehensive Nutrition Assessment    Type and Reason for Visit:  Reassess    Nutrition Recommendations/Plan:   Add Standard High Calorie High Protein Supplement BID   Monitor PO intake      Malnutrition Assessment:  Malnutrition Status:  Insufficient data (07/11/24 1201)    Context:  Acute Illness     Findings of the 6 clinical characteristics of malnutrition:  Energy Intake:  75% or less of estimated energy requirements for 7 or more days  Weight Loss:  Unable to assess     Body Fat Loss:  No significant body fat loss     Muscle Mass Loss:  No significant muscle mass loss    Fluid Accumulation:  No significant fluid accumulation     Strength:  Not Performed    Nutrition Assessment:    Attempted to call patient, no answer. Pt on Regular diet. ONS not yet ordered. PO intake recorded 0% for lunch yesterday.    Nutrition Related Findings:    Meds/labs reviewed. POCG elevated. Wound Type: Surgical Incision       Current Nutrition Intake & Therapies:    Average Meal Intake: 0%  Average Supplements Intake: None Ordered  ADULT DIET; Regular  ADULT ORAL NUTRITION SUPPLEMENT; Breakfast, Dinner; Standard High Calorie/High Protein Oral Supplement    Anthropometric Measures:  Height: 188 cm (6' 2.02\")  Ideal Body Weight (IBW): 190 lbs (86 kg)    Admission Body Weight: 85.3 kg (188 lb 0.8 oz)  Current Body Weight: 87 kg (191 lb 12.8 oz), 100.9 % IBW. Weight Source: Bed Scale  Current BMI (kg/m2): 24.6  Usual Body Weight: 94.3 kg (208 lb) (per patient report)  % Weight Change (Calculated): -9.6  Weight Adjustment For: No Adjustment                 BMI Categories: Normal Weight (BMI 18.5-24.9)    Estimated Daily Nutrient Needs:  Energy Requirements Based On: Kcal/kg  Weight Used for Energy Requirements: Admission  Energy (kcal/day): 4973-1848 kcal/day  Weight Used for Protein Requirements: Admission  Protein (g/day): 115-125 g/day  Method Used for Fluid Requirements: ml/Kg  Fluid (ml/day): 0189-5440 ml/day (~25

## 2024-07-16 NOTE — PROGRESS NOTES
PULMONARY PROGRESS NOTE      Patient:  Isiah Duncan  YOB: 1975    MRN: [id number]     Acct: [id number]     Admit date: 7/10/2024    REASON FOR CONSULT:- Lung mass    Pt seen and Chart reviewed.    Subjective:     49-year-old male with past medical history of schizophrenia, hypertension initially presented to [facility] on 7/9 with worsening right hip pain, fall and unintentional weight loss.    He reports that he has been having hip pain for the past 1 to 2 months and frequent falls.  X-ray of the right hip showed lytic lesion in the right femoral neck with pathologic fracture.      CT of the chest shows possible left upper lobe infiltrate with possible postobstructive changes or metastatic disease, left hilar mass, left scapular mass.  The CT head is also showing large soft tissue mass at the vertex    On exam patient is afebrile, hemodynamically stable, maintaining saturations on 2 L nasal cannula, father is in the room he is also the legal guardian. Patient reports that he had a fall about 5 days ago and has not been able to walk since. He reports productive cough, denies any hemoptysis or any shortness of breath. He smokes 45 cigarettes per day        Interval history  07/16/24      I have reviewed the chart, overnight events noted.  Patient is not able to provide much information denies much symptoms complain of right hip pain.  Biopsy results pending      Right total hip arthroplasty, prophylactic cabling right proximal femur, tumor biopsy on 7/13  MRI brain shows large heterogeneously enhancing mass up to 10 cm with both intracranial and extracranial extraosseous extension:mild mass effect on b/l high parietal lobes with involvement of overlying scalp.       Review of Systems -Limited  Review of Systems   Constitutional:  Negative for fever.   Respiratory:  Positive for cough. Negative for shortness of breath.    Cardiovascular:  Negative for chest pain and leg swelling.  50,000 Units Oral Weekly   • Vitamin D  1,000 Units Oral Daily   • sodium chloride flush  5-40 mL IntraVENous BID   • sodium chloride flush  5-40 mL IntraVENous BID   • atorvastatin  80 mg Oral Nightly   • famotidine  20 mg Oral BID   • guaiFENesin  600 mg Oral BID   • magnesium oxide  400 mg Oral BID   • metFORMIN  1,000 mg Oral BID   • propranolol  20 mg Oral BID   • traZODone  100 mg Oral Nightly   • sodium chloride flush  5-40 mL IntraVENous 2 times per day   • enoxaparin  40 mg SubCUTAneous Daily     Continuous Infusions:  • sodium chloride       PRN Meds:ipratropium 0.5 mg-albuterol 2.5 mg, benzonatate, sodium chloride flush, sodium chloride, potassium chloride **OR** potassium alternative oral replacement **OR** potassium chloride, magnesium sulfate, ondansetron **OR** ondansetron, polyethylene glycol, acetaminophen **OR** acetaminophen, HYDROcodone 5 mg - acetaminophen **OR** HYDROcodone 5 mg - acetaminophen    Objective:    CBC:   Recent Labs     07/14/24  0643 07/15/24  0857   WBC 10.5 9.2   HGB 10.2* 10.3*    See Reflexed IPF Result       BMP:    Recent Labs     07/14/24  0643 07/15/24  1202   * 136   K 3.9 4.2   CL 97* 99   CO2 27 26   BUN 12 13   CREATININE 0.6* 0.7   GLUCOSE 221* 313*       Calcium:  Recent Labs     07/15/24  1202   CALCIUM 8.6       Ionized Calcium:Invalid input(s): \"IONCA\"  Magnesium:  No results for input(s): \"MG\" in the last 72 hours.    Phosphorus:No results for input(s): \"PHOS\" in the last 72 hours.  BNP:No results for input(s): \"BNP\" in the last 72 hours.  Glucose:  Recent Labs     07/15/24  0813 07/15/24  1232 07/16/24  0755   POCGLU 168* 282* 173*       HgbA1C: No results for input(s): \"LABA1C\" in the last 72 hours.  INR:   No results for input(s): \"INR\" in the last 72 hours.    Hepatic:   No results for input(s): \"ALKPHOS\", \"ALT\", \"AST\", \"BILITOT\", \"BILIDIR\", \"LABALBU\" in the last 72 hours.    Invalid input(s): \"PROT\"    Amylase and Lipase:No results for

## 2024-07-16 NOTE — PLAN OF CARE
Problem: Safety - Adult  Goal: Free from fall injury  7/16/2024 0730 by Caren Samuel RN  Outcome: Progressing  7/16/2024 0314 by Katrin Bustamante RN  Outcome: Progressing  Flowsheets (Taken 7/15/2024 2041)  Free From Fall Injury: Instruct family/caregiver on patient safety     Problem: Pain  Goal: Verbalizes/displays adequate comfort level or baseline comfort level  7/16/2024 0730 by Caren Samuel RN  Outcome: Progressing  7/16/2024 0314 by Katrin Bustamante RN  Outcome: Progressing     Problem: Skin/Tissue Integrity  Goal: Absence of new skin breakdown  7/16/2024 0730 by Caren Samuel RN  Outcome: Progressing  7/16/2024 0314 by Katrin Bustamante RN  Outcome: Progressing     Problem: Respiratory - Adult  Goal: Achieves optimal ventilation and oxygenation  7/16/2024 0730 by Caren Samuel RN  Outcome: Progressing  7/16/2024 0314 by Katrin Bustamante RN  Outcome: Progressing  Flowsheets  Taken 7/15/2024 2130 by Katrin Bustamante RN  Achieves optimal ventilation and oxygenation: Assess for changes in respiratory status  Taken 7/15/2024 2014 by Marimar Cast RCP  Achieves optimal ventilation and oxygenation:   Assess for changes in respiratory status   Assess for changes in mentation and behavior   Position to facilitate oxygenation and minimize respiratory effort   Oxygen supplementation based on oxygen saturation or arterial blood gases   Initiate smoking cessation protocol as indicated   Encourage broncho-pulmonary hygiene including cough, deep breathe, incentive spirometry   Assess the need for suctioning and aspirate as needed   Assess and instruct to report shortness of breath or any respiratory difficulty   Respiratory therapy support as indicated     Problem: Nutrition Deficit:  Goal: Optimize nutritional status  7/16/2024 0730 by Caren Samuel RN  Outcome: Progressing  7/16/2024 0314 by Katrin Bustamante RN  Outcome: Progressing     Problem: Discharge Planning  Goal: Discharge to home or

## 2024-07-16 NOTE — PROGRESS NOTES
@[facility]@    [facility]   IN-PATIENT SERVICE   [facility]    Progress Note    7/16/2024    3:10 PM    Name:   Isiah Duncan  MRN:     [id number]     Acct:      [id number]   Room:   [facility]6/0446-01   Day:  6  Admit Date:  7/10/2024  9:47 PM    PCP:   Lolita Mcclain APRN - CNP  Code Status:  Full Code    Subjective:     C/C: No chief complaint on file.  Right hip pain  Interval History Status: not changed.     Seen at bedside, hemodynamically stable, currently on room air  No acute events overnight, no new complaints, continues to complain of right hip pain, currently controlled on pain medications  Working on placement  Brief History:     49-year-old male with history of schizophrenia, diabetes mellitus, came in with right-sided hip pain causing falls, poor appetite and weight loss also complained of a bump on his scalp, CT imaging concerning for extensive metastatic disease, showed pathological Right femoral fracture in addition to extensive lytic lesions, lung lesions/hilar mass with post obstructive pneumonia, scalp lesions, patient was transferred to [facility] for further management underwent right total hip arthroplasty with biopsy on 7/11/2024.  Orthopedic surgery, neurosurgery, heme-onc service, palliative service, pulm service has been following, patient also seen by radiation oncology.    Medications:     Allergies:  No Known Allergies    Current Meds:   Scheduled Meds:    sennosides-docusate sodium  2 tablet Oral Daily    cefTRIAXone (ROCEPHIN) IV  1,000 mg IntraVENous Q24H    vitamin D  50,000 Units Oral Weekly    Vitamin D  1,000 Units Oral Daily    sodium chloride flush  5-40 mL IntraVENous BID    sodium chloride flush  5-40 mL IntraVENous BID    atorvastatin  80 mg Oral Nightly    famotidine  20 mg Oral BID    guaiFENesin  600 mg Oral BID    magnesium oxide  400 mg Oral BID    metFORMIN  1,000 mg Oral BID    propranolol  20 mg Oral BID     for now  -Heme-onc service recommending palliative systemic treatment pending on pathology/tumor markers, seen by radiation oncology as well, plan to complete PET scan as outpatient  -Has been on antibiotics for postobstructive pneumonia, completed course of steroids, pulm service following as well, recommended aspiration precautions, continue scheduled breathing treatments,  -Neurosurgery has been following as well, recommended against resection of scalp lesion, as it would not provide longer life or improve his quality of life at this time, signed off  -Palliative care has been following as well, continued discussion regarding goals of care  -Continue vitamin D supplementation  -Home meds as tolerated      Working on placement.    Oly Anton Sra, MD  7/16/2024  3:10 PM

## 2024-07-16 NOTE — PROGRESS NOTES
07/15/24 2014   Care Plan - Respiratory Goals   Achieves optimal ventilation and oxygenation Assess for changes in respiratory status;Assess for changes in mentation and behavior;Position to facilitate oxygenation and minimize respiratory effort;Oxygen supplementation based on oxygen saturation or arterial blood gases;Initiate smoking cessation protocol as indicated;Encourage broncho-pulmonary hygiene including cough, deep breathe, incentive spirometry;Assess the need for suctioning and aspirate as needed;Assess and instruct to report shortness of breath or any respiratory difficulty;Respiratory therapy support as indicated

## 2024-07-16 NOTE — CARE COORDINATION
Transitional planning    Greer from [facility] on unit for bedside eval, plan is SNF, need radiation plan, palliative following, [facility] reviewing.

## 2024-07-16 NOTE — PROGRESS NOTES
7/11/2024 6:51 am COMPARISON: 07/09/2024 HISTORY: ORDERING SYSTEM PROVIDED HISTORY: Trauma/Fracture TECHNOLOGIST PROVIDED HISTORY: Trauma/Fracture FINDINGS: Left knee: No acute osseous abnormality.  No evidence of acute fracture or dislocation.  No obvious bone destructive changes. Right knee: No evidence of acute fracture or dislocation.  There may be slight medial joint space loss.  No obvious bone destructive changes. AP pelvis, bilateral hips and femurs: Lytic lesion right femoral neck with associated pathologic fracture is again noted.  Fracture is now slightly displaced with foreshortening and varus angulation.  Femoral head remains within the acetabulum.  Subtle 3 cm lucent lesion in the left femoral neck without associated pathologic fracture at this time is suspicious for metastatic disease.  The pelvis is partially obscured by overlying bowel gas. No obvious lytic lesions in the pelvis.  Consider bone scan or MRI if additional imaging is warranted clinically.     Pathologic fracture right femoral neck is now mildly displaced and angulated. Lucent lesion left femoral neck suspicious for metastatic disease.     XR HIP W PELVIS MIN 5 VWS BILATERAL    Result Date: 7/11/2024  EXAMINATION: THREE XRAY VIEWS OF THE RIGHT KNEE; THREE XRAY VIEWS OF THE LEFT KNEE; ONE XRAY VIEW OF THE PELVIS AND TWO XRAY VIEWS OF EACH OF THE BILATERAL HIPS; 2 XRAY VIEWS OF THE LEFT FEMUR; 2 XRAY VIEWS OF THE RIGHT FEMUR 7/11/2024 6:51 am COMPARISON: 07/09/2024 HISTORY: ORDERING SYSTEM PROVIDED HISTORY: Trauma/Fracture TECHNOLOGIST PROVIDED HISTORY: Trauma/Fracture FINDINGS: Left knee: No acute osseous abnormality.  No evidence of acute fracture or dislocation.  No obvious bone destructive changes. Right knee: No evidence of acute fracture or dislocation.  There may be slight medial joint space loss.  No obvious bone destructive changes. AP pelvis, bilateral hips and femurs: Lytic lesion right femoral neck with associated pathologic  plan    Path report pending.  Further treatment plan and recommendation depending upon path.       Electronically signed by   CLARE FLOREZ MD    on 7/16/2024 at 11:46 PM              This note is created with the assistance of a speech recognition program.  While intending to generate a document that actually reflects the content of the visit, the document can still have some errors including those of syntax and sound a like substitutions which may escape proof reading.  It such instances, actual meaning can be extrapolated by contextual diversion.

## 2024-07-16 NOTE — PROGRESS NOTES
PALLIATIVE CARE PROGRESS NOTE     NAME:  Isiah Duncan  MEDICAL RECORD NUMBER:  [id number]  AGE: 49 y.o.   GENDER: male  : 1975  TODAY'S DATE:  2024  Room: [facility]60446-01    Reason For Consult:  Goals of care evaluation  Distress management  Symptom Management  Guidance and support  Facilitate communications  Assistance in coordinating care  Recommendations for the above    Plan      Palliative Interaction:    I met with patient at the bedside this morning on rounds. Patient appeared comfortable resting in bed. I reintroduced myself. No family present.     Patient tells me that his pain in manageable since surgery. He states that it has been \"8 or 9 days\" since his last bowel movement. Patient agreeable to start stool softeners.     Patient very quiet during encounter and wanted to rest. Emotional support provided.     Reviewed patient's paper chart - his father Max provided guardianship paper work for patient.     Palliative to follow.     IMPRESSION/ PLAN  Symptom management/pain control    We feel the patient's symptoms are being controlled. Their current regimen has been reviewed by myself and discussed with the staff. We recommend adjusting their medications as follows:  Senna-S daily    Goals of care evaluation  The patient goals of care are live longer  Long discussion to ensure the patient's and family's understanding of goals of care, and theconcept of palliative care.    Code Status:  FULL    Other Recommendations -       History of Present Illness     HISTORY OF PRESENT ILLNESS:   The patient is a 49 y.o. male who presented to [facility] emergency department on 2024 with complaints of worsening right hip pain over the last 2 months.  Patient had difficulty with stability in the right leg with pain radiating to the right groin.  Patient then began having difficulty with ambulation.  Patient also has scalp mass that has been present over several months.  Patient denied pain and

## 2024-07-16 NOTE — PLAN OF CARE
Problem: Safety - Adult  Goal: Free from fall injury  Outcome: Progressing  Flowsheets (Taken 7/15/2024 2041)  Free From Fall Injury: Instruct family/caregiver on patient safety     Problem: Pain  Goal: Verbalizes/displays adequate comfort level or baseline comfort level  Outcome: Progressing     Problem: Skin/Tissue Integrity  Goal: Absence of new skin breakdown  Description: 1.  Monitor for areas of redness and/or skin breakdown  2.  Assess vascular access sites hourly  3.  Every 4-6 hours minimum:  Change oxygen saturation probe site  4.  Every 4-6 hours:  If on nasal continuous positive airway pressure, respiratory therapy assess nares and determine need for appliance change or resting period.  Outcome: Progressing     Problem: Respiratory - Adult  Goal: Achieves optimal ventilation and oxygenation  Outcome: Progressing  Flowsheets  Taken 7/15/2024 2130 by Katrin Bustamante RN  Achieves optimal ventilation and oxygenation: Assess for changes in respiratory status  Taken 7/15/2024 2014 by Marimar Cast RCP  Achieves optimal ventilation and oxygenation:   Assess for changes in respiratory status   Assess for changes in mentation and behavior   Position to facilitate oxygenation and minimize respiratory effort   Oxygen supplementation based on oxygen saturation or arterial blood gases   Initiate smoking cessation protocol as indicated   Encourage broncho-pulmonary hygiene including cough, deep breathe, incentive spirometry   Assess the need for suctioning and aspirate as needed   Assess and instruct to report shortness of breath or any respiratory difficulty   Respiratory therapy support as indicated     Problem: Nutrition Deficit:  Goal: Optimize nutritional status  Outcome: Progressing     Problem: Discharge Planning  Goal: Discharge to home or other facility with appropriate resources  Outcome: Progressing  Flowsheets (Taken 7/15/2024 2130)  Discharge to home or other facility with appropriate resources:  Identify barriers to discharge with patient and caregiver

## 2024-07-17 LAB
AFP SERPL-MCNC: <1.8 UG/L
ANION GAP SERPL CALCULATED.3IONS-SCNC: 13 MMOL/L (ref 9–16)
BASOPHILS # BLD: <0.03 K/UL (ref 0–0.2)
BASOPHILS NFR BLD: 0 % (ref 0–2)
BUN SERPL-MCNC: 9 MG/DL (ref 6–20)
CALCIUM SERPL-MCNC: 9.1 MG/DL (ref 8.6–10.4)
CANCER AG19-9 SERPL IA-ACNC: 20 U/ML (ref 0–35)
CEA SERPL-MCNC: 8 NG/ML (ref 0–3.8)
CHLORIDE SERPL-SCNC: 96 MMOL/L (ref 98–107)
CO2 SERPL-SCNC: 23 MMOL/L (ref 20–31)
CREAT SERPL-MCNC: 0.6 MG/DL (ref 0.7–1.2)
EOSINOPHIL # BLD: 0.14 K/UL (ref 0–0.44)
EOSINOPHILS RELATIVE PERCENT: 1 % (ref 1–4)
ERYTHROCYTE [DISTWIDTH] IN BLOOD BY AUTOMATED COUNT: 13.8 % (ref 11.8–14.4)
GFR, ESTIMATED: >90 ML/MIN/1.73M2
GLUCOSE BLD-MCNC: 226 MG/DL (ref 75–110)
GLUCOSE SERPL-MCNC: 194 MG/DL (ref 74–99)
HCT VFR BLD AUTO: 34.1 % (ref 40.7–50.3)
HGB BLD-MCNC: 11 G/DL (ref 13–17)
IMM GRANULOCYTES # BLD AUTO: 0.14 K/UL (ref 0–0.3)
IMM GRANULOCYTES NFR BLD: 1 %
LYMPHOCYTES NFR BLD: 1.82 K/UL (ref 1.1–3.7)
LYMPHOCYTES RELATIVE PERCENT: 14 % (ref 24–43)
MCH RBC QN AUTO: 28.1 PG (ref 25.2–33.5)
MCHC RBC AUTO-ENTMCNC: 32.3 G/DL (ref 28.4–34.8)
MCV RBC AUTO: 87.2 FL (ref 82.6–102.9)
MONOCYTES NFR BLD: 0.84 K/UL (ref 0.1–1.2)
MONOCYTES NFR BLD: 7 % (ref 3–12)
NEUTROPHILS NFR BLD: 77 % (ref 36–65)
NEUTS SEG NFR BLD: 9.69 K/UL (ref 1.5–8.1)
NRBC BLD-RTO: 0 PER 100 WBC
PLATELET # BLD AUTO: 287 K/UL (ref 138–453)
PMV BLD AUTO: 9.1 FL (ref 8.1–13.5)
POTASSIUM SERPL-SCNC: 3.6 MMOL/L (ref 3.7–5.3)
PSA SERPL-MCNC: 0.7 NG/ML (ref 0–4)
RBC # BLD AUTO: 3.91 M/UL (ref 4.21–5.77)
SODIUM SERPL-SCNC: 132 MMOL/L (ref 136–145)
WBC OTHER # BLD: 12.7 K/UL (ref 3.5–11.3)

## 2024-07-17 PROCEDURE — 6370000000 HC RX 637 (ALT 250 FOR IP)

## 2024-07-17 PROCEDURE — 97530 THERAPEUTIC ACTIVITIES: CPT

## 2024-07-17 PROCEDURE — 2580000003 HC RX 258

## 2024-07-17 PROCEDURE — 86738 MYCOPLASMA ANTIBODY: CPT

## 2024-07-17 PROCEDURE — 82105 ALPHA-FETOPROTEIN SERUM: CPT

## 2024-07-17 PROCEDURE — 6360000002 HC RX W HCPCS

## 2024-07-17 PROCEDURE — 99233 SBSQ HOSP IP/OBS HIGH 50: CPT | Performed by: INTERNAL MEDICINE

## 2024-07-17 PROCEDURE — 85025 COMPLETE CBC W/AUTO DIFF WBC: CPT

## 2024-07-17 PROCEDURE — 82378 CARCINOEMBRYONIC ANTIGEN: CPT

## 2024-07-17 PROCEDURE — 36415 COLL VENOUS BLD VENIPUNCTURE: CPT

## 2024-07-17 PROCEDURE — 80048 BASIC METABOLIC PNL TOTAL CA: CPT

## 2024-07-17 PROCEDURE — 97110 THERAPEUTIC EXERCISES: CPT

## 2024-07-17 PROCEDURE — 6370000000 HC RX 637 (ALT 250 FOR IP): Performed by: NURSE PRACTITIONER

## 2024-07-17 PROCEDURE — 82947 ASSAY GLUCOSE BLOOD QUANT: CPT

## 2024-07-17 PROCEDURE — 99232 SBSQ HOSP IP/OBS MODERATE 35: CPT | Performed by: INTERNAL MEDICINE

## 2024-07-17 PROCEDURE — 99239 HOSP IP/OBS DSCHRG MGMT >30: CPT | Performed by: STUDENT IN AN ORGANIZED HEALTH CARE EDUCATION/TRAINING PROGRAM

## 2024-07-17 PROCEDURE — 86301 IMMUNOASSAY TUMOR CA 19-9: CPT

## 2024-07-17 PROCEDURE — 84153 ASSAY OF PSA TOTAL: CPT

## 2024-07-17 PROCEDURE — 6370000000 HC RX 637 (ALT 250 FOR IP): Performed by: STUDENT IN AN ORGANIZED HEALTH CARE EDUCATION/TRAINING PROGRAM

## 2024-07-17 PROCEDURE — 1200000000 HC SEMI PRIVATE

## 2024-07-17 RX ORDER — LACTULOSE 10 G/15ML
20 SOLUTION ORAL DAILY
Status: DISCONTINUED | OUTPATIENT
Start: 2024-07-17 | End: 2024-07-18 | Stop reason: HOSPADM

## 2024-07-17 RX ORDER — SENNA AND DOCUSATE SODIUM 50; 8.6 MG/1; MG/1
2 TABLET, FILM COATED ORAL 2 TIMES DAILY PRN
Qty: 60 TABLET | Refills: 0 | Status: SHIPPED | OUTPATIENT
Start: 2024-07-17 | End: 2024-08-16

## 2024-07-17 RX ORDER — HYDROCODONE BITARTRATE AND ACETAMINOPHEN 5; 325 MG/1; MG/1
1 TABLET ORAL EVERY 8 HOURS PRN
Qty: 10 TABLET | Refills: 0 | Status: SHIPPED | OUTPATIENT
Start: 2024-07-17 | End: 2024-07-18

## 2024-07-17 RX ORDER — CHOLECALCIFEROL (VITAMIN D3) 25 MCG
1000 TABLET ORAL DAILY
Qty: 30 TABLET | Refills: 0 | Status: SHIPPED | OUTPATIENT
Start: 2024-07-18 | End: 2024-08-17

## 2024-07-17 RX ORDER — DEXTROSE MONOHYDRATE 100 MG/ML
INJECTION, SOLUTION INTRAVENOUS CONTINUOUS PRN
Status: DISCONTINUED | OUTPATIENT
Start: 2024-07-17 | End: 2024-07-18 | Stop reason: HOSPADM

## 2024-07-17 RX ORDER — NICOTINE 21 MG/24HR
1 PATCH, TRANSDERMAL 24 HOURS TRANSDERMAL DAILY
Qty: 30 PATCH | Refills: 3 | Status: SHIPPED | OUTPATIENT
Start: 2024-07-17

## 2024-07-17 RX ORDER — LACTULOSE 10 G/15ML
20 SOLUTION ORAL DAILY PRN
Qty: 300 ML | Refills: 1 | Status: SHIPPED | OUTPATIENT
Start: 2024-07-17 | End: 2024-08-16

## 2024-07-17 RX ORDER — INSULIN LISPRO 100 [IU]/ML
0-4 INJECTION, SOLUTION INTRAVENOUS; SUBCUTANEOUS NIGHTLY
Status: DISCONTINUED | OUTPATIENT
Start: 2024-07-17 | End: 2024-07-18 | Stop reason: HOSPADM

## 2024-07-17 RX ORDER — NICOTINE 21 MG/24HR
1 PATCH, TRANSDERMAL 24 HOURS TRANSDERMAL DAILY
Status: DISCONTINUED | OUTPATIENT
Start: 2024-07-17 | End: 2024-07-18 | Stop reason: HOSPADM

## 2024-07-17 RX ORDER — INSULIN LISPRO 100 [IU]/ML
0-8 INJECTION, SOLUTION INTRAVENOUS; SUBCUTANEOUS
Status: DISCONTINUED | OUTPATIENT
Start: 2024-07-17 | End: 2024-07-18 | Stop reason: HOSPADM

## 2024-07-17 RX ORDER — TRAZODONE HYDROCHLORIDE 100 MG/1
100 TABLET ORAL NIGHTLY PRN
Qty: 7 TABLET | Refills: 0 | Status: SHIPPED | OUTPATIENT
Start: 2024-07-17 | End: 2024-07-18

## 2024-07-17 RX ORDER — GLUCAGON 1 MG/ML
1 KIT INJECTION PRN
Status: DISCONTINUED | OUTPATIENT
Start: 2024-07-17 | End: 2024-07-18 | Stop reason: HOSPADM

## 2024-07-17 RX ADMIN — CHOLECALCIFEROL TAB 25 MCG (1000 UNIT) 1000 UNITS: 25 TAB at 09:36

## 2024-07-17 RX ADMIN — PROPRANOLOL HYDROCHLORIDE 20 MG: 20 TABLET ORAL at 09:37

## 2024-07-17 RX ADMIN — SODIUM CHLORIDE, PRESERVATIVE FREE 10 ML: 5 INJECTION INTRAVENOUS at 21:02

## 2024-07-17 RX ADMIN — SODIUM CHLORIDE, PRESERVATIVE FREE 10 ML: 5 INJECTION INTRAVENOUS at 09:36

## 2024-07-17 RX ADMIN — FAMOTIDINE 20 MG: 20 TABLET, FILM COATED ORAL at 21:01

## 2024-07-17 RX ADMIN — LACTULOSE 20 G: 20 SOLUTION ORAL at 09:36

## 2024-07-17 RX ADMIN — METFORMIN HYDROCHLORIDE 1000 MG: 500 TABLET ORAL at 21:01

## 2024-07-17 RX ADMIN — TRAZODONE HYDROCHLORIDE 100 MG: 50 TABLET ORAL at 21:01

## 2024-07-17 RX ADMIN — MAGNESIUM GLUCONATE 500 MG ORAL TABLET 400 MG: 500 TABLET ORAL at 21:01

## 2024-07-17 RX ADMIN — MAGNESIUM GLUCONATE 500 MG ORAL TABLET 400 MG: 500 TABLET ORAL at 09:36

## 2024-07-17 RX ADMIN — SENNOSIDES AND DOCUSATE SODIUM 2 TABLET: 50; 8.6 TABLET ORAL at 09:36

## 2024-07-17 RX ADMIN — PROPRANOLOL HYDROCHLORIDE 20 MG: 20 TABLET ORAL at 21:01

## 2024-07-17 RX ADMIN — Medication 1000 MG: at 02:37

## 2024-07-17 RX ADMIN — METFORMIN HYDROCHLORIDE 1000 MG: 500 TABLET ORAL at 09:36

## 2024-07-17 RX ADMIN — ATORVASTATIN CALCIUM 80 MG: 80 TABLET, FILM COATED ORAL at 21:01

## 2024-07-17 RX ADMIN — SODIUM CHLORIDE, PRESERVATIVE FREE 10 ML: 5 INJECTION INTRAVENOUS at 21:03

## 2024-07-17 RX ADMIN — ENOXAPARIN SODIUM 40 MG: 100 INJECTION SUBCUTANEOUS at 09:35

## 2024-07-17 RX ADMIN — FAMOTIDINE 20 MG: 20 TABLET, FILM COATED ORAL at 09:36

## 2024-07-17 RX ADMIN — SODIUM CHLORIDE, PRESERVATIVE FREE 10 ML: 5 INJECTION INTRAVENOUS at 21:01

## 2024-07-17 ASSESSMENT — ENCOUNTER SYMPTOMS
ABDOMINAL PAIN: 0
COUGH: 1
VOMITING: 0
DIARRHEA: 0
NAUSEA: 0
SHORTNESS OF BREATH: 0
ALLERGIC/IMMUNOLOGIC NEGATIVE: 1

## 2024-07-17 NOTE — PLAN OF CARE
Problem: Safety - Adult  Goal: Free from fall injury  Outcome: Progressing     Problem: Pain  Goal: Verbalizes/displays adequate comfort level or baseline comfort level  Outcome: Progressing     Problem: Skin/Tissue Integrity  Goal: Absence of new skin breakdown  Description: 1.  Monitor for areas of redness and/or skin breakdown  2.  Assess vascular access sites hourly  3.  Every 4-6 hours minimum:  Change oxygen saturation probe site  4.  Every 4-6 hours:  If on nasal continuous positive airway pressure, respiratory therapy assess nares and determine need for appliance change or resting period.  Outcome: Progressing     Problem: Respiratory - Adult  Goal: Achieves optimal ventilation and oxygenation  Outcome: Progressing     Problem: Nutrition Deficit:  Goal: Optimize nutritional status  Outcome: Progressing     Problem: Discharge Planning  Goal: Discharge to home or other facility with appropriate resources  Outcome: Progressing

## 2024-07-17 NOTE — PROGRESS NOTES
Physical Therapy  Facility/Department: [address] ONC/MED SURG  Physical Therapy Daily Treatment Note    Name: Isiah Duncan  : 1975  MRN: [id number]  Date of Service: 2024    Discharge Recommendations:   Further therapy recommended at discharge.     PT Equipment Recommendations  Equipment Needed: Yes  Walker: Rolling  Other: Pt would benefit from acquistion of RW to improve mobility and comply w/ protected weight bearing precautions.      Patient Diagnosis(es): The encounter diagnosis was Closed fracture of neck of femur, unspecified laterality, initial encounter ([facility]).  Past Medical History:  has a past medical history of Hypertension and Schizophrenia ([facility]).  Past Surgical History:  has a past surgical history that includes Hip Arthroplasty (Right, 2024); hip surgery (Right, 2024); and Tibia fracture surgery (Left, 2024).    Assessment   Body Structures, Functions, Activity Limitations Requiring Skilled Therapeutic Intervention: Decreased functional mobility ;Decreased strength;Decreased safe awareness;Decreased balance;Increased pain;Decreased cognition  Assessment: Pt amb 250' CGA RW, 10 steps CGA RW. Pt requires constant education/re-education on anterior hip precautions throughout session w/ impaired carryover of education. Pt would benefit from continued acute PT to address deficits.  Therapy Prognosis: Good  Decision Making: Medium Complexity  Requires PT Follow-Up: Yes  Activity Tolerance  Activity Tolerance: Patient limited by fatigue;Patient limited by endurance     Plan   Physical Therapy Plan  General Plan:  (5-6x/wk)  Current Treatment Recommendations: Strengthening, Balance training, Gait training, Functional mobility training, Stair training, Transfer training, Endurance training, Home exercise program, Safety education & training, Patient/Caregiver education & training, Equipment evaluation, education, & procurement, Therapeutic activities  Safety Devices  Type of Devices:

## 2024-07-17 NOTE — PROGRESS NOTES
[facility]  Office: [phone]  Dean Horvath DO, Jacky Johnston DO, Phillip Christianson DO, Dragan Arauz DO, Trena Giraldo MD, Camille Christiansen MD, Ирина Ye MD, Rachel Bennett MD,  Otf Cisneros MD, Mary Sy MD, Jory hCahal MD,  Stewart Graham DO, Rolo Melton MD, Kolby Guzman MD, Ihsan Horvath DO, Margarette Guevara MD,  Shay Gomes DO, Yesi Conner MD, Narda Caba MD, Mariela Sosa MD, Gema French MD,  Raphael Simons MD, Mikey Gentile MD, Oly Elizabeth MD, Love Finn MD, Danial Saez MD, Ranjan Royal MD, Duncan Johnson DO, Maximiliano Hoyos DO, Hoang Dawkins MD,  Rashawn Morales MD, Shirley Waterhouse, CNP,  Karla Arce CNP, Ruben Ivy, CNP,  Joanne Null, DNP, Sharon Banuelos, CNP, Olamide Don, CNP, Shameka Cherry CNP, Judy Al, CNP, Mayte Donaldson, PA-C, Surekha Branham PA-C, Marjorie Blanco, CNP, Haydee Taylor, CNP, Dannielle Guzmán, CNP, Laisha Roberts, CNP, Radha Jerry, CNP, Mag Burleson, CNS, Rowena Callejas, CNP, Nimisha Hermosillo CNP, Tracy Schwab, CNP         [facility]   IN-PATIENT SERVICE   [facility]    Progress Note    7/17/2024    9:13 AM    Name:   Isiah Duncan  MRN:     [id number]     Acct:      [id number]   Room:   0446/0446-01   Day:  7  Admit Date:  7/10/2024  9:47 PM    PCP:   Lolita Mcclain APRN - CNP  Code Status:  Full Code    Subjective:     C/C: No chief complaint on file.    Interval History Status: not changed.     Patient seen and examined. No complaints. No oxygen on. No complaints. Flat affect. Not complaining.     Brief History:     49-year-old male with history of schizophrenia, diabetes mellitus, came in with right-sided hip pain causing falls, poor appetite and weight loss also complained of a bump on his scalp, CT imaging concerning for extensive metastatic disease, showed pathological Right femoral fracture in addition to extensive lytic lesions, lung lesions/hilar

## 2024-07-17 NOTE — PLAN OF CARE
Problem: Safety - Adult  Goal: Free from fall injury  7/17/2024 1751 by Yina Longo RN  Outcome: Progressing  7/17/2024 0403 by Obed Colón RN  Outcome: Progressing     Problem: Pain  Goal: Verbalizes/displays adequate comfort level or baseline comfort level  7/17/2024 1751 by Yina Longo RN  Outcome: Progressing  7/17/2024 0403 by Obed Colón RN  Outcome: Progressing     Problem: Skin/Tissue Integrity  Goal: Absence of new skin breakdown  Description: 1.  Monitor for areas of redness and/or skin breakdown  2.  Assess vascular access sites hourly  3.  Every 4-6 hours minimum:  Change oxygen saturation probe site  4.  Every 4-6 hours:  If on nasal continuous positive airway pressure, respiratory therapy assess nares and determine need for appliance change or resting period.  7/17/2024 1751 by Yina Longo RN  Outcome: Progressing  7/17/2024 0403 by Obed Colón RN  Outcome: Progressing     Problem: Respiratory - Adult  Goal: Achieves optimal ventilation and oxygenation  7/17/2024 1751 by Yina Longo RN  Outcome: Progressing  7/17/2024 0403 by Obed Colón RN  Outcome: Progressing     Problem: Nutrition Deficit:  Goal: Optimize nutritional status  7/17/2024 1751 by Yina Longo RN  Outcome: Progressing  7/17/2024 0403 by Obed Colón RN  Outcome: Progressing     Problem: Discharge Planning  Goal: Discharge to home or other facility with appropriate resources  7/17/2024 1751 by Yina Longo RN  Outcome: Progressing  7/17/2024 0403 by Obed Colón RN  Outcome: Progressing

## 2024-07-17 NOTE — PROGRESS NOTES
PULMONARY PROGRESS NOTE      Patient:  Isiah Duncan  YOB: 1975    MRN: [id number]     Acct: [id number]     Admit date: 7/10/2024    REASON FOR CONSULT:- Lung mass    Pt seen and Chart reviewed.    Subjective:     49-year-old male with past medical history of schizophrenia, hypertension initially presented to [facility] on 7/9 with worsening right hip pain, fall and unintentional weight loss.    He reports that he has been having hip pain for the past 1 to 2 months and frequent falls.  X-ray of the right hip showed lytic lesion in the right femoral neck with pathologic fracture.      CT of the chest shows possible left upper lobe infiltrate with possible postobstructive changes or metastatic disease, left hilar mass, left scapular mass.  The CT head is also showing large soft tissue mass at the vertex    On exam patient is afebrile, hemodynamically stable, maintaining saturations on 2 L nasal cannula, father is in the room he is also the legal guardian. Patient reports that he had a fall about 5 days ago and has not been able to walk since. He reports productive cough, denies any hemoptysis or any shortness of breath. He smokes 45 cigarettes per day        Interval history  07/17/24      I have reviewed the chart, overnight events noted.  Patient is not able to provide much information denies much symptoms complain of right hip pain.  Biopsy results pending      Right total hip arthroplasty, prophylactic cabling right proximal femur, tumor biopsy on 7/13  MRI brain shows large heterogeneously enhancing mass up to 10 cm with both intracranial and extracranial extraosseous extension:mild mass effect on b/l high parietal lobes with involvement of overlying scalp.       Review of Systems -Limited  Review of Systems   Constitutional:  Negative for fever.   Respiratory:  Positive for cough. Negative for shortness of breath.    Cardiovascular:  Negative for chest pain and leg swelling.

## 2024-07-17 NOTE — PLAN OF CARE
Discussed with pathology. Adenocarcinoma unclear origin yet. Stains pending to determine concern maybe lung as primary.      Electronically signed by Jory Chaahl MD on 7/17/2024 at 10:17 AM

## 2024-07-17 NOTE — DISCHARGE SUMMARY
oncology follow up radiation oncology    Discharge Medications:      Medication List        START taking these medications      HYDROcodone-acetaminophen 5-325 MG per tablet  Commonly known as: NORCO  Take 1 tablet by mouth every 8 hours as needed for Pain for up to 3 days. Max Daily Amount: 3 tablets     lactulose 10 GM/15ML solution  Commonly known as: CHRONULAC  Take 30 mLs by mouth daily as needed (constipation)     nicotine 21 MG/24HR  Commonly known as: NICODERM CQ  Place 1 patch onto the skin daily     predniSONE 20 MG tablet  Commonly known as: DELTASONE  Take 2 tablets by mouth daily for 5 doses     sennosides-docusate sodium 8.6-50 MG tablet  Commonly known as: SENOKOT-S  Take 2 tablets by mouth 2 times daily as needed for Constipation     Vitamin D3 25 MCG Tabs  Take 1 tablet by mouth daily            CHANGE how you take these medications      traZODone 100 MG tablet  Commonly known as: DESYREL  Take 1 tablet by mouth nightly as needed for Sleep  What changed:   when to take this  reasons to take this            CONTINUE taking these medications      atorvastatin 80 MG tablet  Commonly known as: LIPITOR     famotidine 20 MG tablet  Commonly known as: PEPCID  Take 1 tablet by mouth 2 times daily     Invega Trinza 546 MG/1.75ML Katy IM injection  Generic drug: paliperidone palmitate ER     Magnesium 400 MG Caps  Take 1 capsule by mouth in the morning and at bedtime     metFORMIN 1000 MG tablet  Commonly known as: GLUCOPHAGE     propranolol 20 MG tablet  Commonly known as: INDERAL     Vitamin D (Ergocalciferol) 23910 units Caps  Take 50,000 Units by mouth once a week            STOP taking these medications      benztropine 1 MG tablet  Commonly known as: COGENTIN     potassium bicarb-citric acid 20 MEQ Tbef effervescent tablet  Commonly known as: EFFER-K     risperiDONE 2 MG tablet  Commonly known as: RISPERDAL               Where to Get Your Medications        These medications were sent to [id number] - [place], [place] - [address] - P [phone] - F [phone]  [address] [facility] [address]      Phone: [phone]   lactulose 10 GM/15ML solution  nicotine 21 MG/24HR  predniSONE 20 MG tablet  sennosides-docusate sodium 8.6-50 MG tablet  traZODone 100 MG tablet  Vitamin D3 25 MCG Tabs       You can get these medications from any pharmacy    Bring a paper prescription for each of these medications  HYDROcodone-acetaminophen 5-325 MG per tablet         No discharge procedures on file.    Time Spent on discharge is  32 mins in patient examination, evaluation, counseling as well as medication reconciliation, prescriptions for required medications, discharge plan and follow up.    Electronically signed by   Jory Chahal MD  7/19/2024  4:29 PM      Thank you Lolita Kong, APRN - CNP for the opportunity to be involved in this patient's care.

## 2024-07-17 NOTE — PROGRESS NOTES
Today's Date: 7/17/2024  Patient Name: Isiah Duncan  Date of admission: 7/10/2024  9:47 PM  Patient's age: 49 y.o., 1975  Admission Dx: Closed right hip fracture, initial encounter ([facility]) [S72.001A]    Reason for Consult: Metastatic carcinoma   Requesting Physician: Oly Anton Sra, MD    Chief Complaint:  Weight loss and hip pain     Interval Changes:  Patient seen and examined.  Vitals reviewed.    Patient is asymptomatic.  Denies any headaches, changes in vision, weakness, fatigue, or appetite changes  Hemodynamically stable.  On room air  Abdomen is distended but nontender.  No fluid wave or pedal edema.  WBC 12.7.  Elevated absolute neutrophil count, 9.69    History of Present Illness:    The patient is a 49 y.o.  male who was initially seen at [facility] by Dr. Strong on 7/10/2024.  He presented to the ED for hip pain on the right side which has been going on for 2 months, causing him to fall.  His balance has been off for the last month.  He also has had about a 70 pound unintentional weight loss over the last year.  He says he is just not eating much.  He does have a chronic cough. No vomiting or diarrhea.  No chest pain.  No headache.  Says he does have a bump on his scalp he thinks it was from a fall.  Patient has schizophrenia, his dad is his legal guardian.     CT chest abdomen pelvis done demonstrated left upper lobe infiltrate with possible postobstructive changes or metastatic disease there is left hilar mass with left scapular mass and lytic/destructive lesion of the right femoral neck and a CT of the head large soft tissue mass at the vertex.  He was ultimately transferred to [facility] for further workup and management of closed right hip fracture.    Past Medical History:   has a past medical history of Hypertension and Schizophrenia ([facility]).    Past Surgical History:   has a past surgical history that includes Hip Arthroplasty (Right, 07/11/2024); hip surgery  abnormality.  The mass abuts the superior sagittal sinus. There is involvement of the overlying biparietal scalp. There is a 6 mm pineal cyst.  There is no acute infarct. No midline shift or hydrocephalus.  No evidence of an acute intracranial hemorrhage.  The sellar/suprasellar regions appear unremarkable.  The normal signal voids within the major intracranial vessels appear maintained. ORBITS: The visualized portion of the orbits demonstrate no acute abnormality. SINUSES: There are bilateral maxillary sinus mucous retention cyst. BONES/SOFT TISSUES: The bone marrow signal intensity appears normal. The soft tissues demonstrate no acute abnormality. MRV HEAD: The above large mass abuts and demonstrates mass effect on the superior sagittal sinus, which remains patent. No focal stenosis or thrombus is seen of the remaining major dural venous sinuses.     Large heterogeneously enhancing mass involving the calvarium at the vertex measuring up to 10 cm with both intracranial and extracranial extraosseous extension. There is mild mass effect on the subjacent bilateral high parietal lobes without subjacent parenchymal signal abnormality. The mass abuts the superior sagittal sinus, which remains patent. There is involvement of the overlying scalp.  Findings are concerning for malignancy, particularly metastatic disease.     XR FEMUR RIGHT (MIN 2 VIEWS)    Result Date: 7/11/2024  EXAMINATION: THREE XRAY VIEWS OF THE RIGHT KNEE; THREE XRAY VIEWS OF THE LEFT KNEE; ONE XRAY VIEW OF THE PELVIS AND TWO XRAY VIEWS OF EACH OF THE BILATERAL HIPS; 2 XRAY VIEWS OF THE LEFT FEMUR; 2 XRAY VIEWS OF THE RIGHT FEMUR 7/11/2024 6:51 am COMPARISON: 07/09/2024 HISTORY: ORDERING SYSTEM PROVIDED HISTORY: Trauma/Fracture TECHNOLOGIST PROVIDED HISTORY: Trauma/Fracture FINDINGS: Left knee: No acute osseous abnormality.  No evidence of acute fracture or dislocation.  No obvious bone destructive changes. Right knee: No evidence of acute fracture or

## 2024-07-17 NOTE — PLAN OF CARE
Isiah Duncan was evaluated today and a DME order was entered for a wheeled walker because he requires this to successfully complete daily living tasks of eating, toileting, and ambulating.  A wheeled walker is necessary due to the patient's unsteady gait, upper body weakness, and inability to  an ambulation device; and he can ambulate only by pushing a walker instead of a lesser assistive device such as a cane, crutch, or standard walker.  The need for this equipment was discussed with the patient and he understands and is in agreement.

## 2024-07-17 NOTE — DISCHARGE INSTR - COC
Continuity of Care Form    Patient Name: Isiah Duncan   :  1975  MRN:  [id number]    Admit date:  7/10/2024  Discharge date:  24    Code Status Order: Full Code   Advance Directives:     Admitting Physician:  Oly Anton Sra, MD  PCP: Lolita Mcclain, APRN - CNP    Discharging Nurse: Diamond BUSTILLO  Discharging Hospital Unit/Room#: 0446/0446-01  Discharging Unit Phone Number: [phone]    Emergency Contact:   Extended Emergency Contact Information  Primary Emergency Contact: Max Duncan  Address: [address] [place] [address]  Home Phone: [phone]  Mobile Phone: [phone]  Relation: Legal Guardian  Secondary Emergency Contact: SathishSarah salazar  Home Phone: [phone]  Mobile Phone: [phone]  Relation: Parent    Past Surgical History:  Past Surgical History:   Procedure Laterality Date    HIP ARTHROPLASTY Right 2024    RIGHT TOTAL HIP ARTHROPLASTY (Right: Hip)      FEMORAL INTRAMEDULLARY NAIL INSERTION (Left: Hip)    HIP SURGERY Right 2024    RIGHT TOTAL HIP ARTHROPLASTY performed by Jose Cary DO at [facility] OR    TIBIA FRACTURE SURGERY Left 2024    FEMORAL INTRAMEDULLARY NAIL INSERTION performed by Jose Cary DO at [facility] OR       Immunization History:     There is no immunization history on file for this patient.    Active Problems:  Patient Active Problem List   Diagnosis Code    Schizophrenia ([facility]) F20.9    Hypomagnesemia E83.42    Hypomagnesemia syndrome E83.42    Gastroesophageal reflux disease K21.9    Closed fracture of neck of femur ([facility]) S72.009A    Hilar mass R91.8    Acute leg pain, right M79.604    ACP (advance care planning) Z71.89    Palliative care encounter Z51.5    Axillary lymphadenopathy R59.0    Metastatic disease ([facility]) C79.9    Fracture of right hip ([facility]) S72.001A    Lytic bone lesions on xray M89.9    Left upper lobe pneumonia J18.9    Vitamin D deficiency E55.9    Tobacco dependence F17.200    Intracranial mass

## 2024-07-18 ENCOUNTER — OUTSIDE SERVICES (OUTPATIENT)
Dept: PRIMARY CARE CLINIC | Age: 49
End: 2024-07-18

## 2024-07-18 ENCOUNTER — TELEPHONE (OUTPATIENT)
Dept: ONCOLOGY | Age: 49
End: 2024-07-18

## 2024-07-18 VITALS
SYSTOLIC BLOOD PRESSURE: 132 MMHG | OXYGEN SATURATION: 94 % | HEART RATE: 109 BPM | WEIGHT: 181.66 LBS | DIASTOLIC BLOOD PRESSURE: 87 MMHG | HEIGHT: 74 IN | RESPIRATION RATE: 16 BRPM | BODY MASS INDEX: 23.31 KG/M2 | TEMPERATURE: 98.1 F

## 2024-07-18 DIAGNOSIS — C34.90 NON-SMALL CELL LUNG CANCER, UNSPECIFIED LATERALITY (HCC): ICD-10-CM

## 2024-07-18 DIAGNOSIS — C79.51 MALIGNANT NEOPLASM METASTATIC TO BONE (HCC): ICD-10-CM

## 2024-07-18 DIAGNOSIS — F20.1 DISORGANIZED SCHIZOPHRENIA (HCC): Primary | ICD-10-CM

## 2024-07-18 DIAGNOSIS — S72.001A CLOSED FRACTURE OF NECK OF RIGHT FEMUR, INITIAL ENCOUNTER (HCC): ICD-10-CM

## 2024-07-18 LAB
ANION GAP SERPL CALCULATED.3IONS-SCNC: 11 MMOL/L (ref 9–16)
B PARAP IS1001 DNA NPH QL NAA+NON-PROBE: NOT DETECTED
B PERT DNA SPEC QL NAA+PROBE: NOT DETECTED
BASOPHILS # BLD: <0.03 K/UL (ref 0–0.2)
BASOPHILS NFR BLD: 0 % (ref 0–2)
BUN SERPL-MCNC: 9 MG/DL (ref 6–20)
C PNEUM DNA NPH QL NAA+NON-PROBE: NOT DETECTED
CALCIUM SERPL-MCNC: 9 MG/DL (ref 8.6–10.4)
CHLORIDE SERPL-SCNC: 96 MMOL/L (ref 98–107)
CO2 SERPL-SCNC: 24 MMOL/L (ref 20–31)
CREAT SERPL-MCNC: 0.6 MG/DL (ref 0.7–1.2)
EOSINOPHIL # BLD: 0.17 K/UL (ref 0–0.44)
EOSINOPHILS RELATIVE PERCENT: 1 % (ref 1–4)
ERYTHROCYTE [DISTWIDTH] IN BLOOD BY AUTOMATED COUNT: 13.9 % (ref 11.8–14.4)
FLUAV RNA NPH QL NAA+NON-PROBE: NOT DETECTED
FLUBV RNA NPH QL NAA+NON-PROBE: NOT DETECTED
GFR, ESTIMATED: >90 ML/MIN/1.73M2
GLUCOSE SERPL-MCNC: 177 MG/DL (ref 74–99)
HADV DNA NPH QL NAA+NON-PROBE: NOT DETECTED
HCOV 229E RNA NPH QL NAA+NON-PROBE: NOT DETECTED
HCOV HKU1 RNA NPH QL NAA+NON-PROBE: NOT DETECTED
HCOV NL63 RNA NPH QL NAA+NON-PROBE: NOT DETECTED
HCOV OC43 RNA NPH QL NAA+NON-PROBE: NOT DETECTED
HCT VFR BLD AUTO: 34.9 % (ref 40.7–50.3)
HGB BLD-MCNC: 11.5 G/DL (ref 13–17)
HMPV RNA NPH QL NAA+NON-PROBE: NOT DETECTED
HPIV1 RNA NPH QL NAA+NON-PROBE: NOT DETECTED
HPIV2 RNA NPH QL NAA+NON-PROBE: NOT DETECTED
HPIV3 RNA NPH QL NAA+NON-PROBE: NOT DETECTED
HPIV4 RNA NPH QL NAA+NON-PROBE: NOT DETECTED
IMM GRANULOCYTES # BLD AUTO: 0.18 K/UL (ref 0–0.3)
IMM GRANULOCYTES NFR BLD: 1 %
LYMPHOCYTES NFR BLD: 1.92 K/UL (ref 1.1–3.7)
LYMPHOCYTES RELATIVE PERCENT: 15 % (ref 24–43)
M PNEUMO DNA NPH QL NAA+NON-PROBE: NOT DETECTED
MCH RBC QN AUTO: 28.5 PG (ref 25.2–33.5)
MCHC RBC AUTO-ENTMCNC: 33 G/DL (ref 28.4–34.8)
MCV RBC AUTO: 86.4 FL (ref 82.6–102.9)
MONOCYTES NFR BLD: 0.98 K/UL (ref 0.1–1.2)
MONOCYTES NFR BLD: 7 % (ref 3–12)
NEUTROPHILS NFR BLD: 76 % (ref 36–65)
NEUTS SEG NFR BLD: 9.98 K/UL (ref 1.5–8.1)
NRBC BLD-RTO: 0 PER 100 WBC
PLATELET # BLD AUTO: 282 K/UL (ref 138–453)
PMV BLD AUTO: 9.3 FL (ref 8.1–13.5)
POTASSIUM SERPL-SCNC: 3.7 MMOL/L (ref 3.7–5.3)
RBC # BLD AUTO: 4.04 M/UL (ref 4.21–5.77)
RSV RNA NPH QL NAA+NON-PROBE: NOT DETECTED
RV+EV RNA NPH QL NAA+NON-PROBE: NOT DETECTED
SARS-COV-2 RNA NPH QL NAA+NON-PROBE: NOT DETECTED
SODIUM SERPL-SCNC: 131 MMOL/L (ref 136–145)
SPECIMEN DESCRIPTION: NORMAL
SURGICAL PATHOLOGY REPORT: NORMAL
WBC OTHER # BLD: 13.3 K/UL (ref 3.5–11.3)

## 2024-07-18 PROCEDURE — 6370000000 HC RX 637 (ALT 250 FOR IP): Performed by: STUDENT IN AN ORGANIZED HEALTH CARE EDUCATION/TRAINING PROGRAM

## 2024-07-18 PROCEDURE — 6370000000 HC RX 637 (ALT 250 FOR IP): Performed by: NURSE PRACTITIONER

## 2024-07-18 PROCEDURE — 80048 BASIC METABOLIC PNL TOTAL CA: CPT

## 2024-07-18 PROCEDURE — 2580000003 HC RX 258: Performed by: FAMILY MEDICINE

## 2024-07-18 PROCEDURE — 6360000002 HC RX W HCPCS: Performed by: FAMILY MEDICINE

## 2024-07-18 PROCEDURE — 36415 COLL VENOUS BLD VENIPUNCTURE: CPT

## 2024-07-18 PROCEDURE — 0202U NFCT DS 22 TRGT SARS-COV-2: CPT

## 2024-07-18 PROCEDURE — 6370000000 HC RX 637 (ALT 250 FOR IP)

## 2024-07-18 PROCEDURE — 2580000003 HC RX 258

## 2024-07-18 PROCEDURE — 99232 SBSQ HOSP IP/OBS MODERATE 35: CPT | Performed by: INTERNAL MEDICINE

## 2024-07-18 PROCEDURE — 85025 COMPLETE CBC W/AUTO DIFF WBC: CPT

## 2024-07-18 PROCEDURE — 6360000002 HC RX W HCPCS

## 2024-07-18 RX ORDER — PREDNISONE 20 MG/1
40 TABLET ORAL DAILY
Qty: 10 TABLET | Refills: 0 | Status: SHIPPED | OUTPATIENT
Start: 2024-07-18 | End: 2024-07-23

## 2024-07-18 RX ORDER — AZITHROMYCIN 250 MG/1
250 TABLET, FILM COATED ORAL DAILY
Status: DISCONTINUED | OUTPATIENT
Start: 2024-07-19 | End: 2024-07-18 | Stop reason: HOSPADM

## 2024-07-18 RX ORDER — HYDROCODONE BITARTRATE AND ACETAMINOPHEN 5; 325 MG/1; MG/1
1 TABLET ORAL EVERY 8 HOURS PRN
Qty: 10 TABLET | Refills: 0 | Status: SHIPPED | OUTPATIENT
Start: 2024-07-18 | End: 2024-07-21

## 2024-07-18 RX ORDER — PREDNISONE 20 MG/1
40 TABLET ORAL DAILY
Status: DISCONTINUED | OUTPATIENT
Start: 2024-07-18 | End: 2024-07-18 | Stop reason: HOSPADM

## 2024-07-18 RX ORDER — TRAZODONE HYDROCHLORIDE 100 MG/1
100 TABLET ORAL NIGHTLY PRN
Qty: 7 TABLET | Refills: 0 | Status: SHIPPED | OUTPATIENT
Start: 2024-07-18 | End: 2024-07-25

## 2024-07-18 RX ADMIN — CHOLECALCIFEROL TAB 25 MCG (1000 UNIT) 1000 UNITS: 25 TAB at 08:34

## 2024-07-18 RX ADMIN — MAGNESIUM GLUCONATE 500 MG ORAL TABLET 400 MG: 500 TABLET ORAL at 08:34

## 2024-07-18 RX ADMIN — FAMOTIDINE 20 MG: 20 TABLET, FILM COATED ORAL at 08:34

## 2024-07-18 RX ADMIN — METFORMIN HYDROCHLORIDE 1000 MG: 500 TABLET ORAL at 08:34

## 2024-07-18 RX ADMIN — SODIUM CHLORIDE, PRESERVATIVE FREE 10 ML: 5 INJECTION INTRAVENOUS at 08:49

## 2024-07-18 RX ADMIN — PROPRANOLOL HYDROCHLORIDE 20 MG: 20 TABLET ORAL at 08:34

## 2024-07-18 RX ADMIN — SENNOSIDES AND DOCUSATE SODIUM 2 TABLET: 50; 8.6 TABLET ORAL at 08:34

## 2024-07-18 RX ADMIN — SODIUM CHLORIDE, PRESERVATIVE FREE 10 ML: 5 INJECTION INTRAVENOUS at 08:46

## 2024-07-18 RX ADMIN — WATER 40 MG: 1 INJECTION INTRAMUSCULAR; INTRAVENOUS; SUBCUTANEOUS at 08:35

## 2024-07-18 RX ADMIN — AZITHROMYCIN MONOHYDRATE 500 MG: 500 INJECTION, POWDER, LYOPHILIZED, FOR SOLUTION INTRAVENOUS at 08:51

## 2024-07-18 RX ADMIN — LACTULOSE 20 G: 20 SOLUTION ORAL at 08:35

## 2024-07-18 RX ADMIN — ERGOCALCIFEROL 50000 UNITS: 1.25 CAPSULE ORAL at 08:34

## 2024-07-18 RX ADMIN — SODIUM CHLORIDE, PRESERVATIVE FREE 10 ML: 5 INJECTION INTRAVENOUS at 08:48

## 2024-07-18 RX ADMIN — ENOXAPARIN SODIUM 40 MG: 100 INJECTION SUBCUTANEOUS at 08:34

## 2024-07-18 ASSESSMENT — ENCOUNTER SYMPTOMS
ABDOMINAL PAIN: 0
SHORTNESS OF BREATH: 0
NAUSEA: 0
DIARRHEA: 0
COUGH: 1
VOMITING: 0
ALLERGIC/IMMUNOLOGIC NEGATIVE: 1

## 2024-07-18 NOTE — PROGRESS NOTES
Patient discharged to facility.     Ambulance transportation was provided.    Patient was discharged with all belongings.    IV was removed, catheter intact, no complications.

## 2024-07-18 NOTE — PROGRESS NOTES
last 72 hours.    Phosphorus:No results for input(s): \"PHOS\" in the last 72 hours.  BNP:No results for input(s): \"BNP\" in the last 72 hours.  Glucose:  Recent Labs     07/15/24  1232 07/16/24  0755 07/17/24 1958   POCGLU 282* 173* 226*       HgbA1C: No results for input(s): \"LABA1C\" in the last 72 hours.  INR:   No results for input(s): \"INR\" in the last 72 hours.    Hepatic:   No results for input(s): \"ALKPHOS\", \"ALT\", \"AST\", \"BILITOT\", \"BILIDIR\", \"LABALBU\" in the last 72 hours.    Invalid input(s): \"PROT\"    Amylase and Lipase:No results for input(s): \"LACTA\", \"AMYLASE\" in the last 72 hours.  Lactic Acid: No results for input(s): \"LACTA\" in the last 72 hours.  CARDIAC ENZYMES:No results for input(s): \"CKTOTAL\", \"CKMB\", \"CKMBINDEX\", \"TROPONINI\" in the last 72 hours.  BNP: No results for input(s): \"BNP\" in the last 72 hours.  Lipids: No results for input(s): \"CHOL\", \"TRIG\", \"HDL\", \"LDL\" in the last 72 hours.    Invalid input(s): \"LDLCALC\"  ABGs: No results found for: \"PH\", \"PCO2\", \"PO2\", \"HCO3\", \"O2SAT\"  Thyroid:   Lab Results   Component Value Date/Time    TSH 2.14 07/09/2024 07:20 PM      Urinalysis: No results for input(s): \"BACTERIA\", \"BLOODU\", \"CLARITYU\", \"COLORU\", \"PHUR\", \"PROTEINU\", \"RBCUA\", \"SPECGRAV\", \"BILIRUBINUR\", \"NITRU\", \"WBCUA\", \"LEUKOCYTESUR\", \"GLUCOSEU\" in the last 72 hours.          Assessment and Plan:    Patient Active Problem List   Diagnosis    Schizophrenia (HCC)    Hypomagnesemia    Hypomagnesemia syndrome    Gastroesophageal reflux disease    Closed fracture of neck of femur (HCC)    Hilar mass    Acute leg pain, right    ACP (advance care planning)    Palliative care encounter    Axillary lymphadenopathy    Metastatic disease (HCC)    Fracture of right hip (HCC)    Lytic bone lesions on xray    Left upper lobe pneumonia    Vitamin D deficiency    Tobacco dependence    Intracranial mass    Mediastinal lymphadenopathy       Assessment and Plan:     Left hilar mass  Left upper lobe nodular  infiltrate, atypical pneumonia, postobstructive changes or metastatic disease  Right lower lobe nodule measuring 7 mm  Left scapular mass  Lytic/destructive lesions has been discontinued in the region of right femoral with pathologic fracture  Extracranial and intracranial mass     CT scan of the chest with showed left hilar mass/lymphadenopathy possible right hilar lymph node and there is mediastinal lymph node present.     Plan:    Continue with nasal cannula and wean nasal cannula to keep saturation >90%.  Recommend strict aspiration precaution keep head of bed up all the time.  If any concern for aspiration would recommend swallow evaluation if patient is cooperative  Continue with bronchodilators with DuoNeb aerosol.  Completed 5 day course of prednisone  Finished course of Rocephin  Follow-up with oncology and neurosurgery.  Follow-up with palliative care  Follow biopsy results from right femur. Final path report pending. Prelim path report consistent with adenocarcinoma  Cultures have been negative so far      Rocio Pennington  PGY-3, Internal Medicine Resident  [facility], [place]         7/18/2024, 7:42 AM

## 2024-07-18 NOTE — CARE COORDINATION
Transitional planning      Writer received d/c order, call to [facility], spoke with wilman Duncan to return, transport request faxed to [facility] for first available, primary RN updated.      [facility], trip booked   for 11am with [facility], call to Jacky at [facility] and updated, primary RN updated and provided number for report. Call to legal guardian (parent Max), left vm. Primary RN spoke to him upon call back, discussed if they want palliative care to follow at facility, he will discuss with them upon admission options they are contracted with.ROSA completed , copy in packet, faxed this and AVS to facility.

## 2024-07-18 NOTE — TELEPHONE ENCOUNTER
Name: Isiah Duncan  : 1975  MRN: [id number]    Oncology Navigation- Initial Note:  (Unknown)  Intake-  Contact Type: Telephone    Continuum of Care: Diagnosis/Active Treatment    Smoking hx: Everyday Smoker    Notes:   Navigator noting pt. Discharged to [facility].    Please schedule MO F/U post hospital visit. Pt. Needing [facility] testing with visit.    Electronically signed by Fiona Villafana RN on 2024 at 3:42 PM

## 2024-07-18 NOTE — PLAN OF CARE
Problem: Safety - Adult  Goal: Free from fall injury  7/18/2024 0416 by Claudia Wall RN  Outcome: Progressing  7/17/2024 1751 by Yina Longo RN  Outcome: Progressing     Problem: Pain  Goal: Verbalizes/displays adequate comfort level or baseline comfort level  7/18/2024 0416 by Claudia Wall RN  Outcome: Progressing  7/17/2024 1751 by Yina Longo RN  Outcome: Progressing     Problem: Skin/Tissue Integrity  Goal: Absence of new skin breakdown  Description: 1.  Monitor for areas of redness and/or skin breakdown  2.  Assess vascular access sites hourly  3.  Every 4-6 hours minimum:  Change oxygen saturation probe site  4.  Every 4-6 hours:  If on nasal continuous positive airway pressure, respiratory therapy assess nares and determine need for appliance change or resting period.  7/18/2024 0416 by Claudia Wall RN  Outcome: Progressing  7/17/2024 1751 by Yina Longo RN  Outcome: Progressing     Problem: Respiratory - Adult  Goal: Achieves optimal ventilation and oxygenation  7/18/2024 0416 by Claudia Wall RN  Outcome: Progressing  7/17/2024 1751 by Yina Longo RN  Outcome: Progressing     Problem: Nutrition Deficit:  Goal: Optimize nutritional status  7/18/2024 0416 by Claudia Wall RN  Outcome: Progressing  7/17/2024 1751 by Yina Longo RN  Outcome: Progressing     Problem: Discharge Planning  Goal: Discharge to home or other facility with appropriate resources  7/18/2024 0416 by Claudia Wall RN  Outcome: Progressing  7/17/2024 1751 by Yina Longo RN  Outcome: Progressing

## 2024-07-19 ENCOUNTER — OUTSIDE SERVICES (OUTPATIENT)
Dept: PRIMARY CARE CLINIC | Age: 49
End: 2024-07-19

## 2024-07-19 DIAGNOSIS — S72.001D CLOSED FRACTURE OF NECK OF RIGHT FEMUR WITH ROUTINE HEALING, SUBSEQUENT ENCOUNTER: Primary | ICD-10-CM

## 2024-07-19 DIAGNOSIS — F20.1 DISORGANIZED SCHIZOPHRENIA (HCC): ICD-10-CM

## 2024-07-19 DIAGNOSIS — C79.51 MALIGNANT NEOPLASM METASTATIC TO BONE (HCC): ICD-10-CM

## 2024-07-19 PROBLEM — C34.90 NON-SMALL CELL CARCINOMA OF LUNG (HCC): Status: ACTIVE | Noted: 2024-07-19

## 2024-07-19 LAB — M PNEUMO IGM SER QL IA: 0.28

## 2024-07-19 ASSESSMENT — ENCOUNTER SYMPTOMS
VOMITING: 0
EYE DISCHARGE: 0
DIARRHEA: 0
RHINORRHEA: 0
ABDOMINAL PAIN: 0
WHEEZING: 0
NAUSEA: 0
SORE THROAT: 0
EYE REDNESS: 0
SHORTNESS OF BREATH: 0
COUGH: 0

## 2024-07-19 NOTE — PROGRESS NOTES
Isiah Duncan is a 49 y.o. male being seen for his weekly follow up.  Location of visit: [facility]    Visit Date:  7/19/2024     Reason for Visit:    Chief Complaint   Patient presents with    Hip Injury        HPI   Resident has pathological fracture of right hip related to metastatic cancer to the bone.   He deneis pain at rest in bed.  When he is up the pain is severe.  He has history of mental illness    Review of Systems   Constitutional:  Negative for chills and fever.   HENT:  Negative for rhinorrhea and sore throat.    Eyes:  Negative for discharge and redness.   Respiratory:  Negative for cough, shortness of breath and wheezing.    Cardiovascular:  Negative for chest pain and palpitations.   Gastrointestinal:  Negative for abdominal pain, diarrhea, nausea and vomiting.   Genitourinary:  Negative for dysuria and frequency.   Musculoskeletal:  Positive for gait problem. Negative for arthralgias and myalgias.        Hip pain   Skin:  Negative for rash and wound.   Neurological:  Negative for dizziness, light-headedness and headaches.   Psychiatric/Behavioral:  Negative for sleep disturbance. The patient is nervous/anxious.         Physical Exam  Vitals and nursing note reviewed.   Constitutional:       General: He is not in acute distress.     Appearance: He is well-developed. He is not ill-appearing.   HENT:      Head: Normocephalic and atraumatic.      Right Ear: External ear normal.      Left Ear: External ear normal.   Eyes:      Conjunctiva/sclera: Conjunctivae normal.   Neck:      Thyroid: No thyromegaly.      Trachea: No tracheal deviation.   Cardiovascular:      Rate and Rhythm: Normal rate and regular rhythm.      Heart sounds: Normal heart sounds.   Pulmonary:      Effort: Pulmonary effort is normal. No respiratory distress.      Breath sounds: Normal breath sounds. No wheezing.   Abdominal:      General: Bowel sounds are normal.      Palpations: Abdomen is soft.   Musculoskeletal:

## 2024-07-19 NOTE — ASSESSMENT & PLAN NOTE
On injection q 3 months. Will continue if needed, but not due until August.  Is not on any other meds for this diagnosis

## 2024-07-19 NOTE — PROGRESS NOTES
Isiah Duncan is a 49 y.o. male being seen for his  initial admission  follow up.  Location of visit: [facility]    HPI:  New today:  Pt admitted after fracture with surgery to femur.  Found to have carcinoma- multiple mets- biopsy of bone shows non small cell cancer.  Pt currently sleeping.  Does not really wake up for visit/ exam.          Review of Systems   Reason unable to perform ROS: pt sleeping and did not awaken.          Physical Exam  Vitals reviewed.   Constitutional:       General: He is not in acute distress.  HENT:      Head: Normocephalic and atraumatic.      Nose: No congestion or rhinorrhea.   Eyes:      General:         Right eye: No discharge.         Left eye: No discharge.   Cardiovascular:      Rate and Rhythm: Normal rate and regular rhythm.   Pulmonary:      Effort: Pulmonary effort is normal. No respiratory distress.      Breath sounds: Normal breath sounds.   Abdominal:      Palpations: Abdomen is soft.      Tenderness: There is no abdominal tenderness.   Musculoskeletal:      Right lower leg: No edema.      Left lower leg: No edema.   Skin:     General: Skin is warm and dry.   Neurological:      Mental Status: He is alert.          ASSESSMENT:   Diagnosis Orders   1. Disorganized schizophrenia (HCC)        2. Closed fracture of neck of right femur, initial encounter ([facility])        3. Non-small cell lung cancer, unspecified laterality (HCC)        4. 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 Disorganized schizophrenia (HCC)  Assessment & Plan:    On injection q 3 months. Will continue if needed, but not due until August.  Is not on any other meds for this diagnosis   2. Closed fracture of neck of right femur, initial encounter ([facility])  Assessment & Plan:    Pathologic fracture- admitted for PT/ OT- continue therapy as able  3. Non-small cell lung cancer, unspecified laterality (HCC)  4. 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

## 2024-07-23 ENCOUNTER — TELEPHONE (OUTPATIENT)
Dept: ONCOLOGY | Age: 49
End: 2024-07-23

## 2024-07-23 ENCOUNTER — OUTSIDE SERVICES (OUTPATIENT)
Dept: PRIMARY CARE CLINIC | Age: 49
End: 2024-07-23

## 2024-07-23 DIAGNOSIS — S72.001D CLOSED FRACTURE OF RIGHT HIP WITH ROUTINE HEALING, SUBSEQUENT ENCOUNTER: ICD-10-CM

## 2024-07-23 DIAGNOSIS — F20.1 DISORGANIZED SCHIZOPHRENIA (HCC): ICD-10-CM

## 2024-07-23 DIAGNOSIS — C34.90 NON-SMALL CELL LUNG CANCER, UNSPECIFIED LATERALITY (HCC): Primary | ICD-10-CM

## 2024-07-23 ASSESSMENT — ENCOUNTER SYMPTOMS
VOMITING: 0
DIARRHEA: 0
SHORTNESS OF BREATH: 0
COUGH: 0
NAUSEA: 0

## 2024-07-23 NOTE — TELEPHONE ENCOUNTER
Name: Isiah Duncan  : 1975  MRN: [id number]    Oncology Navigation- Initial Note:  (Unknown)  Intake-  Contact Type: Telephone    Continuum of Care: Diagnosis/Active Treatment    Smoking hx:     Notes: navigator reaching out to father-Legal Guardian introducing self and contact information included. Await return call.     Electronically signed by Fiona Villafana RN on 2024 at 3:47 PM

## 2024-07-23 NOTE — PROGRESS NOTES
With mets.  Has f/u with rad-onc and med-onc in the next few weeks.   2. Disorganized schizophrenia (HCC)  Assessment & Plan:    Continue home meds  3. Closed fracture of right hip with routine healing, subsequent encounter  Assessment & Plan:    Pathologic fracture- s/p repair- following with oncology and ortho

## 2024-07-26 ENCOUNTER — OFFICE VISIT (OUTPATIENT)
Dept: ORTHOPEDIC SURGERY | Age: 49
End: 2024-07-26

## 2024-07-26 VITALS — HEIGHT: 74 IN | BODY MASS INDEX: 23.23 KG/M2 | WEIGHT: 181 LBS

## 2024-07-26 DIAGNOSIS — R52 PAIN: Primary | ICD-10-CM

## 2024-07-26 DIAGNOSIS — Z96.641 S/P TOTAL RIGHT HIP ARTHROPLASTY: ICD-10-CM

## 2024-07-26 PROCEDURE — 99024 POSTOP FOLLOW-UP VISIT: CPT | Performed by: STUDENT IN AN ORGANIZED HEALTH CARE EDUCATION/TRAINING PROGRAM

## 2024-07-26 NOTE — PROGRESS NOTES
[facility]  [address]  [place] [address]  Dept Phone: [phone]  Dept Fax: [phone]      [facility]th[facility] Follow Up      Subjective:   Date of Surgery: 7/11/2024    Isiah Duncan is a 49 y.o. year old male who presents to the clinic today for follow up status post Right Total Hip Arthroplasty secondary to pathologic right femur fracture.  Intraoperative biopsy demonstrated non-small cell carcinoma.  Patient also has a large brain tumor, as well as a left lytic peritrochanteric region lesion.  Patient has scheduled oncology appointment in 1-2 weeks.  Patient states his hip does not cause him much discomfort.  Patient does have some mild left proximal thigh pain.  Patient's father is present.    Review of Systems  Gen: no fever, chills, malaise  CV: no chest pain or palpitations  Resp: no cough or shortness of breath  GI: no nausea, vomiting, diarrhea, or constipation  Neuro: no seizures, vertigo, or headache  Msk: Per HPI  10 remaining systems reviewed and negative    Objective :   There were no vitals filed for this visit.Body mass index is 23.24 kg/m².  General: No acute distress, resting comfortably in the clinic  Neuro: alert. oriented  Eyes: Extra-ocular muscles intact  Pulm: Respirations unlabored and regular.  Skin: warm, well perfused  Psych:   Patient has good fund of knowledge and displays understanding of exam, diagnosis, and plan.  Right Lower Extremity: dermabond glue present. Wound well approximated. No erythema or signs of infection. Compartments soft/compressible. Extremity warm/well perfused. EHL/FHL/TA/GSC motor intact. Patient expresses normal sensation L3-S1 distribution.  Left lower extremity: Skin intact.  Tender to palpation well-approximated.  Logroll reproduces pain to left proximal thigh.  Patient is able to ambulate without any pain or discomfort.  Expresses normal sensation light touch L3-S1 distribution      Radiology:  Imaging

## 2024-07-30 ENCOUNTER — OUTSIDE SERVICES (OUTPATIENT)
Dept: PRIMARY CARE CLINIC | Age: 49
End: 2024-07-30

## 2024-07-30 DIAGNOSIS — S72.001D CLOSED FRACTURE OF NECK OF RIGHT FEMUR WITH ROUTINE HEALING, SUBSEQUENT ENCOUNTER: Primary | ICD-10-CM

## 2024-07-30 DIAGNOSIS — F20.1 DISORGANIZED SCHIZOPHRENIA (HCC): ICD-10-CM

## 2024-07-30 DIAGNOSIS — C79.51 MALIGNANT NEOPLASM METASTATIC TO BONE (HCC): ICD-10-CM

## 2024-07-30 ASSESSMENT — ENCOUNTER SYMPTOMS
NAUSEA: 0
VOMITING: 0
DIARRHEA: 0
SHORTNESS OF BREATH: 0
COUGH: 0

## 2024-07-30 NOTE — PROGRESS NOTES
Isiah Duncan is a 49 y.o. male being seen for his weekly follow up.  Location of visit: [facility]    HPI:  New today:  Pt to go home this week.  No new complaints or issues.  No pain or SOB.          Review of Systems   Constitutional:  Negative for activity change, appetite change, chills, diaphoresis and fever.   HENT:  Negative for congestion.    Respiratory:  Negative for cough and shortness of breath.    Cardiovascular:  Negative for chest pain and palpitations.   Gastrointestinal:  Negative for diarrhea, nausea and vomiting.   Genitourinary:  Negative for hematuria.   Musculoskeletal:  Negative for arthralgias and myalgias.   Skin:  Negative for rash.   Neurological:  Negative for weakness and headaches.   Psychiatric/Behavioral:  Negative for agitation, dysphoric mood and sleep disturbance. The patient is not nervous/anxious.           Physical Exam  Vitals reviewed.   Constitutional:       General: He is not in acute distress.  HENT:      Head: Normocephalic and atraumatic.      Nose: No congestion or rhinorrhea.   Eyes:      General:         Right eye: No discharge.         Left eye: No discharge.   Cardiovascular:      Rate and Rhythm: Normal rate and regular rhythm.   Pulmonary:      Effort: Pulmonary effort is normal. No respiratory distress.      Breath sounds: Normal breath sounds.   Abdominal:      Palpations: Abdomen is soft.      Tenderness: There is no abdominal tenderness.   Musculoskeletal:      Right lower leg: No edema.      Left lower leg: No edema.   Skin:     General: Skin is warm and dry.   Neurological:      Mental Status: He is alert. Mental status is at baseline.          ASSESSMENT:   Diagnosis Orders   1. Closed fracture of neck of right femur with routine healing, subsequent encounter        2. 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 Disorganized schizophrenia (HCC)            PLAN:  1. Closed fracture of neck of right femur with routine healing, subsequent

## 2024-08-01 ENCOUNTER — TELEPHONE (OUTPATIENT)
Dept: RADIATION ONCOLOGY | Age: 49
End: 2024-08-01

## 2024-08-01 ENCOUNTER — TELEPHONE (OUTPATIENT)
Dept: ONCOLOGY | Age: 49
End: 2024-08-01

## 2024-08-01 ENCOUNTER — HOSPITAL ENCOUNTER (OUTPATIENT)
Dept: RADIATION ONCOLOGY | Age: 49
End: 2024-08-01
Payer: MEDICARE

## 2024-08-01 NOTE — TELEPHONE ENCOUNTER
Incoming call from [facility] stating pts father did not pick him up today for his appt here w/DR. Moralez. Appt cancelled. They state pt will be discharged to home tomorrow.   I called father, Max, no answer. I left detailed messg for max to c/b to reschedule pts follow up

## 2024-08-01 NOTE — TELEPHONE ENCOUNTER
Patient was scheduled with Dr. Cole in error, should have been Dr. Strong. Samy Barraza/, who made original appointment rescheduled patient for 8/7/24 at 3 pm with Dr. Strong. Fiona updated and will contact patient/patient's father with updated appointment information.

## 2024-08-01 NOTE — TELEPHONE ENCOUNTER
Name: Isiah Duncan  : 1975  MRN: [id number]    Oncology Navigation- Initial Note:  (UNKNOWN))  Intake-  Contact Type: Telephone    Continuum of Care: Diagnosis/Active Treatment    Smoking hx:     Notes: Navigator reviewing chart and pt. Rescheduled with MO for   Please clarify who's pt. This is?  I thought pt. Was first consulted per Dr. Strong?   Thanks, Fiona    Electronically signed by Fiona Villafana RN on 2024 at 12:52 PM

## 2024-08-02 ENCOUNTER — TELEPHONE (OUTPATIENT)
Dept: ONCOLOGY | Age: 49
End: 2024-08-02

## 2024-08-02 NOTE — TELEPHONE ENCOUNTER
Name: Isiah Duncan  : 1975  MRN: [id number]    Oncology Navigation- Initial Note:  (Unknown)  Intake-  Contact Type: Telephone    Continuum of Care: Diagnosis/Active Treatment    Smoking hx:     Notes: Navigator left VM for Max-legal guardian regarding appt. Rescheduled for .    Electronically signed by Fiona Villafana RN on 2024 at 8:34 AM

## 2024-08-06 ENCOUNTER — HOSPITAL ENCOUNTER (OUTPATIENT)
Dept: RADIATION ONCOLOGY | Age: 49
Discharge: HOME | End: 2024-08-06
Payer: MEDICARE

## 2024-08-06 VITALS
SYSTOLIC BLOOD PRESSURE: 106 MMHG | TEMPERATURE: 98.3 F | RESPIRATION RATE: 16 BRPM | DIASTOLIC BLOOD PRESSURE: 75 MMHG | BODY MASS INDEX: 22.96 KG/M2 | WEIGHT: 178.8 LBS | HEART RATE: 111 BPM

## 2024-08-06 PROCEDURE — 99212 OFFICE O/P EST SF 10 MIN: CPT | Performed by: RADIOLOGY

## 2024-08-07 ENCOUNTER — TELEPHONE (OUTPATIENT)
Dept: ONCOLOGY | Age: 49
End: 2024-08-07

## 2024-08-07 ENCOUNTER — TELEPHONE (OUTPATIENT)
Dept: INFUSION THERAPY | Age: 49
End: 2024-08-07

## 2024-08-07 ENCOUNTER — OFFICE VISIT (OUTPATIENT)
Dept: ONCOLOGY | Age: 49
End: 2024-08-07
Payer: MEDICARE

## 2024-08-07 VITALS
WEIGHT: 177.7 LBS | HEART RATE: 109 BPM | TEMPERATURE: 97.8 F | DIASTOLIC BLOOD PRESSURE: 75 MMHG | RESPIRATION RATE: 14 BRPM | SYSTOLIC BLOOD PRESSURE: 121 MMHG | OXYGEN SATURATION: 99 % | BODY MASS INDEX: 22.82 KG/M2

## 2024-08-07 DIAGNOSIS — C34.90 NON-SMALL CELL LUNG CANCER, UNSPECIFIED LATERALITY (HCC): Primary | ICD-10-CM

## 2024-08-07 DIAGNOSIS — M84.40XD: ICD-10-CM

## 2024-08-07 DIAGNOSIS — C79.51 METASTASIS TO BONE (HCC): ICD-10-CM

## 2024-08-07 DIAGNOSIS — F20.9 SCHIZOPHRENIA, UNSPECIFIED TYPE (HCC): ICD-10-CM

## 2024-08-07 DIAGNOSIS — R22.0 SCALP MASS: ICD-10-CM

## 2024-08-07 DIAGNOSIS — R59.0 HILAR LYMPHADENOPATHY: ICD-10-CM

## 2024-08-07 PROCEDURE — G8427 DOCREV CUR MEDS BY ELIG CLIN: HCPCS | Performed by: INTERNAL MEDICINE

## 2024-08-07 PROCEDURE — 99215 OFFICE O/P EST HI 40 MIN: CPT | Performed by: INTERNAL MEDICINE

## 2024-08-07 PROCEDURE — 99211 OFF/OP EST MAY X REQ PHY/QHP: CPT | Performed by: INTERNAL MEDICINE

## 2024-08-07 PROCEDURE — G8420 CALC BMI NORM PARAMETERS: HCPCS | Performed by: INTERNAL MEDICINE

## 2024-08-07 PROCEDURE — 1111F DSCHRG MED/CURRENT MED MERGE: CPT | Performed by: INTERNAL MEDICINE

## 2024-08-07 PROCEDURE — 4004F PT TOBACCO SCREEN RCVD TLK: CPT | Performed by: INTERNAL MEDICINE

## 2024-08-07 NOTE — TELEPHONE ENCOUNTER
[facility] orders placed in Epic. Pt here for lab draw today. Pathology report faxed to [facility] with confirmation.

## 2024-08-07 NOTE — TELEPHONE ENCOUNTER
GE HERE FOR FOLLOW UP   NGS [facility]  RTC in 2 weeks  PORT CATH  Nurse navigator  PORT PLACEMENT: WRITER LEFT MESSAGE FOR IR TO CALL PT LEGAL GUARDIAN   MD VISIT: 8/7/24 @ 3PM   [facility] DONE ON EXIT   RN NAVIGATOR CAMILLE MET W/ PATIENT   AVS PRINTED AND GIVEN ON EXIT

## 2024-08-07 NOTE — TELEPHONE ENCOUNTER
Name: Isiah Duncan  : 1975  MRN: [id number]    Oncology Navigation- Initial Note:    Intake-  Contact Type: Telephone    Continuum of Care: Diagnosis/Active Treatment    Smoking hx: Current Smoker    Notes:   Navigator met with pt. And father(legal guardian), navigation packet given to father along with business card. Father working on financial assistance-Medicaid approval at this time. Pt. Needing Tempus testing today and if not sufficient tissue from previous Bx will plan to Bx mass on scalp. Writer offering assistance and resources if needed.  Horacio MARTIN please reach out to pts. Father for assistance, pt. May qualify for PFA?    Electronically signed by Fiona Villafana RN on 2024 at 4:17 PM

## 2024-08-07 NOTE — PROGRESS NOTES
Patient ID: Isiah Duncan, 1975, [id number], 49 y.o.  Referred by :  No ref. provider found   Reason for consultation: Stage IV left lung cancer with bone metastasis    Problem list  Schizophrenia  Metastatic non-small cell lung cancer with bone metastasis  Left scapular mass  Pathology fracture of the right femoral neck status post ORIF      Oncologic treatment  Tentative chemoimmunotherapy      There is a barrier due to mentation and father who is POA the source of information and decision making and he is present during the visit      HISTORY OF PRESENT ILLNESS:    Oncologic History:    Isiah Duncan is a very pleasant 49 y.o. male. with right hip pain who fell and was brought to the ED 7/10/2024; he reports a 70lb weight loss over the last year. He has a history of schizophrenia and 40pk/yrs of smoking. He notes he has a bump on his head from his fall. He had a CT chest/abd/pelvis that shows a left lung mass as well as a bone metastases in the right femur causing pathologic fracture, as well as a CT Head showing a large scalp base mass.  Patient had a biopsy and ORIF of the right femur on July 11, 2024 which came back showing a poorly differentiated non-small cell carcinoma consistent with lung primary.  He denies any headaches, vision changes, chest pain, abdominal pain, N/V/D, swelling, bleeding, or fatigue. Patient's father who is his POA notes there is also a lesion in his left femur for which he will be seeing orthopedics about that to discuss fixation there as well.   He was seen by radiation oncology for palliative radiation treatment  Pathology from the right total hip arthroplasty metastatic poorly differentiated non-small cell carcinoma   please see below comment    There is infiltration by markedly atypical cells which are positive   for pankeratin. Although the neoplastic cells are negative for both   TTF-1 and Napsin A, given the clinical history, a lung primary is   favored. Clinical

## 2024-08-07 NOTE — PROGRESS NOTES
[facility]            Radiation Oncology          [address] [place] [address]        O: [phone]        F: [phone]       [email or web address]           Date of Service: 2024     Location:  [facility] Radiation Oncology,   [address] [place] [id number]   [phone]       RADIATION ONCOLOGY FOLLOW UP NOTE    Patient ID:   Isiah Duncan  : 1975   MRN: [id number]    DIAGNOSIS:  Stage IV left lung carcinoma with bone metastases to scalp and right femur    -s/p R ORIF 24    INTERVAL HISTORY:   Isiah Duncan is a 49 y.o.. male with right hip pain who fell and was brought to the ED. He reports a 70lb weight loss over the last year. He has a history of schizophrenia and 40pk/yrs of smoking. He notes he has a bump on his head from his fall. He had a CT chest/abd/pelvis that shows a left lung mass as well as a bone metastases in the right femur causing pathologic fracture, as well as a CT Head showing a large scalp base mass.  Patient had a biopsy and ORIF of the right femur on 2024 which came back showing a poorly differentiated non-small cell carcinoma consistent with lung primary.  He denies any headaches, vision changes, chest pain, abdominal pain, N/V/D, swelling, bleeding, or fatigue.  Patient has been recommended palliative radiation to his this right hip and scalp.  He will be meeting with medical oncology tomorrow to discuss systemic treatment options.  Patient's father who is his POA notes there is also a lesion in his left femur for which he will be seeing orthopedics about that to discuss fixation there as well.        MEDICATIONS:    Current Outpatient Medications:     traZODone (DESYREL) 100 MG tablet, Take 1 tablet by mouth nightly as needed for Sleep, Disp: 7 tablet, Rfl: 0    nicotine (NICODERM CQ) 21 MG/24HR, Place 1 patch onto the skin daily, Disp: 30 patch, Rfl: 3    lactulose (CHRONULAC) 10 GM/15ML 
Issues: incontinence, frequency 0       3.  Ambulatory: use of assistive devices (walker, cane, off-loading devices),        attached to equipment (IV pole, oxygen) 0     4.  Sensory Limitations: dizziness, vertigo, impaired vision 2     5.  Age less than 65        0     6.  Age 65 or greater 0     7.  Medication: diuretics, strong analgesics, hypnotics, sedatives,        antihypertensive agents 3   8.  Falls:  recent history of falls within the last 3 months (not to include slipping or        tripping) 7   TOTAL 12    If score of 4 or greater was education given? Yes           TABLE 2   Risk Score Risk Level Plan of Care   0-3 Little or  No Risk 1.  Provide assistance as indicated for ambulation activities  2.  Reorient confused/cognitively impaired patient  3.  Chair/bed in low position, stretcher/bed with siderails up except when performing patient care activities  5.  Educate patient/family/caregiver on falls prevention  6.  Reassess in 12 weeks or with any noted change in patient condition which places them at a risk for a fall   4-6 Moderate Risk 1.  Provide assistance as indicated for ambulation activities  2.  Reorient confused/cognitively impaired patient  3.  Chair/bed in low position, stretcher/bed with siderails up except when performing patient care activities  4.  Educate patient/family/caregiver on falls prevention     7 or   Higher High Risk 1.  Place patient in easily observable treatment room  2.  Patient attended at all times by family member or staff  3.  Provide assistance as indicated for ambulation activities  4.  Reorient confused/cognitively impaired patient  5.  Chair/bed in low position, stretcher/bed with siderails up except when performing patient care activities  6.  Educate patient/family/caregiver on falls prevention         PLAN: Patient is seen today in follow up with his father/guardian present.  Patient has a history of schizophrenia and has a somewhat flat affect.  Gait is

## 2024-08-08 ENCOUNTER — HOSPITAL ENCOUNTER (OUTPATIENT)
Age: 49
Discharge: HOME | End: 2024-08-08
Payer: MEDICARE

## 2024-08-08 DIAGNOSIS — C34.90 NON-SMALL CELL LUNG CANCER, UNSPECIFIED LATERALITY (HCC): ICD-10-CM

## 2024-08-08 LAB
SEND OUT REPORT: NORMAL
TEST NAME: NORMAL

## 2024-08-08 PROCEDURE — 36415 COLL VENOUS BLD VENIPUNCTURE: CPT

## 2024-08-12 ENCOUNTER — TELEPHONE (OUTPATIENT)
Dept: ONCOLOGY | Age: 49
End: 2024-08-12

## 2024-08-12 PROBLEM — C79.51 METASTASIS TO BONE (HCC): Status: ACTIVE | Noted: 2024-08-12

## 2024-08-13 ENCOUNTER — TELEPHONE (OUTPATIENT)
Dept: ONCOLOGY | Age: 49
End: 2024-08-13

## 2024-08-13 LAB — TEMPUS PORTAL: NORMAL

## 2024-08-14 ENCOUNTER — CLINICAL DOCUMENTATION (OUTPATIENT)
Facility: HOSPITAL | Age: 49
End: 2024-08-14

## 2024-08-14 ENCOUNTER — TELEPHONE (OUTPATIENT)
Facility: HOSPITAL | Age: 49
End: 2024-08-14

## 2024-08-14 ENCOUNTER — TELEPHONE (OUTPATIENT)
Dept: INTERVENTIONAL RADIOLOGY/VASCULAR | Age: 49
End: 2024-08-14

## 2024-08-14 NOTE — TELEPHONE ENCOUNTER
Left a message asking for a return call.  I would like to apply for free Keytruda and also place on a wait list for assistance through a foundation.      Kia Shipman  Financial Navigation  [facility]  [phone]

## 2024-08-14 NOTE — PROGRESS NOTES
Patient Assistance    Met with: Patient    Navigator Type: Infusion  Documentation Type: New Patient  Contact Type: Telephone  Status of Patient Insurance Coverage: Patient has active coverage          Additional notes: called and introduced myself to Isiah's father and went over his insurance coverage.  Patient has Medicare A & B and will be responsible for 20% of treatment cost.  I offered to apply for free Keytruda and also place Isiah on a wait list for assistance with a foundation for other costs of treatment.  They were very appreciative for the offer to help.  I will send the application to the office to get signatures and they will bring in POI to the appointment 8-15-24.  I gave them my contact number for any future questions.    Drug Name: Keytruda  Form of PAP Assistance: Free Drug (Pending)

## 2024-08-15 ENCOUNTER — HOSPITAL ENCOUNTER (OUTPATIENT)
Dept: RADIATION ONCOLOGY | Age: 49
Discharge: HOME | End: 2024-08-15
Payer: MEDICARE

## 2024-08-15 ENCOUNTER — TELEPHONE (OUTPATIENT)
Dept: ONCOLOGY | Age: 49
End: 2024-08-15

## 2024-08-15 ENCOUNTER — TELEPHONE (OUTPATIENT)
Dept: INFUSION THERAPY | Age: 49
End: 2024-08-15

## 2024-08-15 PROCEDURE — 77334 RADIATION TREATMENT AID(S): CPT | Performed by: RADIOLOGY

## 2024-08-15 PROCEDURE — 77290 THER RAD SIMULAJ FIELD CPLX: CPT | Performed by: RADIOLOGY

## 2024-08-15 RX ORDER — DEXAMETHASONE 4 MG/1
20 TABLET ORAL SEE ADMIN INSTRUCTIONS
Qty: 40 TABLET | Refills: 1 | Status: SHIPPED | OUTPATIENT
Start: 2024-08-15 | End: 2024-08-25

## 2024-08-15 RX ORDER — ONDANSETRON 8 MG/1
8 TABLET, ORALLY DISINTEGRATING ORAL 3 TIMES DAILY PRN
Qty: 60 TABLET | Refills: 3 | Status: SHIPPED | OUTPATIENT
Start: 2024-08-15

## 2024-08-15 RX ORDER — PROCHLORPERAZINE MALEATE 10 MG
10 TABLET ORAL EVERY 6 HOURS PRN
Qty: 120 TABLET | Refills: 3 | Status: SHIPPED | OUTPATIENT
Start: 2024-08-15

## 2024-08-15 RX ORDER — LIDOCAINE AND PRILOCAINE 25; 25 MG/G; MG/G
CREAM TOPICAL
Qty: 1 EACH | Refills: 2 | Status: SHIPPED | OUTPATIENT
Start: 2024-08-15

## 2024-08-15 NOTE — TELEPHONE ENCOUNTER
Chemotherapy orders received:    Ht=74 inches  Ny=752 lbs  BSA=2.05  Chemotherapy doses verified:   Keytruda flat dose 200 mg  Taxol 200 mg/m2 = 410 mg  Carboplatin AUC 6 TBD day of treatment   Gabi Redd RN

## 2024-08-15 NOTE — TELEPHONE ENCOUNTER
Chemotherapy orders received:     Ht=74 inches  Vr=056 lbs  BSA=2.05  Chemotherapy doses verified:   Keytruda flat dose 200 mg  Taxol 200 mg/m2 = 410 mg  Carboplatin AUC 6 TBD day of treatment   Zena Allen RN

## 2024-08-15 NOTE — DISCHARGE INSTRUCTIONS
Radiation Therapy Education    A Simulation Scan is like having a CT scan. Your physician will use the images obtained to develop your radiation treatment.  May take 30 minutes to 1 hour to complete  Marking will be applied to your skin to help insure accurate positioning for your future treatments.  A mold or a mask may also be needed to help aid in your positioning    Schedule: You will have treatments Monday - Friday  Treatments should take about 15 minutes from the time you enter the treatment room  You will have the same appointment time each day  You will see your doctor and nurse one day weekly while you are undergoing treatments.  Every effort will be make to start your treatment at the scheduled time. However, there may be occasional delays due to emergency patients, technical problems, or other difficulties.    Side Effects: Radiation is a local treatment so any side effects you may experience will be in your treatment area only.  If you experience side effects they will typically occur after the 2nd or 3rd week of treatments.  Many patients do not experience severe side effects and are able to continue working during their treatments. If you feel you treatments are interfering with your job, please notify your nurse        Skin Care During Radiation Treatments          Start applying moisturizers to the skin two times a day when you start your radiation        treatments  Suggested moisturizers include: Aquaphor, Eucerin, Exclair, Borion gel (may be purchased at [facility]) or Miaderm (to order call 1-610.487.1954 or go to www.[employer])  Do not apply anything to your skin in your treatment area that is not recommended by your radiation nurse and/or doctor  Good general hygiene/bathing should be performed daily  Use moisturizing soaps that do not contain perfume or fragrances. Recommended soaps include Dove or Dial  Use warm water, not hot water when bathing  After bathing, pat

## 2024-08-15 NOTE — TELEPHONE ENCOUNTER
Patient's father returned call.  states he will meet with patient and father during appointment to fill out [employer] Financial Assistance paperwork. Max states he spoke with Patient Assistance and they have paperwork for him too.      met with patient and father during teach appointment. Proof of income provided. [employer] Financial Assistance application completed. Patient Assistance paperwork signed and given to triage for physician signature.  assisted with application to [facility] in [facility]. Max states Medicaid application submitted.  will continue to follow.

## 2024-08-15 NOTE — TELEPHONE ENCOUNTER
Name: Isiah Duncan  : 1975  MRN: [id number]    Oncology Navigation Follow-Up Note    Contact Type:  Telephone    Notes:   Navigator noting pt. Scheduled for [place] F/U .  Triage,  may want to add C-1 D-1 to [place] F/U if space?  Impression was regardless of Tempus pt. Could start Tx      Electronically signed by Fiona Villafana RN on 8/15/2024 at 9:46 AM

## 2024-08-15 NOTE — PROGRESS NOTES
Radiation Treatment Site/Plan/Fractions:  Skull and Right Femur/10 Fractions Daily      Concurrent Chemotherapy/Immunotherapy:  Radiation to be delivered between chem/immunotherapy per Dr. Moralez.  Current dates for this are 8/21/24 and 9/11/24 and updated therapist, Aura, regarding this as well.        Cardiac Device:  None      Transportation Concerns:  None-pt's father and guardian will transport patient.        Nursing Referrals and Reasons:  following patient.       Contrast Given:  None          Miscellaneous Information:  Site specific information reviewed with patient and father.  Therapist instructed to call father with appt times.  Support offered and both verbalized understanding of information but will likely need reinforcement.

## 2024-08-16 ENCOUNTER — TELEPHONE (OUTPATIENT)
Dept: INFUSION THERAPY | Age: 49
End: 2024-08-16

## 2024-08-16 NOTE — TELEPHONE ENCOUNTER
Financial assistance paperwork faxed to Kia Shipman at [phone].  Paperwork also emailed to [email or web address]  Confirmation received

## 2024-08-17 LAB
DNA RANGE(S) EXAMINED NAR: NORMAL
GENE DIS ANL INTERP-IMP: POSITIVE
GENE DIS ASSESSED: NORMAL
REASON FOR STUDY: NORMAL
TEMPUS BLOOD TUMOR MUTATIONAL BURDEN: 21.7 M/MB
TEMPUS LCA: NORMAL
TEMPUS MSI NOTE: NORMAL
TEMPUS PORTAL: NORMAL
TEMPUS TREATMENT IMPLICATIONS NOTE: NORMAL
TEMPUS TRIALCOUNT: 3
TEMPUS TRIALMATCHES1: NORMAL
TEMPUS TRIALMATCHES2: NORMAL
TEMPUS TRIALMATCHES3: NORMAL

## 2024-08-20 ENCOUNTER — HOSPITAL ENCOUNTER (OUTPATIENT)
Dept: RADIATION ONCOLOGY | Age: 49
Discharge: HOME | End: 2024-08-20
Payer: MEDICARE

## 2024-08-20 ENCOUNTER — HOSPITAL ENCOUNTER (OUTPATIENT)
Dept: INTERVENTIONAL RADIOLOGY/VASCULAR | Age: 49
Discharge: HOME | End: 2024-08-22
Payer: MEDICARE

## 2024-08-20 VITALS
DIASTOLIC BLOOD PRESSURE: 83 MMHG | SYSTOLIC BLOOD PRESSURE: 99 MMHG | OXYGEN SATURATION: 95 % | WEIGHT: 177 LBS | BODY MASS INDEX: 22.72 KG/M2 | HEIGHT: 74 IN | HEART RATE: 95 BPM | TEMPERATURE: 96.8 F | RESPIRATION RATE: 20 BRPM

## 2024-08-20 DIAGNOSIS — Z79.899 LONG TERM USE OF DRUG: ICD-10-CM

## 2024-08-20 DIAGNOSIS — C34.90: ICD-10-CM

## 2024-08-20 DIAGNOSIS — C34.90 NON-SMALL CELL LUNG CANCER, UNSPECIFIED LATERALITY (HCC): Primary | ICD-10-CM

## 2024-08-20 LAB
INR PPP: 1
PARTIAL THROMBOPLASTIN TIME: 28.3 SEC (ref 23–36.5)
PLATELET # BLD AUTO: 344 K/UL (ref 138–453)
PROTHROMBIN TIME: 13.1 SEC (ref 11.7–14.9)

## 2024-08-20 PROCEDURE — 85049 AUTOMATED PLATELET COUNT: CPT

## 2024-08-20 PROCEDURE — 77001 FLUOROGUIDE FOR VEIN DEVICE: CPT

## 2024-08-20 PROCEDURE — 76937 US GUIDE VASCULAR ACCESS: CPT

## 2024-08-20 PROCEDURE — C1788 PORT, INDWELLING, IMP: HCPCS

## 2024-08-20 PROCEDURE — 36561 INSERT TUNNELED CV CATH: CPT

## 2024-08-20 PROCEDURE — 77334 RADIATION TREATMENT AID(S): CPT | Performed by: RADIOLOGY

## 2024-08-20 PROCEDURE — 85730 THROMBOPLASTIN TIME PARTIAL: CPT

## 2024-08-20 PROCEDURE — 99153 MOD SED SAME PHYS/QHP EA: CPT

## 2024-08-20 PROCEDURE — 77300 RADIATION THERAPY DOSE PLAN: CPT | Performed by: RADIOLOGY

## 2024-08-20 PROCEDURE — 99152 MOD SED SAME PHYS/QHP 5/>YRS: CPT

## 2024-08-20 PROCEDURE — 6360000002 HC RX W HCPCS: Performed by: RADIOLOGY

## 2024-08-20 PROCEDURE — 85610 PROTHROMBIN TIME: CPT

## 2024-08-20 PROCEDURE — 77295 3-D RADIOTHERAPY PLAN: CPT | Performed by: RADIOLOGY

## 2024-08-20 RX ORDER — SODIUM CHLORIDE 9 MG/ML
INJECTION, SOLUTION INTRAVENOUS CONTINUOUS
Status: DISCONTINUED | OUTPATIENT
Start: 2024-08-20 | End: 2024-08-23 | Stop reason: HOSPADM

## 2024-08-20 RX ORDER — FENTANYL CITRATE 50 UG/ML
INJECTION, SOLUTION INTRAMUSCULAR; INTRAVENOUS PRN
Status: COMPLETED | OUTPATIENT
Start: 2024-08-20 | End: 2024-08-20

## 2024-08-20 RX ORDER — SODIUM CHLORIDE 0.9 % (FLUSH) 0.9 %
5-40 SYRINGE (ML) INJECTION PRN
Status: DISCONTINUED | OUTPATIENT
Start: 2024-08-20 | End: 2024-08-23 | Stop reason: HOSPADM

## 2024-08-20 RX ORDER — SODIUM CHLORIDE 0.9 % (FLUSH) 0.9 %
5-40 SYRINGE (ML) INJECTION EVERY 12 HOURS SCHEDULED
Status: DISCONTINUED | OUTPATIENT
Start: 2024-08-20 | End: 2024-08-23 | Stop reason: HOSPADM

## 2024-08-20 RX ORDER — HEPARIN 100 UNIT/ML
SYRINGE INTRAVENOUS PRN
Status: COMPLETED | OUTPATIENT
Start: 2024-08-20 | End: 2024-08-20

## 2024-08-20 RX ORDER — SODIUM CHLORIDE 9 MG/ML
INJECTION, SOLUTION INTRAVENOUS PRN
Status: DISCONTINUED | OUTPATIENT
Start: 2024-08-20 | End: 2024-08-23 | Stop reason: HOSPADM

## 2024-08-20 RX ORDER — MIDAZOLAM HYDROCHLORIDE 2 MG/2ML
INJECTION, SOLUTION INTRAMUSCULAR; INTRAVENOUS PRN
Status: COMPLETED | OUTPATIENT
Start: 2024-08-20 | End: 2024-08-20

## 2024-08-20 RX ADMIN — Medication 2000 MG: at 14:20

## 2024-08-20 RX ADMIN — HEPARIN 500 UNITS: 100 SYRINGE at 14:34

## 2024-08-20 RX ADMIN — FENTANYL CITRATE 25 MCG: 50 INJECTION, SOLUTION INTRAMUSCULAR; INTRAVENOUS at 14:17

## 2024-08-20 RX ADMIN — MIDAZOLAM HYDROCHLORIDE 1 MG: 1 INJECTION, SOLUTION INTRAMUSCULAR; INTRAVENOUS at 14:17

## 2024-08-20 ASSESSMENT — PAIN - FUNCTIONAL ASSESSMENT: PAIN_FUNCTIONAL_ASSESSMENT: NONE - DENIES PAIN

## 2024-08-20 NOTE — POST SEDATION
Sedation Post Procedure Note    Patient Name: Isiah Duncan   YOB: 1975  Room/Bed: Room/bed info not found  Medical Record Number: [id number]  Date: 8/20/2024   Time: 2:54 PM         Physicians/Assistants: HARSH Villa    Procedure Performed:  Port    Post-Sedation Vital Signs:  Vitals:    08/20/24 1438   BP: 113/77   Pulse: 96   Resp: 20   Temp:    SpO2: 96%      Vital signs were reviewed and were stable after the procedure (see flow sheet for vitals)            Post-Sedation Exam: Pt remains stable           Complications: none    Electronically signed by HARSH Villa on 8/20/2024 at 2:54 PM

## 2024-08-20 NOTE — BRIEF OP NOTE
Brief Postoperative Note for Port Placement    Isiah Duncan  YOB: 1975  [id number]    Pre-operative Diagnosis: :Lung CA      Post-operative Diagnosis: Same    Procedure: 8 Fr Port Placement    Medication Given: fentanyl and versed    Anesthesia: 1 %Lidocaine, 1% Lidocaine w/ Epi    Surgeons/Assistants: MD Jose David and HARSH Rogers    Estimated Blood Loss: Minimal    Complications: none    Specimen Removal: None    8 Fr Port placement into the right IJ. Port flushed with heparin.  Incision site closed with Vicryl sutures and tissue adhesive. Vital signs were reviewed and were stable after the procedure. Patient tolerated the procedure well.    Electronically signed by HARSH Rogers PA-C on 8/20/2024 at 2:53 PM

## 2024-08-20 NOTE — H&P
MUSCULOSKELETAL:   negative for neck or back pain     NEUROLOGICAL:   Negative for weakness and tingling  negative for headaches and dizziness     PSYCHIATRIC:   negative for anxiety       OBJECTIVE:   VITALS:  vitals were not taken for this visit.  See nursing flowsheet.   CONSTITUTIONAL:alert & oriented x 3, no acute distress. Calm and pleasant.    SKIN:  Warm and dry, no rashes to exposed areas of skin.   HEAD: large, superior, central scalp mass.   EYES: PERRL.  EOMs intact. Wearing glasses.   EARS:  Intact and equal bilaterally.  No edema or thickening, without lumps, lesions, or discharge. Hearing grossly WNL.    NOSE:  Nares patent.  No rhinorrhea.   MOUTH/THROAT:  Mucous membranes pink and moist, uvula midline, teeth appear to be intact  NECK: Supple, no lymphadenopathy.  LUNGS: Respirations even and non-labored. Clear to auscultation bilaterally, mildly diminished bilateral bases.   CARDIOVASCULAR: Regular rate and rhythm, no murmurs/rubs/gallops.   ABDOMEN: soft, non-tender and non-distended, bowel sounds active x 4.   EXTREMITIES: No edema to bilateral lower extremities.  No varicosities to bilateral lower extremities.   NEUROLOGIC: CN II-XII are grossly intact. Gait not assessed.   IMPRESSIONS:   Non-small cell lung cancer.   PLANS:   Port placement.     GOYO Chou CNP   Electronically signed 8/20/2024 at 12:18 PM

## 2024-08-20 NOTE — H&P (VIEW-ONLY)
History and Physical    Pt Name: Isiah Duncan  MRN: [id number]  YOB: 1975  Date of evaluation: 8/20/2024  Primary Care Physician: Lolita Mcclain APRN - DEAN    SUBJECTIVE:   History of Chief Complaint:    Isiah Duncan is a 49 y.o. male who is scheduled today for port placement. Patient presents with father who states he has been having a nonproductive cough for months, denies any dyspnea. Patient states he has had unintentional weight loss but no change in appetite. He is a current smoker. Patient fell and sought care in the ER last month and was found to haven non-small cell carcinoma, lung primary.   Allergies  has No Known Allergies.  Medications  Prior to Admission medications    Medication Sig Start Date End Date Taking? Authorizing Provider   ondansetron (ZOFRAN-ODT) 8 MG TBDP disintegrating tablet Take 1 tablet by mouth 3 times daily as needed for Nausea or Vomiting 8/15/24  Yes Calin Strong MD   metFORMIN (GLUCOPHAGE) 1000 MG tablet Take 1 tablet by mouth 2 times daily 7/1/24  Yes ProviderMelecio MD   atorvastatin (LIPITOR) 80 MG tablet Take 1 tablet by mouth nightly at bedtime. 7/1/24  Yes Melecio Guerrero MD   Ergocalciferol (VITAMIN D) 08703 units CAPS Take 50,000 Units by mouth once a week 5/29/22  Yes Jeevan Rodríguez MD   dexAMETHasone (DECADRON) 4 MG tablet Take 5 tablets by mouth See Admin Instructions for 10 days Take 5 tabs (20mg) 12 and 6 hrs prior to chemotherapy 8/15/24 8/25/24  Calin Strong MD   prochlorperazine (COMPAZINE) 10 MG tablet Take 1 tablet by mouth every 6 hours as needed (nausea vomiting)  Patient not taking: Reported on 8/20/2024 8/15/24   Calin Strong MD   lidocaine-prilocaine (EMLA) 2.5-2.5 % cream Apply topically 45-60 mins prior to needle poke daily PRN 8/15/24   Calin Strong MD   traZODone (DESYREL) 100 MG tablet Take 1 tablet by mouth nightly as needed for Sleep 7/18/24 7/25/24  Ирина Ye MD   nicotine (NICODERM CQ) 21  MG/24HR Place 1 patch onto the skin daily 7/17/24   Jory Chahal MD   Cholecalciferol (VITAMIN D) 25 MCG TABS Take 1 tablet by mouth daily 7/18/24 8/17/24  Jory Chahal MD   propranolol (INDERAL) 20 MG tablet Take 1 tablet by mouth 2 times daily 6/27/24   Melecio Guerrero MD   INVEGA TRINZA 546 MG/1.75ML ALEXSANDER IM injection  5/22/24   Melecio Guerrero MD   Magnesium 400 MG CAPS Take 1 capsule by mouth in the morning and at bedtime 5/23/22 6/22/22  Jeevan Rodríguez MD   famotidine (PEPCID) 20 MG tablet Take 1 tablet by mouth 2 times daily 5/23/22   Jeevan Rodríguez MD     Past Medical History    has a past medical history of Anemia, Diabetes (HCC), Hypertension, Non-small cell carcinoma of lung (HCC), and Schizophrenia (HCC).  Past Surgical History   has a past surgical history that includes Hip Arthroplasty (Right, 07/11/2024); hip surgery (Right, 07/11/2024); Tibia fracture surgery (Left, 07/11/2024); Tympanostomy tube placement (Bilateral); and Tonsillectomy.  Social History   reports that he has been smoking cigarettes. He has a 40 pack-year smoking history. He has never used smokeless tobacco.   reports no history of alcohol use.   reports no history of drug use.  Occupation none  Family History  Family Status   Relation Name Status    Father Max Alive    Brother  (Not Specified)   No partnership data on file     family history includes Diabetes in his father; Substance Abuse in his brother.    Review of Systems:  CONSTITUTIONAL:   negative for fevers, chills, fatigue and malaise    EYES:   negative for double vision, blurred vision and photophobia    HEENT:   negative for tinnitus, epistaxis and sore throat     RESPIRATORY:   negative for dyspnea, nonproductive cough   CARDIOVASCULAR:   negative for chest pain, palpitations, syncope, edema     GASTROINTESTINAL:   negative for nausea, vomiting unintentional weight loss   GENITOURINARY:   negative for incontinence

## 2024-08-20 NOTE — PRE SEDATION
(DECADRON) 4 MG tablet Take 5 tablets by mouth See Admin Instructions for 10 days Take 5 tabs (20mg) 12 and 6 hrs prior to chemotherapy 8/15/24 8/25/24  Calin Strong MD   prochlorperazine (COMPAZINE) 10 MG tablet Take 1 tablet by mouth every 6 hours as needed (nausea vomiting)  Patient not taking: Reported on 8/20/2024 8/15/24   Calin Strong MD   lidocaine-prilocaine (EMLA) 2.5-2.5 % cream Apply topically 45-60 mins prior to needle poke daily PRN 8/15/24   Calin Strong MD   traZODone (DESYREL) 100 MG tablet Take 1 tablet by mouth nightly as needed for Sleep 7/18/24 7/25/24  Ирина Ye MD   nicotine (NICODERM CQ) 21 MG/24HR Place 1 patch onto the skin daily 7/17/24   Jory Chahal MD   Cholecalciferol (VITAMIN D) 25 MCG TABS Take 1 tablet by mouth daily 7/18/24 8/17/24  Jory Chahal MD   propranolol (INDERAL) 20 MG tablet Take 1 tablet by mouth 2 times daily 6/27/24   ProviderMelecio MD   INVEGA TRINZA 546 MG/1.75ML ALEXSANDER IM injection  5/22/24   Melecio Guerrero MD   Magnesium 400 MG CAPS Take 1 capsule by mouth in the morning and at bedtime 5/23/22 6/22/22  Jeevan Rodríguez MD   famotidine (PEPCID) 20 MG tablet Take 1 tablet by mouth 2 times daily 5/23/22   Jeevan Rodríguez MD     Coumadin Use Last 7 Days:  no  Antiplatelet drug therapy use last 7 days: no  Other anticoagulant use last 7 days: no  Additional Medication Information:  See [facility]      Pre-Sedation Documentation and Exam:   I have reviewed the patient's history and review of systems.    Mallampati Airway Assessment:  Mallampati Class III - (soft palate & base of uvula are visible)    Prior History of Anesthesia Complications:   none    ASA Classification:  Class 2 - A normal healthy patient with mild systemic disease    Sedation/ Anesthesia Plan:   intravenous sedation    Medications Planned:   midazolam (Versed) intravenously and fentanyl intravenously    Patient is an appropriate candidate for plan of

## 2024-08-21 ENCOUNTER — TELEPHONE (OUTPATIENT)
Dept: INFUSION THERAPY | Age: 49
End: 2024-08-21

## 2024-08-21 ENCOUNTER — TELEPHONE (OUTPATIENT)
Dept: ONCOLOGY | Age: 49
End: 2024-08-21

## 2024-08-21 ENCOUNTER — HOSPITAL ENCOUNTER (OUTPATIENT)
Dept: INFUSION THERAPY | Age: 49
Discharge: HOME | End: 2024-08-21
Payer: MEDICARE

## 2024-08-21 ENCOUNTER — OFFICE VISIT (OUTPATIENT)
Dept: ONCOLOGY | Age: 49
End: 2024-08-21
Payer: MEDICARE

## 2024-08-21 VITALS
BODY MASS INDEX: 22.21 KG/M2 | HEART RATE: 103 BPM | TEMPERATURE: 97.9 F | RESPIRATION RATE: 16 BRPM | WEIGHT: 173 LBS | SYSTOLIC BLOOD PRESSURE: 101 MMHG | DIASTOLIC BLOOD PRESSURE: 66 MMHG

## 2024-08-21 DIAGNOSIS — C79.51 MALIGNANT NEOPLASM METASTATIC TO BONE (HCC): Primary | ICD-10-CM

## 2024-08-21 DIAGNOSIS — C34.90 NON-SMALL CELL LUNG CANCER, UNSPECIFIED LATERALITY (HCC): ICD-10-CM

## 2024-08-21 DIAGNOSIS — Z79.899 LONG TERM USE OF DRUG: ICD-10-CM

## 2024-08-21 LAB
ALBUMIN SERPL-MCNC: 3.2 G/DL (ref 3.5–5.2)
ALBUMIN/GLOB SERPL: 0.9 {RATIO} (ref 1–2.5)
ALP SERPL-CCNC: 219 U/L (ref 40–129)
ALT SERPL-CCNC: 17 U/L (ref 5–41)
AMYLASE SERPL-CCNC: 91 U/L (ref 28–100)
ANION GAP SERPL CALCULATED.3IONS-SCNC: 12 MMOL/L (ref 9–17)
AST SERPL-CCNC: 10 U/L
BASOPHILS # BLD: 0 K/UL (ref 0–0.2)
BASOPHILS NFR BLD: 0 % (ref 0–2)
BILIRUB SERPL-MCNC: 0.6 MG/DL (ref 0.3–1.2)
BUN SERPL-MCNC: 10 MG/DL (ref 6–20)
CALCIUM SERPL-MCNC: 9.1 MG/DL (ref 8.6–10.4)
CHLORIDE SERPL-SCNC: 98 MMOL/L (ref 98–107)
CO2 SERPL-SCNC: 24 MMOL/L (ref 20–31)
CORTIS SERPL-MCNC: 6.1 UG/DL (ref 2.5–19.5)
CORTISOL COLLECTION INFO: NORMAL
CREAT SERPL-MCNC: 0.5 MG/DL (ref 0.7–1.2)
EOSINOPHIL # BLD: 0 K/UL (ref 0–0.4)
EOSINOPHILS RELATIVE PERCENT: 0 % (ref 1–4)
ERYTHROCYTE [DISTWIDTH] IN BLOOD BY AUTOMATED COUNT: 15.5 % (ref 12.5–15.4)
GFR, ESTIMATED: >90 ML/MIN/1.73M2
GLUCOSE SERPL-MCNC: 271 MG/DL (ref 70–99)
HCT VFR BLD AUTO: 33.4 % (ref 41–53)
HGB BLD-MCNC: 11.3 G/DL (ref 13.5–17.5)
LIPASE SERPL-CCNC: 117 U/L (ref 13–60)
LYMPHOCYTES NFR BLD: 0.7 K/UL (ref 1–4.8)
LYMPHOCYTES RELATIVE PERCENT: 13 % (ref 24–44)
MCH RBC QN AUTO: 27.7 PG (ref 26–34)
MCHC RBC AUTO-ENTMCNC: 33.8 G/DL (ref 31–37)
MCV RBC AUTO: 82.1 FL (ref 80–100)
MONOCYTES NFR BLD: 0 K/UL (ref 0.1–1.2)
MONOCYTES NFR BLD: 1 % (ref 2–11)
NEUTROPHILS NFR BLD: 86 % (ref 36–66)
NEUTS SEG NFR BLD: 4.6 K/UL (ref 1.8–7.7)
PLATELET # BLD AUTO: 389 K/UL (ref 140–450)
PMV BLD AUTO: 6.2 FL (ref 6–12)
POTASSIUM SERPL-SCNC: 4.2 MMOL/L (ref 3.7–5.3)
PROT SERPL-MCNC: 6.9 G/DL (ref 6.4–8.3)
RBC # BLD AUTO: 4.07 M/UL (ref 4.5–5.9)
SODIUM SERPL-SCNC: 134 MMOL/L (ref 135–144)
TSH SERPL DL<=0.05 MIU/L-ACNC: 1.04 UIU/ML (ref 0.3–5)
WBC OTHER # BLD: 5.4 K/UL (ref 3.5–11)

## 2024-08-21 PROCEDURE — 99213 OFFICE O/P EST LOW 20 MIN: CPT | Performed by: INTERNAL MEDICINE

## 2024-08-21 PROCEDURE — 4004F PT TOBACCO SCREEN RCVD TLK: CPT | Performed by: INTERNAL MEDICINE

## 2024-08-21 PROCEDURE — 2500000003 HC RX 250 WO HCPCS: Performed by: INTERNAL MEDICINE

## 2024-08-21 PROCEDURE — 6360000002 HC RX W HCPCS: Performed by: INTERNAL MEDICINE

## 2024-08-21 PROCEDURE — 84443 ASSAY THYROID STIM HORMONE: CPT

## 2024-08-21 PROCEDURE — 96415 CHEMO IV INFUSION ADDL HR: CPT

## 2024-08-21 PROCEDURE — 82150 ASSAY OF AMYLASE: CPT

## 2024-08-21 PROCEDURE — 96375 TX/PRO/DX INJ NEW DRUG ADDON: CPT

## 2024-08-21 PROCEDURE — 2580000003 HC RX 258: Performed by: INTERNAL MEDICINE

## 2024-08-21 PROCEDURE — 83690 ASSAY OF LIPASE: CPT

## 2024-08-21 PROCEDURE — 96413 CHEMO IV INFUSION 1 HR: CPT

## 2024-08-21 PROCEDURE — 96367 TX/PROPH/DG ADDL SEQ IV INF: CPT

## 2024-08-21 PROCEDURE — 80053 COMPREHEN METABOLIC PANEL: CPT

## 2024-08-21 PROCEDURE — 96417 CHEMO IV INFUS EACH ADDL SEQ: CPT

## 2024-08-21 PROCEDURE — 85025 COMPLETE CBC W/AUTO DIFF WBC: CPT

## 2024-08-21 PROCEDURE — 82533 TOTAL CORTISOL: CPT

## 2024-08-21 PROCEDURE — G8428 CUR MEDS NOT DOCUMENT: HCPCS | Performed by: INTERNAL MEDICINE

## 2024-08-21 PROCEDURE — 36591 DRAW BLOOD OFF VENOUS DEVICE: CPT

## 2024-08-21 PROCEDURE — G8420 CALC BMI NORM PARAMETERS: HCPCS | Performed by: INTERNAL MEDICINE

## 2024-08-21 RX ORDER — FAMOTIDINE 10 MG/ML
20 INJECTION, SOLUTION INTRAVENOUS ONCE
Status: COMPLETED | OUTPATIENT
Start: 2024-08-21 | End: 2024-08-21

## 2024-08-21 RX ORDER — SODIUM CHLORIDE 9 MG/ML
5-250 INJECTION, SOLUTION INTRAVENOUS PRN
Status: CANCELLED | OUTPATIENT
Start: 2024-08-21

## 2024-08-21 RX ORDER — PROCHLORPERAZINE EDISYLATE 5 MG/ML
5 INJECTION INTRAMUSCULAR; INTRAVENOUS
Status: CANCELLED | OUTPATIENT
Start: 2024-08-21

## 2024-08-21 RX ORDER — DIPHENHYDRAMINE HYDROCHLORIDE 50 MG/ML
50 INJECTION INTRAMUSCULAR; INTRAVENOUS ONCE
Status: CANCELLED | OUTPATIENT
Start: 2024-08-21 | End: 2024-08-21

## 2024-08-21 RX ORDER — HEPARIN 100 UNIT/ML
500 SYRINGE INTRAVENOUS PRN
Status: DISCONTINUED | OUTPATIENT
Start: 2024-08-21 | End: 2024-08-22 | Stop reason: HOSPADM

## 2024-08-21 RX ORDER — DIPHENHYDRAMINE HYDROCHLORIDE 50 MG/ML
50 INJECTION INTRAMUSCULAR; INTRAVENOUS ONCE
Status: COMPLETED | OUTPATIENT
Start: 2024-08-21 | End: 2024-08-21

## 2024-08-21 RX ORDER — SODIUM CHLORIDE 0.9 % (FLUSH) 0.9 %
5-40 SYRINGE (ML) INJECTION PRN
Status: DISCONTINUED | OUTPATIENT
Start: 2024-08-21 | End: 2024-08-22 | Stop reason: HOSPADM

## 2024-08-21 RX ORDER — FAMOTIDINE 10 MG/ML
20 INJECTION, SOLUTION INTRAVENOUS
Status: CANCELLED | OUTPATIENT
Start: 2024-08-21

## 2024-08-21 RX ORDER — MEPERIDINE HYDROCHLORIDE 50 MG/ML
12.5 INJECTION INTRAMUSCULAR; INTRAVENOUS; SUBCUTANEOUS PRN
Status: CANCELLED | OUTPATIENT
Start: 2024-08-21

## 2024-08-21 RX ORDER — PALONOSETRON 0.05 MG/ML
0.25 INJECTION, SOLUTION INTRAVENOUS ONCE
Status: COMPLETED | OUTPATIENT
Start: 2024-08-21 | End: 2024-08-21

## 2024-08-21 RX ORDER — DEXAMETHASONE SODIUM PHOSPHATE 10 MG/ML
10 INJECTION INTRAMUSCULAR; INTRAVENOUS ONCE
Status: COMPLETED | OUTPATIENT
Start: 2024-08-21 | End: 2024-08-21

## 2024-08-21 RX ORDER — SODIUM CHLORIDE 9 MG/ML
5-250 INJECTION, SOLUTION INTRAVENOUS PRN
Status: DISCONTINUED | OUTPATIENT
Start: 2024-08-21 | End: 2024-08-22 | Stop reason: HOSPADM

## 2024-08-21 RX ORDER — DIPHENHYDRAMINE HYDROCHLORIDE 50 MG/ML
50 INJECTION INTRAMUSCULAR; INTRAVENOUS
Status: CANCELLED | OUTPATIENT
Start: 2024-08-21

## 2024-08-21 RX ORDER — EPINEPHRINE 1 MG/ML
0.3 INJECTION, SOLUTION, CONCENTRATE INTRAVENOUS PRN
Status: CANCELLED | OUTPATIENT
Start: 2024-08-21

## 2024-08-21 RX ORDER — ACETAMINOPHEN 325 MG/1
650 TABLET ORAL
Status: CANCELLED | OUTPATIENT
Start: 2024-08-21

## 2024-08-21 RX ORDER — ONDANSETRON 2 MG/ML
8 INJECTION INTRAMUSCULAR; INTRAVENOUS
Status: CANCELLED | OUTPATIENT
Start: 2024-08-21

## 2024-08-21 RX ORDER — PALONOSETRON 0.05 MG/ML
0.25 INJECTION, SOLUTION INTRAVENOUS ONCE
Status: CANCELLED | OUTPATIENT
Start: 2024-08-21 | End: 2024-08-21

## 2024-08-21 RX ORDER — FAMOTIDINE 10 MG/ML
20 INJECTION, SOLUTION INTRAVENOUS ONCE
Status: CANCELLED | OUTPATIENT
Start: 2024-08-21 | End: 2024-08-21

## 2024-08-21 RX ORDER — SODIUM CHLORIDE 9 MG/ML
INJECTION, SOLUTION INTRAVENOUS CONTINUOUS
Status: CANCELLED | OUTPATIENT
Start: 2024-08-21

## 2024-08-21 RX ORDER — ALBUTEROL SULFATE 90 UG/1
4 AEROSOL, METERED RESPIRATORY (INHALATION) PRN
Status: CANCELLED | OUTPATIENT
Start: 2024-08-21

## 2024-08-21 RX ADMIN — SODIUM CHLORIDE, PRESERVATIVE FREE 10 ML: 5 INJECTION INTRAVENOUS at 16:46

## 2024-08-21 RX ADMIN — PALONOSETRON 0.25 MG: 0.05 INJECTION, SOLUTION INTRAVENOUS at 11:43

## 2024-08-21 RX ADMIN — SODIUM CHLORIDE, PRESERVATIVE FREE 10 ML: 5 INJECTION INTRAVENOUS at 09:54

## 2024-08-21 RX ADMIN — PACLITAXEL 408 MG: 6 INJECTION, SOLUTION, CONCENTRATE INTRAVENOUS at 13:09

## 2024-08-21 RX ADMIN — FAMOTIDINE 20 MG: 10 INJECTION, SOLUTION INTRAVENOUS at 11:43

## 2024-08-21 RX ADMIN — HEPARIN 500 UNITS: 100 SYRINGE at 16:46

## 2024-08-21 RX ADMIN — CARBOPLATIN 800 MG: 10 INJECTION INTRAVENOUS at 16:13

## 2024-08-21 RX ADMIN — SODIUM CHLORIDE 200 ML/HR: 9 INJECTION, SOLUTION INTRAVENOUS at 11:33

## 2024-08-21 RX ADMIN — SODIUM CHLORIDE 150 MG: 9 INJECTION, SOLUTION INTRAVENOUS at 11:57

## 2024-08-21 RX ADMIN — DIPHENHYDRAMINE HYDROCHLORIDE 50 MG: 50 INJECTION INTRAMUSCULAR; INTRAVENOUS at 11:43

## 2024-08-21 RX ADMIN — DEXAMETHASONE SODIUM PHOSPHATE 10 MG: 10 INJECTION INTRAMUSCULAR; INTRAVENOUS at 11:43

## 2024-08-21 RX ADMIN — SODIUM CHLORIDE, PRESERVATIVE FREE 10 ML: 5 INJECTION INTRAVENOUS at 09:53

## 2024-08-21 RX ADMIN — SODIUM CHLORIDE 200 MG: 9 INJECTION, SOLUTION INTRAVENOUS at 12:37

## 2024-08-21 NOTE — TELEPHONE ENCOUNTER
Instructions   from Dr. Calin Strong MD    Nurse navigator to arrange bx  Ok for treatment  Rtc in 3 weeks           Tx today  RV 9/11/24 at 8:45 am with tx to follow  Nurse navigator to arrange bx, note routed to Navigator and covering navigator.

## 2024-08-21 NOTE — PROGRESS NOTES
Davi  were  here for chemotherapy teaching. Spent 40 minutes with them.  Teaching sheets from [facility] and verbal information given on chemotherapy agents, action, administration and side effects.    Chemotherapy medications discussed: Keytruda, taxol and carboplatin.    Pregnancy warnings reviewed: N/A    Chemotherapy consent form signed by patient.    Provided new patient binder, Chemotherapy and You booklet, Eating Hints booklet      Port placement: 8/20/2024  Pt has prescription for EMLA cream and was given instructions on use.    Reviewed take home medication: zofran, compazine and decadron.    Questions answered and support given.    [facility] rehab referral assessment form, distress tool form and PG-SGA form completed by patient.    Needs that were identified during teaching visit: Father legal guardian.    Discussed community resources such as [employer], [employer] [facility], [employer], [employer], [employer], etc.    Pt will return on 9/11/2024 for chemo and f/u with Dr. Strong  on 9/11/2024.

## 2024-08-21 NOTE — TELEPHONE ENCOUNTER
Name: Isiah Duncan  : 1975  MRN: [id number]    Oncology Navigation Follow-Up Note    Contact Type:  Telephone    Notes: Writer covering for Fiona pt's oncology navigator.  Olive, [facility] infusion nurse, alerted writer Dr. Strong requesting tempus testing.  Spoke w/Dr. Strong to clarify.  Dr. Strong stated pt requires additional bx r/t insufficient tissue to complete tempus testing.  Dr. Storng stated referral to  IR placed to complete bx of skull lesion on  &  IR  attempted to contact.  Olive stated pt & pt's father currently in infusion area.  Spoke w/[facility]  IR , requested bx scheduled & writer will update pt's father.  Pt scheduled  @ [facility] to arrive @ 0700 @ outpatient surgery entrance next to ER.  NPO after midnight, no blood thinners/asa/NSAID's, & accompanied by adult .  Pt's father updated on appt details, verbalized understanding, & denied questions/concerns.        Electronically signed by Diamond Somers RN on 2024 at 12:59 PM

## 2024-08-21 NOTE — PROGRESS NOTES
Spiritual Health Outpatient Oncology/Hematology Progress Note    Situation: Writer encountered Patient and Father in the treatment cubicle of the infusion clinic.    Assessment: Patient appeared to be resting. Pt opened his eyes and greeted writer with a faint voice. Father engaged in conversation. Father shared about Pt's illness, acknowledging the extent and the goals of care for treatment. Father spoke of Pt's other morbidities, noting that Pt has had many difficulties in his life. Father affirmed that he is Pt's guardian and caregiver. Father spoke of the challenges with his health and other aspects of his life that he is facing. Father expressed gratitude for his brant and [employer] tradition. Father's energy lifted as he spoke of the ways God has helped him and how he has strength due to his brant. Father named many of Pt's favorable qualities and how he is aspiring to emulate Pt. Father is praying for Pt's comfort.     Intervention: Writer introduced herself and her services. Writer inquired about Pt's and Father's coping and needs. Writer explored Pt's sources of support and strength. Writer offered supportive presence and active listening. Writer affirmed Pt's and Father's strengths. Writer offered empathy and words of support and encouragement.     Outcome: Father thanked writer for the visit.    Plan: Spiritual Health Services are available for Patient and Family by phone and/or in person.      08/21/24 5050   Encounter Summary   Encounter Overview/Reason Spiritual/Emotional Needs;Initial Encounter   Service Provided For Patient and family together;Family   Referral/Consult From Nurse   Support System Family members;Parent   Last Encounter  08/21/24   Complexity of Encounter Moderate   Begin Time 1315   End Time  1335   Total Time Calculated 20 min   Spiritual/Emotional needs   Type Spiritual Support   Assessment/Intervention/Outcome   Assessment Impaired resilience;Powerlessness;Sad;Stress overload

## 2024-08-21 NOTE — PROGRESS NOTES
Patient arrived for C1D1 keytruda, taxol, carbo. Labs drawn and within treatable parameters. Seen by wilman Salinas to proceed with treatment. Patient tolerated w/o incident and left in stable condition. Returns 9/11

## 2024-08-21 NOTE — PROGRESS NOTES
NEUTOPHILPCT 86 (H) 08/21/2024    MONOPCT 1 (L) 08/21/2024    EOSPCT 0 (L) 08/21/2024    BASOPCT 0 08/21/2024    NEUTROABS 4.60 08/21/2024    LYMPHSABS 0.70 (L) 08/21/2024    MONOSABS 0.00 (L) 08/21/2024    EOSABS 0.00 08/21/2024    BASOSABS 0.00 08/21/2024         Chemistry        Component Value Date/Time     (L) 08/21/2024 1002    K 4.2 08/21/2024 1002    CL 98 08/21/2024 1002    CO2 24 08/21/2024 1002    BUN 10 08/21/2024 1002    CREATININE 0.5 (L) 08/21/2024 1002        Component Value Date/Time    CALCIUM 9.1 08/21/2024 1002    ALKPHOS 219 (H) 08/21/2024 1002    AST 10 08/21/2024 1002    ALT 17 08/21/2024 1002    BILITOT 0.6 08/21/2024 1002            PATHOLOGY DATA:         IMAGING DATA:      IR PORT PLACEMENT > 5 YEARS  Narrative: PROCEDURE:  ULTRASOUND GUIDED VASCULAR ACCESS.  FLUOROSCOPY GUIDED PLACEMENT OF A SUBCUTANEOUS PORT-A-CATH.    MODERATE CONSCIOUS SEDATION    8/20/2024.    HISTORY:  ORDERING SYSTEM PROVIDED HISTORY: Small cell-large cell carcinoma (HCC)    SEDATION:  1 mgversed and 25 mcg fentanyl were titrated intravenously for moderate  sedation monitored under my direction.  The patient's vital signs were  monitored throughout the procedure and recorded in the patient's medical  record by the nurse.  Sedation monitoring started at 14:12 and ended at 14:58.    FLUOROSCOPY DOSE AND TYPE OR TIME AND EXPOSURES:  0.9 minute fluoroscopy time; dap: 139 cGy per cm2    TECHNIQUE:  Informed consent was obtained from the patient's legal guardian after a  detailed explanation of the procedure including risks. All aspects of maximum  sterile barrier technique were used including washing hands with  alcohol-based hand rub, skin preparation, cap, mask, sterile gown, sterile  gloves, and sterile full body drape. Local anesthesia was achieved with  lidocaine. A micropuncture needle was used to access the right internal  jugular vein using ultrasound guidance.  (A sterile probe cover and gel

## 2024-08-23 ENCOUNTER — OFFICE VISIT (OUTPATIENT)
Dept: ORTHOPEDIC SURGERY | Age: 49
End: 2024-08-23

## 2024-08-23 VITALS — HEIGHT: 74 IN | WEIGHT: 175 LBS | BODY MASS INDEX: 22.46 KG/M2

## 2024-08-23 DIAGNOSIS — Z96.641 S/P TOTAL RIGHT HIP ARTHROPLASTY: Primary | ICD-10-CM

## 2024-08-23 DIAGNOSIS — M89.9 LYTIC BONE LESION OF LEFT FEMUR: ICD-10-CM

## 2024-08-23 NOTE — PROGRESS NOTES
normal sensation L3-S1 distribution  Left lower extremity: Skin intact.  Positive logroll with reproducible groin pain.  Patient expresses baseline sensation light touch throughout left lower extremity.  Compartments soft/compressible.    Radiology:  Imaging studies from today were independently reviewed and read as listed below. Any relevant images obtained prior to today's visit were also independently interpreted.         Assessment:   49 y.o. year old male status post right total hip arthroplasty for pathologic right femur fracture, with lytic lesion within left peritrochanteric region.  Plan:   Lengthy discussion had with patient about current clinical state.  Terms of his right lower extremity, patient has no restrictions.  Weight-bear as tolerated.  I am very concerned, with a lytic lesion within the peritrochanteric region of his left femur.  I discussed risk, benefits, and alternatives with his father about operative intervention.  They would both like to proceed with operative intervention in the form of intramedullary nail fixation left femur.    All treatment options, including conservative and operative, were discussed with the patient in detail including the risks and benefits of each.  All questions answered to the patient's satisfaction.  Knowing these risks, he wishes to proceed with surgery. Written informed consent was obtained. Will return for outpatient surgery and follow up 10-14 days postop.    Will plan for operative intervention 8/27/2024.  All questions answered.          Electronically signed by Jose Cary DO on 8/23/2024 at 1:34 PM    This note is created with the assistance of a speech recognition program.  While intending to generate a document that actually reflects the content of the visit, the document can still have some errors including those of syntax and sound a like substitutions which may escape proof reading.  In such instances, actual meaning can be extrapolated by

## 2024-08-26 ENCOUNTER — HOSPITAL ENCOUNTER (OUTPATIENT)
Dept: CT IMAGING | Age: 49
Discharge: HOME | End: 2024-08-28
Payer: MEDICARE

## 2024-08-26 ENCOUNTER — HOSPITAL ENCOUNTER (OUTPATIENT)
Dept: ULTRASOUND IMAGING | Age: 49
Discharge: HOME | End: 2024-08-28
Payer: MEDICARE

## 2024-08-26 VITALS
DIASTOLIC BLOOD PRESSURE: 68 MMHG | SYSTOLIC BLOOD PRESSURE: 116 MMHG | RESPIRATION RATE: 18 BRPM | HEART RATE: 78 BPM | TEMPERATURE: 98.4 F | OXYGEN SATURATION: 99 %

## 2024-08-26 VITALS
OXYGEN SATURATION: 95 % | SYSTOLIC BLOOD PRESSURE: 100 MMHG | RESPIRATION RATE: 18 BRPM | DIASTOLIC BLOOD PRESSURE: 74 MMHG | HEART RATE: 91 BPM

## 2024-08-26 DIAGNOSIS — C34.90 NON-SMALL CELL LUNG CANCER, UNSPECIFIED LATERALITY (HCC): ICD-10-CM

## 2024-08-26 LAB
SEND OUT REPORT: NORMAL
TEST NAME: NORMAL

## 2024-08-26 PROCEDURE — 88334 PATH CONSLTJ SURG CYTO XM EA: CPT

## 2024-08-26 PROCEDURE — 88333 PATH CONSLTJ SURG CYTO XM 1: CPT

## 2024-08-26 PROCEDURE — 88341 IMHCHEM/IMCYTCHM EA ADD ANTB: CPT

## 2024-08-26 PROCEDURE — 7100000041 HC SPAR PHASE II RECOVERY - ADDTL 15 MIN

## 2024-08-26 PROCEDURE — 88305 TISSUE EXAM BY PATHOLOGIST: CPT

## 2024-08-26 PROCEDURE — 88342 IMHCHEM/IMCYTCHM 1ST ANTB: CPT

## 2024-08-26 PROCEDURE — 20220 BONE BIOPSY TROCAR/NDL SUPFC: CPT

## 2024-08-26 PROCEDURE — 7100000040 HC SPAR PHASE II RECOVERY - FIRST 15 MIN

## 2024-08-26 RX ORDER — SODIUM CHLORIDE 9 MG/ML
INJECTION, SOLUTION INTRAVENOUS CONTINUOUS
Status: DISCONTINUED | OUTPATIENT
Start: 2024-08-26 | End: 2024-08-29 | Stop reason: HOSPADM

## 2024-08-26 ASSESSMENT — PAIN SCALES - GENERAL: PAINLEVEL_OUTOF10: 5

## 2024-08-26 ASSESSMENT — PAIN DESCRIPTION - LOCATION: LOCATION: HIP

## 2024-08-26 ASSESSMENT — PAIN - FUNCTIONAL ASSESSMENT: PAIN_FUNCTIONAL_ASSESSMENT: 0-10

## 2024-08-26 ASSESSMENT — PAIN DESCRIPTION - DESCRIPTORS: DESCRIPTORS: ACHING

## 2024-08-26 NOTE — OR NURSING
Core specimens obtained x 5 et passed off to cytology. Post scan done. Dressing to back of head. Tolerated well.

## 2024-08-26 NOTE — INTERVAL H&P NOTE
No varicosities to bilateral lower extremities.   NEUROLOGIC: CN II-XII are grossly intact. Gait not assessed.     RACHEL PENNINGTON, APRN - CNP  8/26/24  7:39 AM

## 2024-08-26 NOTE — BRIEF OP NOTE
Brief Postoperative Note    Isiah Duncan  YOB: 1975  [id number]    Pre-operative Diagnosis: Scalp lesion    Post-operative Diagnosis: Same    Procedure: Scalp bx    Anesthesia: Local    Surgeons/Assistants: MD Jose David    Estimated Blood Loss: less than 50     Complications: None    Specimens: Was Obtained:     Findings: Successful bx of scalp lesion.  5 core samples obtained.     Electronically signed by HARSH Villa on 8/26/2024 at 9:24 AM

## 2024-08-27 ENCOUNTER — APPOINTMENT (OUTPATIENT)
Dept: GENERAL RADIOLOGY | Age: 49
End: 2024-08-27
Attending: STUDENT IN AN ORGANIZED HEALTH CARE EDUCATION/TRAINING PROGRAM
Payer: MEDICARE

## 2024-08-27 ENCOUNTER — HOSPITAL ENCOUNTER (OUTPATIENT)
Age: 49
Setting detail: SURGERY ADMIT
Discharge: HOME | End: 2024-08-27
Attending: STUDENT IN AN ORGANIZED HEALTH CARE EDUCATION/TRAINING PROGRAM | Admitting: STUDENT IN AN ORGANIZED HEALTH CARE EDUCATION/TRAINING PROGRAM
Payer: MEDICARE

## 2024-08-27 ENCOUNTER — ANESTHESIA EVENT (OUTPATIENT)
Dept: OPERATING ROOM | Age: 49
End: 2024-08-27
Payer: MEDICARE

## 2024-08-27 ENCOUNTER — TELEPHONE (OUTPATIENT)
Dept: RADIATION ONCOLOGY | Age: 49
End: 2024-08-27

## 2024-08-27 ENCOUNTER — ANESTHESIA (OUTPATIENT)
Dept: OPERATING ROOM | Age: 49
End: 2024-08-27
Payer: MEDICARE

## 2024-08-27 VITALS
RESPIRATION RATE: 12 BRPM | HEIGHT: 74 IN | BODY MASS INDEX: 22.46 KG/M2 | WEIGHT: 175 LBS | TEMPERATURE: 97.9 F | DIASTOLIC BLOOD PRESSURE: 80 MMHG | OXYGEN SATURATION: 99 % | SYSTOLIC BLOOD PRESSURE: 113 MMHG | HEART RATE: 101 BPM

## 2024-08-27 DIAGNOSIS — G89.18 POSTOPERATIVE PAIN: Primary | ICD-10-CM

## 2024-08-27 DIAGNOSIS — M89.9 LESION OF LEFT FEMUR: ICD-10-CM

## 2024-08-27 LAB
GLUCOSE BLD-MCNC: 170 MG/DL (ref 75–110)
GLUCOSE BLD-MCNC: 199 MG/DL (ref 75–110)
SURGICAL PATHOLOGY REPORT: NORMAL

## 2024-08-27 PROCEDURE — C1713 ANCHOR/SCREW BN/BN,TIS/BN: HCPCS | Performed by: STUDENT IN AN ORGANIZED HEALTH CARE EDUCATION/TRAINING PROGRAM

## 2024-08-27 PROCEDURE — 6360000002 HC RX W HCPCS

## 2024-08-27 PROCEDURE — 20245 BONE BIOPSY OPEN DEEP: CPT | Performed by: STUDENT IN AN ORGANIZED HEALTH CARE EDUCATION/TRAINING PROGRAM

## 2024-08-27 PROCEDURE — 2709999900 HC NON-CHARGEABLE SUPPLY: Performed by: STUDENT IN AN ORGANIZED HEALTH CARE EDUCATION/TRAINING PROGRAM

## 2024-08-27 PROCEDURE — 7100000001 HC PACU RECOVERY - ADDTL 15 MIN: Performed by: STUDENT IN AN ORGANIZED HEALTH CARE EDUCATION/TRAINING PROGRAM

## 2024-08-27 PROCEDURE — 3600000004 HC SURGERY LEVEL 4 BASE: Performed by: STUDENT IN AN ORGANIZED HEALTH CARE EDUCATION/TRAINING PROGRAM

## 2024-08-27 PROCEDURE — 88342 IMHCHEM/IMCYTCHM 1ST ANTB: CPT

## 2024-08-27 PROCEDURE — 2720000010 HC SURG SUPPLY STERILE: Performed by: STUDENT IN AN ORGANIZED HEALTH CARE EDUCATION/TRAINING PROGRAM

## 2024-08-27 PROCEDURE — 2580000003 HC RX 258: Performed by: STUDENT IN AN ORGANIZED HEALTH CARE EDUCATION/TRAINING PROGRAM

## 2024-08-27 PROCEDURE — C1769 GUIDE WIRE: HCPCS | Performed by: STUDENT IN AN ORGANIZED HEALTH CARE EDUCATION/TRAINING PROGRAM

## 2024-08-27 PROCEDURE — 3700000001 HC ADD 15 MINUTES (ANESTHESIA): Performed by: STUDENT IN AN ORGANIZED HEALTH CARE EDUCATION/TRAINING PROGRAM

## 2024-08-27 PROCEDURE — 73552 X-RAY EXAM OF FEMUR 2/>: CPT

## 2024-08-27 PROCEDURE — 72170 X-RAY EXAM OF PELVIS: CPT

## 2024-08-27 PROCEDURE — 3700000000 HC ANESTHESIA ATTENDED CARE: Performed by: STUDENT IN AN ORGANIZED HEALTH CARE EDUCATION/TRAINING PROGRAM

## 2024-08-27 PROCEDURE — 88341 IMHCHEM/IMCYTCHM EA ADD ANTB: CPT

## 2024-08-27 PROCEDURE — 88311 DECALCIFY TISSUE: CPT

## 2024-08-27 PROCEDURE — 88307 TISSUE EXAM BY PATHOLOGIST: CPT

## 2024-08-27 PROCEDURE — 7100000011 HC PHASE II RECOVERY - ADDTL 15 MIN: Performed by: STUDENT IN AN ORGANIZED HEALTH CARE EDUCATION/TRAINING PROGRAM

## 2024-08-27 PROCEDURE — 2500000003 HC RX 250 WO HCPCS

## 2024-08-27 PROCEDURE — 27245 TREAT THIGH FRACTURE: CPT | Performed by: STUDENT IN AN ORGANIZED HEALTH CARE EDUCATION/TRAINING PROGRAM

## 2024-08-27 PROCEDURE — 7100000010 HC PHASE II RECOVERY - FIRST 15 MIN: Performed by: STUDENT IN AN ORGANIZED HEALTH CARE EDUCATION/TRAINING PROGRAM

## 2024-08-27 PROCEDURE — 3600000014 HC SURGERY LEVEL 4 ADDTL 15MIN: Performed by: STUDENT IN AN ORGANIZED HEALTH CARE EDUCATION/TRAINING PROGRAM

## 2024-08-27 PROCEDURE — 6360000004 HC RX CONTRAST MEDICATION: Performed by: STUDENT IN AN ORGANIZED HEALTH CARE EDUCATION/TRAINING PROGRAM

## 2024-08-27 PROCEDURE — 2580000003 HC RX 258

## 2024-08-27 PROCEDURE — 7100000000 HC PACU RECOVERY - FIRST 15 MIN: Performed by: STUDENT IN AN ORGANIZED HEALTH CARE EDUCATION/TRAINING PROGRAM

## 2024-08-27 PROCEDURE — 82947 ASSAY GLUCOSE BLOOD QUANT: CPT

## 2024-08-27 DEVICE — TRAUMACEM(TM) V+ INJECTABLE BONE CEMENT - STERILE
Type: IMPLANTABLE DEVICE | Site: FEMUR | Status: FUNCTIONAL
Brand: TRAUMACEM

## 2024-08-27 DEVICE — SCREW BNE L105MM DIA10.35MM G TI CANN PERF FOR PROX FEM: Type: IMPLANTABLE DEVICE | Site: FEMUR | Status: FUNCTIONAL

## 2024-08-27 DEVICE — 12MM/125 DEG TI CANN TFNA 460MM/LEFT-STERILE
Type: IMPLANTABLE DEVICE | Site: FEMUR | Status: FUNCTIONAL
Brand: TFN-ADVANCE

## 2024-08-27 DEVICE — LOCKING SCREW FOR IM NAIL Ø 5MM/ 46MM/ XL25/ STERILE: Type: IMPLANTABLE DEVICE | Site: FEMUR | Status: FUNCTIONAL

## 2024-08-27 RX ORDER — ONDANSETRON 2 MG/ML
4 INJECTION INTRAMUSCULAR; INTRAVENOUS
Status: DISCONTINUED | OUTPATIENT
Start: 2024-08-27 | End: 2024-08-27 | Stop reason: HOSPADM

## 2024-08-27 RX ORDER — METOPROLOL TARTRATE 1 MG/ML
INJECTION, SOLUTION INTRAVENOUS PRN
Status: DISCONTINUED | OUTPATIENT
Start: 2024-08-27 | End: 2024-08-27 | Stop reason: SDUPTHER

## 2024-08-27 RX ORDER — NALOXONE HYDROCHLORIDE 0.4 MG/ML
INJECTION, SOLUTION INTRAMUSCULAR; INTRAVENOUS; SUBCUTANEOUS PRN
Status: DISCONTINUED | OUTPATIENT
Start: 2024-08-27 | End: 2024-08-27 | Stop reason: HOSPADM

## 2024-08-27 RX ORDER — SODIUM CHLORIDE 9 MG/ML
INJECTION, SOLUTION INTRAVENOUS PRN
Status: DISCONTINUED | OUTPATIENT
Start: 2024-08-27 | End: 2024-08-27 | Stop reason: HOSPADM

## 2024-08-27 RX ORDER — FENTANYL CITRATE 50 UG/ML
INJECTION, SOLUTION INTRAMUSCULAR; INTRAVENOUS PRN
Status: DISCONTINUED | OUTPATIENT
Start: 2024-08-27 | End: 2024-08-27 | Stop reason: SDUPTHER

## 2024-08-27 RX ORDER — LIDOCAINE HYDROCHLORIDE 10 MG/ML
INJECTION, SOLUTION EPIDURAL; INFILTRATION; INTRACAUDAL; PERINEURAL PRN
Status: DISCONTINUED | OUTPATIENT
Start: 2024-08-27 | End: 2024-08-27 | Stop reason: SDUPTHER

## 2024-08-27 RX ORDER — DEXAMETHASONE SODIUM PHOSPHATE 10 MG/ML
INJECTION, SOLUTION INTRAMUSCULAR; INTRAVENOUS PRN
Status: DISCONTINUED | OUTPATIENT
Start: 2024-08-27 | End: 2024-08-27 | Stop reason: SDUPTHER

## 2024-08-27 RX ORDER — MAGNESIUM HYDROXIDE 1200 MG/15ML
LIQUID ORAL CONTINUOUS PRN
Status: DISCONTINUED | OUTPATIENT
Start: 2024-08-27 | End: 2024-08-27 | Stop reason: HOSPADM

## 2024-08-27 RX ORDER — PHENYLEPHRINE HCL IN 0.9% NACL 1 MG/10 ML
SYRINGE (ML) INTRAVENOUS PRN
Status: DISCONTINUED | OUTPATIENT
Start: 2024-08-27 | End: 2024-08-27 | Stop reason: SDUPTHER

## 2024-08-27 RX ORDER — ONDANSETRON 2 MG/ML
INJECTION INTRAMUSCULAR; INTRAVENOUS PRN
Status: DISCONTINUED | OUTPATIENT
Start: 2024-08-27 | End: 2024-08-27 | Stop reason: SDUPTHER

## 2024-08-27 RX ORDER — IOPAMIDOL 612 MG/ML
INJECTION, SOLUTION INTRAVASCULAR PRN
Status: DISCONTINUED | OUTPATIENT
Start: 2024-08-27 | End: 2024-08-27 | Stop reason: HOSPADM

## 2024-08-27 RX ORDER — MIDAZOLAM HYDROCHLORIDE 2 MG/2ML
2 INJECTION, SOLUTION INTRAMUSCULAR; INTRAVENOUS ONCE
Status: DISCONTINUED | OUTPATIENT
Start: 2024-08-27 | End: 2024-08-27 | Stop reason: HOSPADM

## 2024-08-27 RX ORDER — GABAPENTIN 100 MG/1
100 CAPSULE ORAL 3 TIMES DAILY
Qty: 42 CAPSULE | Refills: 0 | Status: SHIPPED | OUTPATIENT
Start: 2024-08-27 | End: 2024-09-10

## 2024-08-27 RX ORDER — ROCURONIUM BROMIDE 10 MG/ML
INJECTION, SOLUTION INTRAVENOUS PRN
Status: DISCONTINUED | OUTPATIENT
Start: 2024-08-27 | End: 2024-08-27 | Stop reason: SDUPTHER

## 2024-08-27 RX ORDER — SODIUM CHLORIDE, SODIUM LACTATE, POTASSIUM CHLORIDE, CALCIUM CHLORIDE 600; 310; 30; 20 MG/100ML; MG/100ML; MG/100ML; MG/100ML
INJECTION, SOLUTION INTRAVENOUS CONTINUOUS PRN
Status: DISCONTINUED | OUTPATIENT
Start: 2024-08-27 | End: 2024-08-27 | Stop reason: SDUPTHER

## 2024-08-27 RX ORDER — CYCLOBENZAPRINE HCL 10 MG
10 TABLET ORAL 3 TIMES DAILY PRN
Qty: 50 TABLET | Refills: 0 | Status: SHIPPED | OUTPATIENT
Start: 2024-08-27

## 2024-08-27 RX ORDER — MIDAZOLAM HYDROCHLORIDE 1 MG/ML
INJECTION INTRAMUSCULAR; INTRAVENOUS PRN
Status: DISCONTINUED | OUTPATIENT
Start: 2024-08-27 | End: 2024-08-27 | Stop reason: SDUPTHER

## 2024-08-27 RX ORDER — SODIUM CHLORIDE 0.9 % (FLUSH) 0.9 %
5-40 SYRINGE (ML) INJECTION PRN
Status: DISCONTINUED | OUTPATIENT
Start: 2024-08-27 | End: 2024-08-27 | Stop reason: HOSPADM

## 2024-08-27 RX ORDER — PROPOFOL 10 MG/ML
INJECTION, EMULSION INTRAVENOUS PRN
Status: DISCONTINUED | OUTPATIENT
Start: 2024-08-27 | End: 2024-08-27 | Stop reason: SDUPTHER

## 2024-08-27 RX ORDER — SODIUM CHLORIDE 0.9 % (FLUSH) 0.9 %
5-40 SYRINGE (ML) INJECTION EVERY 12 HOURS SCHEDULED
Status: DISCONTINUED | OUTPATIENT
Start: 2024-08-27 | End: 2024-08-27 | Stop reason: HOSPADM

## 2024-08-27 RX ORDER — ACETAMINOPHEN 500 MG
1000 TABLET ORAL EVERY 6 HOURS PRN
Qty: 112 TABLET | Refills: 0 | Status: SHIPPED | OUTPATIENT
Start: 2024-08-27

## 2024-08-27 RX ORDER — LABETALOL HYDROCHLORIDE 5 MG/ML
10 INJECTION, SOLUTION INTRAVENOUS
Status: DISCONTINUED | OUTPATIENT
Start: 2024-08-27 | End: 2024-08-27 | Stop reason: HOSPADM

## 2024-08-27 RX ORDER — OXYCODONE HYDROCHLORIDE 5 MG/1
5 TABLET ORAL EVERY 6 HOURS PRN
Qty: 28 TABLET | Refills: 0 | Status: SHIPPED | OUTPATIENT
Start: 2024-08-27 | End: 2024-09-03

## 2024-08-27 RX ORDER — DOCUSATE SODIUM 100 MG/1
100 CAPSULE, LIQUID FILLED ORAL 2 TIMES DAILY PRN
Qty: 20 CAPSULE | Refills: 0 | Status: SHIPPED | OUTPATIENT
Start: 2024-08-27

## 2024-08-27 RX ADMIN — PROPOFOL 150 MG: 10 INJECTION, EMULSION INTRAVENOUS at 09:05

## 2024-08-27 RX ADMIN — FENTANYL CITRATE 50 MCG: 0.05 INJECTION, SOLUTION INTRAMUSCULAR; INTRAVENOUS at 10:26

## 2024-08-27 RX ADMIN — METOPROLOL TARTRATE 1 MG: 1 INJECTION, SOLUTION INTRAVENOUS at 10:07

## 2024-08-27 RX ADMIN — FENTANYL CITRATE 50 MCG: 0.05 INJECTION, SOLUTION INTRAMUSCULAR; INTRAVENOUS at 09:17

## 2024-08-27 RX ADMIN — LIDOCAINE HYDROCHLORIDE 50 MG: 10 INJECTION, SOLUTION EPIDURAL; INFILTRATION; INTRACAUDAL; PERINEURAL at 09:05

## 2024-08-27 RX ADMIN — FENTANYL CITRATE 50 MCG: 0.05 INJECTION, SOLUTION INTRAMUSCULAR; INTRAVENOUS at 09:34

## 2024-08-27 RX ADMIN — METOPROLOL TARTRATE 1 MG: 1 INJECTION, SOLUTION INTRAVENOUS at 09:42

## 2024-08-27 RX ADMIN — FENTANYL CITRATE 50 MCG: 0.05 INJECTION, SOLUTION INTRAMUSCULAR; INTRAVENOUS at 10:16

## 2024-08-27 RX ADMIN — ONDANSETRON 4 MG: 2 INJECTION INTRAMUSCULAR; INTRAVENOUS at 10:15

## 2024-08-27 RX ADMIN — SUGAMMADEX 200 MG: 100 INJECTION, SOLUTION INTRAVENOUS at 10:33

## 2024-08-27 RX ADMIN — FENTANYL CITRATE 100 MCG: 0.05 INJECTION, SOLUTION INTRAMUSCULAR; INTRAVENOUS at 09:05

## 2024-08-27 RX ADMIN — SODIUM CHLORIDE, POTASSIUM CHLORIDE, SODIUM LACTATE AND CALCIUM CHLORIDE: 600; 310; 30; 20 INJECTION, SOLUTION INTRAVENOUS at 09:11

## 2024-08-27 RX ADMIN — Medication 2 G: at 09:22

## 2024-08-27 RX ADMIN — HYDROMORPHONE HYDROCHLORIDE 0.5 MG: 1 INJECTION, SOLUTION INTRAMUSCULAR; INTRAVENOUS; SUBCUTANEOUS at 09:50

## 2024-08-27 RX ADMIN — Medication 100 MCG: at 09:23

## 2024-08-27 RX ADMIN — DEXAMETHASONE SODIUM PHOSPHATE 10 MG: 10 INJECTION, SOLUTION INTRAMUSCULAR; INTRAVENOUS at 09:28

## 2024-08-27 RX ADMIN — FENTANYL CITRATE 50 MCG: 0.05 INJECTION, SOLUTION INTRAMUSCULAR; INTRAVENOUS at 09:44

## 2024-08-27 RX ADMIN — SODIUM CHLORIDE, SODIUM LACTATE, POTASSIUM CHLORIDE, CALCIUM CHLORIDE: 600; 310; 30; 20 INJECTION, SOLUTION INTRAVENOUS at 08:58

## 2024-08-27 RX ADMIN — MIDAZOLAM 2 MG: 1 INJECTION INTRAMUSCULAR; INTRAVENOUS at 09:01

## 2024-08-27 RX ADMIN — ROCURONIUM BROMIDE 50 MG: 10 INJECTION, SOLUTION INTRAVENOUS at 09:05

## 2024-08-27 RX ADMIN — HYDROMORPHONE HYDROCHLORIDE 0.5 MG: 1 INJECTION, SOLUTION INTRAMUSCULAR; INTRAVENOUS; SUBCUTANEOUS at 09:57

## 2024-08-27 ASSESSMENT — PAIN - FUNCTIONAL ASSESSMENT
PAIN_FUNCTIONAL_ASSESSMENT: 0-10
PAIN_FUNCTIONAL_ASSESSMENT: NONE - DENIES PAIN

## 2024-08-27 NOTE — DISCHARGE INSTRUCTIONS
Orthopaedic Instructions:  -Weight bearing status: Weight bearing as tolerated with the left leg  -Do not remove Optifoam bandage (the large sealed \"Band-aid\"-like dressing) until your follow-up date in clinic. It is okay to shower with the Optifoam bandage. Should it fall off, replace with band-aids or gauze & tape until dry. It is still okay to shower if it falls off, but avoid baths and underwater submersion.  -Always look for signs of compartment syndrome: pain out of proportion to the injury, pain not controlled with pain medication, numbness in digits, changing of color of digits (paleness). If these signs occur return to ED immediately for reassessment.  -Always work on ankle , toe motion to decrease swelling.  -Ice (20 minutes on and off 1 hour) to reduce swelling and throbbing pain.  -Should urinate within 8 hours of surgery.  -Call the office or come to Emergency Room if signs of infection appear (hot, swollen, red, draining pus, fever)  -Take medications as prescribed.  -Wean off narcotics (percocet/norco) as soon as possible. Do not take tylenol if still taking narcotics.  -Follow up with Dr. Cary in his office on 9/18/24 at 2:00 PM. Call [phone] to schedule/confirm or with any questions/concerns.      No alcoholic beverages, no driving or operating machinery, no making important decisions for 24 hours.   You may have a normal diet but should eat lightly day of surgery.  Drink plenty of fluids.  Urinate within 8 hours after surgery, if unable to urinate call your doctor

## 2024-08-27 NOTE — BRIEF OP NOTE
Brief Postoperative Note      Patient: Isiah Duncan  YOB: 1975  MRN: [id number]    Date of Procedure: 8/27/2024    Pre-Op Diagnosis Codes:   Pathologic lesion, left femur    Post-Op Diagnosis:   Pathologic lesion, left femur       Procedure(s):  -Prophylactic intramedullary nailing of left femur  -bone Biopsy lesion, left femur    Surgeon(s):  Jose Cary DO    Assistant:  Resident: Demetrio Nunn MD    Anesthesia: General    Estimated Blood Loss (mL): 100cc  Fluids: 1600cc crystalloids    Complications: None    Specimens:   ID Type Source Tests Collected by Time Destination   A : LEFT FEMUR TISSUE Tissue Leg SURGICAL PATHOLOGY Jose Cary DO 8/27/2024 1003        Implants:  Implant Name Type Inv. Item Serial No.  Lot No. LRB No. Used Action   NAIL IM [id number] [id number] 125DEG LNG GRN L PROX FEM TI - [id number]  NAIL IM L460MM [id number] 125DEG LNG GRN L PROX FEM TI  DEPU[employer] [place]- [id number] Left 1 Implanted   SCREW BNE L105MM DIA10.35MM G TI GERRY PERF FOR PROX FEM - [id number]  SCREW BNE L105MM DIA10.35MM G TI GERRY PERF FOR PROX FEM  DEPUY SY[employer] [place]-WD [id number] Left 1 Implanted   CEMENT BONE INJ TRAUMACEM V+ STERILE - [id number]  CEMENT BONE INJ TRAUMACEM V+ STERILE  DEPUY [employer] [place]-WD [id number] Left 1 Implanted   SCREW LCK F/IM NAIL 5X46MM XL25 STR - [id number]  SCREW LCK F/IM NAIL 5X46MM XL25 STR  DEPUY SYNTHES [place]-WD [id number] Left 1 Implanted         Drains: * No LDAs found *    Findings:  Infection Present At Time Of Surgery (PATOS) (choose all levels that have infection present):  No infection present  Other Findings: Pathologic lesion of proximal left femur. See opnote for more details    Electronically signed by Jose Cary DO on 8/27/2024 at 7:08 PM

## 2024-08-27 NOTE — TELEPHONE ENCOUNTER
Per Dr. Moralez pt needs to have radiation planning for left femur .  I contacted Max no answer message left.

## 2024-08-27 NOTE — OP NOTE
Operative Note    Isiah Duncan  YOB: 1975  [id number]    Date of surgery: 8/27/2024    Pre-operative Diagnosis: Left peritrochanteric lytic lesion    Post-operative Diagnosis:  Left peritrochanteric lytic lesion    Procedure:   1: Prophylactic intramedullary nail fixation left peritrochanteric lytic lesion femur  2: Left proximal femur biopsy    Anesthesia: General    Surgeons: Jose Cary DO    Assistants: Edouard    Estimated Blood Loss: 100 cc    IVF: 1600 cc crystalloid    Complications: None    Specimens: Was Obtained: left femur tissue/bone    Findings:  Left peritrochanteric lytic lesion    Implants: Synthes TFNA [id number]      Indications:   This is a 48 yo Male who has known metastatic disease, with lytic lesion to left peritrochanteric region.  Patient presents for prophylactic nail fixation left femur with biopsy.  All treatment options, including conservative and operative, were discussed with the patient in detail including the risks and benefits of each. Risks including but not limited to  bleeding, blood clots, infection, damage to nearby tissues, vessels and nerves, wound healing complications, failed procedure, stiffness, loss of motion, malunion, nonunion, anesthesia risk, loss of life were all discussed with the patient.  Knowing these risks, the patient wished to proceed with surgery as indicated. Consent was obtained. Site Marked. All questions answered to the patient's satisfaction.      Operative:    The patient was taken to the operative suite, placed under general anesthesia without any complications.  Ancef 2 gm was given prior to the procedure.  At this time, all team members paused to identify proper patient name, indications, site and allergies.  All team members were in agreeance.  The patient was placed on a Gabrielle Table.  Patient was adequately secured to the operative table and all bony prominences were well-padded. The Left lower extremity was prepped and draped in  normal sterile fashion.    Using a marking pen, we mapped out our incision proximal to the tip of the greater trochanter.  Small incision was made, and starting guidewire was placed in the appropriate position and confirmed on both AP and lateral radiographs.  Guidewire was advanced, followed by opening reamer, ball-tipped guidewire was then passed down to the level of the knee.  Lateral of the knee was obtained to ensure proper placement of our ball-tipped guidewire.   AP of the hip was then obtained, and depth gauge was introduced to measure the appropriate length of our nail.   Being satisfied, we attached our Synthes NEIDA system, placed a 14 mm reamer head, and passed our NEIDA system to obtain bone biopsy within the intramedullary canal in the proximal femur.  This was sent for analysis with pathology.  We then placed our Synthes TFNA 12x460 cephalomedullary nail.  Being satisfied with the placement and positioning, we attached the proximal targeting guide for placement of our lag screw.  We then incised the skin, placed the guidewire up into the femoral neck into the femoral head, and confirmed appropriate position on both AP and lateral radiographs.  We then measured the appropriate length of our screw.  We then drilled, and placed our lag screw.  We once again confirmed proper placement of the lag screw. We then statically locked the nail proximally. Given the pathologic nature of the lesion, it was felt to augment the lag screw with bone cement.  Initially we injected Isovue using the Synthes cannulas to ensure no intra-articular extension had occurred.  Being satisfied, cement was prepared on the back table.  Then using the Synthes cannulas, we injected cement into the femoral head to augment our fixation.  This was done under direct fluoroscopic guidance to ensure no extravasation into the hip joint.    Being satisfied, lateral of the distal extent of the nail was obtained.  We obtained perfect circles,

## 2024-08-27 NOTE — ANESTHESIA PRE PROCEDURE
Department of Anesthesiology  Preprocedure Note       Name:  Isiah Duncan   Age:  49 y.o.  :  1975                                          MRN:  [id number]         Date:  2024      Surgeon: Surgeon(s):  Jose Cary DO    Procedure: Procedure(s):  ORIF FEMUR (SYNTHES WITH AUGMENT, FRACTURE TABLE, SUPINE, C-ARM)    Medications prior to admission:   Prior to Admission medications    Medication Sig Start Date End Date Taking? Authorizing Provider   ondansetron (ZOFRAN-ODT) 8 MG TBDP disintegrating tablet Take 1 tablet by mouth 3 times daily as needed for Nausea or Vomiting 8/15/24  Yes Calin Strong MD   propranolol (INDERAL) 20 MG tablet Take 1 tablet by mouth 2 times daily 24  Yes Melecio Guerrero MD   metFORMIN (GLUCOPHAGE) 1000 MG tablet Take 1 tablet by mouth 2 times daily 24  Yes Melecio Guerrero MD   atorvastatin (LIPITOR) 80 MG tablet Take 1 tablet by mouth nightly at bedtime. 24  Yes Melecio Guerrero MD   Ergocalciferol (VITAMIN D) 82501 units CAPS Take 50,000 Units by mouth once a week 22  Yes Jeevan Rodríguez MD   famotidine (PEPCID) 20 MG tablet Take 1 tablet by mouth 2 times daily 22  Yes Jeevan Rodríguez MD   prochlorperazine (COMPAZINE) 10 MG tablet Take 1 tablet by mouth every 6 hours as needed (nausea vomiting)  Patient not taking: Reported on 2024 8/15/24   Calin Strong MD   lidocaine-prilocaine (EMLA) 2.5-2.5 % cream Apply topically 45-60 mins prior to needle poke daily PRN 8/15/24   Calin Strong MD   traZODone (DESYREL) 100 MG tablet Take 1 tablet by mouth nightly as needed for Sleep 24  Ирина Ye MD   nicotine (NICODERM CQ) 21 MG/24HR Place 1 patch onto the skin daily 24   Jory Chahal MD   Cholecalciferol (VITAMIN D) 25 MCG TABS Take 1 tablet by mouth daily 24  Jory Chahal MD   INVEGA TRINZA 546 MG/1.75ML ALEXSANDER IM injection  24   Provider, MD Melecio  calculate BMI.    CBC:   Lab Results   Component Value Date/Time    WBC 5.4 08/21/2024 10:02 AM    RBC 4.07 08/21/2024 10:02 AM    HGB 11.3 08/21/2024 10:02 AM    HCT 33.4 08/21/2024 10:02 AM    MCV 82.1 08/21/2024 10:02 AM    RDW 15.5 08/21/2024 10:02 AM     08/21/2024 10:02 AM       CMP:   Lab Results   Component Value Date/Time     08/21/2024 10:02 AM    K 4.2 08/21/2024 10:02 AM    CL 98 08/21/2024 10:02 AM    CO2 24 08/21/2024 10:02 AM    BUN 10 08/21/2024 10:02 AM    CREATININE 0.5 08/21/2024 10:02 AM    GFRAA >60 06/23/2022 04:08 PM    LABGLOM >90 08/21/2024 10:02 AM    LABGLOM >60 10/05/2023 10:04 AM    GLUCOSE 271 08/21/2024 10:02 AM    CALCIUM 9.1 08/21/2024 10:02 AM    BILITOT 0.6 08/21/2024 10:02 AM    ALKPHOS 219 08/21/2024 10:02 AM    AST 10 08/21/2024 10:02 AM    ALT 17 08/21/2024 10:02 AM       POC Tests: No results for input(s): \"POCGLU\", \"POCNA\", \"POCK\", \"POCCL\", \"POCBUN\", \"POCHEMO\", \"POCHCT\" in the last 72 hours.    Coags:   Lab Results   Component Value Date/Time    PROTIME 13.1 08/20/2024 12:33 PM    INR 1.0 08/20/2024 12:33 PM    APTT 28.3 08/20/2024 12:33 PM       HCG (If Applicable): No results found for: \"PREGTESTUR\", \"PREGSERUM\", \"HCG\", \"HCGQUANT\"     ABGs: No results found for: \"PHART\", \"PO2ART\", \"ZHP8WXX\", \"AHD0NRO\", \"BEART\", \"I8SCPOMS\"     Type & Screen (If Applicable):  No results found for: \"LABABO\"    Drug/Infectious Status (If Applicable):  No results found for: \"HIV\", \"HEPCAB\"    COVID-19 Screening (If Applicable):   Lab Results   Component Value Date/Time    COVID19 Not Detected 07/18/2024 08:15 AM           Anesthesia Evaluation  Patient summary reviewed and Nursing notes reviewed   no history of anesthetic complications:   Airway: Mallampati: II  TM distance: >3 FB   Neck ROM: full  Mouth opening: > = 3 FB   Dental: normal exam         Pulmonary: breath sounds clear to auscultation  (+) pneumonia:                                     ROS comment: Hilar mass

## 2024-08-27 NOTE — ANESTHESIA POSTPROCEDURE EVALUATION
Department of Anesthesiology  Postprocedure Note    Patient: Isiah Duncan  MRN: [id number]  YOB: 1975  Date of evaluation: 8/27/2024    Procedure Summary       Date: 08/27/24 Room / Location: [address]    Anesthesia Start: 0900 Anesthesia Stop: 1047    Procedure: PROPHYLATIC INTERMEDULLARY NAILING OF FEMUR BIOPSY OF FEMUR (SYNTHES TFNA,  C-ARM) (Left) Diagnosis:       Lesion of left femur      (Lesion of left femur [M89.9])    Surgeons: Jose Cary DO Responsible Provider: Gabi Greenfield MD    Anesthesia Type: general ASA Status: 4            Anesthesia Type: No value filed.    Michele Phase I: Michele Score: 10    Michele Phase II: Michele Score: 10    Anesthesia Post Evaluation    Patient location during evaluation: PACU  Patient participation: complete - patient participated  Level of consciousness: awake and alert  Pain score: 2  Airway patency: patent  Nausea & Vomiting: no nausea and no vomiting  Cardiovascular status: hemodynamically stable  Respiratory status: acceptable  Hydration status: euvolemic  Pain management: adequate    No notable events documented.

## 2024-08-27 NOTE — H&P
History and Physical    Pt Name: Isiah Duncan  MRN: [id number]  YOB: 1975  Date of evaluation: 8/27/2024  Primary Care Physician: Lolita Mcclain APRN - DEAN    SUBJECTIVE:   History of Chief Complaint:    Isiah Duncan is a 49 y.o. male who is scheduled today for ORIF FEMUR - LEFT. Patient is accompanied by his father/legal guardian Max who is a helpful historian. Max states the patient has metastatic lung cancer and had his port accessed last week for chemo and due for next one 21 days after first one. Patient's father states that his prognosis is overall poor per the oncologist. He has received 1 chemotherapy session, with no current radiation. Patient's father states he is currently getting approval for his insurance to begin radiation.  The patient reportedly had recent right hip surgery last month with Dr. Cary and is here today  to have his left hip surgery. He had scalp biopsy yesterday with IR, the area is well healed.   Allergies  has No Known Allergies.  Medications  Prior to Admission medications    Medication Sig Start Date End Date Taking? Authorizing Provider   ondansetron (ZOFRAN-ODT) 8 MG TBDP disintegrating tablet Take 1 tablet by mouth 3 times daily as needed for Nausea or Vomiting 8/15/24  Yes Calin Strong MD   propranolol (INDERAL) 20 MG tablet Take 1 tablet by mouth 2 times daily 6/27/24  Yes Melecio Guerrero MD   metFORMIN (GLUCOPHAGE) 1000 MG tablet Take 1 tablet by mouth 2 times daily 7/1/24  Yes Melecio Guerrero MD   atorvastatin (LIPITOR) 80 MG tablet Take 1 tablet by mouth nightly at bedtime. 7/1/24  Yes Melecio Guerrero MD   Ergocalciferol (VITAMIN D) 21270 units CAPS Take 50,000 Units by mouth once a week 5/29/22  Yes Jeevan Rodríguez MD   famotidine (PEPCID) 20 MG tablet Take 1 tablet by mouth 2 times daily 5/23/22  Yes Jeevan Rodríguez MD   prochlorperazine (COMPAZINE) 10 MG tablet Take 1 tablet by mouth every 6 hours as needed (nausea  malaise +per HPI   EYES:   negative for double vision, blurred vision and photophobia    HEENT:   negative for tinnitus, epistaxis and sore throat     RESPIRATORY:   negative for cough, shortness of breath, wheezing  +per HPI   CARDIOVASCULAR:   negative for chest pain, palpitations, syncope, edema     GASTROINTESTINAL:   negative for nausea, vomiting  +unintentional weight loss   GENITOURINARY:   negative for incontinence     MUSCULOSKELETAL:   negative for neck or back pain  +per HPI   NEUROLOGICAL:   Negative for weakness and tingling  negative for headaches and dizziness     PSYCHIATRIC:   negative for anxiety       OBJECTIVE:   VITALS:  height is 1.88 m (6' 2\") and weight is 79.4 kg (175 lb). His temporal temperature is 97.2 °F (36.2 °C). His blood pressure is 118/86 and his pulse is 106 (abnormal). His respiration is 18.   CONSTITUTIONAL:alert, oriented x 3. He is accompanied by his legal guardian/father Max who is a helpful historian  SKIN:  Warm and dry, no rashes to exposed areas of skin.   HEAD: significantly large, superior, central scalp mass.   EYES: PERRL.  EOMs intact.  EARS:  Intact and equal bilaterally.  No edema or thickening, without lumps, lesions, or discharge. Hearing grossly WNL.    NOSE:  Nares patent.  No rhinorrhea.   MOUTH/THROAT:  Mucous membranes moist,tongue discolored, edentulous  NECK: Supple, no lymphadenopathy.  LUNGS: Respirations even and non-labored. Clear to auscultation bilaterally, mildly diminished bilateral bases.   CARDIOVASCULAR: Regular rate and rhythm, no murmurs/rubs/gallops.   ABDOMEN: soft, non-tender and non-distended, bowel sounds active x 4.   EXTREMITIES: No edema to bilateral lower extremities.  No varicosities to bilateral lower extremities.   NEUROLOGIC: CN II-XII are grossly intact. Gait not assessed.   IMPRESSIONS:   Lesion of left femur    PLANS:   ORIF FEMUR - LEFT    GOYO LEO CNP   Electronically signed 8/27/2024 at 8:23 AM

## 2024-08-28 ENCOUNTER — TELEPHONE (OUTPATIENT)
Facility: HOSPITAL | Age: 49
End: 2024-08-28

## 2024-08-30 LAB — SURGICAL PATHOLOGY REPORT: NORMAL

## 2024-09-04 ENCOUNTER — TELEPHONE (OUTPATIENT)
Dept: ONCOLOGY | Age: 49
End: 2024-09-04

## 2024-09-04 NOTE — TELEPHONE ENCOUNTER
Name: Isiah Duncan  : 1975  MRN: [id number]    Oncology Navigation Follow-Up Note    Contact Type:  Telephone    Notes:   Navigator unsure if Tissue sent/requested by [facility] for testing? Email sent to triage due to original Bx tissue may be insufficient and they will need to send  Scalp sample.  Writer noting [facility] has tried to reach out to pt.   Blanquita/Debbie, pt. May have gone to care facility, but writer not sure? In the past pt. Went to [facility]?      Electronically signed by Fiona Villafana RN on 2024 at 8:11 AM

## 2024-09-04 NOTE — TELEPHONE ENCOUNTER
Spoke with radiation therapist, Michelle, who stated she has not spoken with pt's father yet regarding treatment schedule.  Per Dr. Moralez, pt will need simulated for left hip/femur radiation.  Discussed doing this when pt is done with chemotherapy that he is scheduled for 9/11/24.  Left voicemail for father requesting return call to update.

## 2024-09-04 NOTE — TELEPHONE ENCOUNTER
Spoke with patient's father, Max, and updated him on appts and plan of care.  He verbalized understanding and agreeable.

## 2024-09-06 LAB
PD-L1 BY 22C3 TISS IMSTN DOC: NEGATIVE
TEMPUS PD-L1 (22C3) COMBINED POSITIVE SCORE: <1
TEMPUS PD-L1 (22C3) TUMOR PROPORTION SCORE: <1 %
TEMPUS PORTAL: NORMAL

## 2024-09-11 ENCOUNTER — HOSPITAL ENCOUNTER (OUTPATIENT)
Dept: INFUSION THERAPY | Age: 49
Discharge: HOME | End: 2024-09-11
Payer: MEDICARE

## 2024-09-11 ENCOUNTER — TELEPHONE (OUTPATIENT)
Dept: ONCOLOGY | Age: 49
End: 2024-09-11

## 2024-09-11 ENCOUNTER — HOSPITAL ENCOUNTER (OUTPATIENT)
Dept: RADIATION ONCOLOGY | Age: 49
End: 2024-09-11
Payer: MEDICARE

## 2024-09-11 ENCOUNTER — OFFICE VISIT (OUTPATIENT)
Dept: ONCOLOGY | Age: 49
End: 2024-09-11
Payer: MEDICARE

## 2024-09-11 VITALS — DIASTOLIC BLOOD PRESSURE: 81 MMHG | HEART RATE: 125 BPM | SYSTOLIC BLOOD PRESSURE: 114 MMHG | OXYGEN SATURATION: 100 %

## 2024-09-11 VITALS
HEART RATE: 94 BPM | TEMPERATURE: 97.9 F | BODY MASS INDEX: 22.21 KG/M2 | WEIGHT: 173 LBS | DIASTOLIC BLOOD PRESSURE: 64 MMHG | RESPIRATION RATE: 16 BRPM | SYSTOLIC BLOOD PRESSURE: 96 MMHG

## 2024-09-11 DIAGNOSIS — R59.0 AXILLARY LYMPHADENOPATHY: ICD-10-CM

## 2024-09-11 DIAGNOSIS — C79.51 MALIGNANT NEOPLASM METASTATIC TO BONE (HCC): Primary | ICD-10-CM

## 2024-09-11 DIAGNOSIS — R59.0 MEDIASTINAL LYMPHADENOPATHY: ICD-10-CM

## 2024-09-11 DIAGNOSIS — C34.90 NON-SMALL CELL LUNG CANCER, UNSPECIFIED LATERALITY (HCC): ICD-10-CM

## 2024-09-11 DIAGNOSIS — D64.9 NORMOCYTIC ANEMIA: ICD-10-CM

## 2024-09-11 DIAGNOSIS — Z79.899 LONG TERM USE OF DRUG: ICD-10-CM

## 2024-09-11 LAB
ALBUMIN SERPL-MCNC: 3.1 G/DL (ref 3.5–5.2)
ALBUMIN/GLOB SERPL: 1 {RATIO} (ref 1–2.5)
ALP SERPL-CCNC: 217 U/L (ref 40–129)
ALT SERPL-CCNC: 19 U/L (ref 5–41)
AMYLASE SERPL-CCNC: 40 U/L (ref 28–100)
ANION GAP SERPL CALCULATED.3IONS-SCNC: 13 MMOL/L (ref 9–17)
AST SERPL-CCNC: 10 U/L
BASOPHILS # BLD: 0 K/UL (ref 0–0.2)
BASOPHILS NFR BLD: 1 % (ref 0–2)
BILIRUB SERPL-MCNC: 0.4 MG/DL (ref 0.3–1.2)
BUN SERPL-MCNC: 8 MG/DL (ref 6–20)
CALCIUM SERPL-MCNC: 9 MG/DL (ref 8.6–10.4)
CHLORIDE SERPL-SCNC: 100 MMOL/L (ref 98–107)
CO2 SERPL-SCNC: 24 MMOL/L (ref 20–31)
CORTIS SERPL-MCNC: 5.8 UG/DL (ref 2.5–19.5)
CORTISOL COLLECTION INFO: NORMAL
CREAT SERPL-MCNC: 0.5 MG/DL (ref 0.7–1.2)
EOSINOPHIL # BLD: 0 K/UL (ref 0–0.4)
EOSINOPHILS RELATIVE PERCENT: 0 % (ref 1–4)
ERYTHROCYTE [DISTWIDTH] IN BLOOD BY AUTOMATED COUNT: 17.5 % (ref 12.5–15.4)
GFR, ESTIMATED: >90 ML/MIN/1.73M2
GLUCOSE SERPL-MCNC: 249 MG/DL (ref 70–99)
HCT VFR BLD AUTO: 29.9 % (ref 41–53)
HGB BLD-MCNC: 10.1 G/DL (ref 13.5–17.5)
LIPASE SERPL-CCNC: 25 U/L (ref 13–60)
LYMPHOCYTES NFR BLD: 1 K/UL (ref 1–4.8)
LYMPHOCYTES RELATIVE PERCENT: 14 % (ref 24–44)
MCH RBC QN AUTO: 27.4 PG (ref 26–34)
MCHC RBC AUTO-ENTMCNC: 33.8 G/DL (ref 31–37)
MCV RBC AUTO: 81.1 FL (ref 80–100)
MONOCYTES NFR BLD: 0.1 K/UL (ref 0.1–1.2)
MONOCYTES NFR BLD: 1 % (ref 2–11)
NEUTROPHILS NFR BLD: 84 % (ref 36–66)
NEUTS SEG NFR BLD: 6.3 K/UL (ref 1.8–7.7)
PLATELET # BLD AUTO: 362 K/UL (ref 140–450)
PMV BLD AUTO: 6.2 FL (ref 6–12)
POTASSIUM SERPL-SCNC: 4.2 MMOL/L (ref 3.7–5.3)
PROT SERPL-MCNC: 6.1 G/DL (ref 6.4–8.3)
RBC # BLD AUTO: 3.68 M/UL (ref 4.5–5.9)
SODIUM SERPL-SCNC: 137 MMOL/L (ref 135–144)
TSH SERPL DL<=0.05 MIU/L-ACNC: 1.45 UIU/ML (ref 0.3–5)
WBC OTHER # BLD: 7.5 K/UL (ref 3.5–11)

## 2024-09-11 PROCEDURE — 96375 TX/PRO/DX INJ NEW DRUG ADDON: CPT

## 2024-09-11 PROCEDURE — 99214 OFFICE O/P EST MOD 30 MIN: CPT | Performed by: INTERNAL MEDICINE

## 2024-09-11 PROCEDURE — 96415 CHEMO IV INFUSION ADDL HR: CPT

## 2024-09-11 PROCEDURE — 4004F PT TOBACCO SCREEN RCVD TLK: CPT | Performed by: INTERNAL MEDICINE

## 2024-09-11 PROCEDURE — 82150 ASSAY OF AMYLASE: CPT

## 2024-09-11 PROCEDURE — 96413 CHEMO IV INFUSION 1 HR: CPT

## 2024-09-11 PROCEDURE — 2580000003 HC RX 258: Performed by: INTERNAL MEDICINE

## 2024-09-11 PROCEDURE — 85025 COMPLETE CBC W/AUTO DIFF WBC: CPT

## 2024-09-11 PROCEDURE — 96367 TX/PROPH/DG ADDL SEQ IV INF: CPT

## 2024-09-11 PROCEDURE — 2500000003 HC RX 250 WO HCPCS: Performed by: INTERNAL MEDICINE

## 2024-09-11 PROCEDURE — 80053 COMPREHEN METABOLIC PANEL: CPT

## 2024-09-11 PROCEDURE — G8420 CALC BMI NORM PARAMETERS: HCPCS | Performed by: INTERNAL MEDICINE

## 2024-09-11 PROCEDURE — 83690 ASSAY OF LIPASE: CPT

## 2024-09-11 PROCEDURE — 84443 ASSAY THYROID STIM HORMONE: CPT

## 2024-09-11 PROCEDURE — 6360000002 HC RX W HCPCS: Performed by: INTERNAL MEDICINE

## 2024-09-11 PROCEDURE — 82533 TOTAL CORTISOL: CPT

## 2024-09-11 PROCEDURE — G8428 CUR MEDS NOT DOCUMENT: HCPCS | Performed by: INTERNAL MEDICINE

## 2024-09-11 PROCEDURE — 36591 DRAW BLOOD OFF VENOUS DEVICE: CPT

## 2024-09-11 PROCEDURE — 96417 CHEMO IV INFUS EACH ADDL SEQ: CPT

## 2024-09-11 RX ORDER — DIPHENHYDRAMINE HYDROCHLORIDE 50 MG/ML
50 INJECTION INTRAMUSCULAR; INTRAVENOUS ONCE
Status: CANCELLED | OUTPATIENT
Start: 2024-09-11 | End: 2024-09-11

## 2024-09-11 RX ORDER — ACETAMINOPHEN 325 MG/1
650 TABLET ORAL
Status: DISCONTINUED | OUTPATIENT
Start: 2024-09-11 | End: 2024-09-12 | Stop reason: HOSPADM

## 2024-09-11 RX ORDER — SODIUM CHLORIDE 9 MG/ML
INJECTION, SOLUTION INTRAVENOUS CONTINUOUS
Status: CANCELLED | OUTPATIENT
Start: 2024-09-11

## 2024-09-11 RX ORDER — MEPERIDINE HYDROCHLORIDE 50 MG/ML
12.5 INJECTION INTRAMUSCULAR; INTRAVENOUS; SUBCUTANEOUS PRN
Status: CANCELLED | OUTPATIENT
Start: 2024-09-11

## 2024-09-11 RX ORDER — HEPARIN 100 UNIT/ML
500 SYRINGE INTRAVENOUS PRN
Status: DISCONTINUED | OUTPATIENT
Start: 2024-09-11 | End: 2024-09-12 | Stop reason: HOSPADM

## 2024-09-11 RX ORDER — PALONOSETRON 0.05 MG/ML
0.25 INJECTION, SOLUTION INTRAVENOUS ONCE
Status: CANCELLED | OUTPATIENT
Start: 2024-09-11 | End: 2024-09-11

## 2024-09-11 RX ORDER — EPINEPHRINE 1 MG/ML
0.3 INJECTION, SOLUTION, CONCENTRATE INTRAVENOUS PRN
Status: DISCONTINUED | OUTPATIENT
Start: 2024-09-11 | End: 2024-09-12 | Stop reason: HOSPADM

## 2024-09-11 RX ORDER — SODIUM CHLORIDE 9 MG/ML
INJECTION, SOLUTION INTRAVENOUS CONTINUOUS
Status: DISCONTINUED | OUTPATIENT
Start: 2024-09-11 | End: 2024-09-12 | Stop reason: HOSPADM

## 2024-09-11 RX ORDER — SODIUM CHLORIDE 9 MG/ML
5-250 INJECTION, SOLUTION INTRAVENOUS PRN
Status: CANCELLED | OUTPATIENT
Start: 2024-09-11

## 2024-09-11 RX ORDER — ACETAMINOPHEN 325 MG/1
650 TABLET ORAL
Status: CANCELLED | OUTPATIENT
Start: 2024-09-11

## 2024-09-11 RX ORDER — PALONOSETRON 0.05 MG/ML
0.25 INJECTION, SOLUTION INTRAVENOUS ONCE
Status: COMPLETED | OUTPATIENT
Start: 2024-09-11 | End: 2024-09-11

## 2024-09-11 RX ORDER — MEPERIDINE HYDROCHLORIDE 50 MG/ML
12.5 INJECTION INTRAMUSCULAR; INTRAVENOUS; SUBCUTANEOUS PRN
Status: DISCONTINUED | OUTPATIENT
Start: 2024-09-11 | End: 2024-09-12 | Stop reason: HOSPADM

## 2024-09-11 RX ORDER — DIPHENHYDRAMINE HYDROCHLORIDE 50 MG/ML
50 INJECTION INTRAMUSCULAR; INTRAVENOUS
Status: CANCELLED | OUTPATIENT
Start: 2024-09-11

## 2024-09-11 RX ORDER — SODIUM CHLORIDE 0.9 % (FLUSH) 0.9 %
5-40 SYRINGE (ML) INJECTION PRN
Status: DISCONTINUED | OUTPATIENT
Start: 2024-09-11 | End: 2024-09-12 | Stop reason: HOSPADM

## 2024-09-11 RX ORDER — HEPARIN SODIUM (PORCINE) LOCK FLUSH IV SOLN 100 UNIT/ML 100 UNIT/ML
500 SOLUTION INTRAVENOUS PRN
Status: CANCELLED | OUTPATIENT
Start: 2024-09-11

## 2024-09-11 RX ORDER — SODIUM CHLORIDE 0.9 % (FLUSH) 0.9 %
5-40 SYRINGE (ML) INJECTION PRN
Status: CANCELLED | OUTPATIENT
Start: 2024-09-11

## 2024-09-11 RX ORDER — ALBUTEROL SULFATE 90 UG/1
4 INHALANT RESPIRATORY (INHALATION) PRN
Status: DISCONTINUED | OUTPATIENT
Start: 2024-09-11 | End: 2024-09-12 | Stop reason: HOSPADM

## 2024-09-11 RX ORDER — DIPHENHYDRAMINE HYDROCHLORIDE 50 MG/ML
50 INJECTION INTRAMUSCULAR; INTRAVENOUS ONCE
Status: COMPLETED | OUTPATIENT
Start: 2024-09-11 | End: 2024-09-11

## 2024-09-11 RX ORDER — DIPHENHYDRAMINE HYDROCHLORIDE 50 MG/ML
50 INJECTION INTRAMUSCULAR; INTRAVENOUS
Status: COMPLETED | OUTPATIENT
Start: 2024-09-11 | End: 2024-09-11

## 2024-09-11 RX ORDER — FAMOTIDINE 10 MG/ML
20 INJECTION, SOLUTION INTRAVENOUS
Status: CANCELLED | OUTPATIENT
Start: 2024-09-11

## 2024-09-11 RX ORDER — EPINEPHRINE 1 MG/ML
0.3 INJECTION, SOLUTION, CONCENTRATE INTRAVENOUS PRN
Status: CANCELLED | OUTPATIENT
Start: 2024-09-11

## 2024-09-11 RX ORDER — SODIUM CHLORIDE 9 MG/ML
5-250 INJECTION, SOLUTION INTRAVENOUS PRN
Status: DISCONTINUED | OUTPATIENT
Start: 2024-09-11 | End: 2024-09-12 | Stop reason: HOSPADM

## 2024-09-11 RX ORDER — FAMOTIDINE 10 MG/ML
20 INJECTION, SOLUTION INTRAVENOUS
Status: COMPLETED | OUTPATIENT
Start: 2024-09-11 | End: 2024-09-11

## 2024-09-11 RX ORDER — PROCHLORPERAZINE EDISYLATE 5 MG/ML
5 INJECTION INTRAMUSCULAR; INTRAVENOUS
Status: CANCELLED | OUTPATIENT
Start: 2024-09-11

## 2024-09-11 RX ORDER — FAMOTIDINE 10 MG/ML
20 INJECTION, SOLUTION INTRAVENOUS ONCE
Status: CANCELLED | OUTPATIENT
Start: 2024-09-11 | End: 2024-09-11

## 2024-09-11 RX ORDER — ONDANSETRON 2 MG/ML
8 INJECTION INTRAMUSCULAR; INTRAVENOUS
Status: CANCELLED | OUTPATIENT
Start: 2024-09-11

## 2024-09-11 RX ORDER — ALBUTEROL SULFATE 90 UG/1
4 AEROSOL, METERED RESPIRATORY (INHALATION) PRN
Status: CANCELLED | OUTPATIENT
Start: 2024-09-11

## 2024-09-11 RX ORDER — ONDANSETRON 2 MG/ML
8 INJECTION INTRAMUSCULAR; INTRAVENOUS
Status: DISCONTINUED | OUTPATIENT
Start: 2024-09-11 | End: 2024-09-12 | Stop reason: HOSPADM

## 2024-09-11 RX ORDER — DEXAMETHASONE SODIUM PHOSPHATE 10 MG/ML
10 INJECTION INTRAMUSCULAR; INTRAVENOUS ONCE
Status: COMPLETED | OUTPATIENT
Start: 2024-09-11 | End: 2024-09-11

## 2024-09-11 RX ORDER — FAMOTIDINE 10 MG/ML
20 INJECTION, SOLUTION INTRAVENOUS ONCE
Status: COMPLETED | OUTPATIENT
Start: 2024-09-11 | End: 2024-09-11

## 2024-09-11 RX ADMIN — DIPHENHYDRAMINE HYDROCHLORIDE 50 MG: 50 INJECTION INTRAMUSCULAR; INTRAVENOUS at 09:42

## 2024-09-11 RX ADMIN — SODIUM CHLORIDE: 9 INJECTION, SOLUTION INTRAVENOUS at 11:46

## 2024-09-11 RX ADMIN — FAMOTIDINE 20 MG: 10 INJECTION, SOLUTION INTRAVENOUS at 11:42

## 2024-09-11 RX ADMIN — SODIUM CHLORIDE, PRESERVATIVE FREE 10 ML: 5 INJECTION INTRAVENOUS at 08:47

## 2024-09-11 RX ADMIN — SODIUM CHLORIDE 150 MG: 9 INJECTION, SOLUTION INTRAVENOUS at 09:59

## 2024-09-11 RX ADMIN — CARBOPLATIN 800 MG: 10 INJECTION INTRAVENOUS at 15:07

## 2024-09-11 RX ADMIN — FAMOTIDINE 20 MG: 10 INJECTION, SOLUTION INTRAVENOUS at 09:42

## 2024-09-11 RX ADMIN — HEPARIN 500 UNITS: 100 SYRINGE at 15:38

## 2024-09-11 RX ADMIN — HYDROCORTISONE SODIUM SUCCINATE 100 MG: 100 INJECTION, POWDER, FOR SOLUTION INTRAMUSCULAR; INTRAVENOUS at 11:46

## 2024-09-11 RX ADMIN — SODIUM CHLORIDE, PRESERVATIVE FREE 10 ML: 5 INJECTION INTRAVENOUS at 08:48

## 2024-09-11 RX ADMIN — PACLITAXEL 408 MG: 6 INJECTION, SOLUTION, CONCENTRATE INTRAVENOUS at 11:22

## 2024-09-11 RX ADMIN — DEXAMETHASONE SODIUM PHOSPHATE 10 MG: 10 INJECTION INTRAMUSCULAR; INTRAVENOUS at 09:42

## 2024-09-11 RX ADMIN — SODIUM CHLORIDE 200 MG: 9 INJECTION, SOLUTION INTRAVENOUS at 10:45

## 2024-09-11 RX ADMIN — DIPHENHYDRAMINE HYDROCHLORIDE 50 MG: 50 INJECTION INTRAMUSCULAR; INTRAVENOUS at 11:50

## 2024-09-11 RX ADMIN — SODIUM CHLORIDE, PRESERVATIVE FREE 10 ML: 5 INJECTION INTRAVENOUS at 15:38

## 2024-09-11 RX ADMIN — PALONOSETRON 0.25 MG: 0.05 INJECTION, SOLUTION INTRAVENOUS at 09:42

## 2024-09-11 RX ADMIN — SODIUM CHLORIDE 200 ML/HR: 9 INJECTION, SOLUTION INTRAVENOUS at 09:41

## 2024-09-12 ENCOUNTER — HOSPITAL ENCOUNTER (OUTPATIENT)
Dept: RADIATION ONCOLOGY | Age: 49
Discharge: HOME | End: 2024-09-12
Payer: MEDICARE

## 2024-09-12 PROCEDURE — 77280 THER RAD SIMULAJ FIELD SMPL: CPT | Performed by: RADIOLOGY

## 2024-09-12 PROCEDURE — 77412 RADIATION TX DELIVERY LVL 3: CPT | Performed by: RADIOLOGY

## 2024-09-13 ENCOUNTER — HOSPITAL ENCOUNTER (OUTPATIENT)
Dept: RADIATION ONCOLOGY | Age: 49
Discharge: HOME | End: 2024-09-13
Payer: MEDICARE

## 2024-09-13 PROBLEM — D64.9 NORMOCYTIC ANEMIA: Status: ACTIVE | Noted: 2024-09-13

## 2024-09-13 LAB
DNA RANGE(S) EXAMINED NAR: NORMAL
GENE DIS ANL INTERP-IMP: POSITIVE
GENE DIS ASSESSED: NORMAL
GENE MUT TESTED BLD/T: 16.3 M/MB
MLH1+MSH2+MSH6+PMS2 GN DEL+DUP+FUL M: NORMAL
MMR ENDO PMS2 CA SPEC QL IMSTN: PRESENT
MMR PROT MLH1 CA SPEC QL IMSTN: PRESENT
MMR PROT MSH2 CA SPEC QL IMSTN: PRESENT
MMR PROT MSH6 CA SPEC QL IMSTN: PRESENT
MSI CA SPEC-IMP: NORMAL
PD-L1 BY 22C3 TISS IMSTN DOC: NEGATIVE
REASON FOR STUDY: NORMAL
TEMPUS GERMLINE NOTE: NORMAL
TEMPUS PD-L1 (22C3) COMBINED POSITIVE SCORE: <1
TEMPUS PD-L1 (22C3) TUMOR PROPORTION SCORE: <1 %
TEMPUS PERTINENTNEGATIVES: NORMAL
TEMPUS PORTAL: NORMAL
TEMPUS THERAPY1: NORMAL
TEMPUS THERAPYCOUNT: 1
TEMPUS TRIALCOUNT: 3
TEMPUS TRIALMATCHES1: NORMAL
TEMPUS TRIALMATCHES2: NORMAL
TEMPUS TRIALMATCHES3: NORMAL
TEMPUS XR RESULT 1: NORMAL

## 2024-09-13 PROCEDURE — 77412 RADIATION TX DELIVERY LVL 3: CPT | Performed by: RADIOLOGY

## 2024-09-13 PROCEDURE — 77387 GUIDANCE FOR RADJ TX DLVR: CPT | Performed by: RADIOLOGY

## 2024-09-16 ENCOUNTER — HOSPITAL ENCOUNTER (OUTPATIENT)
Dept: RADIATION ONCOLOGY | Age: 49
Discharge: HOME | End: 2024-09-16
Payer: MEDICARE

## 2024-09-16 ENCOUNTER — TELEPHONE (OUTPATIENT)
Dept: GASTROENTEROLOGY | Age: 49
End: 2024-09-16

## 2024-09-16 PROCEDURE — 77412 RADIATION TX DELIVERY LVL 3: CPT | Performed by: RADIOLOGY

## 2024-09-16 PROCEDURE — 77334 RADIATION TREATMENT AID(S): CPT | Performed by: RADIOLOGY

## 2024-09-16 PROCEDURE — 77300 RADIATION THERAPY DOSE PLAN: CPT | Performed by: RADIOLOGY

## 2024-09-16 PROCEDURE — 77387 GUIDANCE FOR RADJ TX DLVR: CPT | Performed by: RADIOLOGY

## 2024-09-16 NOTE — TELEPHONE ENCOUNTER
Gerhard from [facility] is requesting a call regarding whether or not this patient is being seen at this office and can be reached at [phone] Ext. [phone] to leave a message.

## 2024-09-17 ENCOUNTER — HOSPITAL ENCOUNTER (OUTPATIENT)
Dept: RADIATION ONCOLOGY | Age: 49
Discharge: HOME | End: 2024-09-17
Payer: MEDICARE

## 2024-09-17 PROCEDURE — 77412 RADIATION TX DELIVERY LVL 3: CPT | Performed by: RADIOLOGY

## 2024-09-17 PROCEDURE — 77280 THER RAD SIMULAJ FIELD SMPL: CPT | Performed by: RADIOLOGY

## 2024-09-18 ENCOUNTER — HOSPITAL ENCOUNTER (OUTPATIENT)
Dept: RADIATION ONCOLOGY | Age: 49
Discharge: HOME | End: 2024-09-18
Payer: MEDICARE

## 2024-09-18 VITALS
TEMPERATURE: 98 F | RESPIRATION RATE: 16 BRPM | WEIGHT: 168.8 LBS | DIASTOLIC BLOOD PRESSURE: 80 MMHG | HEART RATE: 67 BPM | SYSTOLIC BLOOD PRESSURE: 117 MMHG | BODY MASS INDEX: 21.67 KG/M2

## 2024-09-18 PROCEDURE — 77336 RADIATION PHYSICS CONSULT: CPT | Performed by: RADIOLOGY

## 2024-09-18 PROCEDURE — 77387 GUIDANCE FOR RADJ TX DLVR: CPT | Performed by: RADIOLOGY

## 2024-09-18 PROCEDURE — 77412 RADIATION TX DELIVERY LVL 3: CPT | Performed by: RADIOLOGY

## 2024-09-19 ENCOUNTER — HOSPITAL ENCOUNTER (OUTPATIENT)
Dept: RADIATION ONCOLOGY | Age: 49
Discharge: HOME | End: 2024-09-19
Payer: MEDICARE

## 2024-09-19 PROCEDURE — 77412 RADIATION TX DELIVERY LVL 3: CPT | Performed by: RADIOLOGY

## 2024-09-19 PROCEDURE — 77417 THER RADIOLOGY PORT IMAGE(S): CPT | Performed by: RADIOLOGY

## 2024-09-20 ENCOUNTER — HOSPITAL ENCOUNTER (OUTPATIENT)
Dept: RADIATION ONCOLOGY | Age: 49
Discharge: HOME | End: 2024-09-20
Payer: MEDICARE

## 2024-09-20 ENCOUNTER — TELEPHONE (OUTPATIENT)
Dept: ONCOLOGY | Age: 49
End: 2024-09-20

## 2024-09-20 PROCEDURE — 77412 RADIATION TX DELIVERY LVL 3: CPT | Performed by: RADIOLOGY

## 2024-09-20 PROCEDURE — 77387 GUIDANCE FOR RADJ TX DLVR: CPT | Performed by: RADIOLOGY

## 2024-09-23 ENCOUNTER — HOSPITAL ENCOUNTER (OUTPATIENT)
Dept: RADIATION ONCOLOGY | Age: 49
Discharge: HOME | End: 2024-09-23
Payer: MEDICARE

## 2024-09-23 PROCEDURE — 77412 RADIATION TX DELIVERY LVL 3: CPT | Performed by: RADIOLOGY

## 2024-09-23 PROCEDURE — 77387 GUIDANCE FOR RADJ TX DLVR: CPT | Performed by: RADIOLOGY

## 2024-09-24 ENCOUNTER — HOSPITAL ENCOUNTER (OUTPATIENT)
Dept: RADIATION ONCOLOGY | Age: 49
Discharge: HOME | End: 2024-09-24
Payer: MEDICARE

## 2024-09-24 PROCEDURE — 77417 THER RADIOLOGY PORT IMAGE(S): CPT | Performed by: RADIOLOGY

## 2024-09-24 PROCEDURE — 77412 RADIATION TX DELIVERY LVL 3: CPT | Performed by: RADIOLOGY

## 2024-09-25 ENCOUNTER — HOSPITAL ENCOUNTER (OUTPATIENT)
Dept: RADIATION ONCOLOGY | Age: 49
Discharge: HOME | End: 2024-09-25
Payer: MEDICARE

## 2024-09-25 VITALS
BODY MASS INDEX: 21.21 KG/M2 | SYSTOLIC BLOOD PRESSURE: 106 MMHG | WEIGHT: 165.2 LBS | RESPIRATION RATE: 18 BRPM | TEMPERATURE: 97.6 F | HEART RATE: 106 BPM | OXYGEN SATURATION: 99 % | DIASTOLIC BLOOD PRESSURE: 72 MMHG

## 2024-09-25 PROCEDURE — 77412 RADIATION TX DELIVERY LVL 3: CPT | Performed by: RADIOLOGY

## 2024-09-25 PROCEDURE — 77387 GUIDANCE FOR RADJ TX DLVR: CPT | Performed by: RADIOLOGY

## 2024-09-26 ENCOUNTER — HOSPITAL ENCOUNTER (OUTPATIENT)
Dept: RADIATION ONCOLOGY | Age: 49
Discharge: HOME | End: 2024-09-26
Payer: MEDICARE

## 2024-09-26 PROCEDURE — 77387 GUIDANCE FOR RADJ TX DLVR: CPT | Performed by: RADIOLOGY

## 2024-09-26 PROCEDURE — 77412 RADIATION TX DELIVERY LVL 3: CPT | Performed by: RADIOLOGY

## 2024-09-27 ENCOUNTER — TELEPHONE (OUTPATIENT)
Dept: ORTHOPEDIC SURGERY | Age: 49
End: 2024-09-27

## 2024-09-27 ENCOUNTER — HOSPITAL ENCOUNTER (OUTPATIENT)
Dept: RADIATION ONCOLOGY | Age: 49
Discharge: HOME | End: 2024-09-27
Payer: MEDICARE

## 2024-09-27 PROCEDURE — 77412 RADIATION TX DELIVERY LVL 3: CPT | Performed by: RADIOLOGY

## 2024-09-27 PROCEDURE — 77387 GUIDANCE FOR RADJ TX DLVR: CPT | Performed by: RADIOLOGY

## 2024-09-30 ENCOUNTER — HOSPITAL ENCOUNTER (OUTPATIENT)
Dept: RADIATION ONCOLOGY | Age: 49
Discharge: HOME | End: 2024-09-30
Payer: MEDICARE

## 2024-09-30 PROCEDURE — 77387 GUIDANCE FOR RADJ TX DLVR: CPT | Performed by: RADIOLOGY

## 2024-09-30 PROCEDURE — 77336 RADIATION PHYSICS CONSULT: CPT | Performed by: RADIOLOGY

## 2024-09-30 PROCEDURE — 77412 RADIATION TX DELIVERY LVL 3: CPT | Performed by: RADIOLOGY

## 2024-10-02 ENCOUNTER — OFFICE VISIT (OUTPATIENT)
Dept: ONCOLOGY | Age: 49
End: 2024-10-02
Payer: MEDICARE

## 2024-10-02 ENCOUNTER — HOSPITAL ENCOUNTER (OUTPATIENT)
Dept: INFUSION THERAPY | Age: 49
Discharge: HOME | End: 2024-10-02
Payer: MEDICARE

## 2024-10-02 ENCOUNTER — TELEPHONE (OUTPATIENT)
Dept: ONCOLOGY | Age: 49
End: 2024-10-02

## 2024-10-02 VITALS
HEART RATE: 105 BPM | SYSTOLIC BLOOD PRESSURE: 103 MMHG | DIASTOLIC BLOOD PRESSURE: 73 MMHG | RESPIRATION RATE: 18 BRPM | BODY MASS INDEX: 21.7 KG/M2 | WEIGHT: 169 LBS | TEMPERATURE: 97.8 F

## 2024-10-02 DIAGNOSIS — Z79.899 LONG TERM USE OF DRUG: ICD-10-CM

## 2024-10-02 DIAGNOSIS — C79.51 MALIGNANT NEOPLASM METASTATIC TO BONE (HCC): Primary | ICD-10-CM

## 2024-10-02 DIAGNOSIS — C34.90 NON-SMALL CELL LUNG CANCER, UNSPECIFIED LATERALITY (HCC): ICD-10-CM

## 2024-10-02 LAB
ALBUMIN SERPL-MCNC: 3.4 G/DL (ref 3.5–5.2)
ALBUMIN/GLOB SERPL: 1.3 {RATIO} (ref 1–2.5)
ALP SERPL-CCNC: 161 U/L (ref 40–129)
ALT SERPL-CCNC: 11 U/L (ref 5–41)
AMYLASE SERPL-CCNC: 57 U/L (ref 28–100)
ANION GAP SERPL CALCULATED.3IONS-SCNC: 15 MMOL/L (ref 9–17)
AST SERPL-CCNC: 8 U/L
BASOPHILS # BLD: 0.03 K/UL (ref 0–0.2)
BASOPHILS NFR BLD: 1 % (ref 0–2)
BILIRUB SERPL-MCNC: 0.4 MG/DL (ref 0.3–1.2)
BUN SERPL-MCNC: 10 MG/DL (ref 6–20)
CALCIUM SERPL-MCNC: 8.7 MG/DL (ref 8.6–10.4)
CHLORIDE SERPL-SCNC: 100 MMOL/L (ref 98–107)
CO2 SERPL-SCNC: 23 MMOL/L (ref 20–31)
CORTIS SERPL-MCNC: 5 UG/DL (ref 2.5–19.5)
CORTISOL COLLECTION INFO: NORMAL
CREAT SERPL-MCNC: 0.6 MG/DL (ref 0.7–1.2)
EOSINOPHIL # BLD: 0 K/UL (ref 0–0.4)
EOSINOPHILS RELATIVE PERCENT: 0 % (ref 1–4)
ERYTHROCYTE [DISTWIDTH] IN BLOOD BY AUTOMATED COUNT: 21 % (ref 12.5–15.4)
GFR, ESTIMATED: >90 ML/MIN/1.73M2
GLUCOSE SERPL-MCNC: 336 MG/DL (ref 70–99)
HCT VFR BLD AUTO: 31.8 % (ref 41–53)
HGB BLD-MCNC: 10.6 G/DL (ref 13.5–17.5)
LIPASE SERPL-CCNC: 48 U/L (ref 13–60)
LYMPHOCYTES NFR BLD: 0.35 K/UL (ref 1–4.8)
LYMPHOCYTES RELATIVE PERCENT: 11 % (ref 24–44)
MCH RBC QN AUTO: 27.4 PG (ref 26–34)
MCHC RBC AUTO-ENTMCNC: 33.2 G/DL (ref 31–37)
MCV RBC AUTO: 82.5 FL (ref 80–100)
MONOCYTES NFR BLD: 0.03 K/UL (ref 0.1–0.8)
MONOCYTES NFR BLD: 1 % (ref 1–7)
MORPHOLOGY: ABNORMAL
MORPHOLOGY: ABNORMAL
NEUTROPHILS NFR BLD: 87 % (ref 36–66)
NEUTS SEG NFR BLD: 2.79 K/UL (ref 1.8–7.7)
PLATELET # BLD AUTO: 250 K/UL (ref 140–450)
PMV BLD AUTO: 6.7 FL (ref 6–12)
POTASSIUM SERPL-SCNC: 3.8 MMOL/L (ref 3.7–5.3)
PROT SERPL-MCNC: 6.1 G/DL (ref 6.4–8.3)
RBC # BLD AUTO: 3.86 M/UL (ref 4.5–5.9)
SODIUM SERPL-SCNC: 138 MMOL/L (ref 135–144)
TSH SERPL DL<=0.05 MIU/L-ACNC: 1.09 UIU/ML (ref 0.3–5)
WBC OTHER # BLD: 3.2 K/UL (ref 3.5–11)

## 2024-10-02 PROCEDURE — 2580000003 HC RX 258: Performed by: INTERNAL MEDICINE

## 2024-10-02 PROCEDURE — G8427 DOCREV CUR MEDS BY ELIG CLIN: HCPCS | Performed by: INTERNAL MEDICINE

## 2024-10-02 PROCEDURE — 99214 OFFICE O/P EST MOD 30 MIN: CPT | Performed by: INTERNAL MEDICINE

## 2024-10-02 PROCEDURE — 82150 ASSAY OF AMYLASE: CPT

## 2024-10-02 PROCEDURE — 4004F PT TOBACCO SCREEN RCVD TLK: CPT | Performed by: INTERNAL MEDICINE

## 2024-10-02 PROCEDURE — G8484 FLU IMMUNIZE NO ADMIN: HCPCS | Performed by: INTERNAL MEDICINE

## 2024-10-02 PROCEDURE — 2500000003 HC RX 250 WO HCPCS: Performed by: INTERNAL MEDICINE

## 2024-10-02 PROCEDURE — 96413 CHEMO IV INFUSION 1 HR: CPT

## 2024-10-02 PROCEDURE — 84443 ASSAY THYROID STIM HORMONE: CPT

## 2024-10-02 PROCEDURE — 83690 ASSAY OF LIPASE: CPT

## 2024-10-02 PROCEDURE — 96375 TX/PRO/DX INJ NEW DRUG ADDON: CPT

## 2024-10-02 PROCEDURE — G8420 CALC BMI NORM PARAMETERS: HCPCS | Performed by: INTERNAL MEDICINE

## 2024-10-02 PROCEDURE — 6360000002 HC RX W HCPCS: Performed by: INTERNAL MEDICINE

## 2024-10-02 PROCEDURE — 82533 TOTAL CORTISOL: CPT

## 2024-10-02 PROCEDURE — 96367 TX/PROPH/DG ADDL SEQ IV INF: CPT

## 2024-10-02 PROCEDURE — 96415 CHEMO IV INFUSION ADDL HR: CPT

## 2024-10-02 PROCEDURE — 80053 COMPREHEN METABOLIC PANEL: CPT

## 2024-10-02 PROCEDURE — 96417 CHEMO IV INFUS EACH ADDL SEQ: CPT

## 2024-10-02 PROCEDURE — 85025 COMPLETE CBC W/AUTO DIFF WBC: CPT

## 2024-10-02 RX ORDER — FAMOTIDINE 10 MG/ML
20 INJECTION, SOLUTION INTRAVENOUS
Status: CANCELLED | OUTPATIENT
Start: 2024-10-02

## 2024-10-02 RX ORDER — SODIUM CHLORIDE 9 MG/ML
INJECTION, SOLUTION INTRAVENOUS CONTINUOUS
Status: CANCELLED | OUTPATIENT
Start: 2024-10-02

## 2024-10-02 RX ORDER — DEXAMETHASONE SODIUM PHOSPHATE 10 MG/ML
10 INJECTION INTRAMUSCULAR; INTRAVENOUS ONCE
Status: COMPLETED | OUTPATIENT
Start: 2024-10-02 | End: 2024-10-02

## 2024-10-02 RX ORDER — PALONOSETRON 0.05 MG/ML
0.25 INJECTION, SOLUTION INTRAVENOUS ONCE
Status: CANCELLED | OUTPATIENT
Start: 2024-10-02 | End: 2024-10-02

## 2024-10-02 RX ORDER — PROCHLORPERAZINE EDISYLATE 5 MG/ML
5 INJECTION INTRAMUSCULAR; INTRAVENOUS
Status: CANCELLED | OUTPATIENT
Start: 2024-10-02

## 2024-10-02 RX ORDER — ACETAMINOPHEN 325 MG/1
650 TABLET ORAL
Status: CANCELLED | OUTPATIENT
Start: 2024-10-02

## 2024-10-02 RX ORDER — HEPARIN 100 UNIT/ML
500 SYRINGE INTRAVENOUS PRN
Status: DISCONTINUED | OUTPATIENT
Start: 2024-10-02 | End: 2024-10-03 | Stop reason: HOSPADM

## 2024-10-02 RX ORDER — DIPHENHYDRAMINE HYDROCHLORIDE 50 MG/ML
50 INJECTION INTRAMUSCULAR; INTRAVENOUS
Status: CANCELLED | OUTPATIENT
Start: 2024-10-02

## 2024-10-02 RX ORDER — DIPHENHYDRAMINE HYDROCHLORIDE 50 MG/ML
50 INJECTION INTRAMUSCULAR; INTRAVENOUS ONCE
Status: COMPLETED | OUTPATIENT
Start: 2024-10-02 | End: 2024-10-02

## 2024-10-02 RX ORDER — ACETAMINOPHEN 325 MG/1
650 TABLET ORAL
Status: DISCONTINUED | OUTPATIENT
Start: 2024-10-02 | End: 2024-10-03 | Stop reason: HOSPADM

## 2024-10-02 RX ORDER — ONDANSETRON 2 MG/ML
8 INJECTION INTRAMUSCULAR; INTRAVENOUS
Status: CANCELLED | OUTPATIENT
Start: 2024-10-02

## 2024-10-02 RX ORDER — DIPHENHYDRAMINE HYDROCHLORIDE 50 MG/ML
50 INJECTION INTRAMUSCULAR; INTRAVENOUS ONCE
Status: CANCELLED | OUTPATIENT
Start: 2024-10-02 | End: 2024-10-02

## 2024-10-02 RX ORDER — SODIUM CHLORIDE 9 MG/ML
5-250 INJECTION, SOLUTION INTRAVENOUS PRN
Status: DISCONTINUED | OUTPATIENT
Start: 2024-10-02 | End: 2024-10-03 | Stop reason: HOSPADM

## 2024-10-02 RX ORDER — FAMOTIDINE 10 MG/ML
20 INJECTION, SOLUTION INTRAVENOUS ONCE
Status: COMPLETED | OUTPATIENT
Start: 2024-10-02 | End: 2024-10-02

## 2024-10-02 RX ORDER — SODIUM CHLORIDE 9 MG/ML
5-250 INJECTION, SOLUTION INTRAVENOUS PRN
Status: CANCELLED | OUTPATIENT
Start: 2024-10-02

## 2024-10-02 RX ORDER — SODIUM CHLORIDE 0.9 % (FLUSH) 0.9 %
5-40 SYRINGE (ML) INJECTION PRN
Status: DISCONTINUED | OUTPATIENT
Start: 2024-10-02 | End: 2024-10-03 | Stop reason: HOSPADM

## 2024-10-02 RX ORDER — FAMOTIDINE 10 MG/ML
20 INJECTION, SOLUTION INTRAVENOUS ONCE
Status: CANCELLED | OUTPATIENT
Start: 2024-10-02 | End: 2024-10-02

## 2024-10-02 RX ORDER — DIPHENHYDRAMINE HYDROCHLORIDE 50 MG/ML
50 INJECTION INTRAMUSCULAR; INTRAVENOUS
Status: DISCONTINUED | OUTPATIENT
Start: 2024-10-02 | End: 2024-10-03 | Stop reason: HOSPADM

## 2024-10-02 RX ORDER — EPINEPHRINE 1 MG/ML
0.3 INJECTION, SOLUTION, CONCENTRATE INTRAVENOUS PRN
Status: CANCELLED | OUTPATIENT
Start: 2024-10-02

## 2024-10-02 RX ORDER — ALBUTEROL SULFATE 90 UG/1
4 INHALANT RESPIRATORY (INHALATION) PRN
Status: CANCELLED | OUTPATIENT
Start: 2024-10-02

## 2024-10-02 RX ORDER — PALONOSETRON 0.05 MG/ML
0.25 INJECTION, SOLUTION INTRAVENOUS ONCE
Status: COMPLETED | OUTPATIENT
Start: 2024-10-02 | End: 2024-10-02

## 2024-10-02 RX ORDER — MEPERIDINE HYDROCHLORIDE 50 MG/ML
12.5 INJECTION INTRAMUSCULAR; INTRAVENOUS; SUBCUTANEOUS PRN
Status: CANCELLED | OUTPATIENT
Start: 2024-10-02

## 2024-10-02 RX ADMIN — PACLITAXEL 408 MG: 6 INJECTION, SOLUTION, CONCENTRATE INTRAVENOUS at 10:34

## 2024-10-02 RX ADMIN — DIPHENHYDRAMINE HYDROCHLORIDE 50 MG: 50 INJECTION INTRAMUSCULAR; INTRAVENOUS at 09:22

## 2024-10-02 RX ADMIN — SODIUM CHLORIDE, PRESERVATIVE FREE 10 ML: 5 INJECTION INTRAVENOUS at 08:04

## 2024-10-02 RX ADMIN — FAMOTIDINE 20 MG: 10 INJECTION, SOLUTION INTRAVENOUS at 09:22

## 2024-10-02 RX ADMIN — SODIUM CHLORIDE 200 ML/HR: 9 INJECTION, SOLUTION INTRAVENOUS at 09:21

## 2024-10-02 RX ADMIN — CARBOPLATIN 800 MG: 10 INJECTION INTRAVENOUS at 13:38

## 2024-10-02 RX ADMIN — SODIUM CHLORIDE 150 MG: 9 INJECTION, SOLUTION INTRAVENOUS at 09:35

## 2024-10-02 RX ADMIN — DEXAMETHASONE SODIUM PHOSPHATE 10 MG: 10 INJECTION INTRAMUSCULAR; INTRAVENOUS at 09:22

## 2024-10-02 RX ADMIN — SODIUM CHLORIDE, PRESERVATIVE FREE 10 ML: 5 INJECTION INTRAVENOUS at 08:05

## 2024-10-02 RX ADMIN — SODIUM CHLORIDE, PRESERVATIVE FREE 10 ML: 5 INJECTION INTRAVENOUS at 14:21

## 2024-10-02 RX ADMIN — HEPARIN 500 UNITS: 100 SYRINGE at 14:21

## 2024-10-02 RX ADMIN — PALONOSETRON 0.25 MG: 0.05 INJECTION, SOLUTION INTRAVENOUS at 09:22

## 2024-10-02 RX ADMIN — SODIUM CHLORIDE 200 MG: 9 INJECTION, SOLUTION INTRAVENOUS at 10:02

## 2024-10-02 NOTE — TELEPHONE ENCOUNTER
Name: Isiah Duncan  : 1975  MRN: [id number]    Oncology Navigation Follow-Up Note    Contact Type:  Telephone    Notes:   Navigator met with pt. And father offering assistance. Father asking about an additional free drug? Writer will check on this? Pt. Receiving free Keytruda at this time. Pt. Thinner, but decreased appetite. Pt. Sleeping a lot. No barriers to care noted.       Electronically signed by Fiona Villafana RN on 10/2/2024 at 10:24 AM

## 2024-10-02 NOTE — PROGRESS NOTES
PROVIDED HISTORY: post op  TECHNOLOGIST PROVIDED HISTORY:  AP pelvis please  post op; ORDERING SYSTEM PROVIDED HISTORY: post op  TECHNOLOGIST PROVIDED HISTORY:  post op    FINDINGS:  Frontal view of the pelvis demonstrates right total hip replacement which is  well aligned.  THE left hip demonstrates transtrochanteric hip  reconstruction, which appears well aligned.    Multiple views of the left femur demonstrate adequately placed femoral erma  with interlocking screws.  No evidence of acute fractures or malalignment.  Impression: 1. Adequately placed left femoral erma with interlocking screws.  2. Adequately placed right total hip replacement.  3. Adequately placed left trans trochanteric hip reconstruction.  XR PELVIS (1-2 VIEWS)  Narrative: EXAMINATION:  ONE XRAY VIEW OF THE PELVIS; FOUR XRAY VIEWS OF THE LEFT FEMUR    8/27/2024 11:31 am    COMPARISON:  August 23, 2024    HISTORY:  ORDERING SYSTEM PROVIDED HISTORY: post op  TECHNOLOGIST PROVIDED HISTORY:  AP pelvis please  post op; ORDERING SYSTEM PROVIDED HISTORY: post op  TECHNOLOGIST PROVIDED HISTORY:  post op    FINDINGS:  Frontal view of the pelvis demonstrates right total hip replacement which is  well aligned.  THE left hip demonstrates transtrochanteric hip  reconstruction, which appears well aligned.    Multiple views of the left femur demonstrate adequately placed femoral erma  with interlocking screws.  No evidence of acute fractures or malalignment.  Impression: 1. Adequately placed left femoral erma with interlocking screws.  2. Adequately placed right total hip replacement.  3. Adequately placed left trans trochanteric hip reconstruction.       ASSESSMENT:       Diagnosis Orders   1. 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 Non-small cell lung cancer, unspecified laterality (HCC)  CBC With Auto Differential    Comprehensive metabolic panel    TSH without Reflex

## 2024-10-02 NOTE — PROGRESS NOTES
Patient arrived for C3D1 keytruda, taxol, carbo. Labs drawn and within treatable parameters.Seen by wilman Salinas to proceed with treatment. Patient tolerated w/o incident and left in stable condition. Return 10/23

## 2024-10-16 ENCOUNTER — OFFICE VISIT (OUTPATIENT)
Dept: ORTHOPEDIC SURGERY | Age: 49
End: 2024-10-16

## 2024-10-16 DIAGNOSIS — M89.9 LYTIC BONE LESION OF LEFT FEMUR: Primary | ICD-10-CM

## 2024-10-16 PROCEDURE — 99024 POSTOP FOLLOW-UP VISIT: CPT | Performed by: STUDENT IN AN ORGANIZED HEALTH CARE EDUCATION/TRAINING PROGRAM

## 2024-10-16 NOTE — PROGRESS NOTES
8/27/2024    Findings: 2 views of the Left femur (AP/lateral) in a skeletally mature patient showing redemonstration of orthopedic hardware in the form of intramedullary nail to Left femur with cement augmentation within the head.  No signs of hardware failure or loosening.  Unchanged alignment.  No new acute osseous abnormalities.  No radiopaque foreign bodies.    Impression: Stable hardware Left femur     Assessment:   49 y.o. year old male status post 7 medullary nail fixation left femur  Plan:   Lengthy discussion had with patient about current clinical state.     Imaging reviewed with patient.    Weight-bear as tolerated Left lower extremity.     Incisions healing well with staples intact. Staples were removed without complications and steri strips were applied which are not to be removed until they fall off naturally.    Clean incisions and/or wounds with soap and water daily then apply clean dressings until wounds are healed and dry. If steri strips are in place, leave in place until they naturally fall off. Avoid soaks of any kind (bath, hot tub, pool, lake, etc.)    Patient will follow-up PRN.       Electronically signed by Jose Cary DO on 10/16/2024 at 3:50 PM    This note is created with the assistance of a speech recognition program.  While intending to generate a document that actually reflects the content of the visit, the document can still have some errors including those of syntax and sound a like substitutions which may escape proof reading.  In such instances, actual meaning can be extrapolated by contextual diversion

## 2024-10-17 ENCOUNTER — TELEPHONE (OUTPATIENT)
Dept: ORTHOPEDIC SURGERY | Age: 49
End: 2024-10-17

## 2024-10-18 ENCOUNTER — TELEPHONE (OUTPATIENT)
Dept: ONCOLOGY | Age: 49
End: 2024-10-18

## 2024-10-18 NOTE — TELEPHONE ENCOUNTER
Pt present to office with father Max. Geraldine removed, site cleansed thoroughly, pt tolerated well. Informed father to call with any further concerns.

## 2024-10-27 ENCOUNTER — APPOINTMENT (OUTPATIENT)
Dept: CT IMAGING | Age: 49
DRG: 605 | End: 2024-10-27
Payer: MEDICARE

## 2024-10-27 ENCOUNTER — HOSPITAL ENCOUNTER (INPATIENT)
Age: 49
LOS: 2 days | Discharge: HOME | DRG: 605 | End: 2024-10-29
Attending: EMERGENCY MEDICINE | Admitting: INTERNAL MEDICINE
Payer: MEDICARE

## 2024-10-27 DIAGNOSIS — S01.00XA OPEN WOUND OF SCALP, UNSPECIFIED OPEN WOUND TYPE, INITIAL ENCOUNTER: Primary | ICD-10-CM

## 2024-10-27 PROBLEM — S08.0XXA SCALP AVULSION, INITIAL ENCOUNTER: Status: ACTIVE | Noted: 2024-10-27

## 2024-10-27 LAB
ANION GAP SERPL CALCULATED.3IONS-SCNC: 16 MMOL/L (ref 9–16)
BASOPHILS # BLD: 0 K/UL (ref 0–0.2)
BASOPHILS NFR BLD: 0 % (ref 0–2)
BUN SERPL-MCNC: 6 MG/DL (ref 6–20)
CALCIUM SERPL-MCNC: 9 MG/DL (ref 8.6–10.4)
CHLORIDE SERPL-SCNC: 99 MMOL/L (ref 98–107)
CO2 SERPL-SCNC: 23 MMOL/L (ref 20–31)
CREAT SERPL-MCNC: 0.6 MG/DL (ref 0.7–1.2)
EOSINOPHIL # BLD: 0.07 K/UL (ref 0–0.4)
EOSINOPHILS RELATIVE PERCENT: 1 % (ref 1–4)
ERYTHROCYTE [DISTWIDTH] IN BLOOD BY AUTOMATED COUNT: 20.9 % (ref 11.8–14.4)
GFR, ESTIMATED: >90 ML/MIN/1.73M2
GLUCOSE SERPL-MCNC: 138 MG/DL (ref 74–99)
HCT VFR BLD AUTO: 39.5 % (ref 40.7–50.3)
HGB BLD-MCNC: 12.8 G/DL (ref 13–17)
IMM GRANULOCYTES # BLD AUTO: 0.07 K/UL (ref 0–0.3)
IMM GRANULOCYTES NFR BLD: 1 %
LACTIC ACID, SEPSIS WHOLE BLOOD: 1.6 MMOL/L (ref 0.5–1.9)
LACTIC ACID, SEPSIS WHOLE BLOOD: 2.2 MMOL/L (ref 0.5–1.9)
LYMPHOCYTES NFR BLD: 1.17 K/UL (ref 1–4.8)
LYMPHOCYTES RELATIVE PERCENT: 16 % (ref 24–44)
MCH RBC QN AUTO: 28.7 PG (ref 25.2–33.5)
MCHC RBC AUTO-ENTMCNC: 32.4 G/DL (ref 28.4–34.8)
MCV RBC AUTO: 88.6 FL (ref 82.6–102.9)
MONOCYTES NFR BLD: 0.51 K/UL (ref 0.1–0.8)
MONOCYTES NFR BLD: 7 % (ref 1–7)
MORPHOLOGY: ABNORMAL
NEUTROPHILS NFR BLD: 75 % (ref 36–66)
NEUTS SEG NFR BLD: 5.48 K/UL (ref 1.8–7.7)
NRBC BLD-RTO: 0 PER 100 WBC
PLATELET # BLD AUTO: 264 K/UL (ref 138–453)
PMV BLD AUTO: 8.7 FL (ref 8.1–13.5)
POTASSIUM SERPL-SCNC: 3.5 MMOL/L (ref 3.7–5.3)
RBC # BLD AUTO: 4.46 M/UL (ref 4.21–5.77)
SODIUM SERPL-SCNC: 138 MMOL/L (ref 136–145)
WBC OTHER # BLD: 7.3 K/UL (ref 3.5–11.3)

## 2024-10-27 PROCEDURE — 85025 COMPLETE CBC W/AUTO DIFF WBC: CPT

## 2024-10-27 PROCEDURE — 2580000003 HC RX 258: Performed by: EMERGENCY MEDICINE

## 2024-10-27 PROCEDURE — 2060000000 HC ICU INTERMEDIATE R&B

## 2024-10-27 PROCEDURE — 96365 THER/PROPH/DIAG IV INF INIT: CPT

## 2024-10-27 PROCEDURE — 96367 TX/PROPH/DG ADDL SEQ IV INF: CPT

## 2024-10-27 PROCEDURE — 6360000002 HC RX W HCPCS: Performed by: EMERGENCY MEDICINE

## 2024-10-27 PROCEDURE — 87040 BLOOD CULTURE FOR BACTERIA: CPT

## 2024-10-27 PROCEDURE — 70450 CT HEAD/BRAIN W/O DYE: CPT

## 2024-10-27 PROCEDURE — 83605 ASSAY OF LACTIC ACID: CPT

## 2024-10-27 PROCEDURE — 80048 BASIC METABOLIC PNL TOTAL CA: CPT

## 2024-10-27 PROCEDURE — 99285 EMERGENCY DEPT VISIT HI MDM: CPT

## 2024-10-27 RX ORDER — POTASSIUM CHLORIDE 1500 MG/1
40 TABLET, EXTENDED RELEASE ORAL PRN
Status: DISCONTINUED | OUTPATIENT
Start: 2024-10-27 | End: 2024-10-29 | Stop reason: HOSPADM

## 2024-10-27 RX ORDER — SODIUM CHLORIDE 0.9 % (FLUSH) 0.9 %
5-40 SYRINGE (ML) INJECTION PRN
Status: DISCONTINUED | OUTPATIENT
Start: 2024-10-27 | End: 2024-10-29 | Stop reason: HOSPADM

## 2024-10-27 RX ORDER — SODIUM CHLORIDE 9 MG/ML
INJECTION, SOLUTION INTRAVENOUS PRN
Status: DISCONTINUED | OUTPATIENT
Start: 2024-10-27 | End: 2024-10-29 | Stop reason: HOSPADM

## 2024-10-27 RX ORDER — ONDANSETRON 2 MG/ML
4 INJECTION INTRAMUSCULAR; INTRAVENOUS EVERY 6 HOURS PRN
Status: DISCONTINUED | OUTPATIENT
Start: 2024-10-27 | End: 2024-10-29 | Stop reason: HOSPADM

## 2024-10-27 RX ORDER — SODIUM CHLORIDE 0.9 % (FLUSH) 0.9 %
5-40 SYRINGE (ML) INJECTION EVERY 12 HOURS SCHEDULED
Status: DISCONTINUED | OUTPATIENT
Start: 2024-10-28 | End: 2024-10-29 | Stop reason: HOSPADM

## 2024-10-27 RX ORDER — POTASSIUM CHLORIDE 7.45 MG/ML
10 INJECTION INTRAVENOUS PRN
Status: DISCONTINUED | OUTPATIENT
Start: 2024-10-27 | End: 2024-10-29 | Stop reason: HOSPADM

## 2024-10-27 RX ORDER — ONDANSETRON 4 MG/1
4 TABLET, ORALLY DISINTEGRATING ORAL EVERY 8 HOURS PRN
Status: DISCONTINUED | OUTPATIENT
Start: 2024-10-27 | End: 2024-10-29 | Stop reason: HOSPADM

## 2024-10-27 RX ORDER — MAGNESIUM SULFATE IN WATER 40 MG/ML
2000 INJECTION, SOLUTION INTRAVENOUS PRN
Status: DISCONTINUED | OUTPATIENT
Start: 2024-10-27 | End: 2024-10-29 | Stop reason: HOSPADM

## 2024-10-27 RX ORDER — ENOXAPARIN SODIUM 100 MG/ML
40 INJECTION SUBCUTANEOUS DAILY
Status: DISCONTINUED | OUTPATIENT
Start: 2024-10-28 | End: 2024-10-29 | Stop reason: HOSPADM

## 2024-10-27 RX ORDER — POLYETHYLENE GLYCOL 3350 17 G/17G
17 POWDER, FOR SOLUTION ORAL DAILY PRN
Status: DISCONTINUED | OUTPATIENT
Start: 2024-10-27 | End: 2024-10-29 | Stop reason: HOSPADM

## 2024-10-27 RX ORDER — ACETAMINOPHEN 325 MG/1
650 TABLET ORAL EVERY 6 HOURS PRN
Status: DISCONTINUED | OUTPATIENT
Start: 2024-10-27 | End: 2024-10-29 | Stop reason: HOSPADM

## 2024-10-27 RX ORDER — ACETAMINOPHEN 650 MG/1
650 SUPPOSITORY RECTAL EVERY 6 HOURS PRN
Status: DISCONTINUED | OUTPATIENT
Start: 2024-10-27 | End: 2024-10-29 | Stop reason: HOSPADM

## 2024-10-27 RX ADMIN — VANCOMYCIN HYDROCHLORIDE 1750 MG: 10 INJECTION, POWDER, LYOPHILIZED, FOR SOLUTION INTRAVENOUS at 20:40

## 2024-10-27 RX ADMIN — CEFEPIME HYDROCHLORIDE 2000 MG: 2 INJECTION, POWDER, FOR SOLUTION INTRAVENOUS at 18:56

## 2024-10-27 ASSESSMENT — PAIN - FUNCTIONAL ASSESSMENT: PAIN_FUNCTIONAL_ASSESSMENT: NONE - DENIES PAIN

## 2024-10-27 NOTE — ED PROVIDER NOTES
[facility]     Emergency Department     Faculty Attestation    I performed a history and physical examination of the patient and discussed management with the resident. I reviewed the resident’s note and agree with the documented findings and plan of care. Any areas of disagreement are noted on the chart. I was personally present for the key portions of any procedures. I have documented in the chart those procedures where I was not present during the key portions. I have reviewed the emergency nurses triage note. I agree with the chief complaint, past medical history, past surgical history, allergies, medications, social and family history as documented unless otherwise noted below.        For Physician Assistant/ Nurse Practitioner cases/documentation I have personally evaluated this patient and have completed at least one if not all key elements of the E/M (history, physical exam, and MDM). Additional findings are as noted.  I have personally seen and evaluated the patient.  I find the patient's history and physical exam are consistent with the NP/PA documentation.  I agree with the care provided, treatment rendered, disposition and follow-up plan.    The patient has a history of metastatic lung carcinoma which is received radiation and on the skull has eroded causing a large cystic structure on the crown of his head.  That cystic structure has ruptured today and on MRI clearly this cystic structure communicates with the inner table of the skull and brain.  Neurosurgery has been consulted        Critical Care     Harris Armando M.D.  Attending Emergency  Physician            Harris Armando MD  10/27/24 [id number]

## 2024-10-27 NOTE — ED PROVIDER NOTES
[facility] ED  Emergency Department Encounter  Emergency Medicine Resident     Pt Name:Isiah Duncan  MRN: [id number]  Birthdate 1975  Date of evaluation: 10/27/24  PCP:  Lolita Mcclain APRN - CNP  Note Started: 6:12 PM EDT      CHIEF COMPLAINT       Chief Complaint   Patient presents with    Wound Check       HISTORY OF PRESENT ILLNESS  (Location/Symptom, Timing/Onset, Context/Setting, Quality, Duration, Modifying Factors, Severity.)      Isiah Duncan is a 49 y.o. male w/ PMHx of Stage IV left lung carcinoma with bone metastases to scalp and right femur s/p R ORIF 7/11/24, L ORIF 8/20/24 who presents with scalp wound. Patient presents with his father who is his legal guardian.     Last radiation on 9/30 at [facility].     PAST MEDICAL / SURGICAL / SOCIAL / FAMILY HISTORY      has a past medical history of Anemia, Custody issue, Diabetes (HCC), H/O falling, History of recent hospitalization, Hypertension, Leg fracture, right, Non-small cell carcinoma of lung (HCC), Schizophrenia (HCC), Under care of team, Under care of team, Under care of team, Under care of team, Weight loss, and Wellness examination.       has a past surgical history that includes hip surgery (Right, 07/11/2024); Tibia fracture surgery (Left, 07/11/2024); Tympanostomy tube placement (Bilateral); Tonsillectomy; IR PORT PLACEMENT > 5 YEARS (08/20/2024); US BIOPSY SOFT TISSUE SHOULDER SUPERFICIAL (08/26/2024); Femur Surgery (Left, 08/27/2024); and Leg Surgery (Left, 8/27/2024).      Social History     Socioeconomic History    Marital status: Single     Spouse name: Not on file    Number of children: Not on file    Years of education: Not on file    Highest education level: Not on file   Occupational History    Not on file   Tobacco Use    Smoking status: Every Day     Current packs/day: 2.00     Average packs/day: 2.0 packs/day for 20.0 years (40.0 ttl pk-yrs)     Types: Cigarettes    Smokeless tobacco: Never   Vaping

## 2024-10-27 NOTE — ED TRIAGE NOTES
Pt with chemo and radiation/just finished radiation, had cyst/lesion to top of head area broke open and started oozing fluid today. Family legal guardian/present with patient.

## 2024-10-28 PROBLEM — S01.00XA OPEN WND OF SCALP: Status: ACTIVE | Noted: 2024-10-27

## 2024-10-28 PROBLEM — Z51.5 ENCOUNTER FOR PALLIATIVE CARE: Status: ACTIVE | Noted: 2024-10-28

## 2024-10-28 LAB
ANION GAP SERPL CALCULATED.3IONS-SCNC: 11 MMOL/L (ref 9–16)
BASOPHILS # BLD: 0.05 K/UL (ref 0–0.2)
BASOPHILS NFR BLD: 1 % (ref 0–2)
BUN SERPL-MCNC: 5 MG/DL (ref 6–20)
CALCIUM SERPL-MCNC: 8.7 MG/DL (ref 8.6–10.4)
CHLORIDE SERPL-SCNC: 106 MMOL/L (ref 98–107)
CO2 SERPL-SCNC: 24 MMOL/L (ref 20–31)
CREAT SERPL-MCNC: 0.5 MG/DL (ref 0.7–1.2)
EOSINOPHIL # BLD: 0.05 K/UL (ref 0–0.44)
EOSINOPHILS RELATIVE PERCENT: 1 % (ref 1–4)
ERYTHROCYTE [DISTWIDTH] IN BLOOD BY AUTOMATED COUNT: 21.2 % (ref 11.8–14.4)
GFR, ESTIMATED: >90 ML/MIN/1.73M2
GLUCOSE BLD-MCNC: 116 MG/DL (ref 75–110)
GLUCOSE BLD-MCNC: 119 MG/DL (ref 75–110)
GLUCOSE BLD-MCNC: 136 MG/DL (ref 75–110)
GLUCOSE SERPL-MCNC: 122 MG/DL (ref 74–99)
HCT VFR BLD AUTO: 32.8 % (ref 40.7–50.3)
HGB BLD-MCNC: 10.7 G/DL (ref 13–17)
IMM GRANULOCYTES # BLD AUTO: 0.05 K/UL (ref 0–0.3)
IMM GRANULOCYTES NFR BLD: 1 %
LYMPHOCYTES NFR BLD: 0.92 K/UL (ref 1.1–3.7)
LYMPHOCYTES RELATIVE PERCENT: 18 % (ref 24–43)
MCH RBC QN AUTO: 28.8 PG (ref 25.2–33.5)
MCHC RBC AUTO-ENTMCNC: 32.6 G/DL (ref 28.4–34.8)
MCV RBC AUTO: 88.2 FL (ref 82.6–102.9)
MONOCYTES NFR BLD: 0.51 K/UL (ref 0.1–1.2)
MONOCYTES NFR BLD: 10 % (ref 3–12)
MORPHOLOGY: ABNORMAL
NEUTROPHILS NFR BLD: 69 % (ref 36–65)
NEUTS SEG NFR BLD: 3.52 K/UL (ref 1.5–8.1)
NRBC BLD-RTO: 0 PER 100 WBC
PLATELET # BLD AUTO: 207 K/UL (ref 138–453)
PMV BLD AUTO: 8.9 FL (ref 8.1–13.5)
POTASSIUM SERPL-SCNC: 3.8 MMOL/L (ref 3.7–5.3)
RBC # BLD AUTO: 3.72 M/UL (ref 4.21–5.77)
SODIUM SERPL-SCNC: 141 MMOL/L (ref 136–145)
WBC OTHER # BLD: 5.1 K/UL (ref 3.5–11.3)

## 2024-10-28 PROCEDURE — 80048 BASIC METABOLIC PNL TOTAL CA: CPT

## 2024-10-28 PROCEDURE — 6360000002 HC RX W HCPCS

## 2024-10-28 PROCEDURE — 6370000000 HC RX 637 (ALT 250 FOR IP)

## 2024-10-28 PROCEDURE — 36415 COLL VENOUS BLD VENIPUNCTURE: CPT

## 2024-10-28 PROCEDURE — 6360000002 HC RX W HCPCS: Performed by: EMERGENCY MEDICINE

## 2024-10-28 PROCEDURE — 2580000003 HC RX 258: Performed by: EMERGENCY MEDICINE

## 2024-10-28 PROCEDURE — 85025 COMPLETE CBC W/AUTO DIFF WBC: CPT

## 2024-10-28 PROCEDURE — 6370000000 HC RX 637 (ALT 250 FOR IP): Performed by: INTERNAL MEDICINE

## 2024-10-28 PROCEDURE — 2580000003 HC RX 258

## 2024-10-28 PROCEDURE — 99222 1ST HOSP IP/OBS MODERATE 55: CPT | Performed by: INTERNAL MEDICINE

## 2024-10-28 PROCEDURE — 82947 ASSAY GLUCOSE BLOOD QUANT: CPT

## 2024-10-28 PROCEDURE — 99221 1ST HOSP IP/OBS SF/LOW 40: CPT | Performed by: INTERNAL MEDICINE

## 2024-10-28 PROCEDURE — 87070 CULTURE OTHR SPECIMN AEROBIC: CPT

## 2024-10-28 PROCEDURE — 99223 1ST HOSP IP/OBS HIGH 75: CPT | Performed by: INTERNAL MEDICINE

## 2024-10-28 PROCEDURE — 2060000000 HC ICU INTERMEDIATE R&B

## 2024-10-28 PROCEDURE — 87205 SMEAR GRAM STAIN: CPT

## 2024-10-28 RX ORDER — NICOTINE 21 MG/24HR
1 PATCH, TRANSDERMAL 24 HOURS TRANSDERMAL DAILY
Status: DISCONTINUED | OUTPATIENT
Start: 2024-10-28 | End: 2024-10-29 | Stop reason: HOSPADM

## 2024-10-28 RX ORDER — CYCLOBENZAPRINE HCL 10 MG
10 TABLET ORAL 3 TIMES DAILY PRN
Status: DISCONTINUED | OUTPATIENT
Start: 2024-10-28 | End: 2024-10-29 | Stop reason: HOSPADM

## 2024-10-28 RX ORDER — FAMOTIDINE 20 MG/1
20 TABLET, FILM COATED ORAL 2 TIMES DAILY
Status: DISCONTINUED | OUTPATIENT
Start: 2024-10-28 | End: 2024-10-29 | Stop reason: HOSPADM

## 2024-10-28 RX ORDER — GLUCAGON 1 MG/ML
1 KIT INJECTION PRN
Status: DISCONTINUED | OUTPATIENT
Start: 2024-10-28 | End: 2024-10-29 | Stop reason: HOSPADM

## 2024-10-28 RX ORDER — VITAMIN B COMPLEX
1000 TABLET ORAL DAILY
Status: DISCONTINUED | OUTPATIENT
Start: 2024-10-28 | End: 2024-10-29 | Stop reason: HOSPADM

## 2024-10-28 RX ORDER — INSULIN LISPRO 100 [IU]/ML
0-4 INJECTION, SOLUTION INTRAVENOUS; SUBCUTANEOUS
Status: DISCONTINUED | OUTPATIENT
Start: 2024-10-28 | End: 2024-10-29 | Stop reason: HOSPADM

## 2024-10-28 RX ORDER — ATORVASTATIN CALCIUM 80 MG/1
80 TABLET, FILM COATED ORAL NIGHTLY
Status: DISCONTINUED | OUTPATIENT
Start: 2024-10-28 | End: 2024-10-29 | Stop reason: HOSPADM

## 2024-10-28 RX ORDER — DEXTROSE MONOHYDRATE 100 MG/ML
INJECTION, SOLUTION INTRAVENOUS CONTINUOUS PRN
Status: DISCONTINUED | OUTPATIENT
Start: 2024-10-28 | End: 2024-10-29 | Stop reason: HOSPADM

## 2024-10-28 RX ORDER — SENNOSIDES A AND B 8.6 MG/1
1 TABLET, FILM COATED ORAL 2 TIMES DAILY PRN
Status: DISCONTINUED | OUTPATIENT
Start: 2024-10-28 | End: 2024-10-29 | Stop reason: HOSPADM

## 2024-10-28 RX ORDER — TRAZODONE HYDROCHLORIDE 100 MG/1
100 TABLET ORAL NIGHTLY PRN
Status: DISCONTINUED | OUTPATIENT
Start: 2024-10-28 | End: 2024-10-29 | Stop reason: HOSPADM

## 2024-10-28 RX ADMIN — CEFEPIME 2000 MG: 2 INJECTION, POWDER, FOR SOLUTION INTRAVENOUS at 17:50

## 2024-10-28 RX ADMIN — SODIUM CHLORIDE 1250 MG: 9 INJECTION, SOLUTION INTRAVENOUS at 03:19

## 2024-10-28 RX ADMIN — SODIUM CHLORIDE 1250 MG: 9 INJECTION, SOLUTION INTRAVENOUS at 22:05

## 2024-10-28 RX ADMIN — ACETAMINOPHEN 650 MG: 325 TABLET ORAL at 19:38

## 2024-10-28 RX ADMIN — SODIUM CHLORIDE 1250 MG: 9 INJECTION, SOLUTION INTRAVENOUS at 11:35

## 2024-10-28 RX ADMIN — ENOXAPARIN SODIUM 40 MG: 100 INJECTION SUBCUTANEOUS at 09:39

## 2024-10-28 RX ADMIN — FAMOTIDINE 20 MG: 20 TABLET, FILM COATED ORAL at 13:12

## 2024-10-28 RX ADMIN — SODIUM CHLORIDE, PRESERVATIVE FREE 10 ML: 5 INJECTION INTRAVENOUS at 19:39

## 2024-10-28 RX ADMIN — SODIUM CHLORIDE, PRESERVATIVE FREE 10 ML: 5 INJECTION INTRAVENOUS at 08:31

## 2024-10-28 RX ADMIN — FAMOTIDINE 20 MG: 20 TABLET, FILM COATED ORAL at 01:33

## 2024-10-28 RX ADMIN — POTASSIUM BICARBONATE 40 MEQ: 782 TABLET, EFFERVESCENT ORAL at 00:12

## 2024-10-28 RX ADMIN — CYCLOBENZAPRINE 10 MG: 10 TABLET, FILM COATED ORAL at 19:38

## 2024-10-28 RX ADMIN — CHOLECALCIFEROL TAB 25 MCG (1000 UNIT) 1000 UNITS: 25 TAB at 08:30

## 2024-10-28 RX ADMIN — ATORVASTATIN CALCIUM 80 MG: 80 TABLET, FILM COATED ORAL at 19:39

## 2024-10-28 RX ADMIN — CEFEPIME 2000 MG: 2 INJECTION, POWDER, FOR SOLUTION INTRAVENOUS at 06:04

## 2024-10-28 ASSESSMENT — PAIN DESCRIPTION - ORIENTATION: ORIENTATION: RIGHT;LEFT

## 2024-10-28 ASSESSMENT — PAIN SCALES - GENERAL
PAINLEVEL_OUTOF10: 8
PAINLEVEL_OUTOF10: 0
PAINLEVEL_OUTOF10: 0

## 2024-10-28 ASSESSMENT — PAIN DESCRIPTION - LOCATION: LOCATION: LEG

## 2024-10-28 ASSESSMENT — ENCOUNTER SYMPTOMS
COLOR CHANGE: 0
EYE DISCHARGE: 0
ABDOMINAL DISTENTION: 0
APNEA: 0

## 2024-10-28 ASSESSMENT — PAIN DESCRIPTION - DESCRIPTORS: DESCRIPTORS: DISCOMFORT;ACHING

## 2024-10-28 ASSESSMENT — PAIN SCALES - WONG BAKER: WONGBAKER_NUMERICALRESPONSE: NO HURT

## 2024-10-28 NOTE — ED NOTES
Pt presented to ED accompanied by father (legal guardian) following wound rupturing to top of head. Pt was diagnosed with lung cancer with mets to bone and brain. Pt completed radiation to head due to cancer causing erosion to skull. Pt father states fluid has been collecting to skull for months. Pt is due for next round of chemo in next few days.   
Pt resting on stretcher, alert, RR even and unlabored. NAD. Pt requesting a pop, informed he is NPO at this time. Call light within reach. Pt's father at bedside.   
Pt to floor via stretcher with belongings secured, by jose Mcdaniel T2R signed.   
Signed DNR-CCA given to [facility] and uploaded into chart.   
The following labs were labeled with appropriate pt sticker and tubed to lab:     [] Blue     [] Lavender   [] on ice  [] Green/yellow  [x] Green/black [x] on ice  [] Grey  [] on ice  [] Yellow  [] Red  [] Pink  [] Type/ Screen  [] ABG  [] VBG    [] COVID-19 swab    [] Rapid  [] PCR  [] Flu swab  [] Peds Viral Panel     [] Urine Sample  [] Fecal Sample  [] Pelvic Cultures  [] Blood Cultures  [] X 2  [] STREP Cultures  [] Wound Cultures    
loss 07/2024    70 lb in last year    Wellness examination     PCP , seen at [facility] on 7/23/2024       Labs:  Labs Reviewed   CBC WITH AUTO DIFFERENTIAL - Abnormal; Notable for the following components:       Result Value    Hemoglobin 12.8 (*)     Hematocrit 39.5 (*)     RDW 20.9 (*)     Immature Granulocytes % 1 (*)     Neutrophils % 75 (*)     Lymphocytes % 16 (*)     All other components within normal limits   BASIC METABOLIC PANEL - Abnormal; Notable for the following components:    Potassium 3.5 (*)     Glucose 138 (*)     Creatinine 0.6 (*)     All other components within normal limits   LACTATE, SEPSIS - Abnormal; Notable for the following components:    Lactic Acid, Sepsis, Whole Blood 2.2 (*)     All other components within normal limits   CULTURE, BLOOD 1   CULTURE, BLOOD 2   LACTATE, SEPSIS       Electronically signed by Debbie Wilson RN on 10/27/2024 at 9:28 PM

## 2024-10-28 NOTE — CODE DOCUMENTATION
I had a long discussion with the patient's family in person, in presence of the RN taking care of the patient. Patient's current clinical condition, laboratory and radiographic findings as well as recommendations of physicians consulted on the case were discussed with the patient's family in detail in simple English. All questions and concerns of the family were addressed, and appropriate emotional support was provided. After understanding patient's current medical condition, family decided that they wanted to continue the ongoing treatment but in case patient's heart stops (cardiac arrest) or the patient stops breathing (respiratory arrest), they do not want any chest compressions, insertion of a breathing tube down patient's throat for respiratory support or other similar  resuscitative measures to be performed on the patient. They requested that if such a condition arises, the patient should be made comfortable and nature should be allowed to take its course. They asked that patient's code status should be changed to DNRCCA (no intubation), and signed the DNRCCA order form in my presence, which was witnessed by Debbie Gomes, RN      Will honor family's wishes, and will change patient's code status to DNRCCA (no intubation).    Greg Kellogg MD  PGY1 Internal Medicine Resident  [place], [place]  10/27/2024 10:36 PM

## 2024-10-28 NOTE — CONSULTS
Department of Neurosurgery Consult Note      Reason for Consult: Open scalp wound with underlying skull defect  Requesting Physician: Dr. Raegan Lee  Neurosurgeon:   [] Dr. Hansen  [] Dr. Rivera  [] Dr. Moore  [x] Dr. Coyle    Neurosurgery notified of consult @: 6:36 pm  Neurosurgery arrival to bedside @: 8 pm    History Obtained From:  patient, father, electronic medical record    CHIEF COMPLAINT:         Chief Complaint   Patient presents with    Wound Check       HISTORY OF PRESENT ILLNESS:       Isiah Duncan is a 49 y.o. male with PMH of lung cancer with mets to the skull s/p palliative chemo/radiation who presented due to sudden collapse of large scalp apex cyst.  Cyst used to be large and round in shape which is visualized on previous CT and MRI.  Today, wound suddenly had increased drainage and collapse in size.  Patient was informed to be evaluated if this should occur per his oncologist.  Patient also has underlying skull defect due to bony invasion of metastasis.  On exam, there is a gaping, pulsatile scalp wound at the apex with straw colored drainage over underlying yellow soft tissue measuring roughly 6 cm in length.  No other focal deficits noted on exam.  CT head confirmed soft tissue mass with underlying skull defect.  There is no associated mass effect.    PAST MEDICAL HISTORY :       Past Medical History:        Diagnosis Date    Anemia     Custody issue 10/20/2014    Max Duncan ( father ) is court appointed legal guaridan    Diabetes (HCC)     H/O falling     History of recent hospitalization 07/09/2024    til 7/18/2024 : [facility]    Hypertension     Leg fracture, right 07/09/2024    Non-small cell carcinoma of lung (HCC) 07/09/2024    with bone and scalp mets    Schizophrenia (HCC)     Under care of team     Radiation Oncology, last seen 8/6/2024 : to start Palliative tx    Under care of team     Oncology, last seen 8/21/2024 ; starting chemo and immunotherapy    Under care 
    Today's Date: 10/28/2024  Patient Name: Isiah Duncan  Date of admission: 10/27/2024  6:12 PM  Patient's age: 49 y.o., 1975  Admission Dx: Scalp avulsion, initial encounter [S08.0XXA]  Open wound of scalp, unspecified open wound type, initial encounter [S01.00XA]    Reason for Consult: management recommendations  Requesting Physician: Yesi Conner MD    CHIEF COMPLAINT: Scalp mass.  Metastatic squamous cell carcinoma of lung    History Obtained From:  patient, electronic medical record    HISTORY OF PRESENT ILLNESS:      The patient is a 49 y.o.  male who is admitted to the hospital for chief complaints of scalp lesion.  Patient came to the ER because of wound over the scalp which she felt something ruptured today leaving some clear oozing fluid.  Patient denies any fever nausea vomiting diarrhea.  Patient states she had a bulge over the top of her head for many weeks.  Patient recently underwent biopsy of scalp lesion which came back as squamous cell carcinoma.  Patient is well-known to our practice.  Patient has squamous cell carcinoma of lung with metastasis to scalp and skull.  Patient seen by neurosurgery who are recommending consideration by plastic surgery to get their opinion on debridement and closure of chronic appearing scabbed erosion.  Patient has had 3 cycles of carboplatin Alimta and Keytruda.  Patient seen by ID team started on cefepime Vanco.    Past Medical History:   has a past medical history of Anemia, Custody issue, Diabetes (HCC), H/O falling, History of recent hospitalization, Hypertension, Leg fracture, right, Non-small cell carcinoma of lung (HCC), Schizophrenia (HCC), Under care of team, Under care of team, Under care of team, Under care of team, Weight loss, and Wellness examination.    Past Surgical History:   has a past surgical history that includes hip surgery (Right, 07/11/2024); Tibia fracture surgery (Left, 07/11/2024); Tympanostomy tube placement (Bilateral); 
[facility]    Hypertension     Leg fracture, right 07/09/2024    Non-small cell carcinoma of lung (HCC) 07/09/2024    with bone and scalp mets    Schizophrenia (HCC)     Under care of team     Radiation Oncology, last seen 8/6/2024 : to start Palliative tx    Under care of team     Oncology, last seen 8/21/2024 ; starting chemo and immunotherapy    Under care of team     Ortho, Dr. Cary , last seen 8/23/2024    Under care of team     Pulmonology , last seen in hosp 7/18/2024    Weight loss 07/2024    70 lb in last year    Wellness examination     PCP , seen at [facility] on 7/23/2024       Past Surgical  History:     Past Surgical History:   Procedure Laterality Date    FEMUR SURGERY Left 08/27/2024    PROPHYLATIC INTERMEDULLARY NAILING OF FEMUR BIOPSY OF FEMUR    HIP SURGERY Right 07/11/2024    RIGHT TOTAL HIP ARTHROPLASTY performed by Jose Cary DO at [facility] OR    IR PORT PLACEMENT EQUAL OR GREATER THAN 5 YEARS  08/20/2024    IR PORT PLACEMENT EQUAL OR GREATER THAN 5 YEARS 8/20/2024 Ihsan Main MD [facility] SPECIAL PROCEDURES    LEG SURGERY Left 8/27/2024    PROPHYLATIC INTERMEDULLARY NAILING OF FEMUR BIOPSY OF FEMUR (SYNTHES TFNA,  C-ARM) performed by Jose Cary DO at [facility] OR    TIBIA FRACTURE SURGERY Left 07/11/2024    FEMORAL INTRAMEDULLARY NAIL INSERTION performed by Jose Cary DO at [facility] OR    TONSILLECTOMY      TYMPANOSTOMY TUBE PLACEMENT Bilateral     US BIOPSY SOFT TISSUE SHOULDER SUPERFICIAL  08/26/2024    US BIOPSY SUPERFICIAL ( scalp ) BONE PERCUTANEOUS 8/26/2024 [facility] ULTRASOUND       Medications:      cefepime  2,000 mg IntraVENous Q12H    atorvastatin  80 mg Oral Nightly    famotidine  20 mg Oral BID    insulin lispro  0-4 Units SubCUTAneous 4x Daily AC & HS    Vitamin D  1,000 Units Oral Daily    nicotine  1 patch TransDERmal Daily    vancomycin (VANCOCIN) intermittent dosing (placeholder)   Other RX Placeholder    vancomycin  1,250 mg IntraVENous Q8H    sodium chloride 
difficulty of urination, burning with urination, frequency   MUSCULOSKELETAL:  negative joint pains, muscle aches, swelling of joints  NEUROLOGICAL:  negative for headaches, dizziness, lightheadedness, numbness, pain, tingling extremities  BEHAVIOR/PSYCH:  negative for depression, anxiety    PHYSICAL ASSESSMENT:  General Appearance: alert, well appearing, and in no acute distress  Mental status: oriented to person, place, and time  Head: normocephalic, atraumatic, lesion on the posterior aspect of his scalp; raised area along with a concaved area in the back of his scalp with a lesion that has what appears to be granulation tissue present.  Eye: no icterus, redness, pupils equal and reactive, extraocular eye movements intact, conjunctiva clear  Ear: normal external ear, no discharge, hearing intact  Nose: no drainage noted  Mouth: mucous membranes moist  Neck: supple,  Lungs: Bilateral equal air entry, clear to ausculation, no wheezing, rales or rhonchi, normal effort  Cardiovascular: normal rate, regular rhythm, no murmur, gallop, rub  Abdomen: Soft, nontender, nondistended, normal bowel sounds  Neurologic: Awake and alert, spontaneously moving all four extremities  Skin: No gross lesions, rashes, bruising or bleeding on exposed skin area  Extremities: peripheral pulses palpable, no pedal edema or calf pain with palpation  Psych: normal affect    Palliative Performance Scale:  ___100% Full ambulation; normal activity/No disease; full self-care; normal intake; LOC full  ___90% full ambulation; normal activity/some disease; full self-care; normal intake; LOC full  ___80% ambulation full; normal activity with effort/some disease; full self-care; normal/reduced intake; LOC full  ___70% ambulation reduced; cannot do normal work/some disease; full self-care; normal/reduce intake; LOC full  __60%  Ambulation reduced; Significant disease; Can't do hobbies/housework; intake normal or reduced; occasional assist; LOC

## 2024-10-28 NOTE — CARE COORDINATION
Case Management Assessment  Initial Evaluation    Date/Time of Evaluation: 10/28/2024 11:48 AM  Assessment Completed by: Emma Meneses RN    If patient is discharged prior to next notation, then this note serves as note for discharge by case management.    Patient Name: Isiah Duncan                   YOB: 1975  Diagnosis: Scalp avulsion, initial encounter [S08.0XXA]  Open wound of scalp, unspecified open wound type, initial encounter [S01.00XA]                   Date / Time: 10/27/2024  6:12 PM    Patient Admission Status: Inpatient   Readmission Risk (Low < 19, Mod (19-27), High > 27): Readmission Risk Score: 20.1    Current PCP: Lolita Mcclain APRN - CNP  PCP verified by CM? (P) Yes    Chart Reviewed: Yes      History Provided by: (P) Child/Family  Patient Orientation: (P) Alert and Oriented    Patient Cognition: (P) Alert    Hospitalization in the last 30 days (Readmission):  No    If yes, Readmission Assessment in CM Navigator will be completed.    Advance Directives:      Code Status: DNR-CCA   Patient's Primary Decision Maker is: (P) Named in Scanned ACP Document    Primary Decision Maker: Max Duncan - Legal Guardian, Legal Guardian - [phone]    Discharge Planning:    Patient lives with: (P) Parent Type of Home: (P) House  Primary Care Giver: (P) Self  Patient Support Systems include: (P) Parent   Current Financial resources: (P) Medicare  Current community resources:    Current services prior to admission: (P) None            Current DME:              Type of Home Care services:  (P) None    ADLS  Prior functional level: (P) Independent in ADLs/IADLs  Current functional level: (P) Independent in ADLs/IADLs    PT AM-PAC:   /24  OT AM-PAC:   /24    Family can provide assistance at DC: (P) Yes  Would you like Case Management to discuss the discharge plan with any other family members/significant others, and if so, who? (P) Yes (father, Max)  Plans to Return to Present Housing: (P)

## 2024-10-28 NOTE — H&P
[facility]     Department of Internal Medicine - Staff Internal Medicine Teaching Service          ADMISSION NOTE/HISTORY AND PHYSICAL EXAMINATION   Date: 10/27/2024  Patient Name: Isiah Duncan  Date of admission: 10/27/2024  6:12 PM  YOB: 1975  PCP: Lolita Mcclain APRN - CNP  History Obtained From:  patient, electronic medical record    CHIEF COMPLAINT     Scalp rupture    HISTORY OF PRESENTING ILLNESS     The patient is a 49 y.o. 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  Patient is currently undergoing chemotherapy and radiotherapy for the cancer.  Patient past medical history is also significant for schizophrenia on paliperidone, patient did not have hallucinations recently hence did not get the recent dose of paliperidone.  Patient is a known case of diabetes mellitus on metformin.  They present to the ED with a chief complaint of  rupture of  scalp.  Patient had radiotherapy open 1 month back to the scalp, had spontaneous rupture of her scalp today in the morning and there was clear fluid oozing out from the ruptured lesion.  No history of fever, vomitings, nausea, blurring of vision or headache.  No history of neck stiffness/seizures.  CT head was done in ED had shownlarge exophytic mass involving the scalp and  calvarium at the vertex with resection of the mass involving the subjacent  dura. There is a residual soft tissue mass with foci of gas and fluid  measuring 2.2 x 11.0 x 10.2 cm.  Neurosurgery was consulted by ED.    Initial lab work on 9 patient has shown WBC 7.3, hemoglobin 12.8, platelets 264, sodium 138, potassium 4.5, creatinine 0.6, BUN 6, lactate 2.2-1.6, blood cultures NGTD.    Review of Systems   Constitutional:  Negative for chills and fever.   HENT:  Negative for congestion.    Respiratory:  Negative for cough and wheezing.    Cardiovascular:  Negative for chest pain and

## 2024-10-29 ENCOUNTER — APPOINTMENT (OUTPATIENT)
Dept: MRI IMAGING | Age: 49
DRG: 605 | End: 2024-10-29
Payer: MEDICARE

## 2024-10-29 VITALS
OXYGEN SATURATION: 96 % | TEMPERATURE: 98 F | HEART RATE: 95 BPM | RESPIRATION RATE: 16 BRPM | SYSTOLIC BLOOD PRESSURE: 113 MMHG | DIASTOLIC BLOOD PRESSURE: 93 MMHG | WEIGHT: 184 LBS | HEIGHT: 74 IN | BODY MASS INDEX: 23.61 KG/M2

## 2024-10-29 LAB
ANION GAP SERPL CALCULATED.3IONS-SCNC: 10 MMOL/L (ref 9–16)
BASOPHILS # BLD: 0.04 K/UL (ref 0–0.2)
BASOPHILS NFR BLD: 1 % (ref 0–2)
BUN SERPL-MCNC: 5 MG/DL (ref 6–20)
CALCIUM SERPL-MCNC: 8.4 MG/DL (ref 8.6–10.4)
CHLORIDE SERPL-SCNC: 106 MMOL/L (ref 98–107)
CO2 SERPL-SCNC: 23 MMOL/L (ref 20–31)
CREAT SERPL-MCNC: 0.5 MG/DL (ref 0.7–1.2)
EOSINOPHIL # BLD: 0.04 K/UL (ref 0–0.4)
EOSINOPHILS RELATIVE PERCENT: 1 % (ref 1–4)
ERYTHROCYTE [DISTWIDTH] IN BLOOD BY AUTOMATED COUNT: 20.8 % (ref 11.8–14.4)
GFR, ESTIMATED: >90 ML/MIN/1.73M2
GLUCOSE BLD-MCNC: 124 MG/DL (ref 75–110)
GLUCOSE BLD-MCNC: 144 MG/DL (ref 75–110)
GLUCOSE BLD-MCNC: 99 MG/DL (ref 75–110)
GLUCOSE SERPL-MCNC: 121 MG/DL (ref 74–99)
HCT VFR BLD AUTO: 32.7 % (ref 40.7–50.3)
HGB BLD-MCNC: 10.5 G/DL (ref 13–17)
IMM GRANULOCYTES # BLD AUTO: 0.04 K/UL (ref 0–0.3)
IMM GRANULOCYTES NFR BLD: 1 %
LYMPHOCYTES NFR BLD: 0.82 K/UL (ref 1–4.8)
LYMPHOCYTES RELATIVE PERCENT: 19 % (ref 24–44)
MAGNESIUM SERPL-MCNC: 1.3 MG/DL (ref 1.6–2.6)
MCH RBC QN AUTO: 28.8 PG (ref 25.2–33.5)
MCHC RBC AUTO-ENTMCNC: 32.1 G/DL (ref 28.4–34.8)
MCV RBC AUTO: 89.8 FL (ref 82.6–102.9)
MONOCYTES NFR BLD: 0.47 K/UL (ref 0.1–0.8)
MONOCYTES NFR BLD: 11 % (ref 1–7)
MORPHOLOGY: ABNORMAL
NEUTROPHILS NFR BLD: 67 % (ref 36–66)
NEUTS SEG NFR BLD: 2.89 K/UL (ref 1.8–7.7)
NRBC BLD-RTO: 0 PER 100 WBC
PLATELET # BLD AUTO: 199 K/UL (ref 138–453)
PMV BLD AUTO: 8.6 FL (ref 8.1–13.5)
POTASSIUM SERPL-SCNC: 3.5 MMOL/L (ref 3.7–5.3)
RBC # BLD AUTO: 3.64 M/UL (ref 4.21–5.77)
SODIUM SERPL-SCNC: 139 MMOL/L (ref 136–145)
VANCOMYCIN SERPL-MCNC: 25.1 UG/ML (ref 5–40)
VANCOMYCIN SERPL-MCNC: 56.1 UG/ML (ref 5–40)
WBC OTHER # BLD: 4.3 K/UL (ref 3.5–11.3)

## 2024-10-29 PROCEDURE — 6360000002 HC RX W HCPCS

## 2024-10-29 PROCEDURE — 99232 SBSQ HOSP IP/OBS MODERATE 35: CPT | Performed by: INTERNAL MEDICINE

## 2024-10-29 PROCEDURE — 82947 ASSAY GLUCOSE BLOOD QUANT: CPT

## 2024-10-29 PROCEDURE — 85025 COMPLETE CBC W/AUTO DIFF WBC: CPT

## 2024-10-29 PROCEDURE — 2580000003 HC RX 258: Performed by: EMERGENCY MEDICINE

## 2024-10-29 PROCEDURE — 2580000003 HC RX 258

## 2024-10-29 PROCEDURE — APPSS15 APP SPLIT SHARED TIME 0-15 MINUTES: Performed by: NURSE PRACTITIONER

## 2024-10-29 PROCEDURE — 99213 OFFICE O/P EST LOW 20 MIN: CPT

## 2024-10-29 PROCEDURE — 6370000000 HC RX 637 (ALT 250 FOR IP): Performed by: INTERNAL MEDICINE

## 2024-10-29 PROCEDURE — 6370000000 HC RX 637 (ALT 250 FOR IP)

## 2024-10-29 PROCEDURE — 2580000003 HC RX 258: Performed by: INTERNAL MEDICINE

## 2024-10-29 PROCEDURE — 83735 ASSAY OF MAGNESIUM: CPT

## 2024-10-29 PROCEDURE — 6360000004 HC RX CONTRAST MEDICATION: Performed by: INTERNAL MEDICINE

## 2024-10-29 PROCEDURE — 80202 ASSAY OF VANCOMYCIN: CPT

## 2024-10-29 PROCEDURE — 80048 BASIC METABOLIC PNL TOTAL CA: CPT

## 2024-10-29 PROCEDURE — 6360000002 HC RX W HCPCS: Performed by: EMERGENCY MEDICINE

## 2024-10-29 PROCEDURE — A9576 INJ PROHANCE MULTIPACK: HCPCS | Performed by: INTERNAL MEDICINE

## 2024-10-29 PROCEDURE — 70553 MRI BRAIN STEM W/O & W/DYE: CPT

## 2024-10-29 PROCEDURE — 36415 COLL VENOUS BLD VENIPUNCTURE: CPT

## 2024-10-29 RX ORDER — MAGNESIUM SULFATE IN WATER 40 MG/ML
2000 INJECTION, SOLUTION INTRAVENOUS ONCE
Status: COMPLETED | OUTPATIENT
Start: 2024-10-29 | End: 2024-10-29

## 2024-10-29 RX ORDER — VITAMIN B COMPLEX
1000 TABLET ORAL DAILY
Qty: 60 TABLET | Refills: 0 | Status: SHIPPED | OUTPATIENT
Start: 2024-10-30 | End: 2024-12-29

## 2024-10-29 RX ORDER — SODIUM CHLORIDE 0.9 % (FLUSH) 0.9 %
10 SYRINGE (ML) INJECTION PRN
Status: DISCONTINUED | OUTPATIENT
Start: 2024-10-29 | End: 2024-10-29 | Stop reason: HOSPADM

## 2024-10-29 RX ADMIN — SODIUM CHLORIDE, PRESERVATIVE FREE 10 ML: 5 INJECTION INTRAVENOUS at 09:32

## 2024-10-29 RX ADMIN — SODIUM CHLORIDE 1250 MG: 9 INJECTION, SOLUTION INTRAVENOUS at 12:29

## 2024-10-29 RX ADMIN — CHOLECALCIFEROL TAB 25 MCG (1000 UNIT) 1000 UNITS: 25 TAB at 09:32

## 2024-10-29 RX ADMIN — SODIUM CHLORIDE, PRESERVATIVE FREE 10 ML: 5 INJECTION INTRAVENOUS at 17:57

## 2024-10-29 RX ADMIN — SODIUM CHLORIDE 1250 MG: 9 INJECTION, SOLUTION INTRAVENOUS at 05:14

## 2024-10-29 RX ADMIN — FAMOTIDINE 20 MG: 20 TABLET, FILM COATED ORAL at 14:22

## 2024-10-29 RX ADMIN — FAMOTIDINE 20 MG: 20 TABLET, FILM COATED ORAL at 01:22

## 2024-10-29 RX ADMIN — GADOTERIDOL 16 ML: 279.3 INJECTION, SOLUTION INTRAVENOUS at 17:56

## 2024-10-29 RX ADMIN — CEFEPIME 2000 MG: 2 INJECTION, POWDER, FOR SOLUTION INTRAVENOUS at 09:32

## 2024-10-29 RX ADMIN — MAGNESIUM SULFATE HEPTAHYDRATE 2000 MG: 40 INJECTION, SOLUTION INTRAVENOUS at 09:52

## 2024-10-29 RX ADMIN — ENOXAPARIN SODIUM 40 MG: 100 INJECTION SUBCUTANEOUS at 09:33

## 2024-10-29 RX ADMIN — POTASSIUM BICARBONATE 40 MEQ: 782 TABLET, EFFERVESCENT ORAL at 09:33

## 2024-10-29 ASSESSMENT — ENCOUNTER SYMPTOMS
ABDOMINAL DISTENTION: 0
EYE DISCHARGE: 0
APNEA: 0
COLOR CHANGE: 0

## 2024-10-29 ASSESSMENT — PAIN SCALES - GENERAL: PAINLEVEL_OUTOF10: 0

## 2024-10-29 NOTE — PLAN OF CARE
Problem: Chronic Conditions and Co-morbidities  Goal: Patient's chronic conditions and co-morbidity symptoms are monitored and maintained or improved  Outcome: Progressing     Problem: Discharge Planning  Goal: Discharge to home or other facility with appropriate resources  Outcome: Progressing     Problem: Safety - Adult  Goal: Free from fall injury  Outcome: Progressing     Problem: Nutrition Deficit:  Goal: Optimize nutritional status  Outcome: Progressing     Problem: Skin/Tissue Integrity  Goal: Absence of new skin breakdown  Description: 1.  Monitor for areas of redness and/or skin breakdown  2.  Assess vascular access sites hourly  3.  Every 4-6 hours minimum:  Change oxygen saturation probe site  4.  Every 4-6 hours:  If on nasal continuous positive airway pressure, respiratory therapy assess nares and determine need for appliance change or resting period.  Outcome: Progressing

## 2024-10-29 NOTE — DISCHARGE INSTRUCTIONS
You came in with scalp wound, postradiation to metastasis to skull bone.  You are found to have open scalp wound.  Please cover your head and do not expose to water.  Please follow-up with neurosurgery, oncology and wound care as advised.  Please return to hospital if you experience any fever/headache/vomiting/blurring of vision.    Scalp wound: wash your hands with soap and water prior to providing care and after old dressing removal. Cleanse with saline or sterile water. Apply Opticell Ag gelling fiber dressing to the wound, cover with an Optifoam silicone bordered foam dressing. Change every day until follow up with the [facility] center.

## 2024-10-29 NOTE — PLAN OF CARE
Patient has an appointment on Monday 11/4/2024 at 2:15 with Dr Lord at [facility]   Updated patient regarding this   He is ok for discharge from a neurosurgery standpoint    Electronically signed by GOYO Hines NP on 10/29/2024 at 2:41 PM

## 2024-10-29 NOTE — CARE COORDINATION
Dr Cole at bedside and states he ordered MRI to be done before patient discharges. Call to MRI to notify that  wants MRI done before patient discharges. He relates patient is not scheduled and they will not be able to complete MRI before 8 pm. Voicemail left for patient's father, Max, to update. PS sent to medicine resident on call to update    1843 patient returned from MRI. Spoke to his father, Max, via telephone. Discussed home care. He is agreeable. Freedom of choice provided. Referral sent to [facility]. Lexi notified. He denies other needs for home and will be at hospital shortly to transport patient home    Discharge Report    [facility]  Clinical Case Management Department  Written by: Emma Meneses RN    Patient Name: Isiah ALISHA Perla  Attending Provider: Yesi Conner MD  Admit Date: 10/27/2024  6:12 PM  MRN: [id number]  Account: [id number]                     : 1975  Discharge Date: 10/29/2024      Disposition: home    Emma Meneses RN

## 2024-10-29 NOTE — PROGRESS NOTES
[facility] -   Infectious diseases evaluation  admission date 10/27/2024    reason for consultation:   Scalp wound rupture    Impression :   Current:  Scalp wound drainage, bone exposure  Had a fluid collection x many weeks on top of the skull  Ruptured at night - clear fluid  No signs of meningitis   Schizophrenia  Lung cancer with metastases both hips and the skull post chemoradiation  DM 2 on metformin      Other:    Discussion / summary of stay / plan of care/ Recommendations:     HENCE:     No signs of meningitis  Wound care to address  Plastic consulted  Wound culture pend neg so far  No signs of sepsis  Stop cefepime and vanco - ok for DC w wound care and FU w plastics as outpt  Disc w pt and dad on the bedside    Infection Control Recommendations   [place] Precautions      Antimicrobial Stewardship Recommendations   Simplification of therapy  Targeted therapy    History of Present Illness:   Initial history:  Isiah Duncan is a 49 y.o.-year-old male history of lung cancer with metastasis to the right and the left femur to the scalp,, getting chemoradiation therapy.  Underlying schizophrenia, had hallucinations recently and missed his recent chemo.  Came into the emergency room because of a wound over the scalp, where he felt something ruptured today leaving some clear fluid oozing over the bulk of his head no fever associated nausea vomiting or diarrhea  9/27 describing a soft tissue mass with some gas without any subdural fluid collections, seems to be smaller significantly than prior exam  Infectious disease consulted for possible skull of infection  WBC is 5, blood cultures pending, wound culture taken 10/28 pending      No headache and no neck stifness  Had a bulge over the top of the head x many weeks - recently few weeks ago bone of the skull biopsied w non small cell carcinoma - fluid collection felt to be from it -  Busted the collection at night 
    [facility]  Internal Medicine Teaching Residency Program  Inpatient Daily Progress Note  ______________________________________________________________________________    Patient: Isiah Duncan  YOB: 1975   MRN: [id number]    Acct: [id number]     Room: 2018/2018-01  Admit date: 10/27/2024  Today's date: 10/29/24  Number of days in the hospital: 2  Current Code Status: DNR-CCA   SUBJECTIVE   Admitting Diagnosis: Open wnd of scalp  CC: Scalp cultures    - Patient seen and examined at bedside. Chart and results reviewed.  -No pain at the site of rupture, no fevers, altered mental status  -Wound culture pending  -Plastic surgery consult pending  -Mg 1.3. K 3.5    Review of systems negative except mentioned above  BRIEF HISTORY   The patient is a 49 y.o. 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  Patient is currently undergoing chemotherapy and radiotherapy for the cancer.  Patient past medical history is also significant for schizophrenia on paliperidone, patient did not have hallucinations recently hence did not get the recent dose of paliperidone.  Patient is a known case of diabetes mellitus on metformin.  They present to the ED with a chief complaint of  rupture of  scalp.  Patient had radiotherapy open 1 month back to the scalp, had spontaneous rupture of her scalp today in the morning and there was clear fluid oozing out from the ruptured lesion.  No history of fever, vomitings, nausea, blurring of vision or headache.  No history of neck stiffness/seizures.  CT head was done in ED had shownlarge exophytic mass involving the scalp and  calvarium at the vertex with resection of the mass involving the subjacent  dura. There is a residual soft tissue mass with foci of gas and fluid  measuring 2.2 x 11.0 x 10.2 cm.  Neurosurgery was consulted by ED.     Initial lab work on 9 patient has shown WBC 7.3, 
 [facility] Contin[facility] Nurse  Consult Note       NAME:  Isiah Duncan  MEDICAL RECORD NUMBER:  [id number]  AGE: 49 y.o.   GENDER: male  : 1975  TODAY'S DATE:  10/29/2024    Subjective:     Reason for WOCN Evaluation and Assessment:  cranial wound, request for antibacterial dressing.      Isiah Duncan is a 49 y.o. male referred by:   [x] Physician  [] Nursing  [] Other:     Wound Identification:  Wound Type: malignant wound  Contributing Factors: decreased tissue oxygenation and hx of radiation therapy , immunosuppression            Objective:      /86   Pulse (!) 112   Temp 97.8 °F (36.6 °C) (Oral)   Resp 18   Ht 1.88 m (6' 2.02\")   Wt 83.5 kg (184 lb)   SpO2 95%   BMI 23.61 kg/m²   Alex Risk Score: Alex Scale Score: 16    LABS    CBC:   Lab Results   Component Value Date/Time    WBC 4.3 10/29/2024 06:12 AM    RBC 3.64 10/29/2024 06:12 AM    HGB 10.5 10/29/2024 06:12 AM    HCT 32.7 10/29/2024 06:12 AM     CMP:  Albumin:  No results found for: \"LABALBU\"  PT/INR:    Lab Results   Component Value Date/Time    PROTIME 13.1 2024 12:33 PM    INR 1.0 2024 12:33 PM     HgBA1c:    Lab Results   Component Value Date/Time    LABA1C 6.7 2024 11:57 AM     PTT: No components found for: \"LABPTT\"      Assessment:       Measurements:       10/29/24 1440   Wound 10/29/24 Head Upper   Date First Assessed: 10/29/24   Location: Head  Wound Location Orientation: Upper   Wound Image    Wound Etiology Other   Dressing Status New dressing applied   Wound Cleansed Cleansed with saline   Dressing/Treatment Hydrofiber Ag;Foam   Dressing Change Due 10/30/24   Wound Length (cm) 1.8 cm   Wound Width (cm) 3.9 cm   Wound Depth (cm) 1 cm   Wound Surface Area (cm^2) 7.02 cm^2   Wound Volume (cm^3) 7.02 cm^3   Undermining Starts ___ O'Clock 12   Undermining Ends___ O'Clock 12   Undermining Maxium Distance (cm) did not probe/measure   Wound Assessment Fibrinous;Subcutaneous   Drainage Amount Large 
Comprehensive Nutrition Assessment    Type and Reason for Visit:  Initial, Positive Nutrition Screen    Nutrition Recommendations/Plan:   Continue current diet.  Encourage intakes.  Will monitor labs, weights, intake, GI status, and plan of care.     Malnutrition Assessment:  Malnutrition Status:  Insufficient data (10/28/24 1377)    Context:  Acute Illness     Findings of the 6 clinical characteristics of malnutrition:  Energy Intake:  No significant decrease in energy intake  Weight Loss:  No significant weight loss     Body Fat Loss:  Unable to assess     Muscle Mass Loss:  Unable to assess    Fluid Accumulation:  No significant fluid accumulation     Strength:       Nutrition Assessment:    Pt admit for scalp avulsion. Plastic surgery has been consulted about scalp injury. Seen for positive nutrition screen. Per conversation with pt he reports no recent changes in intake or wt. Noted non significant wt loss of 2% since July. Pt declines ONS.    Nutrition Related Findings:    No edema. Labs/meds reviewed.         Current Nutrition Intake & Therapies:    Average Meal Intake: Unable to assess  Average Supplements Intake: None Ordered  ADULT DIET; Regular; 4 carb choices (60 gm/meal)    Anthropometric Measures:  Height: 188 cm (6' 2.02\")  Ideal Body Weight (IBW): 190 lbs (86 kg)       Current Body Weight: 83.5 kg (184 lb 1.4 oz), 96.9 % IBW.    Current BMI (kg/m2): 23.6  Usual Body Weight: 82.1 kg (181 lb)  % Weight Change (Calculated): 1.7     BMI Categories: Normal Weight (BMI 18.5-24.9)    Estimated Daily Nutrient Needs:  Energy Requirements Based On: Kcal/kg  Weight Used for Energy Requirements: Current  Energy (kcal/day): 5018-1949 kcal/d per 25-30 kcal/kg  Weight Used for Protein Requirements: Current  Protein (g/day): 103-120 g/d per 1.2-1.4 g/kg  Method Used for Fluid Requirements: 1 ml/kcal  Fluid (ml/day): or per phsyician    Nutrition Diagnosis:   Increased nutrient needs related to  (healing) as 
[facility] [place]   Pharmacy Pharmacokinetic Monitoring Service - Vancomycin     Isiah Duncan is a 49 y.o. male starting on vancomycin therapy for CNS infection. Pharmacy consulted by Raegan Lee DO for monitoring and adjustment.    Target Concentration: Goal AUC/GRACE 400-600 mg*hr/L    Additional Antimicrobials: Cefepime    Pertinent Laboratory Values:   Wt Readings from Last 1 Encounters:   10/27/24 83.5 kg (184 lb)     Temp Readings from Last 1 Encounters:   10/27/24 97.3 °F (36.3 °C) (Oral)     Pertinent Cultures:  Culture Date Source Results   10/27 Blood x 2 Drawn   MRSA Nasal Swab: N/A. Non-respiratory infection.    Plan:  Give 1750 mg x 1, BMP currently pending             Will schedule regimen once current sCr is back  Renal labs as indicated   Pharmacy will continue to monitor patient and adjust therapy as indicated    Thank you for the consult,  Krupa Cantu RPH  10/27/2024 6:54 PM    
[facility]   Pharmacy Pharmacokinetic Monitoring Service - Vancomycin    Consulting Provider: Raegan Lee DO    Indication: CNS Infection  Target Concentration: Goal AUC/GRACE 400-600 mg*hr/L  Day of Therapy: 1  Additional Antimicrobials: Cefepime    Pertinent Laboratory Values:   Wt Readings from Last 1 Encounters:   10/27/24 83.5 kg (184 lb)     Temp Readings from Last 1 Encounters:   10/27/24 97.3 °F (36.3 °C) (Oral)     Estimated Creatinine Clearance: 173 mL/min (A) (based on SCr of 0.6 mg/dL (L)).  Recent Labs     10/27/24  1843   CREATININE 0.6*   BUN 6   WBC 7.3       Recent vancomycin administrations        No vancomycin IV orders with administrations found.            Orders not given:            vancomycin (VANCOCIN) intermittent dosing (placeholder)    vancomycin (VANCOCIN) 1750 mg in sodium chloride 0.9 % 500 mL IVPB    vancomycin (VANCOCIN) 1,250 mg in sodium chloride 0.9 % 250 mL IVPB ([id number])               Plan:  Loading dose of 1750 mg x 1 10/27 @ 1900  Start vancomycin 1250 mg every 8 hours, starting 10/28 @ 0300               Anticipated AUC of 588 and trough of 16 at steady state   Vancomycin concentration ordered for 10/29 with AM labs   Pharmacy will continue to monitor patient and adjust therapy as indicated    Thank you for the consult,  Krupa Cantu RPH  10/27/2024 7:20 PM   
[facility]   Pharmacy Pharmacokinetic Monitoring Service - Vancomycin    Consulting Provider: Raegan Lee   Indication: Scalp infection  Target Concentration: Goal AUC/GRACE 400-600 mg*hr/L  Day of Therapy: 3  Additional Antimicrobials: Cefepime    Pertinent Laboratory Values:   Wt Readings from Last 1 Encounters:   10/27/24 83.5 kg (184 lb)     Temp Readings from Last 1 Encounters:   10/29/24 97.8 °F (36.6 °C) (Oral)     Estimated Creatinine Clearance: 208 mL/min (A) (based on SCr of 0.5 mg/dL (L)).  Recent Labs     10/28/24  0629 10/29/24  0612   CREATININE 0.5* 0.5*   BUN 5* 5*   WBC 5.1 4.3       Pertinent Cultures:  Culture Date Source Results   10/28 blood No growth   10/29 wound In progress       Recent vancomycin administrations                     vancomycin (VANCOCIN) 1,250 mg in sodium chloride 0.9 % 250 mL IVPB ([id number]) (mg) 1,250 mg New Bag 10/29/24 1229     1,250 mg New Bag  0514     1,250 mg New Bag 10/28/24 2205     1,250 mg New Bag  1135     1,250 mg New Bag  0319    vancomycin (VANCOCIN) 1750 mg in sodium chloride 0.9 % 500 mL IVPB (mg) 1,750 mg New Bag 10/27/24 2040                    Assessment:  Date/Time Current Dose Concentration Timing of Concentration (h) AUC   10/29 1430 1250 q8h 25.1 6.5 post dose 795   Note: Serum concentrations collected for AUC dosing may appear elevated if collected in close proximity to the dose administered, this is not necessarily an indication of toxicity    Plan:  Current dosing regimen is supra-therapeutic   Decrease dose to 750 mg q8h, restart dose 10/30 at 0100  Repeat vancomycin concentration ordered as needed  Pharmacy will continue to monitor patient and adjust therapy as indicated    Thank you for the consult,  Aj Head, PharmD, [facility], 10/29/2024 2:35 PM    
New admission from ED to room 2018. Patient AOX4.Saturating on room air. Placed on cardiac monitor with 's sinus tachycardia.Patient afebrile.Vital signs obtained. Oriented to room and call light use. Care ongoing.  
wound s/p chemotherapy radiation for history of lung cancer with metastatic disease to skull and scalp    Will have plastic surgery consulted - planning to see patient today   Palliative care (s/o), ID and oncology seeing patient   Patient continues on cefepime and Vanco  No neurosurgical interventions planned         Please contact neurosurgery with any changes in patients neurologic status.       Juan Manuel Newman CNP  10/29/24  9:29 AM         
disease (HCC)    Vitamin D deficiency    Non-small cell carcinoma of lung (HCC)  Resolved Problems:    * No resolved hospital problems. *     Spontaneous scalp rupture-metastasis site  S/p metastatic scalp bone lesion radiotherapy  Patient has history of stage IV squamous cell lung cancer with metastasis to scalp, bilateral femurs.  S/p ORIF of the right femur on July 11, 2024 which came back showing a poorly differentiated non-small cell carcinoma consistent with lung primary.   S/p right total hip arthroplasty metastatic poorly differentiated non-small cell carcinoma   Chemoimmunotherapy started on August 21, 2024-carboplatin paclitaxel and Keytruda, 3 cycles completed.  Patient is on Zometa for bone metastasis per oncology Dr.Ayoubi Layton  Neurosurgery consulted by ED, plan to consult ID  Patient is continued on vancomycin and cefepime due to concern for possible meningitis/encephalitis     Schizophrenia  Patient is on paliperidone since 3 years  Recent dose 3 weeks back he is not given as patient did not have a hallucinations anymore.  Patient states that he started to have mild visual hallucinations today after the rupture  Patient is treated by psychiatrist at [facility]     Type 2 diabetes mellitus  Recent HbA1c 6.7 7/24  Patient is on metformin at home medication  Plan to start lispro sliding scale  Monitor glucose every 6 hours        Diet: Regular  Telemetry: Continue for 72hrs  DVT ppx: Lovenox 40  GI ppx: Not indicated     PT/OT: Consulted  Discharge Planning/SW:  consult for assistance with discharge planning      Greg Kellogg MD  Internal Medicine Resident, PGY-1  [facility]; [place], [place]  10/28/2024, 6:47 AM   
Eosinophils Absolute 0.07 0.0 - 0.4 k/uL    Basophils Absolute 0.00 0.0 - 0.2 k/uL    Morphology ANISOCYTOSIS PRESENT    BMP   Result Value Ref Range    Sodium 138 136 - 145 mmol/L    Potassium 3.5 (L) 3.7 - 5.3 mmol/L    Chloride 99 98 - 107 mmol/L    CO2 23 20 - 31 mmol/L    Anion Gap 16 9 - 16 mmol/L    Glucose 138 (H) 74 - 99 mg/dL    BUN 6 6 - 20 mg/dL    Creatinine 0.6 (L) 0.70 - 1.20 mg/dL    Est, Glom Filt Rate >90 >60 mL/min/1.73m2    Calcium 9.0 8.6 - 10.4 mg/dL   Lactate, Sepsis   Result Value Ref Range    Lactic Acid, Sepsis, Whole Blood 2.2 (H) 0.5 - 1.9 mmol/L   Lactate, Sepsis   Result Value Ref Range    Lactic Acid, Sepsis, Whole Blood 1.6 0.5 - 1.9 mmol/L   Basic Metabolic Panel w/ Reflex to MG   Result Value Ref Range    Sodium 141 136 - 145 mmol/L    Potassium 3.8 3.7 - 5.3 mmol/L    Chloride 106 98 - 107 mmol/L    CO2 24 20 - 31 mmol/L    Anion Gap 11 9 - 16 mmol/L    Glucose 122 (H) 74 - 99 mg/dL    BUN 5 (L) 6 - 20 mg/dL    Creatinine 0.5 (L) 0.70 - 1.20 mg/dL    Est, Glom Filt Rate >90 >60 mL/min/1.73m2    Calcium 8.7 8.6 - 10.4 mg/dL   CBC with Auto Differential   Result Value Ref Range    WBC 5.1 3.5 - 11.3 k/uL    RBC 3.72 (L) 4.21 - 5.77 m/uL    Hemoglobin 10.7 (L) 13.0 - 17.0 g/dL    Hematocrit 32.8 (L) 40.7 - 50.3 %    MCV 88.2 82.6 - 102.9 fL    MCH 28.8 25.2 - 33.5 pg    MCHC 32.6 28.4 - 34.8 g/dL    RDW 21.2 (H) 11.8 - 14.4 %    Platelets 207 138 - 453 k/uL    MPV 8.9 8.1 - 13.5 fL    NRBC Automated 0.0 0.0 per 100 WBC    Immature Granulocytes % 1 (H) 0 %    Neutrophils % 69 (H) 36 - 65 %    Lymphocytes % 18 (L) 24 - 43 %    Monocytes % 10 3 - 12 %    Eosinophils % 1 1 - 4 %    Basophils % 1 0 - 2 %    Immature Granulocytes Absolute 0.05 0.00 - 0.30 k/uL    Neutrophils Absolute 3.52 1.50 - 8.10 k/uL    Lymphocytes Absolute 0.92 (L) 1.10 - 3.70 k/uL    Monocytes Absolute 0.51 0.10 - 1.20 k/uL    Eosinophils Absolute 0.05 0.00 - 0.44 k/uL    Basophils Absolute 0.05 0.00 - 0.20 k/uL

## 2024-10-29 NOTE — DISCHARGE INSTR - COC
Continuity of Care Form    Patient Name: Isiah Duncan   :  1975  MRN:  [id number]    Admit date:  10/27/2024  Discharge date:  10/30      Code Status Order: DNR-CCA   Advance Directives:   Advance Care Flowsheet Documentation             Admitting Physician:  Yesi Conner MD  PCP: Lolita Mcclain, APRN - CNP    Discharging Nurse: Bradley  Discharging Hospital Unit/Room#:   Discharging Unit Phone Number: [phone]      Emergency Contact:   Extended Emergency Contact Information  Primary Emergency Contact: Max Duncan  Address: [address] [place] [address]  Home Phone: [phone]  Mobile Phone: [phone]  Relation: Legal Guardian  Secondary Emergency Contact: SathishSarah salazar  Home Phone: [phone]  Mobile Phone: [phone]  Relation: Parent    Past Surgical History:  Past Surgical History:   Procedure Laterality Date    FEMUR SURGERY Left 2024    PROPHYLATIC INTERMEDULLARY NAILING OF FEMUR BIOPSY OF FEMUR    HIP SURGERY Right 2024    RIGHT TOTAL HIP ARTHROPLASTY performed by Jose Cary DO at [facility] OR    IR PORT PLACEMENT EQUAL OR GREATER THAN 5 YEARS  2024    IR PORT PLACEMENT EQUAL OR GREATER THAN 5 YEARS 2024 Ihsan Main MD [facility] SPECIAL PROCEDURES    LEG SURGERY Left 2024    PROPHYLATIC INTERMEDULLARY NAILING OF FEMUR BIOPSY OF FEMUR (SYNTHES TFNA,  C-ARM) performed by Jose Cary DO at [facility] OR    TIBIA FRACTURE SURGERY Left 2024    FEMORAL INTRAMEDULLARY NAIL INSERTION performed by Jose Cary DO at [facility] OR    TONSILLECTOMY      TYMPANOSTOMY TUBE PLACEMENT Bilateral     US BIOPSY SOFT TISSUE SHOULDER SUPERFICIAL  2024    US BIOPSY SUPERFICIAL ( scalp ) BONE PERCUTANEOUS 2024 [facility] ULTRASOUND       Immunization History:     There is no immunization history on file for this patient.    Active Problems:  Patient Active Problem List   Diagnosis Code    Schizophrenia (HCC) F20.9

## 2024-10-30 ENCOUNTER — OFFICE VISIT (OUTPATIENT)
Dept: ONCOLOGY | Age: 49
End: 2024-10-30
Payer: MEDICARE

## 2024-10-30 ENCOUNTER — TELEPHONE (OUTPATIENT)
Dept: ONCOLOGY | Age: 49
End: 2024-10-30

## 2024-10-30 ENCOUNTER — HOSPITAL ENCOUNTER (OUTPATIENT)
Dept: INFUSION THERAPY | Age: 49
Discharge: HOME | End: 2024-10-30
Payer: MEDICARE

## 2024-10-30 VITALS
DIASTOLIC BLOOD PRESSURE: 67 MMHG | SYSTOLIC BLOOD PRESSURE: 103 MMHG | WEIGHT: 160 LBS | BODY MASS INDEX: 20.54 KG/M2 | HEART RATE: 102 BPM | RESPIRATION RATE: 16 BRPM

## 2024-10-30 DIAGNOSIS — C34.90 NON-SMALL CELL LUNG CANCER, UNSPECIFIED LATERALITY (HCC): ICD-10-CM

## 2024-10-30 DIAGNOSIS — C79.51 MALIGNANT NEOPLASM METASTATIC TO BONE (HCC): Primary | ICD-10-CM

## 2024-10-30 DIAGNOSIS — R90.0 INTRACRANIAL MASS: ICD-10-CM

## 2024-10-30 DIAGNOSIS — C79.51 METASTASIS TO BONE (HCC): ICD-10-CM

## 2024-10-30 DIAGNOSIS — Z79.899 LONG TERM USE OF DRUG: ICD-10-CM

## 2024-10-30 DIAGNOSIS — R59.0 MEDIASTINAL LYMPHADENOPATHY: ICD-10-CM

## 2024-10-30 DIAGNOSIS — R59.0 AXILLARY LYMPHADENOPATHY: ICD-10-CM

## 2024-10-30 LAB
ALBUMIN SERPL-MCNC: 4 G/DL (ref 3.5–5.2)
ALBUMIN/GLOB SERPL: 1.5 {RATIO} (ref 1–2.5)
ALP SERPL-CCNC: 144 U/L (ref 40–129)
ALT SERPL-CCNC: 10 U/L (ref 5–41)
AMYLASE SERPL-CCNC: 42 U/L (ref 28–100)
ANION GAP SERPL CALCULATED.3IONS-SCNC: 13 MMOL/L (ref 9–17)
AST SERPL-CCNC: 9 U/L
BASOPHILS # BLD: 0.04 K/UL (ref 0–0.2)
BASOPHILS NFR BLD: 1 % (ref 0–2)
BILIRUB SERPL-MCNC: 0.6 MG/DL (ref 0.3–1.2)
BUN SERPL-MCNC: 7 MG/DL (ref 6–20)
CALCIUM SERPL-MCNC: 9.7 MG/DL (ref 8.6–10.4)
CHLORIDE SERPL-SCNC: 100 MMOL/L (ref 98–107)
CO2 SERPL-SCNC: 24 MMOL/L (ref 20–31)
CORTIS SERPL-MCNC: 3.3 UG/DL (ref 2.5–19.5)
CORTISOL COLLECTION INFO: NORMAL
CREAT SERPL-MCNC: 0.5 MG/DL (ref 0.7–1.2)
EOSINOPHIL # BLD: 0 K/UL (ref 0–0.4)
EOSINOPHILS RELATIVE PERCENT: 0 % (ref 1–4)
ERYTHROCYTE [DISTWIDTH] IN BLOOD BY AUTOMATED COUNT: 24 % (ref 12.5–15.4)
GFR, ESTIMATED: >90 ML/MIN/1.73M2
GLUCOSE SERPL-MCNC: 201 MG/DL (ref 70–99)
HCT VFR BLD AUTO: 34.4 % (ref 41–53)
HGB BLD-MCNC: 11.9 G/DL (ref 13.5–17.5)
LIPASE SERPL-CCNC: 19 U/L (ref 13–60)
LYMPHOCYTES NFR BLD: 0.3 K/UL (ref 1–4.8)
LYMPHOCYTES RELATIVE PERCENT: 8 % (ref 24–44)
MCH RBC QN AUTO: 29.9 PG (ref 26–34)
MCHC RBC AUTO-ENTMCNC: 34.5 G/DL (ref 31–37)
MCV RBC AUTO: 86.8 FL (ref 80–100)
MICROORGANISM SPEC CULT: NORMAL
MICROORGANISM/AGENT SPEC: NORMAL
MICROORGANISM/AGENT SPEC: NORMAL
MONOCYTES NFR BLD: 0.08 K/UL (ref 0.1–0.8)
MONOCYTES NFR BLD: 2 % (ref 1–7)
MORPHOLOGY: ABNORMAL
NEUTROPHILS NFR BLD: 89 % (ref 36–66)
NEUTS SEG NFR BLD: 3.38 K/UL (ref 1.8–7.7)
PLATELET # BLD AUTO: 278 K/UL (ref 140–450)
PMV BLD AUTO: 6.4 FL (ref 6–12)
POTASSIUM SERPL-SCNC: 4.9 MMOL/L (ref 3.7–5.3)
PROT SERPL-MCNC: 6.7 G/DL (ref 6.4–8.3)
RBC # BLD AUTO: 3.96 M/UL (ref 4.5–5.9)
SODIUM SERPL-SCNC: 137 MMOL/L (ref 135–144)
SPECIMEN DESCRIPTION: NORMAL
TSH SERPL DL<=0.05 MIU/L-ACNC: 1.08 UIU/ML (ref 0.3–5)
WBC OTHER # BLD: 3.8 K/UL (ref 3.5–11)

## 2024-10-30 PROCEDURE — G8428 CUR MEDS NOT DOCUMENT: HCPCS | Performed by: INTERNAL MEDICINE

## 2024-10-30 PROCEDURE — 84443 ASSAY THYROID STIM HORMONE: CPT

## 2024-10-30 PROCEDURE — 80053 COMPREHEN METABOLIC PANEL: CPT

## 2024-10-30 PROCEDURE — 96366 THER/PROPH/DIAG IV INF ADDON: CPT

## 2024-10-30 PROCEDURE — 82150 ASSAY OF AMYLASE: CPT

## 2024-10-30 PROCEDURE — 85025 COMPLETE CBC W/AUTO DIFF WBC: CPT

## 2024-10-30 PROCEDURE — 96413 CHEMO IV INFUSION 1 HR: CPT

## 2024-10-30 PROCEDURE — 4004F PT TOBACCO SCREEN RCVD TLK: CPT | Performed by: INTERNAL MEDICINE

## 2024-10-30 PROCEDURE — 96375 TX/PRO/DX INJ NEW DRUG ADDON: CPT

## 2024-10-30 PROCEDURE — 6360000002 HC RX W HCPCS: Performed by: INTERNAL MEDICINE

## 2024-10-30 PROCEDURE — G8420 CALC BMI NORM PARAMETERS: HCPCS | Performed by: INTERNAL MEDICINE

## 2024-10-30 PROCEDURE — 2580000003 HC RX 258: Performed by: INTERNAL MEDICINE

## 2024-10-30 PROCEDURE — 99214 OFFICE O/P EST MOD 30 MIN: CPT | Performed by: INTERNAL MEDICINE

## 2024-10-30 PROCEDURE — 82533 TOTAL CORTISOL: CPT

## 2024-10-30 PROCEDURE — 96415 CHEMO IV INFUSION ADDL HR: CPT

## 2024-10-30 PROCEDURE — G8484 FLU IMMUNIZE NO ADMIN: HCPCS | Performed by: INTERNAL MEDICINE

## 2024-10-30 PROCEDURE — 1111F DSCHRG MED/CURRENT MED MERGE: CPT | Performed by: INTERNAL MEDICINE

## 2024-10-30 PROCEDURE — 2500000003 HC RX 250 WO HCPCS: Performed by: INTERNAL MEDICINE

## 2024-10-30 PROCEDURE — 83690 ASSAY OF LIPASE: CPT

## 2024-10-30 RX ORDER — ACETAMINOPHEN 325 MG/1
650 TABLET ORAL
Status: CANCELLED | OUTPATIENT
Start: 2024-10-30

## 2024-10-30 RX ORDER — PALONOSETRON 0.05 MG/ML
0.25 INJECTION, SOLUTION INTRAVENOUS ONCE
Status: COMPLETED | OUTPATIENT
Start: 2024-10-30 | End: 2024-10-30

## 2024-10-30 RX ORDER — EPINEPHRINE 1 MG/ML
0.3 INJECTION, SOLUTION, CONCENTRATE INTRAVENOUS PRN
Status: CANCELLED | OUTPATIENT
Start: 2024-10-30

## 2024-10-30 RX ORDER — SODIUM CHLORIDE 9 MG/ML
INJECTION, SOLUTION INTRAVENOUS CONTINUOUS
Status: CANCELLED | OUTPATIENT
Start: 2024-10-30

## 2024-10-30 RX ORDER — PALONOSETRON 0.05 MG/ML
0.25 INJECTION, SOLUTION INTRAVENOUS ONCE
Status: CANCELLED | OUTPATIENT
Start: 2024-10-30 | End: 2024-10-30

## 2024-10-30 RX ORDER — MEPERIDINE HYDROCHLORIDE 50 MG/ML
12.5 INJECTION INTRAMUSCULAR; INTRAVENOUS; SUBCUTANEOUS PRN
Status: CANCELLED | OUTPATIENT
Start: 2024-10-30

## 2024-10-30 RX ORDER — DIPHENHYDRAMINE HYDROCHLORIDE 50 MG/ML
50 INJECTION INTRAMUSCULAR; INTRAVENOUS
Status: CANCELLED | OUTPATIENT
Start: 2024-10-30

## 2024-10-30 RX ORDER — ALBUTEROL SULFATE 90 UG/1
4 INHALANT RESPIRATORY (INHALATION) PRN
Status: CANCELLED | OUTPATIENT
Start: 2024-10-30

## 2024-10-30 RX ORDER — SODIUM CHLORIDE 0.9 % (FLUSH) 0.9 %
5-40 SYRINGE (ML) INJECTION PRN
Status: DISCONTINUED | OUTPATIENT
Start: 2024-10-30 | End: 2024-10-31 | Stop reason: HOSPADM

## 2024-10-30 RX ORDER — SODIUM CHLORIDE 9 MG/ML
5-250 INJECTION, SOLUTION INTRAVENOUS PRN
Status: CANCELLED | OUTPATIENT
Start: 2024-10-30

## 2024-10-30 RX ORDER — DIPHENHYDRAMINE HYDROCHLORIDE 50 MG/ML
50 INJECTION INTRAMUSCULAR; INTRAVENOUS ONCE
Status: COMPLETED | OUTPATIENT
Start: 2024-10-30 | End: 2024-10-30

## 2024-10-30 RX ORDER — FAMOTIDINE 10 MG/ML
20 INJECTION, SOLUTION INTRAVENOUS ONCE
Status: CANCELLED | OUTPATIENT
Start: 2024-10-30 | End: 2024-10-30

## 2024-10-30 RX ORDER — HEPARIN 100 UNIT/ML
500 SYRINGE INTRAVENOUS PRN
Status: DISCONTINUED | OUTPATIENT
Start: 2024-10-30 | End: 2024-10-31 | Stop reason: HOSPADM

## 2024-10-30 RX ORDER — DIPHENHYDRAMINE HYDROCHLORIDE 50 MG/ML
50 INJECTION INTRAMUSCULAR; INTRAVENOUS ONCE
Status: CANCELLED | OUTPATIENT
Start: 2024-10-30 | End: 2024-10-30

## 2024-10-30 RX ORDER — FAMOTIDINE 10 MG/ML
20 INJECTION, SOLUTION INTRAVENOUS
Status: CANCELLED | OUTPATIENT
Start: 2024-10-30

## 2024-10-30 RX ORDER — DEXAMETHASONE SODIUM PHOSPHATE 10 MG/ML
10 INJECTION INTRAMUSCULAR; INTRAVENOUS ONCE
Status: COMPLETED | OUTPATIENT
Start: 2024-10-30 | End: 2024-10-30

## 2024-10-30 RX ORDER — SODIUM CHLORIDE 9 MG/ML
5-250 INJECTION, SOLUTION INTRAVENOUS PRN
Status: DISCONTINUED | OUTPATIENT
Start: 2024-10-30 | End: 2024-10-31 | Stop reason: HOSPADM

## 2024-10-30 RX ORDER — PROCHLORPERAZINE EDISYLATE 5 MG/ML
5 INJECTION INTRAMUSCULAR; INTRAVENOUS
Status: CANCELLED | OUTPATIENT
Start: 2024-10-30

## 2024-10-30 RX ORDER — ONDANSETRON 2 MG/ML
8 INJECTION INTRAMUSCULAR; INTRAVENOUS
Status: CANCELLED | OUTPATIENT
Start: 2024-10-30

## 2024-10-30 RX ORDER — FAMOTIDINE 10 MG/ML
20 INJECTION, SOLUTION INTRAVENOUS ONCE
Status: COMPLETED | OUTPATIENT
Start: 2024-10-30 | End: 2024-10-30

## 2024-10-30 RX ADMIN — SODIUM CHLORIDE, PRESERVATIVE FREE 10 ML: 5 INJECTION INTRAVENOUS at 14:41

## 2024-10-30 RX ADMIN — SODIUM CHLORIDE, PRESERVATIVE FREE 10 ML: 5 INJECTION INTRAVENOUS at 08:20

## 2024-10-30 RX ADMIN — DEXAMETHASONE SODIUM PHOSPHATE 10 MG: 10 INJECTION INTRAMUSCULAR; INTRAVENOUS at 09:57

## 2024-10-30 RX ADMIN — SODIUM CHLORIDE 150 MG: 9 INJECTION, SOLUTION INTRAVENOUS at 10:01

## 2024-10-30 RX ADMIN — DIPHENHYDRAMINE HYDROCHLORIDE 50 MG: 50 INJECTION INTRAMUSCULAR; INTRAVENOUS at 10:54

## 2024-10-30 RX ADMIN — PACLITAXEL 408 MG: 6 INJECTION, SOLUTION, CONCENTRATE INTRAVENOUS at 11:09

## 2024-10-30 RX ADMIN — SODIUM CHLORIDE 200 MG: 9 INJECTION, SOLUTION INTRAVENOUS at 10:26

## 2024-10-30 RX ADMIN — FAMOTIDINE 20 MG: 10 INJECTION, SOLUTION INTRAVENOUS at 09:56

## 2024-10-30 RX ADMIN — HEPARIN 500 UNITS: 100 SYRINGE at 14:41

## 2024-10-30 RX ADMIN — SODIUM CHLORIDE 25 ML/HR: 9 INJECTION, SOLUTION INTRAVENOUS at 09:54

## 2024-10-30 RX ADMIN — CARBOPLATIN 800 MG: 10 INJECTION INTRAVENOUS at 13:58

## 2024-10-30 RX ADMIN — PALONOSETRON HYDROCHLORIDE 0.25 MG: 0.25 INJECTION INTRAVENOUS at 09:54

## 2024-10-30 NOTE — DISCHARGE SUMMARY
[facility]     Department of Internal Medicine - Staff Internal Medicine Teaching Service    INPATIENT DISCHARGE SUMMARY      Patient Identification:  Isiah Duncan is a 49 y.o. male.  :  1975  MRN: [id number]     Acct: [id number]   PCP: Loilta Mcclain APRN - CNP  Admit Date:  10/27/2024  Discharge date and time: 10/29/2024  8:18 PM  Attending Provider: Dr. Yesi Conner                              ACTIVE DISCHARGE DIAGNOSES     Hospital Problem Lists:  Principal Problem:    Open wnd of scalp  Active Problems:    Gastroesophageal reflux disease    Schizophrenia (HCC)    ACP (advance care planning)    Metastatic disease (HCC)    Vitamin D deficiency    Non-small cell carcinoma of lung (HCC)    Encounter for palliative care  Resolved Problems:    * No resolved hospital problems. *      HOSPITAL STAY     Brief Inpatient course:   Isiah Duncan is a 49 y.o. 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  Patient is currently undergoing chemotherapy and radiotherapy for the cancer.  Patient past medical history is also significant for schizophrenia on paliperidone, patient did not have hallucinations recently hence did not get the recent dose of paliperidone.  Patient is a known case of diabetes mellitus on metformin.  They present to the ED with a chief complaint of  rupture of  scalp.  Patient had radiotherapy open 1 month back to the scalp, had spontaneous rupture of her scalp today in the morning and there was clear fluid oozing out from the ruptured lesion.  No history of fever, vomitings, nausea, blurring of vision or headache.  No history of neck stiffness/seizures.  CT head was done in ED had shownlarge exophytic mass involving the scalp and  calvarium at the vertex with resection of the mass involving the subjacent  dura. There is a residual soft tissue mass with foci of gas and fluid  measuring 2.2 x 11.0 x

## 2024-10-30 NOTE — PROGRESS NOTES
Pt. Arrived C4D1 keytruda, taxol, carbo  Denies any complaints  Seen by  et tx ok'd et signed  Tx given without incident, pt. Disconnected tubing durin taxol infusion et pt. Instructed tx wasn't complete. Protocol followed et tx resumed  Return C5D1 11/20/24

## 2024-10-30 NOTE — PROGRESS NOTES
3. Mediastinal lymphadenopathy        4. Metastasis to bone (HCC)        5. Intracranial mass        6. Axillary lymphadenopathy                   PLAN:     I reviewed labs, imaging studies, related electronic medical records including outside records and discussed the diagnosis, prognosis and treatment recommendations   I reviewed the surgical pathology results, discuss goals of care and NCCN guidelines with patient and family    49-year-old man with schizophrenia and his father is POA to have pathologic fracture of the right femur status post ORIF and pathology confirmed metastatic non-small cell carcinoma and tumor origin confirmed squamous cell carcinoma, discussed with the father/POA the natural history of lung cancer in general and in this case in particular where it is metastatic and the goal of treatment will be palliative rather than curative, we discussed chemoimmunotherapy versus immunotherapy only however due to decalcified bone biopsy/sampling no NGS was done and immunostain was not enough to determine if it is squamous versus adeno> will have a biopsy from the scalp mass and sent for NGS  Chemoimmunotherapy started August 21, 2024 in the form of CarboTaxol Keytruda> status post 2 treatments  Toxicity profile reviewed with the patient's and he did tolerate treatment very well  will proceed with cycle 4/4  Reassess imaging after 4 cycles  Status post radiation to the scalp lesion we may consider neurosurgery evaluation if good response after 4 cycles  Continue Zometa for bone metastasis  Scalp wound rupture> status post debridements and antibiotic which done  RTC in 3 weeks after imaging             Attention  Chemotherapy will be in the form of CarboTaxol immunotherapy and if adenocarcinoma will change to carbo Alimta immunotherapy                             Calin Strong MD                          [facility]                            This note is created with the assistance of sadaf

## 2024-10-30 NOTE — TELEPHONE ENCOUNTER
Instructions   from Dr. Calin Strong MD    Ok for treament  Rtc in 3 weeks after scan    Rv for CT scan on 11/19/2024 @1, pt is to arrive at 12:30 [facility]  NPO 2 hrs prior

## 2024-11-01 LAB
MICROORGANISM SPEC CULT: NORMAL
MICROORGANISM SPEC CULT: NORMAL
SERVICE CMNT-IMP: NORMAL
SERVICE CMNT-IMP: NORMAL
SPECIMEN DESCRIPTION: NORMAL
SPECIMEN DESCRIPTION: NORMAL

## 2024-11-04 ENCOUNTER — TELEPHONE (OUTPATIENT)
Dept: WOUND CARE | Age: 49
End: 2024-11-04

## 2024-11-04 ENCOUNTER — HOSPITAL ENCOUNTER (OUTPATIENT)
Dept: WOUND CARE | Age: 49
Discharge: HOME | End: 2024-11-04
Payer: MEDICARE

## 2024-11-04 VITALS
HEART RATE: 124 BPM | TEMPERATURE: 97.2 F | HEIGHT: 74 IN | DIASTOLIC BLOOD PRESSURE: 78 MMHG | RESPIRATION RATE: 16 BRPM | BODY MASS INDEX: 20.53 KG/M2 | SYSTOLIC BLOOD PRESSURE: 137 MMHG | WEIGHT: 160 LBS

## 2024-11-04 DIAGNOSIS — S01.00XD OPEN WOUND OF SCALP, UNSPECIFIED OPEN WOUND TYPE, SUBSEQUENT ENCOUNTER: Primary | ICD-10-CM

## 2024-11-04 PROCEDURE — 99213 OFFICE O/P EST LOW 20 MIN: CPT

## 2024-11-04 PROCEDURE — 99203 OFFICE O/P NEW LOW 30 MIN: CPT | Performed by: PLASTIC SURGERY

## 2024-11-04 RX ORDER — CLOBETASOL PROPIONATE 0.5 MG/G
OINTMENT TOPICAL ONCE
OUTPATIENT
Start: 2024-11-04 | End: 2024-11-04

## 2024-11-04 RX ORDER — LIDOCAINE 40 MG/G
CREAM TOPICAL ONCE
OUTPATIENT
Start: 2024-11-04 | End: 2024-11-04

## 2024-11-04 RX ORDER — MUPIROCIN 20 MG/G
OINTMENT TOPICAL ONCE
OUTPATIENT
Start: 2024-11-04 | End: 2024-11-04

## 2024-11-04 RX ORDER — BACITRACIN ZINC AND POLYMYXIN B SULFATE 500; 1000 [USP'U]/G; [USP'U]/G
OINTMENT TOPICAL ONCE
OUTPATIENT
Start: 2024-11-04 | End: 2024-11-04

## 2024-11-04 RX ORDER — SODIUM CHLOR/HYPOCHLOROUS ACID 0.033 %
SOLUTION, IRRIGATION IRRIGATION ONCE
OUTPATIENT
Start: 2024-11-04 | End: 2024-11-04

## 2024-11-04 RX ORDER — GINSENG 100 MG
CAPSULE ORAL ONCE
OUTPATIENT
Start: 2024-11-04 | End: 2024-11-04

## 2024-11-04 RX ORDER — SILVER SULFADIAZINE 10 MG/G
CREAM TOPICAL ONCE
OUTPATIENT
Start: 2024-11-04 | End: 2024-11-04

## 2024-11-04 RX ORDER — LIDOCAINE HYDROCHLORIDE 20 MG/ML
JELLY TOPICAL ONCE
OUTPATIENT
Start: 2024-11-04 | End: 2024-11-04

## 2024-11-04 RX ORDER — BETAMETHASONE DIPROPIONATE 0.5 MG/G
CREAM TOPICAL ONCE
OUTPATIENT
Start: 2024-11-04 | End: 2024-11-04

## 2024-11-04 RX ORDER — NEOMYCIN/BACITRACIN/POLYMYXINB 3.5-400-5K
OINTMENT (GRAM) TOPICAL ONCE
OUTPATIENT
Start: 2024-11-04 | End: 2024-11-04

## 2024-11-04 RX ORDER — LIDOCAINE HYDROCHLORIDE 40 MG/ML
SOLUTION TOPICAL ONCE
OUTPATIENT
Start: 2024-11-04 | End: 2024-11-04

## 2024-11-04 RX ORDER — TRIAMCINOLONE ACETONIDE 1 MG/G
OINTMENT TOPICAL ONCE
OUTPATIENT
Start: 2024-11-04 | End: 2024-11-04

## 2024-11-04 RX ORDER — LIDOCAINE 50 MG/G
OINTMENT TOPICAL ONCE
OUTPATIENT
Start: 2024-11-04 | End: 2024-11-04

## 2024-11-04 RX ORDER — GENTAMICIN SULFATE 1 MG/G
OINTMENT TOPICAL ONCE
OUTPATIENT
Start: 2024-11-04 | End: 2024-11-04

## 2024-11-04 ASSESSMENT — PAIN SCALES - GENERAL: PAINLEVEL_OUTOF10: 0

## 2024-11-04 NOTE — PROGRESS NOTES
Other Topics Concern    Not on file   Social History Narrative    Not on file     Social Determinants of Health     Financial Resource Strain: Not on file   Food Insecurity: No Food Insecurity (10/27/2024)    Hunger Vital Sign     Worried About Running Out of Food in the Last Year: Never true     Ran Out of Food in the Last Year: Never true   Transportation Needs: No Transportation Needs (10/27/2024)    PRAPARE - Transportation     Lack of Transportation (Medical): No     Lack of Transportation (Non-Medical): No   Physical Activity: Not on file   Stress: Not on file   Social Connections: Not on file   Intimate Partner Violence: Not on file   Housing Stability: Low Risk  (10/27/2024)    Housing Stability Vital Sign     Unable to Pay for Housing in the Last Year: No     Number of Times Moved in the Last Year: 1     Homeless in the Last Year: No     Family History   Problem Relation Age of Onset    Diabetes Father     Substance Abuse Brother      Physical Exam:   /78   Pulse (!) 124   Temp 97.2 °F (36.2 °C) (Tympanic)   Resp 16   Ht 1.88 m (6' 2\")   Wt 72.6 kg (160 lb)   BMI 20.54 kg/m²    Body mass index is 20.54 kg/m².  Physical Exam   Nursing note and vitals reviewed.  Constitutional: Oriented to person, place, and time. Appears well-developed and well-nourished. No distress.   Head: Normocephalic and atraumatic.   Eyes: Conjunctivae and EOM are normal.   Pulmonary/Chest: Effort normal. No respiratory distress.   Neurological: Alert and oriented to person, place, and time.   Psychiatric: Normal mood and affect. Behavior is normal    Post Debridement Measurements:  Wound/Ulcer Descriptions are Pre Debridement except measurements:    Wound 11/04/24 Head Upper wound #1 scalp (Active)   Wound Image   11/04/24 1443   Wound Etiology Other 11/04/24 1443   Dressing Status New drainage noted;Old drainage noted 11/04/24 1443   Wound Cleansed Vashe 11/04/24 1443   Wound Length (cm) 1.4 cm 11/04/24 1443   Wound

## 2024-11-04 NOTE — TELEPHONE ENCOUNTER
Call to [facility] spoke with Martir-explained that we have a new referral. Instructed to send notes, referral and face sheet to [phone]. Martir stated that they are booking 1 month out. This was explained to Patient and his father.  They verbalized understanding.

## 2024-11-08 ENCOUNTER — TELEPHONE (OUTPATIENT)
Dept: ONCOLOGY | Age: 49
End: 2024-11-08

## 2024-11-08 NOTE — TELEPHONE ENCOUNTER
Name: Isiah Duncan  : 1975  MRN: [id number]    Oncology Navigation Follow-Up Note    Contact Type:  Telephone    Notes: Writer covering for shea Jaimes's oncology nurse navigator.  Upon review of [facility] care everywhere noted pt registered @ [facility] & no appt noted.        Electronically signed by Diamond Somers RN on 2024 at 8:10 AM

## 2024-11-13 ENCOUNTER — OFFICE VISIT (OUTPATIENT)
Age: 49
End: 2024-11-13
Payer: MEDICARE

## 2024-11-13 VITALS
WEIGHT: 160 LBS | DIASTOLIC BLOOD PRESSURE: 78 MMHG | BODY MASS INDEX: 20.53 KG/M2 | RESPIRATION RATE: 14 BRPM | HEART RATE: 85 BPM | HEIGHT: 74 IN | SYSTOLIC BLOOD PRESSURE: 130 MMHG

## 2024-11-13 DIAGNOSIS — S01.00XS OPEN WOUND OF SCALP, UNSPECIFIED OPEN WOUND TYPE, SEQUELA: Primary | ICD-10-CM

## 2024-11-13 PROCEDURE — 4004F PT TOBACCO SCREEN RCVD TLK: CPT | Performed by: PHYSICIAN ASSISTANT

## 2024-11-13 PROCEDURE — 99213 OFFICE O/P EST LOW 20 MIN: CPT | Performed by: PHYSICIAN ASSISTANT

## 2024-11-13 PROCEDURE — G8420 CALC BMI NORM PARAMETERS: HCPCS | Performed by: PHYSICIAN ASSISTANT

## 2024-11-13 PROCEDURE — G8484 FLU IMMUNIZE NO ADMIN: HCPCS | Performed by: PHYSICIAN ASSISTANT

## 2024-11-13 PROCEDURE — G8427 DOCREV CUR MEDS BY ELIG CLIN: HCPCS | Performed by: PHYSICIAN ASSISTANT

## 2024-11-13 PROCEDURE — 1111F DSCHRG MED/CURRENT MED MERGE: CPT | Performed by: PHYSICIAN ASSISTANT

## 2024-11-13 NOTE — PROGRESS NOTES
[facility], [facility], [facility] NEUROSURGERY  [address], [address]  [place] [facility] [place] [address]  Dept: [phone]  Dept Fax: [phone]     Patient:  Isiah Duncan  YOB: 1975  Date: 11/13/24      Chief Complaint   Patient presents with    Follow-Up from Hospital     Hospital follow up for Head injury           HPI:     I had the pleasure of seeing this patient in the office today in follow-up.  As you know he is a 49-year-old male with known history of lung cancer with metastatic disease to his skull and scalp.  He hit his head on the top of a bunk bed/bed and noticed a sudden collapse of the large scalp cyst approximately 2 to 3 weeks ago, he then presented to [facility].   We did have him set up with wound care as well as plastic surgery.  He is currently being seen by wound care and has a follow-up appointment next week.  Overall the patient has been doing well denies significant headache, nausea or vomiting.  Denies any fevers chills night sweats.  He and his father feel that he is at his baseline.    Physical examination today is unremarkable.  He has good strength and sensation throughout both upper and lower extremities.  No focal neurologic deficits.  Examination of the scalp does demonstrate a central region of scalp erosion which is currently packed.  No surrounding erythema did or tenderness.      At this point in time given the fact he is set up with wound care and has a appointment with plastic surgery, there are no current recommendations from a neurosurgical perspective other than proper follow-up.  I did answer any questions he in the office today and welcomed him to feel free to contact me at any point in the future should he have any problems or questions we could be of assistance with.  Dr. Coyle did come into the room and review my full history and examination. He was involved in both the

## 2024-11-18 ENCOUNTER — HOSPITAL ENCOUNTER (OUTPATIENT)
Dept: WOUND CARE | Age: 49
Discharge: HOME | End: 2024-11-18
Payer: MEDICARE

## 2024-11-18 VITALS
HEIGHT: 74 IN | BODY MASS INDEX: 20.53 KG/M2 | SYSTOLIC BLOOD PRESSURE: 117 MMHG | HEART RATE: 110 BPM | TEMPERATURE: 95.9 F | RESPIRATION RATE: 15 BRPM | DIASTOLIC BLOOD PRESSURE: 80 MMHG | WEIGHT: 160 LBS

## 2024-11-18 DIAGNOSIS — S01.00XS OPEN WOUND OF SCALP, UNSPECIFIED OPEN WOUND TYPE, SEQUELA: Primary | ICD-10-CM

## 2024-11-18 PROCEDURE — 99213 OFFICE O/P EST LOW 20 MIN: CPT

## 2024-11-18 PROCEDURE — 99213 OFFICE O/P EST LOW 20 MIN: CPT | Performed by: PLASTIC SURGERY

## 2024-11-18 RX ORDER — LIDOCAINE 50 MG/G
OINTMENT TOPICAL ONCE
OUTPATIENT
Start: 2024-11-18 | End: 2024-11-18

## 2024-11-18 RX ORDER — MUPIROCIN 20 MG/G
OINTMENT TOPICAL ONCE
OUTPATIENT
Start: 2024-11-18 | End: 2024-11-18

## 2024-11-18 RX ORDER — LIDOCAINE HYDROCHLORIDE 40 MG/ML
SOLUTION TOPICAL ONCE
OUTPATIENT
Start: 2024-11-18 | End: 2024-11-18

## 2024-11-18 RX ORDER — TRIAMCINOLONE ACETONIDE 1 MG/G
OINTMENT TOPICAL ONCE
OUTPATIENT
Start: 2024-11-18 | End: 2024-11-18

## 2024-11-18 RX ORDER — BACITRACIN ZINC AND POLYMYXIN B SULFATE 500; 1000 [USP'U]/G; [USP'U]/G
OINTMENT TOPICAL ONCE
OUTPATIENT
Start: 2024-11-18 | End: 2024-11-18

## 2024-11-18 RX ORDER — GENTAMICIN SULFATE 1 MG/G
OINTMENT TOPICAL ONCE
OUTPATIENT
Start: 2024-11-18 | End: 2024-11-18

## 2024-11-18 RX ORDER — BETAMETHASONE DIPROPIONATE 0.5 MG/G
CREAM TOPICAL ONCE
OUTPATIENT
Start: 2024-11-18 | End: 2024-11-18

## 2024-11-18 RX ORDER — LIDOCAINE HYDROCHLORIDE 20 MG/ML
JELLY TOPICAL ONCE
OUTPATIENT
Start: 2024-11-18 | End: 2024-11-18

## 2024-11-18 RX ORDER — SODIUM CHLOR/HYPOCHLOROUS ACID 0.033 %
SOLUTION, IRRIGATION IRRIGATION ONCE
OUTPATIENT
Start: 2024-11-18 | End: 2024-11-18

## 2024-11-18 RX ORDER — SILVER SULFADIAZINE 10 MG/G
CREAM TOPICAL ONCE
OUTPATIENT
Start: 2024-11-18 | End: 2024-11-18

## 2024-11-18 RX ORDER — GINSENG 100 MG
CAPSULE ORAL ONCE
OUTPATIENT
Start: 2024-11-18 | End: 2024-11-18

## 2024-11-18 RX ORDER — LIDOCAINE HYDROCHLORIDE 40 MG/ML
SOLUTION TOPICAL ONCE
Status: COMPLETED | OUTPATIENT
Start: 2024-11-18 | End: 2024-11-18

## 2024-11-18 RX ORDER — NEOMYCIN/BACITRACIN/POLYMYXINB 3.5-400-5K
OINTMENT (GRAM) TOPICAL ONCE
OUTPATIENT
Start: 2024-11-18 | End: 2024-11-18

## 2024-11-18 RX ORDER — CLOBETASOL PROPIONATE 0.5 MG/G
OINTMENT TOPICAL ONCE
OUTPATIENT
Start: 2024-11-18 | End: 2024-11-18

## 2024-11-18 RX ORDER — LIDOCAINE 40 MG/G
CREAM TOPICAL ONCE
OUTPATIENT
Start: 2024-11-18 | End: 2024-11-18

## 2024-11-18 RX ADMIN — LIDOCAINE HYDROCHLORIDE 6 ML: 40 SOLUTION TOPICAL at 13:32

## 2024-11-18 ASSESSMENT — PAIN SCALES - GENERAL: PAINLEVEL_OUTOF10: 0

## 2024-11-18 NOTE — PROGRESS NOTES
1322   Odor None 11/18/24 1322   Lupe-wound Assessment Intact 11/18/24 1322   Margins Defined edges 11/18/24 1322   Wound Thickness Description not for Pressure Injury Full thickness 11/18/24 1322   Number of days: 14          Supplies Requested :      WOUND #: 1   PRIMARY DRESSING:  OPTICEL AG OR EQUIV (GELLING FIBER)   COVER WITH EXCELL SAP 4X4     FREQUENCY OF DRESSING CHANGES:  Daily       ADDITIONAL ITEMS:  [] Gloves Small  [] Gloves Medium [] Gloves Large [x] Gloves XLarge  [] Tape 1\" [] Tape 2\" [] Tape 3\"  [] Medipore Tape  [x] Saline  [] Vashe  [] Skin Prep   [] Adhesive Remover   [x] Cotton Tip Applicators   [] Other:    Patient Wound(s) Debrided: [x] Yes if yes please add date 11/4/24  [] No    Debribement Type: Excisional/Sharp    Is the patient currently on an antibiotic for their Wound(s): [] Yes if yes please add name and dose   [x] No    Patient currently being seen by Home Health: [] Yes   [x] No    Duration for needed supplies:  []15  [x]30  []60  []90 Days    Electronically signed by Rosa Giang RN on 11/18/2024 at 1:46 PM     Provider Information:      PROVIDER'S NAME: Francoise CAMPUZANO-[id number]               
No    Drug use: No    Sexual activity: Not Currently     Partners: Female   Other Topics Concern    Not on file   Social History Narrative    Not on file     Social Determinants of Health     Financial Resource Strain: Not on file   Food Insecurity: No Food Insecurity (10/27/2024)    Hunger Vital Sign     Worried About Running Out of Food in the Last Year: Never true     Ran Out of Food in the Last Year: Never true   Transportation Needs: No Transportation Needs (10/27/2024)    PRAPARE - Transportation     Lack of Transportation (Medical): No     Lack of Transportation (Non-Medical): No   Physical Activity: Not on file   Stress: Not on file   Social Connections: Not on file   Intimate Partner Violence: Not on file   Housing Stability: Low Risk  (10/27/2024)    Housing Stability Vital Sign     Unable to Pay for Housing in the Last Year: No     Number of Times Moved in the Last Year: 1     Homeless in the Last Year: No     Family History   Problem Relation Age of Onset    Diabetes Father     Substance Abuse Brother      Physical Exam:   /80   Pulse (!) 110   Temp (!) 95.9 °F (35.5 °C) (Tympanic)   Resp 15   Ht 1.88 m (6' 2\")   Wt 72.6 kg (160 lb)   BMI 20.54 kg/m²    Body mass index is 20.54 kg/m².  Physical Exam   Nursing note and vitals reviewed.  Constitutional: Oriented to person, place, and time. Appears well-developed and well-nourished. No distress.   Head: Normocephalic and atraumatic.   Eyes: Conjunctivae and EOM are normal.   Pulmonary/Chest: Effort normal. No respiratory distress.   Neurological: Alert and oriented to person, place, and time.   Psychiatric: Normal mood and affect. Behavior is normal    Post Debridement Measurements:  Wound/Ulcer Descriptions are Pre Debridement except measurements:    Wound 11/04/24 Head Upper wound #1 scalp (Active)   Wound Image   11/04/24 1443   Wound Etiology Other 11/18/24 1322   Dressing Status New drainage noted;Old drainage noted 11/18/24 1322   Wound

## 2024-11-18 NOTE — DISCHARGE INSTRUCTIONS
[facility] WOUND and HYPERBARIC TREATMENT  CENTER                            Visit  Discharge Instructions / Physician Orders  DATE:11/18/24     Home Care:[facility]- PLEASE SEND SUPPLIES      SUPPLIES ORDERED THRU:HOME CARE TO SUPPLY  DUE TO MEDICARE GUIDELINES                 DATE LAST SUPPLIED     Wound Location:  Scalp     Cleanse with: Liquid antibacterial soap and water, rinse well      Dressing Orders:  Primary dressing-Scalp lesion: cleanse with saline applied to gauze, fill/cover the open wound with Opticell Ag gelling fiber, secure with  an Optifoam 4x4\" foam. Change daily and prn saturation (Orders from Hospital)     Frequency:  Daily     Additional Orders: Increase protein to diet (meat, cheese, eggs, fish, peanut butter, nuts and beans)  Multivitamin daily  Referral to [facility] [place]  OFFLOADING [] YES  TYPE:                  [] NA     Weekly wound care visits until determined otherwise.     Antibiotic therapy-wound care related YES [] NO [x] NA[]     MY CHART []     Smart Device  []      HYPERBARIC TREATMENT-                TREATMENT #                          Your next appointment with the Wound Care Center is 2 weeks with                                                                                                    (Please note your next appointment above and if you are unable to keep, kindly give a 24 hour notice. Thank you.)  If more than 15 min late we cannot guarantee you will be seen due to clinician schedule  Per Policy, Excessive cancellation will call for dismissal from program.  If you experience any of the following, please call the Wound Care Center during business hours:  [phone]     * Increase in Pain  * Temperature over 101  * Increase in drainage from your wound  * Drainage with a foul odor  * Bleeding  * Increase in swelling  * Need for compression bandage changes due to slippage, breakthrough drainage.     If you need medical attention outside of the

## 2024-11-19 ENCOUNTER — HOSPITAL ENCOUNTER (OUTPATIENT)
Dept: CT IMAGING | Age: 49
Discharge: HOME | End: 2024-11-21
Attending: INTERNAL MEDICINE
Payer: MEDICARE

## 2024-11-19 DIAGNOSIS — C79.51 MALIGNANT NEOPLASM METASTATIC TO BONE (HCC): ICD-10-CM

## 2024-11-19 DIAGNOSIS — C34.90 NON-SMALL CELL LUNG CANCER, UNSPECIFIED LATERALITY (HCC): ICD-10-CM

## 2024-11-19 PROCEDURE — 2580000003 HC RX 258: Performed by: INTERNAL MEDICINE

## 2024-11-19 PROCEDURE — 71260 CT THORAX DX C+: CPT

## 2024-11-19 PROCEDURE — 6360000004 HC RX CONTRAST MEDICATION: Performed by: INTERNAL MEDICINE

## 2024-11-19 RX ORDER — IOPAMIDOL 755 MG/ML
100 INJECTION, SOLUTION INTRAVASCULAR
Status: COMPLETED | OUTPATIENT
Start: 2024-11-19 | End: 2024-11-19

## 2024-11-19 RX ORDER — 0.9 % SODIUM CHLORIDE 0.9 %
80 INTRAVENOUS SOLUTION INTRAVENOUS ONCE
Status: COMPLETED | OUTPATIENT
Start: 2024-11-19 | End: 2024-11-19

## 2024-11-19 RX ORDER — SODIUM CHLORIDE 0.9 % (FLUSH) 0.9 %
10 SYRINGE (ML) INJECTION PRN
Status: DISCONTINUED | OUTPATIENT
Start: 2024-11-19 | End: 2024-11-22 | Stop reason: HOSPADM

## 2024-11-19 RX ADMIN — SODIUM CHLORIDE, PRESERVATIVE FREE 10 ML: 5 INJECTION INTRAVENOUS at 12:32

## 2024-11-19 RX ADMIN — SODIUM CHLORIDE 80 ML: 9 INJECTION, SOLUTION INTRAVENOUS at 12:32

## 2024-11-19 RX ADMIN — IOPAMIDOL 100 ML: 755 INJECTION, SOLUTION INTRAVENOUS at 12:31

## 2024-11-20 ENCOUNTER — HOSPITAL ENCOUNTER (OUTPATIENT)
Dept: INFUSION THERAPY | Age: 49
Discharge: HOME | End: 2024-11-20
Payer: MEDICARE

## 2024-11-20 ENCOUNTER — OFFICE VISIT (OUTPATIENT)
Dept: ONCOLOGY | Age: 49
End: 2024-11-20
Payer: MEDICARE

## 2024-11-20 ENCOUNTER — TELEPHONE (OUTPATIENT)
Dept: ONCOLOGY | Age: 49
End: 2024-11-20

## 2024-11-20 ENCOUNTER — TELEPHONE (OUTPATIENT)
Dept: INFUSION THERAPY | Age: 49
End: 2024-11-20

## 2024-11-20 VITALS
WEIGHT: 160 LBS | TEMPERATURE: 96.8 F | SYSTOLIC BLOOD PRESSURE: 104 MMHG | HEART RATE: 133 BPM | BODY MASS INDEX: 20.54 KG/M2 | DIASTOLIC BLOOD PRESSURE: 76 MMHG

## 2024-11-20 DIAGNOSIS — Z79.899 LONG TERM USE OF DRUG: ICD-10-CM

## 2024-11-20 DIAGNOSIS — C79.51 MALIGNANT NEOPLASM METASTATIC TO BONE (HCC): Primary | ICD-10-CM

## 2024-11-20 DIAGNOSIS — C34.90 NON-SMALL CELL LUNG CANCER, UNSPECIFIED LATERALITY (HCC): Primary | ICD-10-CM

## 2024-11-20 DIAGNOSIS — C34.90 NON-SMALL CELL LUNG CANCER, UNSPECIFIED LATERALITY (HCC): ICD-10-CM

## 2024-11-20 DIAGNOSIS — C79.51 MALIGNANT NEOPLASM METASTATIC TO BONE (HCC): ICD-10-CM

## 2024-11-20 DIAGNOSIS — F17.200 TOBACCO DEPENDENCE: ICD-10-CM

## 2024-11-20 DIAGNOSIS — R59.0 MEDIASTINAL LYMPHADENOPATHY: ICD-10-CM

## 2024-11-20 LAB
ALBUMIN SERPL-MCNC: 4 G/DL (ref 3.5–5.2)
ALBUMIN/GLOB SERPL: 1.5 {RATIO} (ref 1–2.5)
ALP SERPL-CCNC: 141 U/L (ref 40–129)
ALT SERPL-CCNC: <5 U/L (ref 5–41)
AMYLASE SERPL-CCNC: 50 U/L (ref 28–100)
ANION GAP SERPL CALCULATED.3IONS-SCNC: 16 MMOL/L (ref 9–17)
AST SERPL-CCNC: 9 U/L
BASOPHILS # BLD: 0.04 K/UL (ref 0–0.2)
BASOPHILS NFR BLD: 1 % (ref 0–2)
BILIRUB SERPL-MCNC: 0.4 MG/DL (ref 0.3–1.2)
BUN SERPL-MCNC: 13 MG/DL (ref 6–20)
CALCIUM SERPL-MCNC: 9.6 MG/DL (ref 8.6–10.4)
CHLORIDE SERPL-SCNC: 100 MMOL/L (ref 98–107)
CO2 SERPL-SCNC: 23 MMOL/L (ref 20–31)
CORTIS SERPL-MCNC: 3.1 UG/DL (ref 2.5–19.5)
CORTISOL COLLECTION INFO: NORMAL
CREAT SERPL-MCNC: 0.7 MG/DL (ref 0.7–1.2)
EOSINOPHIL # BLD: 0.04 K/UL (ref 0–0.4)
EOSINOPHILS RELATIVE PERCENT: 1 % (ref 1–4)
ERYTHROCYTE [DISTWIDTH] IN BLOOD BY AUTOMATED COUNT: 23.7 % (ref 12.5–15.4)
GFR, ESTIMATED: >90 ML/MIN/1.73M2
GLUCOSE SERPL-MCNC: 227 MG/DL (ref 70–99)
HCT VFR BLD AUTO: 34.5 % (ref 41–53)
HGB BLD-MCNC: 11.8 G/DL (ref 13.5–17.5)
LIPASE SERPL-CCNC: 25 U/L (ref 13–60)
LYMPHOCYTES NFR BLD: 0.5 K/UL (ref 1–4.8)
LYMPHOCYTES RELATIVE PERCENT: 14 % (ref 24–44)
MCH RBC QN AUTO: 30.8 PG (ref 26–34)
MCHC RBC AUTO-ENTMCNC: 34.2 G/DL (ref 31–37)
MCV RBC AUTO: 90.1 FL (ref 80–100)
MONOCYTES NFR BLD: 0.04 K/UL (ref 0.1–1.2)
MONOCYTES NFR BLD: 1 % (ref 2–11)
MORPHOLOGY: ABNORMAL
NEUTROPHILS NFR BLD: 83 % (ref 36–66)
NEUTS SEG NFR BLD: 2.98 K/UL (ref 1.8–7.7)
PLATELET # BLD AUTO: 234 K/UL (ref 140–450)
PMV BLD AUTO: 7 FL (ref 6–12)
POTASSIUM SERPL-SCNC: 4.5 MMOL/L (ref 3.7–5.3)
PROT SERPL-MCNC: 6.7 G/DL (ref 6.4–8.3)
RBC # BLD AUTO: 3.83 M/UL (ref 4.5–5.9)
SODIUM SERPL-SCNC: 139 MMOL/L (ref 135–144)
TSH SERPL DL<=0.05 MIU/L-ACNC: 0.93 UIU/ML (ref 0.3–5)
WBC OTHER # BLD: 3.6 K/UL (ref 3.5–11)

## 2024-11-20 PROCEDURE — 6360000002 HC RX W HCPCS: Performed by: INTERNAL MEDICINE

## 2024-11-20 PROCEDURE — 99211 OFF/OP EST MAY X REQ PHY/QHP: CPT

## 2024-11-20 PROCEDURE — G8420 CALC BMI NORM PARAMETERS: HCPCS | Performed by: INTERNAL MEDICINE

## 2024-11-20 PROCEDURE — 83690 ASSAY OF LIPASE: CPT

## 2024-11-20 PROCEDURE — G8484 FLU IMMUNIZE NO ADMIN: HCPCS | Performed by: INTERNAL MEDICINE

## 2024-11-20 PROCEDURE — 84443 ASSAY THYROID STIM HORMONE: CPT

## 2024-11-20 PROCEDURE — 99215 OFFICE O/P EST HI 40 MIN: CPT | Performed by: INTERNAL MEDICINE

## 2024-11-20 PROCEDURE — 82150 ASSAY OF AMYLASE: CPT

## 2024-11-20 PROCEDURE — 82533 TOTAL CORTISOL: CPT

## 2024-11-20 PROCEDURE — 1111F DSCHRG MED/CURRENT MED MERGE: CPT | Performed by: INTERNAL MEDICINE

## 2024-11-20 PROCEDURE — 2580000003 HC RX 258: Performed by: INTERNAL MEDICINE

## 2024-11-20 PROCEDURE — G8427 DOCREV CUR MEDS BY ELIG CLIN: HCPCS | Performed by: INTERNAL MEDICINE

## 2024-11-20 PROCEDURE — 85025 COMPLETE CBC W/AUTO DIFF WBC: CPT

## 2024-11-20 PROCEDURE — 96413 CHEMO IV INFUSION 1 HR: CPT

## 2024-11-20 PROCEDURE — 80053 COMPREHEN METABOLIC PANEL: CPT

## 2024-11-20 PROCEDURE — 4004F PT TOBACCO SCREEN RCVD TLK: CPT | Performed by: INTERNAL MEDICINE

## 2024-11-20 RX ORDER — EPINEPHRINE 1 MG/ML
0.3 INJECTION, SOLUTION, CONCENTRATE INTRAVENOUS PRN
Status: CANCELLED | OUTPATIENT
Start: 2024-11-20

## 2024-11-20 RX ORDER — DIPHENHYDRAMINE HYDROCHLORIDE 50 MG/ML
50 INJECTION INTRAMUSCULAR; INTRAVENOUS
Status: CANCELLED | OUTPATIENT
Start: 2024-11-20

## 2024-11-20 RX ORDER — FAMOTIDINE 10 MG/ML
20 INJECTION, SOLUTION INTRAVENOUS
Status: CANCELLED | OUTPATIENT
Start: 2024-11-20

## 2024-11-20 RX ORDER — SODIUM CHLORIDE 0.9 % (FLUSH) 0.9 %
5-40 SYRINGE (ML) INJECTION PRN
Status: DISCONTINUED | OUTPATIENT
Start: 2024-11-20 | End: 2024-11-21 | Stop reason: HOSPADM

## 2024-11-20 RX ORDER — SODIUM CHLORIDE 9 MG/ML
INJECTION, SOLUTION INTRAVENOUS CONTINUOUS
Status: CANCELLED | OUTPATIENT
Start: 2024-11-20

## 2024-11-20 RX ORDER — ACETAMINOPHEN 325 MG/1
650 TABLET ORAL
Status: CANCELLED | OUTPATIENT
Start: 2024-11-20

## 2024-11-20 RX ORDER — SODIUM CHLORIDE 9 MG/ML
5-250 INJECTION, SOLUTION INTRAVENOUS PRN
Status: CANCELLED | OUTPATIENT
Start: 2024-11-20

## 2024-11-20 RX ORDER — ONDANSETRON 2 MG/ML
8 INJECTION INTRAMUSCULAR; INTRAVENOUS
Status: CANCELLED | OUTPATIENT
Start: 2024-11-20

## 2024-11-20 RX ORDER — HYDROCORTISONE SODIUM SUCCINATE 100 MG/2ML
100 INJECTION INTRAMUSCULAR; INTRAVENOUS
Status: CANCELLED | OUTPATIENT
Start: 2024-11-20

## 2024-11-20 RX ORDER — HEPARIN 100 UNIT/ML
500 SYRINGE INTRAVENOUS PRN
Status: DISCONTINUED | OUTPATIENT
Start: 2024-11-20 | End: 2024-11-21 | Stop reason: HOSPADM

## 2024-11-20 RX ORDER — MEPERIDINE HYDROCHLORIDE 50 MG/ML
12.5 INJECTION INTRAMUSCULAR; INTRAVENOUS; SUBCUTANEOUS PRN
Status: CANCELLED | OUTPATIENT
Start: 2024-11-20

## 2024-11-20 RX ORDER — ALBUTEROL SULFATE 90 UG/1
4 INHALANT RESPIRATORY (INHALATION) PRN
Status: CANCELLED | OUTPATIENT
Start: 2024-11-20

## 2024-11-20 RX ADMIN — SODIUM CHLORIDE 200 MG: 9 INJECTION, SOLUTION INTRAVENOUS at 11:12

## 2024-11-20 RX ADMIN — HEPARIN 500 UNITS: 100 SYRINGE at 11:41

## 2024-11-20 RX ADMIN — SODIUM CHLORIDE, PRESERVATIVE FREE 10 ML: 5 INJECTION INTRAVENOUS at 11:41

## 2024-11-20 RX ADMIN — SODIUM CHLORIDE, PRESERVATIVE FREE 10 ML: 5 INJECTION INTRAVENOUS at 09:28

## 2024-11-20 NOTE — TELEPHONE ENCOUNTER
Dr. Strong states ok to cancel [employer] order from today. He states this is a duplicate order. Writer emailed Carmen with [employer] notifying her to cancel order.

## 2024-11-20 NOTE — PROGRESS NOTES
treatment very well  Post for chemotherapy imaging was done yesterday November 19 and there is no report however my interpretation significant improvement> pending final reading  Will proceed with maintenance immunotherapy  Tobacco smoking> smoking cessation  Status post radiation to the scalp lesion we may consider neurosurgery evaluation if good response after 4 cycles  Continue Zometa for bone metastasis  Scalp wound rupture> status post debridements and antibiotic which done I need to find  RTC in 3 weeks        I spent a total of 50 minutes on the date of the service which included preparing to see the patient, face-to-face patient care, completing clinical documentation, obtaining and/or reviewing separately obtained history, performing a medically appropriate examination, counseling and educating the patient/family/caregiver, ordering medications, tests, or procedures, communicating with other HCPs (not separately reported), independently interpreting results (not separately reported), communicating results to the patient/family/caregiver and care coordination (not separately reported).                              Calin Strong MD                          [facility]                            This note is created with the assistance of a speech recognition program.  While intending to generate a document that actually reflects the content of the visit, the document can still have some errors including those of syntax and sound a like substitutions which may escape proof reading.  It such instances, actual meaning can be extrapolated by contextual diversion.                                                     I spent a total of 45 minutes on the date of the service which included preparing to see the patient, face-to-face patient care, completing clinical documentation, obtaining and/or reviewing separately obtained history, performing a medically appropriate examination, counseling and

## 2024-11-20 NOTE — PROGRESS NOTES
Pt here for C5D1 Dov. Pt seen by Dr Strong prior to tx, refer to his note. Labs drawn from port and results reviewed. Pt was treated without incident and d/c'd in stable condition. Pt will return on 12-11-24 for MD hassan/keren and C6D1.

## 2024-11-20 NOTE — TELEPHONE ENCOUNTER
Instructions   from Dr. Calin Strong MD    Proceed with treament  Rtc in 3 weeks    RTC scheduled 12/11/24 @8:45am  TX. @ 8:45am  SEE IN TX.

## 2024-11-21 LAB — TEMPUS PORTAL: NORMAL

## 2024-12-09 ENCOUNTER — OFFICE VISIT (OUTPATIENT)
Dept: INFECTIOUS DISEASES | Age: 49
End: 2024-12-09
Payer: MEDICARE

## 2024-12-09 ENCOUNTER — HOSPITAL ENCOUNTER (OUTPATIENT)
Age: 49
Setting detail: SPECIMEN
Discharge: HOME | End: 2024-12-09

## 2024-12-09 VITALS
SYSTOLIC BLOOD PRESSURE: 109 MMHG | WEIGHT: 166 LBS | HEIGHT: 73 IN | HEART RATE: 115 BPM | BODY MASS INDEX: 22 KG/M2 | DIASTOLIC BLOOD PRESSURE: 73 MMHG

## 2024-12-09 DIAGNOSIS — S01.00XS OPEN WOUND OF SCALP, UNSPECIFIED OPEN WOUND TYPE, SEQUELA: ICD-10-CM

## 2024-12-09 DIAGNOSIS — S01.00XS OPEN WOUND OF SCALP, UNSPECIFIED OPEN WOUND TYPE, SEQUELA: Primary | ICD-10-CM

## 2024-12-09 PROCEDURE — 4004F PT TOBACCO SCREEN RCVD TLK: CPT | Performed by: INTERNAL MEDICINE

## 2024-12-09 PROCEDURE — 99214 OFFICE O/P EST MOD 30 MIN: CPT | Performed by: INTERNAL MEDICINE

## 2024-12-09 PROCEDURE — G8484 FLU IMMUNIZE NO ADMIN: HCPCS | Performed by: INTERNAL MEDICINE

## 2024-12-09 PROCEDURE — G8420 CALC BMI NORM PARAMETERS: HCPCS | Performed by: INTERNAL MEDICINE

## 2024-12-09 PROCEDURE — G8427 DOCREV CUR MEDS BY ELIG CLIN: HCPCS | Performed by: INTERNAL MEDICINE

## 2024-12-09 ASSESSMENT — ENCOUNTER SYMPTOMS
EYE DISCHARGE: 0
COLOR CHANGE: 0
ABDOMINAL DISTENTION: 0
APNEA: 0

## 2024-12-09 NOTE — PROGRESS NOTES
[facility] - Initial Consult Note  Today's Date: 12/9/2024    Impression :   Post admission STV 10/30/24  Scalp wound drainage, bone exposure  Had a fluid collection x many weeks on top of the skull  Ruptured at night - clear fluid  No signs of meningitis   Plastic seeing him outpt Dr Lord - referred him to [facility] for a flap and cranioplasty - to be seen in March  Wound culture neg   No signs of sepsis  Stop cefepime and vanco - ok for DC w wound care and FU w plastics as outpt  Schizophrenia  Lung cancer with metastases both hips and the skull post chemoradiation to the skull   Mediastinal LN  smoker  DM 2 on metformin    Recommendations   12/9/24  Scalp- wound skull open and w soft tissues covering the skull - some greenish discharge possibly from the si;nuris cell? Cx taken today - we ll address if + cx  Disc w pt and dad - no fever and no HA and no new drainage - soft tissues around it are all ok-  Getting chemo 12/10/24 iv then a whole body CT scan  See me in 1 months  I ll call him for AB if cx is +  Referred out by Dr Gonzalez to [facility] or to [facility] for flap     Diagnosis Orders   1. Open wound of scalp, unspecified open wound type, sequela  Culture, Aerobic          Return in about 4 weeks (around 1/6/2025).      History of Present Illness:   Isiah Duncan is a 49 y.o.-year-old  male who presents with   Chief Complaint   Patient presents with    Scalp wound rupture     Follow up        I have personally reviewed the past medical history, past surgical history, medications, social history, and family history, and I haveupdated the database accordingly.  Past Medical History:     Past Medical History:   Diagnosis Date    Anemia     Custody issue 10/20/2014    Max Duncan ( father ) is court appointed legal guaridan    Diabetes (HCC)     H/O falling     History of recent hospitalization 07/09/2024    til 7/18/2024 : [facility]    Hypertension     Leg fracture, right 07/09/2024

## 2024-12-11 ENCOUNTER — HOSPITAL ENCOUNTER (OUTPATIENT)
Dept: INFUSION THERAPY | Age: 49
Discharge: HOME | End: 2024-12-11
Payer: MEDICARE

## 2024-12-11 ENCOUNTER — OFFICE VISIT (OUTPATIENT)
Dept: ONCOLOGY | Age: 49
End: 2024-12-11
Payer: MEDICARE

## 2024-12-11 ENCOUNTER — TELEPHONE (OUTPATIENT)
Dept: ONCOLOGY | Age: 49
End: 2024-12-11

## 2024-12-11 VITALS
BODY MASS INDEX: 22.03 KG/M2 | WEIGHT: 167 LBS | HEART RATE: 121 BPM | DIASTOLIC BLOOD PRESSURE: 81 MMHG | TEMPERATURE: 97.9 F | RESPIRATION RATE: 18 BRPM | SYSTOLIC BLOOD PRESSURE: 121 MMHG

## 2024-12-11 DIAGNOSIS — T45.1X5A ANEMIA ASSOCIATED WITH CHEMOTHERAPY: ICD-10-CM

## 2024-12-11 DIAGNOSIS — C79.51 MALIGNANT NEOPLASM METASTATIC TO BONE (HCC): Primary | ICD-10-CM

## 2024-12-11 DIAGNOSIS — C79.51 METASTASIS TO BONE (HCC): ICD-10-CM

## 2024-12-11 DIAGNOSIS — C34.90 NON-SMALL CELL LUNG CANCER, UNSPECIFIED LATERALITY (HCC): ICD-10-CM

## 2024-12-11 DIAGNOSIS — Z79.899 LONG TERM USE OF DRUG: ICD-10-CM

## 2024-12-11 DIAGNOSIS — D64.81 ANEMIA ASSOCIATED WITH CHEMOTHERAPY: ICD-10-CM

## 2024-12-11 LAB
ALBUMIN SERPL-MCNC: 4.1 G/DL (ref 3.5–5.2)
ALBUMIN/GLOB SERPL: 1.7 {RATIO} (ref 1–2.5)
ALP SERPL-CCNC: 138 U/L (ref 40–129)
ALT SERPL-CCNC: 13 U/L (ref 5–41)
AMYLASE SERPL-CCNC: 67 U/L (ref 28–100)
ANION GAP SERPL CALCULATED.3IONS-SCNC: 14 MMOL/L (ref 9–17)
AST SERPL-CCNC: 11 U/L
BASOPHILS # BLD: 0.1 K/UL (ref 0–0.2)
BASOPHILS NFR BLD: 1 % (ref 0–2)
BILIRUB SERPL-MCNC: 0.8 MG/DL (ref 0.3–1.2)
BUN SERPL-MCNC: 8 MG/DL (ref 6–20)
CALCIUM SERPL-MCNC: 9.5 MG/DL (ref 8.6–10.4)
CHLORIDE SERPL-SCNC: 101 MMOL/L (ref 98–107)
CO2 SERPL-SCNC: 25 MMOL/L (ref 20–31)
CORTIS SERPL-MCNC: 14.9 UG/DL (ref 2.5–19.5)
CORTISOL COLLECTION INFO: NORMAL
CREAT SERPL-MCNC: 0.7 MG/DL (ref 0.7–1.2)
EOSINOPHIL # BLD: 0.2 K/UL (ref 0–0.4)
EOSINOPHILS RELATIVE PERCENT: 3 % (ref 1–4)
ERYTHROCYTE [DISTWIDTH] IN BLOOD BY AUTOMATED COUNT: 19.8 % (ref 12.5–15.4)
GFR, ESTIMATED: >90 ML/MIN/1.73M2
GLUCOSE SERPL-MCNC: 127 MG/DL (ref 70–99)
HCT VFR BLD AUTO: 34.9 % (ref 41–53)
HGB BLD-MCNC: 12.1 G/DL (ref 13.5–17.5)
LIPASE SERPL-CCNC: 25 U/L (ref 13–60)
LYMPHOCYTES NFR BLD: 1 K/UL (ref 1–4.8)
LYMPHOCYTES RELATIVE PERCENT: 14 % (ref 24–44)
MCH RBC QN AUTO: 32.3 PG (ref 26–34)
MCHC RBC AUTO-ENTMCNC: 34.8 G/DL (ref 31–37)
MCV RBC AUTO: 92.8 FL (ref 80–100)
MICROORGANISM SPEC CULT: NORMAL
MICROORGANISM/AGENT SPEC: NORMAL
MICROORGANISM/AGENT SPEC: NORMAL
MONOCYTES NFR BLD: 0.5 K/UL (ref 0.1–1.2)
MONOCYTES NFR BLD: 7 % (ref 2–11)
NEUTROPHILS NFR BLD: 75 % (ref 36–66)
NEUTS SEG NFR BLD: 5.5 K/UL (ref 1.8–7.7)
PLATELET # BLD AUTO: 178 K/UL (ref 140–450)
PMV BLD AUTO: 7 FL (ref 6–12)
POTASSIUM SERPL-SCNC: 3.8 MMOL/L (ref 3.7–5.3)
PROT SERPL-MCNC: 6.5 G/DL (ref 6.4–8.3)
RBC # BLD AUTO: 3.77 M/UL (ref 4.5–5.9)
SODIUM SERPL-SCNC: 140 MMOL/L (ref 135–144)
SPECIMEN DESCRIPTION: NORMAL
TSH SERPL DL<=0.05 MIU/L-ACNC: 2.62 UIU/ML (ref 0.3–5)
WBC OTHER # BLD: 7.3 K/UL (ref 3.5–11)

## 2024-12-11 PROCEDURE — G8420 CALC BMI NORM PARAMETERS: HCPCS | Performed by: INTERNAL MEDICINE

## 2024-12-11 PROCEDURE — 83690 ASSAY OF LIPASE: CPT

## 2024-12-11 PROCEDURE — 82533 TOTAL CORTISOL: CPT

## 2024-12-11 PROCEDURE — 6360000002 HC RX W HCPCS: Performed by: INTERNAL MEDICINE

## 2024-12-11 PROCEDURE — 84443 ASSAY THYROID STIM HORMONE: CPT

## 2024-12-11 PROCEDURE — 82150 ASSAY OF AMYLASE: CPT

## 2024-12-11 PROCEDURE — 4004F PT TOBACCO SCREEN RCVD TLK: CPT | Performed by: INTERNAL MEDICINE

## 2024-12-11 PROCEDURE — G8428 CUR MEDS NOT DOCUMENT: HCPCS | Performed by: INTERNAL MEDICINE

## 2024-12-11 PROCEDURE — 99214 OFFICE O/P EST MOD 30 MIN: CPT | Performed by: INTERNAL MEDICINE

## 2024-12-11 PROCEDURE — 36415 COLL VENOUS BLD VENIPUNCTURE: CPT

## 2024-12-11 PROCEDURE — 2580000003 HC RX 258: Performed by: INTERNAL MEDICINE

## 2024-12-11 PROCEDURE — G8484 FLU IMMUNIZE NO ADMIN: HCPCS | Performed by: INTERNAL MEDICINE

## 2024-12-11 PROCEDURE — 96413 CHEMO IV INFUSION 1 HR: CPT

## 2024-12-11 PROCEDURE — 85025 COMPLETE CBC W/AUTO DIFF WBC: CPT

## 2024-12-11 PROCEDURE — 80053 COMPREHEN METABOLIC PANEL: CPT

## 2024-12-11 RX ORDER — ACETAMINOPHEN 325 MG/1
650 TABLET ORAL
Status: CANCELLED | OUTPATIENT
Start: 2024-12-11

## 2024-12-11 RX ORDER — SODIUM CHLORIDE 9 MG/ML
INJECTION, SOLUTION INTRAVENOUS CONTINUOUS
Status: CANCELLED | OUTPATIENT
Start: 2024-12-11

## 2024-12-11 RX ORDER — MEPERIDINE HYDROCHLORIDE 50 MG/ML
12.5 INJECTION INTRAMUSCULAR; INTRAVENOUS; SUBCUTANEOUS PRN
Status: CANCELLED | OUTPATIENT
Start: 2024-12-11

## 2024-12-11 RX ORDER — ONDANSETRON 2 MG/ML
8 INJECTION INTRAMUSCULAR; INTRAVENOUS
Status: CANCELLED | OUTPATIENT
Start: 2024-12-11

## 2024-12-11 RX ORDER — HYDROCORTISONE SODIUM SUCCINATE 100 MG/2ML
100 INJECTION INTRAMUSCULAR; INTRAVENOUS
Status: CANCELLED | OUTPATIENT
Start: 2024-12-11

## 2024-12-11 RX ORDER — SODIUM CHLORIDE 9 MG/ML
5-250 INJECTION, SOLUTION INTRAVENOUS PRN
Status: CANCELLED | OUTPATIENT
Start: 2024-12-11

## 2024-12-11 RX ORDER — EPINEPHRINE 1 MG/ML
0.3 INJECTION, SOLUTION, CONCENTRATE INTRAVENOUS PRN
Status: CANCELLED | OUTPATIENT
Start: 2024-12-11

## 2024-12-11 RX ORDER — SODIUM CHLORIDE 0.9 % (FLUSH) 0.9 %
5-40 SYRINGE (ML) INJECTION PRN
Status: DISCONTINUED | OUTPATIENT
Start: 2024-12-11 | End: 2024-12-12 | Stop reason: HOSPADM

## 2024-12-11 RX ORDER — DIPHENHYDRAMINE HYDROCHLORIDE 50 MG/ML
50 INJECTION INTRAMUSCULAR; INTRAVENOUS
Status: CANCELLED | OUTPATIENT
Start: 2024-12-11

## 2024-12-11 RX ORDER — HEPARIN 100 UNIT/ML
500 SYRINGE INTRAVENOUS PRN
Status: DISCONTINUED | OUTPATIENT
Start: 2024-12-11 | End: 2024-12-12 | Stop reason: HOSPADM

## 2024-12-11 RX ORDER — ALBUTEROL SULFATE 90 UG/1
4 INHALANT RESPIRATORY (INHALATION) PRN
Status: CANCELLED | OUTPATIENT
Start: 2024-12-11

## 2024-12-11 RX ORDER — FAMOTIDINE 10 MG/ML
20 INJECTION, SOLUTION INTRAVENOUS
Status: CANCELLED | OUTPATIENT
Start: 2024-12-11

## 2024-12-11 RX ADMIN — Medication 500 UNITS: at 10:18

## 2024-12-11 RX ADMIN — SODIUM CHLORIDE, PRESERVATIVE FREE 10 ML: 5 INJECTION INTRAVENOUS at 10:18

## 2024-12-11 RX ADMIN — SODIUM CHLORIDE, PRESERVATIVE FREE 10 ML: 5 INJECTION INTRAVENOUS at 08:44

## 2024-12-11 RX ADMIN — SODIUM CHLORIDE, PRESERVATIVE FREE 10 ML: 5 INJECTION INTRAVENOUS at 08:45

## 2024-12-11 RX ADMIN — SODIUM CHLORIDE 200 MG: 9 INJECTION, SOLUTION INTRAVENOUS at 09:51

## 2024-12-11 NOTE — PROGRESS NOTES
07/11/2024    RIGHT TOTAL HIP ARTHROPLASTY performed by Jose Cary DO at [facility] OR    IR PORT PLACEMENT EQUAL OR GREATER THAN 5 YEARS  08/20/2024    IR PORT PLACEMENT EQUAL OR GREATER THAN 5 YEARS 8/20/2024 Ihsan Main MD [facility] SPECIAL PROCEDURES    LEG SURGERY Left 8/27/2024    PROPHYLATIC INTERMEDULLARY NAILING OF FEMUR BIOPSY OF FEMUR (SYNTHES TFNA,  C-ARM) performed by Jose Cary DO at [facility] OR    TIBIA FRACTURE SURGERY Left 07/11/2024    FEMORAL INTRAMEDULLARY NAIL INSERTION performed by Jose Cary,  at [facility] OR    TONSILLECTOMY      TYMPANOSTOMY TUBE PLACEMENT Bilateral     US BIOPSY SOFT TISSUE SHOULDER SUPERFICIAL  08/26/2024    US BIOPSY SUPERFICIAL ( scalp ) BONE PERCUTANEOUS 8/26/2024 [facility] ULTRASOUND       No Known Allergies    Current Outpatient Medications   Medication Sig Dispense Refill    Vitamin D (CHOLECALCIFEROL) 25 MCG (1000 UT) TABS tablet Take 1 tablet by mouth daily 60 tablet 0    acetaminophen (TYLENOL) 500 MG tablet Take 2 tablets by mouth every 6 hours as needed for Pain 112 tablet 0    cyclobenzaprine (FLEXERIL) 10 MG tablet Take 1 tablet by mouth 3 times daily as needed for Muscle spasms 50 tablet 0    docusate sodium (COLACE) 100 MG capsule Take 1 capsule by mouth 2 times daily as needed for Constipation 20 capsule 0    gabapentin (NEURONTIN) 100 MG capsule Take 1 capsule by mouth 3 times daily for 14 days. 42 capsule 0    ondansetron (ZOFRAN-ODT) 8 MG TBDP disintegrating tablet Take 1 tablet by mouth 3 times daily as needed for Nausea or Vomiting 60 tablet 3    lidocaine-prilocaine (EMLA) 2.5-2.5 % cream Apply topically 45-60 mins prior to needle poke daily PRN 1 each 2    traZODone (DESYREL) 100 MG tablet Take 1 tablet by mouth nightly as needed for Sleep 7 tablet 0    nicotine (NICODERM CQ) 21 MG/24HR Place 1 patch onto the skin daily 30 patch 3    propranolol (INDERAL) 20 MG tablet Take 1 tablet by mouth 2 times daily      INVEGA TRINZA 546

## 2024-12-11 NOTE — TELEPHONE ENCOUNTER
Instructions   from Dr. Calin Strong MD    Ok for treament  Rtc in 4 weeks    RTC scheduled 1/8/25 at 8am  TX scheduled 1/8/25 at 8am

## 2024-12-11 NOTE — TELEPHONE ENCOUNTER
Name: Isiah Duncan  : 1975  MRN: [id number]    Oncology Navigation Follow-Up Note    Contact Type:  Medical Oncology    Notes:   Navigator met with pt. And father face to face offering assistance if needed. No barriers to care noted.       Electronically signed by Fiona Villafana RN on 2024 at 10:39 AM

## 2024-12-11 NOTE — DISCHARGE INSTRUCTIONS
[facility] WOUND and HYPERBARIC TREATMENT  CENTER                            Visit  Discharge Instructions / Physician Orders  DATE:12/12/24     Home Care:[facility]- PLEASE SEND SUPPLIES      SUPPLIES ORDERED THRU:HOME CARE TO SUPPLY  DUE TO MEDICARE GUIDELINES                 DATE LAST SUPPLIED     Wound Location:  Scalp     Cleanse with: Liquid antibacterial soap and water, rinse well      Dressing Orders:  Primary dressing-Scalp lesion: cleanse with saline applied to gauze, fill/cover the open wound with Opticell Ag gelling fiber, secure with  an Optifoam 4x4\" foam. Change daily and prn saturation (Orders from Hospital)     Frequency:  Daily     Additional Orders: Increase protein to diet (meat, cheese, eggs, fish, peanut butter, nuts and beans)  Multivitamin daily  Call [facility]- to register as soon as possible call [phone]  NEW REFERRAL SENT TO [facility]LAST[facility]  OFFLOADING [] YES  TYPE:                  [] NA     Weekly wound care visits until determined otherwise.     Antibiotic therapy-wound care related YES [] NO [x] NA[]     MY CHART []     Smart Device  []      HYPERBARIC TREATMENT-                TREATMENT #                          Your next appointment with the Wound Care Center is 4 WEEKS WITH DR. LARA                                                                                                   (Please note your next appointment above and if you are unable to keep, kindly give a 24 hour notice. Thank you.)  If more than 15 min late we cannot guarantee you will be seen due to clinician schedule  Per Policy, Excessive cancellation will call for dismissal from program.  If you experience any of the following, please call the Wound Care Center during business hours:  [phone]     * Increase in Pain  * Temperature over 101  * Increase in drainage from your wound  * Drainage with a foul odor  * Bleeding  * Increase in swelling  * Need for compression bandage changes

## 2024-12-11 NOTE — PROGRESS NOTES
Patient arrived for C6D1 keytruda. Labs within treatable parameters. Seen by wilman Salinas to proceed with treatment. Tolerated w/o incident and left in stable condition. Returns 1/8

## 2024-12-12 ENCOUNTER — HOSPITAL ENCOUNTER (OUTPATIENT)
Dept: WOUND CARE | Age: 49
Discharge: HOME | End: 2024-12-12
Payer: MEDICARE

## 2024-12-12 VITALS
RESPIRATION RATE: 16 BRPM | TEMPERATURE: 95.8 F | HEART RATE: 119 BPM | DIASTOLIC BLOOD PRESSURE: 72 MMHG | SYSTOLIC BLOOD PRESSURE: 102 MMHG

## 2024-12-12 DIAGNOSIS — S01.00XS OPEN WOUND OF SCALP, UNSPECIFIED OPEN WOUND TYPE, SEQUELA: Primary | ICD-10-CM

## 2024-12-12 PROCEDURE — 99213 OFFICE O/P EST LOW 20 MIN: CPT

## 2024-12-12 RX ORDER — NEOMYCIN/BACITRACIN/POLYMYXINB 3.5-400-5K
OINTMENT (GRAM) TOPICAL ONCE
OUTPATIENT
Start: 2024-12-12 | End: 2024-12-12

## 2024-12-12 RX ORDER — LIDOCAINE HYDROCHLORIDE 20 MG/ML
JELLY TOPICAL ONCE
OUTPATIENT
Start: 2024-12-12 | End: 2024-12-12

## 2024-12-12 RX ORDER — SILVER SULFADIAZINE 10 MG/G
CREAM TOPICAL ONCE
OUTPATIENT
Start: 2024-12-12 | End: 2024-12-12

## 2024-12-12 RX ORDER — LIDOCAINE HYDROCHLORIDE 40 MG/ML
SOLUTION TOPICAL ONCE
Status: COMPLETED | OUTPATIENT
Start: 2024-12-12 | End: 2024-12-12

## 2024-12-12 RX ORDER — LIDOCAINE HYDROCHLORIDE 40 MG/ML
SOLUTION TOPICAL ONCE
OUTPATIENT
Start: 2024-12-12 | End: 2024-12-12

## 2024-12-12 RX ORDER — SODIUM CHLOR/HYPOCHLOROUS ACID 0.033 %
SOLUTION, IRRIGATION IRRIGATION ONCE
OUTPATIENT
Start: 2024-12-12 | End: 2024-12-12

## 2024-12-12 RX ORDER — CLOBETASOL PROPIONATE 0.5 MG/G
OINTMENT TOPICAL ONCE
OUTPATIENT
Start: 2024-12-12 | End: 2024-12-12

## 2024-12-12 RX ORDER — BACITRACIN ZINC AND POLYMYXIN B SULFATE 500; 1000 [USP'U]/G; [USP'U]/G
OINTMENT TOPICAL ONCE
OUTPATIENT
Start: 2024-12-12 | End: 2024-12-12

## 2024-12-12 RX ORDER — BETAMETHASONE DIPROPIONATE 0.5 MG/G
CREAM TOPICAL ONCE
OUTPATIENT
Start: 2024-12-12 | End: 2024-12-12

## 2024-12-12 RX ORDER — GINSENG 100 MG
CAPSULE ORAL ONCE
OUTPATIENT
Start: 2024-12-12 | End: 2024-12-12

## 2024-12-12 RX ORDER — LIDOCAINE 40 MG/G
CREAM TOPICAL ONCE
OUTPATIENT
Start: 2024-12-12 | End: 2024-12-12

## 2024-12-12 RX ORDER — GENTAMICIN SULFATE 1 MG/G
OINTMENT TOPICAL ONCE
OUTPATIENT
Start: 2024-12-12 | End: 2024-12-12

## 2024-12-12 RX ORDER — TRIAMCINOLONE ACETONIDE 1 MG/G
OINTMENT TOPICAL ONCE
OUTPATIENT
Start: 2024-12-12 | End: 2024-12-12

## 2024-12-12 RX ORDER — LIDOCAINE 50 MG/G
OINTMENT TOPICAL ONCE
OUTPATIENT
Start: 2024-12-12 | End: 2024-12-12

## 2024-12-12 RX ORDER — MUPIROCIN 20 MG/G
OINTMENT TOPICAL ONCE
OUTPATIENT
Start: 2024-12-12 | End: 2024-12-12

## 2024-12-12 RX ADMIN — LIDOCAINE HYDROCHLORIDE 10 ML: 40 SOLUTION TOPICAL at 13:36

## 2024-12-12 ASSESSMENT — ENCOUNTER SYMPTOMS
COUGH: 0
DIARRHEA: 0
RHINORRHEA: 0
VOMITING: 0
SHORTNESS OF BREATH: 0
NAUSEA: 0

## 2024-12-12 ASSESSMENT — PAIN SCALES - GENERAL: PAINLEVEL_OUTOF10: 0

## 2024-12-12 NOTE — PROGRESS NOTES
[facility]   Progress Note and Procedure Note      Isiah Duncan  MEDICAL RECORD NUMBER:  [id number]  AGE: 49 y.o.   GENDER: male  : 1975  EPISODE DATE:  2024    Subjective:     Chief Complaint   Patient presents with    Wound Check     scalp         HISTORY of PRESENT ILLNESS HPI     Isiah Duncan is a 49 y.o. male who presents today for wound/ulcer evaluation.   History of Wound Context: here to follow up on scalp wound. Had scalp mass that ruptured at home when he bumped it on head board. Admitted d/t scalp wound. CT head showed soft tissue mass 2.2 x 11 x 10.2. Saw Dr Lord to discuss surgical options in wound clinic 2024. He was referred to [facility] for further evaluation due to size of mass.   Unable to get an appointment into plastic surgery at [facility] until 3/2025. Father is requesting information be sent to [facility] to see if able to get patient seen sooner for evaluation.   Wound/Ulcer Pain Timing/Severity: none  Quality of pain: N/A  Severity:  0 / 10   Modifying Factors: None  Associated Signs/Symptoms: none    Ulcer Identification:  Ulcer Type: malignant wound  Contributing Factors: smoking         PAST MEDICAL HISTORY        Diagnosis Date    Anemia     Custody issue 10/20/2014    Max Duncan ( father ) is court appointed legal guaridan    Diabetes (HCC)     H/O falling     History of recent hospitalization 2024    til 2024 : [facility]    Hypertension     Leg fracture, right 2024    Non-small cell carcinoma of lung (HCC) 2024    with bone and scalp mets    Schizophrenia (HCC)     Under care of team     Radiation Oncology, last seen 2024 : to start Palliative tx    Under care of team     Oncology, last seen 2024 ; starting chemo and immunotherapy    Under care of team     [place], Dr. Cary , last seen 2024    Under care of team     Pulmonology , last seen in hosp 2024    Weight loss 2024    70 lb in

## 2025-01-06 ENCOUNTER — HOSPITAL ENCOUNTER (OUTPATIENT)
Dept: WOUND CARE | Age: 50
Discharge: HOME | End: 2025-01-06
Payer: MEDICARE

## 2025-01-06 VITALS
TEMPERATURE: 95.5 F | HEIGHT: 73 IN | HEART RATE: 97 BPM | DIASTOLIC BLOOD PRESSURE: 71 MMHG | BODY MASS INDEX: 22.13 KG/M2 | RESPIRATION RATE: 20 BRPM | WEIGHT: 167 LBS | SYSTOLIC BLOOD PRESSURE: 104 MMHG

## 2025-01-06 DIAGNOSIS — S01.00XS OPEN WOUND OF SCALP, UNSPECIFIED OPEN WOUND TYPE, SEQUELA: Primary | ICD-10-CM

## 2025-01-06 PROCEDURE — 99213 OFFICE O/P EST LOW 20 MIN: CPT | Performed by: PLASTIC SURGERY

## 2025-01-06 PROCEDURE — 99213 OFFICE O/P EST LOW 20 MIN: CPT

## 2025-01-06 RX ORDER — MUPIROCIN 20 MG/G
OINTMENT TOPICAL ONCE
OUTPATIENT
Start: 2025-01-06 | End: 2025-01-06

## 2025-01-06 RX ORDER — BACITRACIN ZINC AND POLYMYXIN B SULFATE 500; 1000 [USP'U]/G; [USP'U]/G
OINTMENT TOPICAL ONCE
OUTPATIENT
Start: 2025-01-06 | End: 2025-01-06

## 2025-01-06 RX ORDER — GINSENG 100 MG
CAPSULE ORAL ONCE
OUTPATIENT
Start: 2025-01-06 | End: 2025-01-06

## 2025-01-06 RX ORDER — LIDOCAINE HYDROCHLORIDE 40 MG/ML
SOLUTION TOPICAL ONCE
OUTPATIENT
Start: 2025-01-06 | End: 2025-01-06

## 2025-01-06 RX ORDER — LIDOCAINE HYDROCHLORIDE 40 MG/ML
SOLUTION TOPICAL ONCE
Status: COMPLETED | OUTPATIENT
Start: 2025-01-06 | End: 2025-01-06

## 2025-01-06 RX ORDER — TRIAMCINOLONE ACETONIDE 1 MG/G
OINTMENT TOPICAL ONCE
OUTPATIENT
Start: 2025-01-06 | End: 2025-01-06

## 2025-01-06 RX ORDER — NEOMYCIN/BACITRACIN/POLYMYXINB 3.5-400-5K
OINTMENT (GRAM) TOPICAL ONCE
OUTPATIENT
Start: 2025-01-06 | End: 2025-01-06

## 2025-01-06 RX ORDER — BETAMETHASONE DIPROPIONATE 0.5 MG/G
CREAM TOPICAL ONCE
OUTPATIENT
Start: 2025-01-06 | End: 2025-01-06

## 2025-01-06 RX ORDER — LIDOCAINE HYDROCHLORIDE 20 MG/ML
JELLY TOPICAL ONCE
OUTPATIENT
Start: 2025-01-06 | End: 2025-01-06

## 2025-01-06 RX ORDER — LIDOCAINE 50 MG/G
OINTMENT TOPICAL ONCE
OUTPATIENT
Start: 2025-01-06 | End: 2025-01-06

## 2025-01-06 RX ORDER — GENTAMICIN SULFATE 1 MG/G
OINTMENT TOPICAL ONCE
OUTPATIENT
Start: 2025-01-06 | End: 2025-01-06

## 2025-01-06 RX ORDER — LIDOCAINE 40 MG/G
CREAM TOPICAL ONCE
OUTPATIENT
Start: 2025-01-06 | End: 2025-01-06

## 2025-01-06 RX ORDER — SODIUM CHLOR/HYPOCHLOROUS ACID 0.033 %
SOLUTION, IRRIGATION IRRIGATION ONCE
OUTPATIENT
Start: 2025-01-06 | End: 2025-01-06

## 2025-01-06 RX ORDER — CLOBETASOL PROPIONATE 0.5 MG/G
OINTMENT TOPICAL ONCE
OUTPATIENT
Start: 2025-01-06 | End: 2025-01-06

## 2025-01-06 RX ORDER — SILVER SULFADIAZINE 10 MG/G
CREAM TOPICAL ONCE
OUTPATIENT
Start: 2025-01-06 | End: 2025-01-06

## 2025-01-06 RX ADMIN — LIDOCAINE HYDROCHLORIDE 5 ML: 40 SOLUTION TOPICAL at 13:16

## 2025-01-06 ASSESSMENT — PAIN SCALES - GENERAL: PAINLEVEL_OUTOF10: 0

## 2025-01-06 ASSESSMENT — PAIN - FUNCTIONAL ASSESSMENT: PAIN_FUNCTIONAL_ASSESSMENT: ACTIVITIES ARE NOT PREVENTED

## 2025-01-06 ASSESSMENT — PAIN DESCRIPTION - ONSET: ONSET: ON-GOING

## 2025-01-06 NOTE — DISCHARGE INSTRUCTIONS
[facility]                            Visit  Discharge Instructions / Physician Orders  DATE:1/6/25     Home Care:[facility]- PLEASE SEND SUPPLIES      SUPPLIES ORDERED THRU:HOME CARE TO SUPPLY  DUE TO MEDICARE GUIDELINES                 DATE LAST SUPPLIED     Wound Location:  Scalp     Cleanse with: Liquid antibacterial soap and water, rinse well      Dressing Orders:  Primary dressing-Scalp lesion: cleanse with saline applied to gauze, fill/cover the open wound with Opticell Ag gelling fiber, secure with  an Optifoam 6x6\" foam (if possible). Change daily and prn saturation (Orders from Hospital)     Frequency:  Daily     Additional Orders: Increase protein to diet (meat, cheese, eggs, fish, peanut butter, nuts and beans)  Multivitamin daily  Call [facility]- to register as soon as possible call [phone]  [facility] continue follow up.and neuro surgery.  OFFLOADING [] YES  TYPE:                  [] NA     Weekly wound care visits until determined otherwise.     Antibiotic therapy-wound care related YES [] NO [x] NA[]     MY CHART []     Smart Device  []      HYPERBARIC TREATMENT-                TREATMENT #                          Your next appointment with the Wound Care Center is 4 WEEKS WITH DR. LARA                                                                                                   (Please note your next appointment above and if you are unable to keep, kindly give a 24 hour notice. Thank you.)  If more than 15 min late we cannot guarantee you will be seen due to clinician schedule  Per Policy, Excessive cancellation will call for dismissal from program.  If you experience any of the following, please call the Wound Care Center during business hours:  [phone]     * Increase in Pain  * Temperature over 101  * Increase in drainage from your wound  * Drainage with a foul odor  * Bleeding  * Increase in swelling  * Need for

## 2025-01-06 NOTE — PROGRESS NOTES
[facility]       Progress Note and Procedure Note      History and Physical     Chief Complaint   Patient presents with    Wound Check     scalp        HPI:   Isiah Duncan is a 49 y.o. male who presents wound ulcer evaluation.      History of Wound Context:   The patient presents with a scalp wound.  Patient presented to the ED with a right hip pain on 7/10/2024.  Patient had a 70 pound weight loss.  Patient has a history of schizophrenia and a 4D pack per year smoker.  Patient noted a bump on his head from his fall.  Patient had a CT of the chest abdomen and pelvis showed left lung mass with bone metastasis in the right femur causing a pathological fracture and the CT of the head showed a large scalp base mass.  Patient had an ORIF of the right femur with biopsy which showed non-small cell carcinoma consistent with a lung primary.  Patient has a history of radiation treatment to the right femur.  As per the family the patient also had radiation to skull.    Wound/Ulcer Pain Timing/Severity: none  Quality of pain: N/A  Severity:  0 / 10   Modifying Factors: None  Associated Signs/Symptoms: none    Ulcer Identification:  Ulcer Type: non-healing surgical  Contributing Factors: smoking        Medications:     Current Outpatient Medications   Medication Sig Dispense Refill    Vitamin D (CHOLECALCIFEROL) 25 MCG (1000 UT) TABS tablet Take 1 tablet by mouth daily 60 tablet 0    acetaminophen (TYLENOL) 500 MG tablet Take 2 tablets by mouth every 6 hours as needed for Pain 112 tablet 0    docusate sodium (COLACE) 100 MG capsule Take 1 capsule by mouth 2 times daily as needed for Constipation 20 capsule 0    gabapentin (NEURONTIN) 100 MG capsule Take 1 capsule by mouth 3 times daily for 14 days. 42 capsule 0    traZODone (DESYREL) 100 MG tablet Take 1 tablet by mouth nightly as needed for Sleep 7 tablet 0    nicotine (NICODERM CQ) 21 MG/24HR Place 1 patch onto the skin daily 30 patch 3

## 2025-01-08 ENCOUNTER — TELEPHONE (OUTPATIENT)
Dept: ONCOLOGY | Age: 50
End: 2025-01-08

## 2025-01-08 ENCOUNTER — OFFICE VISIT (OUTPATIENT)
Dept: ONCOLOGY | Age: 50
End: 2025-01-08

## 2025-01-08 ENCOUNTER — HOSPITAL ENCOUNTER (OUTPATIENT)
Dept: INFUSION THERAPY | Age: 50
Discharge: HOME | End: 2025-01-08
Payer: MEDICARE

## 2025-01-08 VITALS
HEART RATE: 89 BPM | RESPIRATION RATE: 18 BRPM | SYSTOLIC BLOOD PRESSURE: 93 MMHG | BODY MASS INDEX: 22.03 KG/M2 | DIASTOLIC BLOOD PRESSURE: 65 MMHG | WEIGHT: 167 LBS | TEMPERATURE: 97.5 F

## 2025-01-08 VITALS — HEART RATE: 89 BPM | RESPIRATION RATE: 18 BRPM | DIASTOLIC BLOOD PRESSURE: 65 MMHG | SYSTOLIC BLOOD PRESSURE: 93 MMHG

## 2025-01-08 DIAGNOSIS — C34.90 NON-SMALL CELL LUNG CANCER, UNSPECIFIED LATERALITY (HCC): ICD-10-CM

## 2025-01-08 DIAGNOSIS — C79.51 METASTASIS TO BONE (HCC): ICD-10-CM

## 2025-01-08 DIAGNOSIS — C79.51 MALIGNANT NEOPLASM METASTATIC TO BONE (HCC): Primary | ICD-10-CM

## 2025-01-08 DIAGNOSIS — Z79.899 LONG TERM USE OF DRUG: ICD-10-CM

## 2025-01-08 LAB
ALBUMIN SERPL-MCNC: 3.9 G/DL (ref 3.5–5.2)
ALBUMIN/GLOB SERPL: 1.7 {RATIO} (ref 1–2.5)
ALP SERPL-CCNC: 125 U/L (ref 40–129)
ALT SERPL-CCNC: 9 U/L (ref 5–41)
AMYLASE SERPL-CCNC: 66 U/L (ref 28–100)
ANION GAP SERPL CALCULATED.3IONS-SCNC: 11 MMOL/L (ref 9–17)
AST SERPL-CCNC: 12 U/L
BASOPHILS # BLD: 0 K/UL (ref 0–0.2)
BASOPHILS NFR BLD: 1 % (ref 0–2)
BILIRUB SERPL-MCNC: 0.8 MG/DL (ref 0.3–1.2)
BUN SERPL-MCNC: 10 MG/DL (ref 6–20)
CALCIUM SERPL-MCNC: 9.5 MG/DL (ref 8.6–10.4)
CHLORIDE SERPL-SCNC: 103 MMOL/L (ref 98–107)
CO2 SERPL-SCNC: 27 MMOL/L (ref 20–31)
CORTIS SERPL-MCNC: 23.6 UG/DL (ref 2.5–19.5)
CORTISOL COLLECTION INFO: ABNORMAL
CREAT SERPL-MCNC: 0.7 MG/DL (ref 0.7–1.2)
EOSINOPHIL # BLD: 0.1 K/UL (ref 0–0.4)
EOSINOPHILS RELATIVE PERCENT: 3 % (ref 1–4)
ERYTHROCYTE [DISTWIDTH] IN BLOOD BY AUTOMATED COUNT: 15.3 % (ref 12.5–15.4)
GFR, ESTIMATED: >90 ML/MIN/1.73M2
GLUCOSE SERPL-MCNC: 125 MG/DL (ref 70–99)
HCT VFR BLD AUTO: 33.7 % (ref 41–53)
HGB BLD-MCNC: 11.6 G/DL (ref 13.5–17.5)
LIPASE SERPL-CCNC: 28 U/L (ref 13–60)
LYMPHOCYTES NFR BLD: 0.9 K/UL (ref 1–4.8)
LYMPHOCYTES RELATIVE PERCENT: 20 % (ref 24–44)
MCH RBC QN AUTO: 31.8 PG (ref 26–34)
MCHC RBC AUTO-ENTMCNC: 34.6 G/DL (ref 31–37)
MCV RBC AUTO: 92.1 FL (ref 80–100)
MONOCYTES NFR BLD: 0.4 K/UL (ref 0.1–1.2)
MONOCYTES NFR BLD: 9 % (ref 2–11)
NEUTROPHILS NFR BLD: 67 % (ref 36–66)
NEUTS SEG NFR BLD: 2.9 K/UL (ref 1.8–7.7)
PLATELET # BLD AUTO: 232 K/UL (ref 140–450)
PMV BLD AUTO: 6.8 FL (ref 6–12)
POTASSIUM SERPL-SCNC: 4 MMOL/L (ref 3.7–5.3)
PROT SERPL-MCNC: 6.2 G/DL (ref 6.4–8.3)
RBC # BLD AUTO: 3.65 M/UL (ref 4.5–5.9)
SODIUM SERPL-SCNC: 141 MMOL/L (ref 135–144)
TSH SERPL DL<=0.05 MIU/L-ACNC: 2.07 UIU/ML (ref 0.3–5)
WBC OTHER # BLD: 4.4 K/UL (ref 3.5–11)

## 2025-01-08 PROCEDURE — 83690 ASSAY OF LIPASE: CPT

## 2025-01-08 PROCEDURE — 2580000003 HC RX 258: Performed by: INTERNAL MEDICINE

## 2025-01-08 PROCEDURE — 6360000002 HC RX W HCPCS: Performed by: INTERNAL MEDICINE

## 2025-01-08 PROCEDURE — 96413 CHEMO IV INFUSION 1 HR: CPT

## 2025-01-08 PROCEDURE — 85025 COMPLETE CBC W/AUTO DIFF WBC: CPT

## 2025-01-08 PROCEDURE — 84443 ASSAY THYROID STIM HORMONE: CPT

## 2025-01-08 PROCEDURE — 2500000003 HC RX 250 WO HCPCS: Performed by: INTERNAL MEDICINE

## 2025-01-08 PROCEDURE — 82150 ASSAY OF AMYLASE: CPT

## 2025-01-08 PROCEDURE — 82533 TOTAL CORTISOL: CPT

## 2025-01-08 PROCEDURE — 80053 COMPREHEN METABOLIC PANEL: CPT

## 2025-01-08 RX ORDER — ALBUTEROL SULFATE 90 UG/1
4 INHALANT RESPIRATORY (INHALATION) PRN
OUTPATIENT
Start: 2025-01-29

## 2025-01-08 RX ORDER — ACETAMINOPHEN 325 MG/1
650 TABLET ORAL
Status: CANCELLED | OUTPATIENT
Start: 2025-01-08

## 2025-01-08 RX ORDER — FAMOTIDINE 10 MG/ML
20 INJECTION, SOLUTION INTRAVENOUS
Status: CANCELLED | OUTPATIENT
Start: 2025-01-08

## 2025-01-08 RX ORDER — ACETAMINOPHEN 325 MG/1
650 TABLET ORAL
OUTPATIENT
Start: 2025-01-29

## 2025-01-08 RX ORDER — HYDROCORTISONE SODIUM SUCCINATE 100 MG/2ML
100 INJECTION INTRAMUSCULAR; INTRAVENOUS
Status: CANCELLED | OUTPATIENT
Start: 2025-01-08

## 2025-01-08 RX ORDER — SODIUM CHLORIDE 9 MG/ML
INJECTION, SOLUTION INTRAVENOUS CONTINUOUS
Status: CANCELLED | OUTPATIENT
Start: 2025-01-08

## 2025-01-08 RX ORDER — SODIUM CHLORIDE 9 MG/ML
5-250 INJECTION, SOLUTION INTRAVENOUS PRN
Status: CANCELLED | OUTPATIENT
Start: 2025-01-08

## 2025-01-08 RX ORDER — DIPHENHYDRAMINE HYDROCHLORIDE 50 MG/ML
50 INJECTION INTRAMUSCULAR; INTRAVENOUS
Status: CANCELLED | OUTPATIENT
Start: 2025-01-08

## 2025-01-08 RX ORDER — HEPARIN 100 UNIT/ML
500 SYRINGE INTRAVENOUS PRN
Status: DISCONTINUED | OUTPATIENT
Start: 2025-01-08 | End: 2025-01-09 | Stop reason: HOSPADM

## 2025-01-08 RX ORDER — EPINEPHRINE 1 MG/ML
0.3 INJECTION, SOLUTION, CONCENTRATE INTRAVENOUS PRN
OUTPATIENT
Start: 2025-01-29

## 2025-01-08 RX ORDER — HEPARIN SODIUM (PORCINE) LOCK FLUSH IV SOLN 100 UNIT/ML 100 UNIT/ML
500 SOLUTION INTRAVENOUS PRN
OUTPATIENT
Start: 2025-01-29

## 2025-01-08 RX ORDER — SODIUM CHLORIDE 9 MG/ML
INJECTION, SOLUTION INTRAVENOUS CONTINUOUS
OUTPATIENT
Start: 2025-01-29

## 2025-01-08 RX ORDER — SODIUM CHLORIDE 0.9 % (FLUSH) 0.9 %
5-40 SYRINGE (ML) INJECTION PRN
Status: DISCONTINUED | OUTPATIENT
Start: 2025-01-08 | End: 2025-01-09 | Stop reason: HOSPADM

## 2025-01-08 RX ORDER — HYDROCORTISONE SODIUM SUCCINATE 100 MG/2ML
100 INJECTION INTRAMUSCULAR; INTRAVENOUS
OUTPATIENT
Start: 2025-01-29

## 2025-01-08 RX ORDER — DIPHENHYDRAMINE HYDROCHLORIDE 50 MG/ML
50 INJECTION INTRAMUSCULAR; INTRAVENOUS
OUTPATIENT
Start: 2025-01-29

## 2025-01-08 RX ORDER — ALBUTEROL SULFATE 90 UG/1
4 INHALANT RESPIRATORY (INHALATION) PRN
Status: CANCELLED | OUTPATIENT
Start: 2025-01-08

## 2025-01-08 RX ORDER — FAMOTIDINE 10 MG/ML
20 INJECTION, SOLUTION INTRAVENOUS
OUTPATIENT
Start: 2025-01-29

## 2025-01-08 RX ORDER — SODIUM CHLORIDE 0.9 % (FLUSH) 0.9 %
5-40 SYRINGE (ML) INJECTION PRN
OUTPATIENT
Start: 2025-01-29

## 2025-01-08 RX ORDER — EPINEPHRINE 1 MG/ML
0.3 INJECTION, SOLUTION, CONCENTRATE INTRAVENOUS PRN
Status: CANCELLED | OUTPATIENT
Start: 2025-01-08

## 2025-01-08 RX ORDER — ONDANSETRON 2 MG/ML
8 INJECTION INTRAMUSCULAR; INTRAVENOUS
Status: CANCELLED | OUTPATIENT
Start: 2025-01-08

## 2025-01-08 RX ORDER — SODIUM CHLORIDE 9 MG/ML
5-250 INJECTION, SOLUTION INTRAVENOUS PRN
OUTPATIENT
Start: 2025-01-29

## 2025-01-08 RX ORDER — ONDANSETRON 2 MG/ML
8 INJECTION INTRAMUSCULAR; INTRAVENOUS
OUTPATIENT
Start: 2025-01-29

## 2025-01-08 RX ORDER — ZOLEDRONIC ACID 0.04 MG/ML
4 INJECTION, SOLUTION INTRAVENOUS ONCE
OUTPATIENT
Start: 2025-01-29 | End: 2025-01-29

## 2025-01-08 RX ORDER — MEPERIDINE HYDROCHLORIDE 50 MG/ML
12.5 INJECTION INTRAMUSCULAR; INTRAVENOUS; SUBCUTANEOUS PRN
Status: CANCELLED | OUTPATIENT
Start: 2025-01-08

## 2025-01-08 RX ADMIN — SODIUM CHLORIDE, PRESERVATIVE FREE 10 ML: 5 INJECTION INTRAVENOUS at 10:20

## 2025-01-08 RX ADMIN — SODIUM CHLORIDE 200 MG: 9 INJECTION, SOLUTION INTRAVENOUS at 09:39

## 2025-01-08 RX ADMIN — SODIUM CHLORIDE, PRESERVATIVE FREE 10 ML: 5 INJECTION INTRAVENOUS at 08:10

## 2025-01-08 RX ADMIN — HEPARIN 500 UNITS: 100 SYRINGE at 10:20

## 2025-01-08 NOTE — PROGRESS NOTES
Patient ID: Isiah Duncan, 1975, [id number], 49 y.o.  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 male. with right hip pain who fell and was brought to the ED 7/10/2024; he reports a 70lb weight loss over the last year. He has a history of schizophrenia and 40pk/yrs of smoking. He notes he has a bump on his head from his fall. He had a CT chest/abd/pelvis that shows a left lung mass as well as a bone metastases in the right femur causing pathologic fracture, as well as a CT Head showing a large scalp base mass.  Patient had a biopsy and ORIF of the right femur on July 11, 2024 which came back showing a poorly differentiated non-small cell carcinoma consistent with lung primary.  He denies any headaches, vision changes, chest pain, abdominal pain, N/V/D, swelling, bleeding, or fatigue. Patient's father who is his POA notes there is also a lesion in his left femur for which he will be seeing orthopedics about that to discuss fixation there as well.   He was seen by radiation oncology for palliative radiation treatment  Pathology from the right total hip arthroplasty metastatic poorly differentiated non-small cell carcinoma   please see below comment    There is  Lab Facility: 0 Bill For Surgical Tray: no Performing Laboratory: [id number] Billing Type: Third-Party Bill Expected Date Of Service: 02/12/2024

## 2025-01-08 NOTE — TELEPHONE ENCOUNTER
Instructions   from Dr. Calin Strong MD    Ok for treament  Rtc in 3 weeks      Tx today  RV 1/29/25 during tx at 8 am

## 2025-01-08 NOTE — PROGRESS NOTES
Pt. Arrived C7D1 keytruda  Seen by  et tx   Denies any complaints  Tx given without incident  Return 1/22/25 C8D1

## 2025-01-20 ENCOUNTER — TELEPHONE (OUTPATIENT)
Dept: WOUND CARE | Age: 50
End: 2025-01-20

## 2025-01-20 NOTE — TELEPHONE ENCOUNTER
I spoke with Olive from [facility]. She states patient wound bed is drying out, and exposed white tissue is pulling away from edges. Wound now has exposed red edges. I spoke with Dr. Lord, she would like home care to moisten opticel AG with saline and cover daily for dressing. We will call patient and see if he can see us sooner than previously scheduled appt.

## 2025-01-27 ENCOUNTER — OFFICE VISIT (OUTPATIENT)
Dept: INFECTIOUS DISEASES | Age: 50
End: 2025-01-27
Payer: MEDICARE

## 2025-01-27 VITALS
HEART RATE: 124 BPM | RESPIRATION RATE: 16 BRPM | SYSTOLIC BLOOD PRESSURE: 123 MMHG | WEIGHT: 169 LBS | BODY MASS INDEX: 22.4 KG/M2 | DIASTOLIC BLOOD PRESSURE: 83 MMHG | TEMPERATURE: 97.7 F | OXYGEN SATURATION: 95 % | HEIGHT: 73 IN

## 2025-01-27 DIAGNOSIS — S01.00XD OPEN WOUND OF SCALP, UNSPECIFIED OPEN WOUND TYPE, SUBSEQUENT ENCOUNTER: Primary | ICD-10-CM

## 2025-01-27 PROCEDURE — 99214 OFFICE O/P EST MOD 30 MIN: CPT | Performed by: INTERNAL MEDICINE

## 2025-01-27 PROCEDURE — 4004F PT TOBACCO SCREEN RCVD TLK: CPT | Performed by: INTERNAL MEDICINE

## 2025-01-27 PROCEDURE — G2211 COMPLEX E/M VISIT ADD ON: HCPCS | Performed by: INTERNAL MEDICINE

## 2025-01-27 PROCEDURE — G8420 CALC BMI NORM PARAMETERS: HCPCS | Performed by: INTERNAL MEDICINE

## 2025-01-27 PROCEDURE — G8427 DOCREV CUR MEDS BY ELIG CLIN: HCPCS | Performed by: INTERNAL MEDICINE

## 2025-01-27 ASSESSMENT — ENCOUNTER SYMPTOMS
PHOTOPHOBIA: 0
ABDOMINAL DISTENTION: 0
COLOR CHANGE: 0
EYE REDNESS: 0
APNEA: 0
EYE DISCHARGE: 0

## 2025-01-27 NOTE — PROGRESS NOTES
US BIOPSY SOFT TISSUE SHOULDER SUPERFICIAL  08/26/2024    US BIOPSY SUPERFICIAL ( scalp ) BONE PERCUTANEOUS 8/26/2024 [facility] ULTRASOUND       Medications:     Current Outpatient Medications:     acetaminophen (TYLENOL) 500 MG tablet, Take 2 tablets by mouth every 6 hours as needed for Pain, Disp: 112 tablet, Rfl: 0    cyclobenzaprine (FLEXERIL) 10 MG tablet, Take 1 tablet by mouth 3 times daily as needed for Muscle spasms, Disp: 50 tablet, Rfl: 0    docusate sodium (COLACE) 100 MG capsule, Take 1 capsule by mouth 2 times daily as needed for Constipation, Disp: 20 capsule, Rfl: 0    ondansetron (ZOFRAN-ODT) 8 MG TBDP disintegrating tablet, Take 1 tablet by mouth 3 times daily as needed for Nausea or Vomiting, Disp: 60 tablet, Rfl: 3    lidocaine-prilocaine (EMLA) 2.5-2.5 % cream, Apply topically 45-60 mins prior to needle poke daily PRN, Disp: 1 each, Rfl: 2    nicotine (NICODERM CQ) 21 MG/24HR, Place 1 patch onto the skin daily, Disp: 30 patch, Rfl: 3    propranolol (INDERAL) 20 MG tablet, Take 1 tablet by mouth 2 times daily, Disp: , Rfl:     INVEGA TRINZA 546 MG/1.75ML ALEXSANDER IM injection, , Disp: , Rfl:     metFORMIN (GLUCOPHAGE) 1000 MG tablet, Take 1 tablet by mouth 2 times daily, Disp: , Rfl:     atorvastatin (LIPITOR) 80 MG tablet, Take 1 tablet by mouth nightly at bedtime., Disp: , Rfl:     famotidine (PEPCID) 20 MG tablet, Take 1 tablet by mouth 2 times daily, Disp: 60 tablet, Rfl: 3    Vitamin D (CHOLECALCIFEROL) 25 MCG (1000 UT) TABS tablet, Take 1 tablet by mouth daily, Disp: 60 tablet, Rfl: 0    gabapentin (NEURONTIN) 100 MG capsule, Take 1 capsule by mouth 3 times daily for 14 days., Disp: 42 capsule, Rfl: 0    traZODone (DESYREL) 100 MG tablet, Take 1 tablet by mouth nightly as needed for Sleep, Disp: 7 tablet, Rfl: 0      Social History:     Social History     Socioeconomic History    Marital status: Single     Spouse name: Not on file    Number of children: Not on file    Years of education: Not on

## 2025-01-28 DIAGNOSIS — C79.51 MALIGNANT NEOPLASM METASTATIC TO BONE (HCC): Primary | ICD-10-CM

## 2025-01-28 DIAGNOSIS — C34.90 NON-SMALL CELL LUNG CANCER, UNSPECIFIED LATERALITY (HCC): ICD-10-CM

## 2025-01-28 RX ORDER — SODIUM CHLORIDE 9 MG/ML
5-250 INJECTION, SOLUTION INTRAVENOUS PRN
Status: CANCELLED | OUTPATIENT
Start: 2025-01-29

## 2025-01-28 RX ORDER — DIPHENHYDRAMINE HYDROCHLORIDE 50 MG/ML
50 INJECTION INTRAMUSCULAR; INTRAVENOUS
OUTPATIENT
Start: 2025-02-19

## 2025-01-28 RX ORDER — SODIUM CHLORIDE 9 MG/ML
5-250 INJECTION, SOLUTION INTRAVENOUS PRN
OUTPATIENT
Start: 2025-02-19

## 2025-01-28 RX ORDER — HEPARIN SODIUM (PORCINE) LOCK FLUSH IV SOLN 100 UNIT/ML 100 UNIT/ML
500 SOLUTION INTRAVENOUS PRN
Status: CANCELLED | OUTPATIENT
Start: 2025-01-29

## 2025-01-28 RX ORDER — ONDANSETRON 2 MG/ML
8 INJECTION INTRAMUSCULAR; INTRAVENOUS
OUTPATIENT
Start: 2025-02-19

## 2025-01-28 RX ORDER — FAMOTIDINE 10 MG/ML
20 INJECTION, SOLUTION INTRAVENOUS
Status: CANCELLED | OUTPATIENT
Start: 2025-01-29

## 2025-01-28 RX ORDER — EPINEPHRINE 1 MG/ML
0.3 INJECTION, SOLUTION, CONCENTRATE INTRAVENOUS PRN
Status: CANCELLED | OUTPATIENT
Start: 2025-01-29

## 2025-01-28 RX ORDER — SODIUM CHLORIDE 0.9 % (FLUSH) 0.9 %
5-40 SYRINGE (ML) INJECTION PRN
Status: CANCELLED | OUTPATIENT
Start: 2025-01-29

## 2025-01-28 RX ORDER — SODIUM CHLORIDE 9 MG/ML
INJECTION, SOLUTION INTRAVENOUS CONTINUOUS
OUTPATIENT
Start: 2025-02-19

## 2025-01-28 RX ORDER — ALBUTEROL SULFATE 90 UG/1
4 INHALANT RESPIRATORY (INHALATION) PRN
Status: CANCELLED | OUTPATIENT
Start: 2025-01-29

## 2025-01-28 RX ORDER — HEPARIN SODIUM (PORCINE) LOCK FLUSH IV SOLN 100 UNIT/ML 100 UNIT/ML
500 SOLUTION INTRAVENOUS PRN
OUTPATIENT
Start: 2025-02-19

## 2025-01-28 RX ORDER — DIPHENHYDRAMINE HYDROCHLORIDE 50 MG/ML
50 INJECTION INTRAMUSCULAR; INTRAVENOUS
Status: CANCELLED | OUTPATIENT
Start: 2025-01-29

## 2025-01-28 RX ORDER — ACETAMINOPHEN 325 MG/1
650 TABLET ORAL
OUTPATIENT
Start: 2025-02-19

## 2025-01-28 RX ORDER — HYDROCORTISONE SODIUM SUCCINATE 100 MG/2ML
100 INJECTION INTRAMUSCULAR; INTRAVENOUS
OUTPATIENT
Start: 2025-02-19

## 2025-01-28 RX ORDER — ALBUTEROL SULFATE 90 UG/1
4 INHALANT RESPIRATORY (INHALATION) PRN
OUTPATIENT
Start: 2025-02-19

## 2025-01-28 RX ORDER — MEPERIDINE HYDROCHLORIDE 50 MG/ML
12.5 INJECTION INTRAMUSCULAR; INTRAVENOUS; SUBCUTANEOUS PRN
OUTPATIENT
Start: 2025-02-19

## 2025-01-28 RX ORDER — SODIUM CHLORIDE 0.9 % (FLUSH) 0.9 %
5-40 SYRINGE (ML) INJECTION PRN
OUTPATIENT
Start: 2025-02-19

## 2025-01-28 RX ORDER — EPINEPHRINE 1 MG/ML
0.3 INJECTION, SOLUTION, CONCENTRATE INTRAVENOUS PRN
OUTPATIENT
Start: 2025-02-19

## 2025-01-28 RX ORDER — MEPERIDINE HYDROCHLORIDE 50 MG/ML
12.5 INJECTION INTRAMUSCULAR; INTRAVENOUS; SUBCUTANEOUS PRN
Status: CANCELLED | OUTPATIENT
Start: 2025-01-29

## 2025-01-28 RX ORDER — ACETAMINOPHEN 325 MG/1
650 TABLET ORAL
Status: CANCELLED | OUTPATIENT
Start: 2025-01-29

## 2025-01-28 RX ORDER — HYDROCORTISONE SODIUM SUCCINATE 100 MG/2ML
100 INJECTION INTRAMUSCULAR; INTRAVENOUS
Status: CANCELLED | OUTPATIENT
Start: 2025-01-29

## 2025-01-28 RX ORDER — SODIUM CHLORIDE 9 MG/ML
INJECTION, SOLUTION INTRAVENOUS CONTINUOUS
Status: CANCELLED | OUTPATIENT
Start: 2025-01-29

## 2025-01-28 RX ORDER — FAMOTIDINE 10 MG/ML
20 INJECTION, SOLUTION INTRAVENOUS
OUTPATIENT
Start: 2025-02-19

## 2025-01-28 RX ORDER — ONDANSETRON 2 MG/ML
8 INJECTION INTRAMUSCULAR; INTRAVENOUS
Status: CANCELLED | OUTPATIENT
Start: 2025-01-29

## 2025-01-29 ENCOUNTER — HOSPITAL ENCOUNTER (OUTPATIENT)
Dept: INFUSION THERAPY | Age: 50
Discharge: HOME | End: 2025-01-29
Payer: MEDICARE

## 2025-01-29 ENCOUNTER — OFFICE VISIT (OUTPATIENT)
Dept: ONCOLOGY | Age: 50
End: 2025-01-29
Payer: MEDICARE

## 2025-01-29 ENCOUNTER — TELEPHONE (OUTPATIENT)
Dept: ONCOLOGY | Age: 50
End: 2025-01-29

## 2025-01-29 VITALS
WEIGHT: 170.5 LBS | RESPIRATION RATE: 16 BRPM | TEMPERATURE: 97.7 F | HEART RATE: 100 BPM | DIASTOLIC BLOOD PRESSURE: 69 MMHG | BODY MASS INDEX: 22.49 KG/M2 | SYSTOLIC BLOOD PRESSURE: 100 MMHG

## 2025-01-29 DIAGNOSIS — Z79.899 LONG TERM USE OF DRUG: ICD-10-CM

## 2025-01-29 DIAGNOSIS — C79.51 METASTASIS TO BONE (HCC): ICD-10-CM

## 2025-01-29 DIAGNOSIS — S01.00XD OPEN WOUND OF SCALP, UNSPECIFIED OPEN WOUND TYPE, SUBSEQUENT ENCOUNTER: ICD-10-CM

## 2025-01-29 DIAGNOSIS — R00.0 TACHYCARDIA: ICD-10-CM

## 2025-01-29 DIAGNOSIS — T45.1X5A ANEMIA ASSOCIATED WITH CHEMOTHERAPY: ICD-10-CM

## 2025-01-29 DIAGNOSIS — C79.51 MALIGNANT NEOPLASM METASTATIC TO BONE (HCC): Primary | ICD-10-CM

## 2025-01-29 DIAGNOSIS — D64.81 ANEMIA ASSOCIATED WITH CHEMOTHERAPY: ICD-10-CM

## 2025-01-29 DIAGNOSIS — C34.90 NON-SMALL CELL LUNG CANCER, UNSPECIFIED LATERALITY (HCC): Primary | ICD-10-CM

## 2025-01-29 DIAGNOSIS — C34.90 NON-SMALL CELL LUNG CANCER, UNSPECIFIED LATERALITY (HCC): ICD-10-CM

## 2025-01-29 LAB
ALBUMIN SERPL-MCNC: 4.1 G/DL (ref 3.5–5.2)
ALBUMIN/GLOB SERPL: 1.8 {RATIO} (ref 1–2.5)
ALP SERPL-CCNC: 109 U/L (ref 40–129)
ALT SERPL-CCNC: 17 U/L (ref 5–41)
AMYLASE SERPL-CCNC: 94 U/L (ref 28–100)
ANION GAP SERPL CALCULATED.3IONS-SCNC: 12 MMOL/L (ref 9–17)
AST SERPL-CCNC: 14 U/L
BASOPHILS # BLD: 0.1 K/UL (ref 0–0.2)
BASOPHILS NFR BLD: 1 % (ref 0–2)
BILIRUB SERPL-MCNC: 0.7 MG/DL (ref 0.3–1.2)
BUN SERPL-MCNC: 15 MG/DL (ref 6–20)
CALCIUM SERPL-MCNC: 9.7 MG/DL (ref 8.6–10.4)
CHLORIDE SERPL-SCNC: 100 MMOL/L (ref 98–107)
CO2 SERPL-SCNC: 26 MMOL/L (ref 20–31)
CORTIS SERPL-MCNC: 24.3 UG/DL (ref 2.5–19.5)
CORTISOL COLLECTION INFO: ABNORMAL
CREAT SERPL-MCNC: 0.9 MG/DL (ref 0.7–1.2)
EOSINOPHIL # BLD: 0.2 K/UL (ref 0–0.4)
EOSINOPHILS RELATIVE PERCENT: 4 % (ref 1–4)
ERYTHROCYTE [DISTWIDTH] IN BLOOD BY AUTOMATED COUNT: 13.8 % (ref 12.5–15.4)
GFR, ESTIMATED: >90 ML/MIN/1.73M2
GLUCOSE SERPL-MCNC: 136 MG/DL (ref 70–99)
HCT VFR BLD AUTO: 33.2 % (ref 41–53)
HGB BLD-MCNC: 12 G/DL (ref 13.5–17.5)
LIPASE SERPL-CCNC: 34 U/L (ref 13–60)
LYMPHOCYTES NFR BLD: 1 K/UL (ref 1–4.8)
LYMPHOCYTES RELATIVE PERCENT: 18 % (ref 24–44)
MCH RBC QN AUTO: 32.5 PG (ref 26–34)
MCHC RBC AUTO-ENTMCNC: 36.3 G/DL (ref 31–37)
MCV RBC AUTO: 89.5 FL (ref 80–100)
MONOCYTES NFR BLD: 0.5 K/UL (ref 0.1–1.2)
MONOCYTES NFR BLD: 9 % (ref 2–11)
NEUTROPHILS NFR BLD: 68 % (ref 36–66)
NEUTS SEG NFR BLD: 3.8 K/UL (ref 1.8–7.7)
PLATELET # BLD AUTO: 205 K/UL (ref 140–450)
PMV BLD AUTO: 7.1 FL (ref 6–12)
POTASSIUM SERPL-SCNC: 4.2 MMOL/L (ref 3.7–5.3)
PROT SERPL-MCNC: 6.4 G/DL (ref 6.4–8.3)
RBC # BLD AUTO: 3.71 M/UL (ref 4.5–5.9)
SODIUM SERPL-SCNC: 138 MMOL/L (ref 135–144)
TSH SERPL DL<=0.05 MIU/L-ACNC: 3.53 UIU/ML (ref 0.3–5)
WBC OTHER # BLD: 5.7 K/UL (ref 3.5–11)

## 2025-01-29 PROCEDURE — 80053 COMPREHEN METABOLIC PANEL: CPT

## 2025-01-29 PROCEDURE — 82150 ASSAY OF AMYLASE: CPT

## 2025-01-29 PROCEDURE — 2580000003 HC RX 258: Performed by: INTERNAL MEDICINE

## 2025-01-29 PROCEDURE — 84443 ASSAY THYROID STIM HORMONE: CPT

## 2025-01-29 PROCEDURE — G8420 CALC BMI NORM PARAMETERS: HCPCS | Performed by: INTERNAL MEDICINE

## 2025-01-29 PROCEDURE — 2500000003 HC RX 250 WO HCPCS: Performed by: INTERNAL MEDICINE

## 2025-01-29 PROCEDURE — 96413 CHEMO IV INFUSION 1 HR: CPT

## 2025-01-29 PROCEDURE — 4004F PT TOBACCO SCREEN RCVD TLK: CPT | Performed by: INTERNAL MEDICINE

## 2025-01-29 PROCEDURE — G8427 DOCREV CUR MEDS BY ELIG CLIN: HCPCS | Performed by: INTERNAL MEDICINE

## 2025-01-29 PROCEDURE — 96367 TX/PROPH/DG ADDL SEQ IV INF: CPT

## 2025-01-29 PROCEDURE — 85025 COMPLETE CBC W/AUTO DIFF WBC: CPT

## 2025-01-29 PROCEDURE — 99214 OFFICE O/P EST MOD 30 MIN: CPT | Performed by: INTERNAL MEDICINE

## 2025-01-29 PROCEDURE — 83690 ASSAY OF LIPASE: CPT

## 2025-01-29 PROCEDURE — 6360000002 HC RX W HCPCS: Performed by: INTERNAL MEDICINE

## 2025-01-29 PROCEDURE — 82533 TOTAL CORTISOL: CPT

## 2025-01-29 RX ORDER — HEPARIN 100 UNIT/ML
500 SYRINGE INTRAVENOUS PRN
OUTPATIENT
Start: 2025-02-23

## 2025-01-29 RX ORDER — ONDANSETRON 2 MG/ML
8 INJECTION INTRAMUSCULAR; INTRAVENOUS
OUTPATIENT
Start: 2025-02-23

## 2025-01-29 RX ORDER — FAMOTIDINE 10 MG/ML
20 INJECTION, SOLUTION INTRAVENOUS
OUTPATIENT
Start: 2025-02-23

## 2025-01-29 RX ORDER — SODIUM CHLORIDE 0.9 % (FLUSH) 0.9 %
5-40 SYRINGE (ML) INJECTION PRN
OUTPATIENT
Start: 2025-02-23

## 2025-01-29 RX ORDER — EPINEPHRINE 1 MG/ML
0.3 INJECTION, SOLUTION, CONCENTRATE INTRAVENOUS PRN
OUTPATIENT
Start: 2025-02-23

## 2025-01-29 RX ORDER — ZOLEDRONIC ACID 0.04 MG/ML
4 INJECTION, SOLUTION INTRAVENOUS ONCE
Status: COMPLETED | OUTPATIENT
Start: 2025-01-29 | End: 2025-01-29

## 2025-01-29 RX ORDER — ALBUTEROL SULFATE 90 UG/1
4 INHALANT RESPIRATORY (INHALATION) PRN
OUTPATIENT
Start: 2025-02-23

## 2025-01-29 RX ORDER — SODIUM CHLORIDE 9 MG/ML
5-250 INJECTION, SOLUTION INTRAVENOUS PRN
Status: DISCONTINUED | OUTPATIENT
Start: 2025-01-29 | End: 2025-01-30 | Stop reason: HOSPADM

## 2025-01-29 RX ORDER — DIPHENHYDRAMINE HYDROCHLORIDE 50 MG/ML
50 INJECTION INTRAMUSCULAR; INTRAVENOUS
OUTPATIENT
Start: 2025-02-23

## 2025-01-29 RX ORDER — ACETAMINOPHEN 325 MG/1
650 TABLET ORAL
OUTPATIENT
Start: 2025-02-23

## 2025-01-29 RX ORDER — ZOLEDRONIC ACID 0.04 MG/ML
4 INJECTION, SOLUTION INTRAVENOUS ONCE
OUTPATIENT
Start: 2025-02-23 | End: 2025-02-23

## 2025-01-29 RX ORDER — SODIUM CHLORIDE 9 MG/ML
5-250 INJECTION, SOLUTION INTRAVENOUS PRN
OUTPATIENT
Start: 2025-02-23

## 2025-01-29 RX ORDER — HEPARIN 100 UNIT/ML
500 SYRINGE INTRAVENOUS PRN
Status: DISCONTINUED | OUTPATIENT
Start: 2025-01-29 | End: 2025-01-30 | Stop reason: HOSPADM

## 2025-01-29 RX ORDER — SODIUM CHLORIDE 0.9 % (FLUSH) 0.9 %
5-40 SYRINGE (ML) INJECTION PRN
Status: DISCONTINUED | OUTPATIENT
Start: 2025-01-29 | End: 2025-01-30 | Stop reason: HOSPADM

## 2025-01-29 RX ORDER — HYDROCORTISONE SODIUM SUCCINATE 100 MG/2ML
100 INJECTION INTRAMUSCULAR; INTRAVENOUS
OUTPATIENT
Start: 2025-02-23

## 2025-01-29 RX ORDER — SODIUM CHLORIDE 9 MG/ML
INJECTION, SOLUTION INTRAVENOUS CONTINUOUS
OUTPATIENT
Start: 2025-02-23

## 2025-01-29 RX ADMIN — SODIUM CHLORIDE 120 ML/HR: 9 INJECTION, SOLUTION INTRAVENOUS at 09:03

## 2025-01-29 RX ADMIN — SODIUM CHLORIDE, PRESERVATIVE FREE 10 ML: 5 INJECTION INTRAVENOUS at 10:44

## 2025-01-29 RX ADMIN — HEPARIN 500 UNITS: 100 SYRINGE at 10:44

## 2025-01-29 RX ADMIN — SODIUM CHLORIDE 200 MG: 9 INJECTION, SOLUTION INTRAVENOUS at 09:39

## 2025-01-29 RX ADMIN — ZOLEDRONIC ACID 4 MG: 0.04 INJECTION, SOLUTION INTRAVENOUS at 09:04

## 2025-01-29 NOTE — PROGRESS NOTES
Not on file     Social Determinants of Health     Financial Resource Strain: Not on file   Food Insecurity: No Food Insecurity (10/27/2024)    Hunger Vital Sign     Worried About Running Out of Food in the Last Year: Never true     Ran Out of Food in the Last Year: Never true   Transportation Needs: No Transportation Needs (10/27/2024)    PRAPARE - Transportation     Lack of Transportation (Medical): No     Lack of Transportation (Non-Medical): No   Physical Activity: Not on file   Stress: Not on file   Social Connections: Not on file   Intimate Partner Violence: Not on file   Housing Stability: Low Risk  (10/27/2024)    Housing Stability Vital Sign     Unable to Pay for Housing in the Last Year: No     Number of Times Moved in the Last Year: 1     Homeless in the Last Year: No       Family History   Problem Relation Age of Onset    Diabetes Father     Substance Abuse Brother         REVIEW OF SYSTEM:     Constitutional: No fever or chills. No night sweats, no weight loss   Eyes: No eye discharge, double vision, or eye pain   HEENT: negative for sore mouth, sore throat, hoarseness and voice change   Respiratory: negative for cough , sputum, dyspnea, wheezing, hemoptysis, chest pain   Cardiovascular: negative for chest pain, dyspnea, palpitations, orthopnea, PND   Gastrointestinal: negative for nausea, vomiting, diarrhea, constipation, abdominal pain, Dysphagia, hematemesis and hematochezia   Genitourinary: negative for frequency, dysuria, nocturia, urinary incontinence, and hematuria   Integument: negative for rash, skin lesions, bruises.   Hematologic/Lymphatic: negative for easy bruising, bleeding, lymphadenopathy, petechiae and swelling/edema   Endocrine: negative for heat or cold intolerance, tremor, weight changes, change in bowel habits and hair loss   Musculoskeletal: negative for myalgias, arthralgias, pain, joint swelling,and bone pain   Neurological: negative for headaches, dizziness, seizures,

## 2025-01-29 NOTE — TELEPHONE ENCOUNTER
Instructions   from Dr. Calin Strong MD    Ok for treament  Rtc in 4 weeks    RV scheduled 2/26 at 1:30 pm  Tx scheduled 2/26 at 1 pm  SEE IN TX

## 2025-01-29 NOTE — PROGRESS NOTES
Patient arrives ambulatory  for Keytruda  C8D1  Pt denies complaints or concerns  Pt seen by Dr Strong , Orders to proceed with tx  Labs drawn via med port and reviewed, within normal limits for tx  Patient tolerated tx without incident and discharged in stable condition  Next appointment 2/19 Keytruda

## 2025-01-29 NOTE — PROGRESS NOTES
Spiritual Health Outpatient Oncology/Hematology Progress Note: [facility]    Situation: Writer encountered Patient and Father in the treatment cubicle of the infusion clinic.    Assessment: Patient was joined by his father, Max. Father noted that Pt has improved. Pt smiled and nodded. Father expressed gratitude for all of the prayers and to God for Pt's healing.    Intervention:  Writer inquired about Pt's coping and needs. Writer celebrated Pt's recovery. Writer offered words of support and encouragement.     Outcome: Father thanked writer for the support.     Plan: Spiritual Health Services are available for Patient and Family by phone and/or in person.    01/29/25 1222   Encounter Summary   Encounter Overview/Reason Spiritual/Emotional Needs   Service Provided For Patient and family together   Referral/Consult From [facility]   Support System Family members;Parent   Last Encounter  08/21/24   Complexity of Encounter Low   Begin Time 1010   End Time  1013   Total Time Calculated 3 min   Spiritual/Emotional needs   Type Spiritual Support   Assessment/Intervention/Outcome   Assessment Coping   Intervention Sustaining Presence/Ministry of presence;Active listening;Explored/Affirmed feelings, thoughts, concerns;Discussed illness injury and it’s impact   Outcome Expressed feelings, needs, and concerns;Expressed Gratitude;Coping   Plan and Referrals   Plan/Referrals Continue Support (comment)     AURELIA Lucia  [facility]   [phone]

## 2025-02-02 NOTE — DISCHARGE INSTRUCTIONS
[facility]OUND and HYPERBAR[facility] TREATMENT  CENTER                            Visit  Discharge Instructions / Physician Orders  DATE:2/3/25     Home Care:[facility]- PLEASE SEND SUPPLIES      SUPPLIES ORDERED THRU:HOME CARE TO SUPPLY  DUE TO MEDICARE GUIDELINES                 DATE LAST SUPPLIED     Wound Location:  Scalp     Cleanse with: Liquid antibacterial soap and water, rinse well      Dressing Orders:  Primary dressing-Scalp lesion: cleanse with saline applied to gauze, fill/cover the open wound with Opticell Ag gelling fiber, secure with  an Optifoam 6x6\" foam (if possible). Change daily and prn saturation (Orders from Hospital)     Frequency:  Daily     Additional Orders: Increase protein to diet (meat, cheese, eggs, fish, peanut butter, nuts and beans)  Multivitamin daily  Call [facility]- to register as soon as possible call [phone] Call and get an appointment  [facility] continue follow up.and neuro surgery.  OFFLOADING [] YES  TYPE:                  [] NA     Weekly wound care visits until determined otherwise.     Antibiotic therapy-wound care related YES [] NO [x] NA[]     MY CHART []     Smart Device  []      HYPERBARIC TREATMENT-                TREATMENT #                          Your next appointment with the Wound Care Center is 4 WEEKS WITH DR. LARA                                                                                                   (Please note your next appointment above and if you are unable to keep, kindly give a 24 hour notice. Thank you.)  If more than 15 min late we cannot guarantee you will be seen due to clinician schedule  Per Policy, Excessive cancellation will call for dismissal from program.  If you experience any of the following, please call the Wound Care Center during business hours:  [phone]     * Increase in Pain  * Temperature over 101  * Increase in drainage from your wound  * Drainage with a foul odor  * Bleeding  *

## 2025-02-03 ENCOUNTER — HOSPITAL ENCOUNTER (OUTPATIENT)
Dept: WOUND CARE | Age: 50
Discharge: HOME | End: 2025-02-03
Payer: MEDICARE

## 2025-02-03 VITALS
HEIGHT: 73 IN | TEMPERATURE: 96.8 F | WEIGHT: 170 LBS | BODY MASS INDEX: 22.53 KG/M2 | RESPIRATION RATE: 18 BRPM | DIASTOLIC BLOOD PRESSURE: 66 MMHG | HEART RATE: 97 BPM | SYSTOLIC BLOOD PRESSURE: 98 MMHG

## 2025-02-03 DIAGNOSIS — S01.00XS OPEN WOUND OF SCALP, UNSPECIFIED OPEN WOUND TYPE, SEQUELA: Primary | ICD-10-CM

## 2025-02-03 PROCEDURE — 99213 OFFICE O/P EST LOW 20 MIN: CPT

## 2025-02-03 PROCEDURE — 99213 OFFICE O/P EST LOW 20 MIN: CPT | Performed by: PLASTIC SURGERY

## 2025-02-03 RX ORDER — LIDOCAINE HYDROCHLORIDE 20 MG/ML
JELLY TOPICAL ONCE
OUTPATIENT
Start: 2025-02-03 | End: 2025-02-03

## 2025-02-03 RX ORDER — MUPIROCIN 20 MG/G
OINTMENT TOPICAL ONCE
OUTPATIENT
Start: 2025-02-03 | End: 2025-02-03

## 2025-02-03 RX ORDER — LIDOCAINE HYDROCHLORIDE 40 MG/ML
SOLUTION TOPICAL ONCE
OUTPATIENT
Start: 2025-02-03 | End: 2025-02-03

## 2025-02-03 RX ORDER — CLOBETASOL PROPIONATE 0.5 MG/G
OINTMENT TOPICAL ONCE
OUTPATIENT
Start: 2025-02-03 | End: 2025-02-03

## 2025-02-03 RX ORDER — LIDOCAINE HYDROCHLORIDE 40 MG/ML
SOLUTION TOPICAL ONCE
Status: COMPLETED | OUTPATIENT
Start: 2025-02-03 | End: 2025-02-03

## 2025-02-03 RX ORDER — GINSENG 100 MG
CAPSULE ORAL ONCE
OUTPATIENT
Start: 2025-02-03 | End: 2025-02-03

## 2025-02-03 RX ORDER — BETAMETHASONE DIPROPIONATE 0.5 MG/G
CREAM TOPICAL ONCE
OUTPATIENT
Start: 2025-02-03 | End: 2025-02-03

## 2025-02-03 RX ORDER — TRIAMCINOLONE ACETONIDE 1 MG/G
OINTMENT TOPICAL ONCE
OUTPATIENT
Start: 2025-02-03 | End: 2025-02-03

## 2025-02-03 RX ORDER — BACITRACIN ZINC AND POLYMYXIN B SULFATE 500; 1000 [USP'U]/G; [USP'U]/G
OINTMENT TOPICAL ONCE
OUTPATIENT
Start: 2025-02-03 | End: 2025-02-03

## 2025-02-03 RX ORDER — LIDOCAINE 40 MG/G
CREAM TOPICAL ONCE
OUTPATIENT
Start: 2025-02-03 | End: 2025-02-03

## 2025-02-03 RX ORDER — SODIUM CHLOR/HYPOCHLOROUS ACID 0.033 %
SOLUTION, IRRIGATION IRRIGATION ONCE
OUTPATIENT
Start: 2025-02-03 | End: 2025-02-03

## 2025-02-03 RX ORDER — NEOMYCIN/BACITRACIN/POLYMYXINB 3.5-400-5K
OINTMENT (GRAM) TOPICAL ONCE
OUTPATIENT
Start: 2025-02-03 | End: 2025-02-03

## 2025-02-03 RX ORDER — SILVER SULFADIAZINE 10 MG/G
CREAM TOPICAL ONCE
OUTPATIENT
Start: 2025-02-03 | End: 2025-02-03

## 2025-02-03 RX ORDER — GENTAMICIN SULFATE 1 MG/G
OINTMENT TOPICAL ONCE
OUTPATIENT
Start: 2025-02-03 | End: 2025-02-03

## 2025-02-03 RX ORDER — LIDOCAINE 50 MG/G
OINTMENT TOPICAL ONCE
OUTPATIENT
Start: 2025-02-03 | End: 2025-02-03

## 2025-02-03 RX ADMIN — LIDOCAINE HYDROCHLORIDE 5 ML: 40 SOLUTION TOPICAL at 14:27

## 2025-02-03 ASSESSMENT — PAIN SCALES - GENERAL: PAINLEVEL_OUTOF10: 0

## 2025-02-03 ASSESSMENT — PAIN - FUNCTIONAL ASSESSMENT: PAIN_FUNCTIONAL_ASSESSMENT: ACTIVITIES ARE NOT PREVENTED

## 2025-02-03 NOTE — PROGRESS NOTES
[facility]       Progress Note and Procedure Note      Progress     Chief Complaint   Patient presents with    Wound Check     scalp        HPI:   Isiah Duncan is a 49 y.o. male who presents wound ulcer evaluation.      History of Wound Context:   The patient presents with a scalp wound.  Patient presented to the ED with a right hip pain on 7/10/2024.  Patient had a 70 pound weight loss.  Patient has a history of schizophrenia and a 4D pack per year smoker.  Patient noted a bump on his head from his fall.  Patient had a CT of the chest abdomen and pelvis showed left lung mass with bone metastasis in the right femur causing a pathological fracture and the CT of the head showed a large scalp base mass.  Patient had an ORIF of the right femur with biopsy which showed non-small cell carcinoma consistent with a lung primary.  Patient has a history of radiation treatment to the right femur.  As per the family the patient also had radiation to skull.    Wound/Ulcer Pain Timing/Severity: none  Quality of pain: N/A  Severity:  0 / 10   Modifying Factors: None  Associated Signs/Symptoms: none    Ulcer Identification:  Ulcer Type: non-healing surgical  Contributing Factors: none    Patient has stopped smoking.  Patient has completed His radiation treatment September    Medications:     Current Outpatient Medications   Medication Sig Dispense Refill    Vitamin D (CHOLECALCIFEROL) 25 MCG (1000 UT) TABS tablet Take 1 tablet by mouth daily 60 tablet 0    acetaminophen (TYLENOL) 500 MG tablet Take 2 tablets by mouth every 6 hours as needed for Pain 112 tablet 0    cyclobenzaprine (FLEXERIL) 10 MG tablet Take 1 tablet by mouth 3 times daily as needed for Muscle spasms 50 tablet 0    docusate sodium (COLACE) 100 MG capsule Take 1 capsule by mouth 2 times daily as needed for Constipation 20 capsule 0    propranolol (INDERAL) 20 MG tablet Take 1 tablet by mouth 2 times daily      INVEGA TRINZA 546

## 2025-02-19 ENCOUNTER — HOSPITAL ENCOUNTER (OUTPATIENT)
Dept: INFUSION THERAPY | Age: 50
Discharge: HOME | End: 2025-02-19
Payer: MEDICARE

## 2025-02-19 VITALS
RESPIRATION RATE: 18 BRPM | BODY MASS INDEX: 22.69 KG/M2 | HEART RATE: 98 BPM | TEMPERATURE: 97.4 F | DIASTOLIC BLOOD PRESSURE: 65 MMHG | WEIGHT: 172 LBS | SYSTOLIC BLOOD PRESSURE: 95 MMHG

## 2025-02-19 DIAGNOSIS — C79.51 MALIGNANT NEOPLASM METASTATIC TO BONE (HCC): ICD-10-CM

## 2025-02-19 DIAGNOSIS — C34.90 NON-SMALL CELL LUNG CANCER, UNSPECIFIED LATERALITY (HCC): Primary | ICD-10-CM

## 2025-02-19 LAB
ALBUMIN SERPL-MCNC: 4.3 G/DL (ref 3.5–5.2)
ALBUMIN/GLOB SERPL: 1.7 {RATIO} (ref 1–2.5)
ALP SERPL-CCNC: 111 U/L (ref 40–129)
ALT SERPL-CCNC: 17 U/L (ref 5–41)
ANION GAP SERPL CALCULATED.3IONS-SCNC: 11 MMOL/L (ref 9–17)
AST SERPL-CCNC: 14 U/L
BASOPHILS # BLD: 0.1 K/UL (ref 0–0.2)
BASOPHILS NFR BLD: 1 % (ref 0–2)
BILIRUB SERPL-MCNC: 0.9 MG/DL (ref 0.3–1.2)
BUN SERPL-MCNC: 23 MG/DL (ref 6–20)
CALCIUM SERPL-MCNC: 9.1 MG/DL (ref 8.6–10.4)
CHLORIDE SERPL-SCNC: 103 MMOL/L (ref 98–107)
CO2 SERPL-SCNC: 26 MMOL/L (ref 20–31)
CREAT SERPL-MCNC: 0.8 MG/DL (ref 0.7–1.2)
EOSINOPHIL # BLD: 0.2 K/UL (ref 0–0.4)
EOSINOPHILS RELATIVE PERCENT: 4 % (ref 1–4)
ERYTHROCYTE [DISTWIDTH] IN BLOOD BY AUTOMATED COUNT: 13.8 % (ref 12.5–15.4)
GFR, ESTIMATED: >90 ML/MIN/1.73M2
GLUCOSE SERPL-MCNC: 147 MG/DL (ref 70–99)
HCT VFR BLD AUTO: 34.8 % (ref 41–53)
HGB BLD-MCNC: 12.1 G/DL (ref 13.5–17.5)
LYMPHOCYTES NFR BLD: 1.1 K/UL (ref 1–4.8)
LYMPHOCYTES RELATIVE PERCENT: 17 % (ref 24–44)
MCH RBC QN AUTO: 31.1 PG (ref 26–34)
MCHC RBC AUTO-ENTMCNC: 34.8 G/DL (ref 31–37)
MCV RBC AUTO: 89.4 FL (ref 80–100)
MONOCYTES NFR BLD: 0.5 K/UL (ref 0.1–1.2)
MONOCYTES NFR BLD: 8 % (ref 2–11)
NEUTROPHILS NFR BLD: 70 % (ref 36–66)
NEUTS SEG NFR BLD: 4.4 K/UL (ref 1.8–7.7)
PLATELET # BLD AUTO: 202 K/UL (ref 140–450)
PMV BLD AUTO: 6.6 FL (ref 6–12)
POTASSIUM SERPL-SCNC: 4.1 MMOL/L (ref 3.7–5.3)
PROT SERPL-MCNC: 6.9 G/DL (ref 6.4–8.3)
RBC # BLD AUTO: 3.89 M/UL (ref 4.5–5.9)
SODIUM SERPL-SCNC: 140 MMOL/L (ref 135–144)
TSH SERPL DL<=0.05 MIU/L-ACNC: 3.14 UIU/ML (ref 0.3–5)
WBC OTHER # BLD: 6.3 K/UL (ref 3.5–11)

## 2025-02-19 PROCEDURE — 84443 ASSAY THYROID STIM HORMONE: CPT

## 2025-02-19 PROCEDURE — 85025 COMPLETE CBC W/AUTO DIFF WBC: CPT

## 2025-02-19 PROCEDURE — 80053 COMPREHEN METABOLIC PANEL: CPT

## 2025-02-19 PROCEDURE — 36415 COLL VENOUS BLD VENIPUNCTURE: CPT

## 2025-02-19 PROCEDURE — 96413 CHEMO IV INFUSION 1 HR: CPT

## 2025-02-19 PROCEDURE — 6360000002 HC RX W HCPCS: Performed by: INTERNAL MEDICINE

## 2025-02-19 PROCEDURE — 2580000003 HC RX 258: Performed by: INTERNAL MEDICINE

## 2025-02-19 PROCEDURE — 2500000003 HC RX 250 WO HCPCS: Performed by: INTERNAL MEDICINE

## 2025-02-19 RX ORDER — SODIUM CHLORIDE 0.9 % (FLUSH) 0.9 %
5-40 SYRINGE (ML) INJECTION PRN
Status: DISCONTINUED | OUTPATIENT
Start: 2025-02-19 | End: 2025-02-20 | Stop reason: HOSPADM

## 2025-02-19 RX ORDER — HEPARIN 100 UNIT/ML
500 SYRINGE INTRAVENOUS PRN
Status: DISCONTINUED | OUTPATIENT
Start: 2025-02-19 | End: 2025-02-20 | Stop reason: HOSPADM

## 2025-02-19 RX ADMIN — Medication 500 UNITS: at 09:42

## 2025-02-19 RX ADMIN — SODIUM CHLORIDE 200 MG: 9 INJECTION, SOLUTION INTRAVENOUS at 09:14

## 2025-02-19 RX ADMIN — SODIUM CHLORIDE, PRESERVATIVE FREE 10 ML: 5 INJECTION INTRAVENOUS at 09:42

## 2025-02-19 RX ADMIN — SODIUM CHLORIDE, PRESERVATIVE FREE 10 ML: 5 INJECTION INTRAVENOUS at 08:05

## 2025-02-19 NOTE — PROGRESS NOTES
Pt here for C.9D.1 keytruda  Arrives ambulatory.  Denies any new complaints.  Labs drawn from port, results reviewed.  Tx complete without incident.  Pt d/c'd in stable condition.  Returns 2/26 for Dr and zometa.

## 2025-02-26 ENCOUNTER — OFFICE VISIT (OUTPATIENT)
Dept: ONCOLOGY | Age: 50
End: 2025-02-26
Payer: MEDICARE

## 2025-02-26 ENCOUNTER — HOSPITAL ENCOUNTER (OUTPATIENT)
Dept: INFUSION THERAPY | Age: 50
Discharge: HOME | End: 2025-02-26
Payer: MEDICARE

## 2025-02-26 VITALS
SYSTOLIC BLOOD PRESSURE: 93 MMHG | RESPIRATION RATE: 18 BRPM | DIASTOLIC BLOOD PRESSURE: 67 MMHG | TEMPERATURE: 97.5 F | HEART RATE: 93 BPM | BODY MASS INDEX: 22.99 KG/M2 | WEIGHT: 174.28 LBS

## 2025-02-26 VITALS
WEIGHT: 174.28 LBS | SYSTOLIC BLOOD PRESSURE: 93 MMHG | HEART RATE: 93 BPM | TEMPERATURE: 97.5 F | RESPIRATION RATE: 18 BRPM | BODY MASS INDEX: 22.99 KG/M2 | DIASTOLIC BLOOD PRESSURE: 67 MMHG

## 2025-02-26 DIAGNOSIS — C34.90 NON-SMALL CELL LUNG CANCER, UNSPECIFIED LATERALITY (HCC): ICD-10-CM

## 2025-02-26 DIAGNOSIS — C79.51 METASTASIS TO BONE (HCC): Primary | ICD-10-CM

## 2025-02-26 DIAGNOSIS — C79.51 METASTASIS TO BONE (HCC): ICD-10-CM

## 2025-02-26 DIAGNOSIS — R59.0 MEDIASTINAL LYMPHADENOPATHY: Primary | ICD-10-CM

## 2025-02-26 PROCEDURE — 99214 OFFICE O/P EST MOD 30 MIN: CPT | Performed by: INTERNAL MEDICINE

## 2025-02-26 PROCEDURE — 2500000003 HC RX 250 WO HCPCS: Performed by: INTERNAL MEDICINE

## 2025-02-26 PROCEDURE — G8420 CALC BMI NORM PARAMETERS: HCPCS | Performed by: INTERNAL MEDICINE

## 2025-02-26 PROCEDURE — 96365 THER/PROPH/DIAG IV INF INIT: CPT

## 2025-02-26 PROCEDURE — 6360000002 HC RX W HCPCS: Performed by: INTERNAL MEDICINE

## 2025-02-26 PROCEDURE — G8427 DOCREV CUR MEDS BY ELIG CLIN: HCPCS | Performed by: INTERNAL MEDICINE

## 2025-02-26 PROCEDURE — 4004F PT TOBACCO SCREEN RCVD TLK: CPT | Performed by: INTERNAL MEDICINE

## 2025-02-26 RX ORDER — ZOLEDRONIC ACID 0.04 MG/ML
4 INJECTION, SOLUTION INTRAVENOUS ONCE
Status: COMPLETED | OUTPATIENT
Start: 2025-02-26 | End: 2025-02-26

## 2025-02-26 RX ORDER — SODIUM CHLORIDE 0.9 % (FLUSH) 0.9 %
5-40 SYRINGE (ML) INJECTION PRN
Status: DISCONTINUED | OUTPATIENT
Start: 2025-02-26 | End: 2025-02-27 | Stop reason: HOSPADM

## 2025-02-26 RX ORDER — HYDROCORTISONE SODIUM SUCCINATE 100 MG/2ML
100 INJECTION INTRAMUSCULAR; INTRAVENOUS
OUTPATIENT
Start: 2025-03-26

## 2025-02-26 RX ORDER — ZOLEDRONIC ACID 0.04 MG/ML
4 INJECTION, SOLUTION INTRAVENOUS ONCE
OUTPATIENT
Start: 2025-03-26 | End: 2025-03-26

## 2025-02-26 RX ORDER — ONDANSETRON 2 MG/ML
8 INJECTION INTRAMUSCULAR; INTRAVENOUS
OUTPATIENT
Start: 2025-03-26

## 2025-02-26 RX ORDER — SODIUM CHLORIDE 0.9 % (FLUSH) 0.9 %
5-40 SYRINGE (ML) INJECTION PRN
OUTPATIENT
Start: 2025-03-26

## 2025-02-26 RX ORDER — ACETAMINOPHEN 325 MG/1
650 TABLET ORAL
OUTPATIENT
Start: 2025-03-26

## 2025-02-26 RX ORDER — DIPHENHYDRAMINE HYDROCHLORIDE 50 MG/ML
50 INJECTION INTRAMUSCULAR; INTRAVENOUS
OUTPATIENT
Start: 2025-03-26

## 2025-02-26 RX ORDER — HEPARIN 100 UNIT/ML
500 SYRINGE INTRAVENOUS PRN
OUTPATIENT
Start: 2025-03-26

## 2025-02-26 RX ORDER — SODIUM CHLORIDE 9 MG/ML
5-250 INJECTION, SOLUTION INTRAVENOUS PRN
OUTPATIENT
Start: 2025-03-26

## 2025-02-26 RX ORDER — HEPARIN 100 UNIT/ML
500 SYRINGE INTRAVENOUS PRN
Status: DISCONTINUED | OUTPATIENT
Start: 2025-02-26 | End: 2025-02-27 | Stop reason: HOSPADM

## 2025-02-26 RX ORDER — FAMOTIDINE 10 MG/ML
20 INJECTION, SOLUTION INTRAVENOUS
OUTPATIENT
Start: 2025-03-26

## 2025-02-26 RX ORDER — ALBUTEROL SULFATE 90 UG/1
4 INHALANT RESPIRATORY (INHALATION) PRN
OUTPATIENT
Start: 2025-03-26

## 2025-02-26 RX ORDER — SODIUM CHLORIDE 9 MG/ML
5-250 INJECTION, SOLUTION INTRAVENOUS PRN
Status: DISCONTINUED | OUTPATIENT
Start: 2025-02-26 | End: 2025-02-27 | Stop reason: HOSPADM

## 2025-02-26 RX ORDER — EPINEPHRINE 1 MG/ML
0.3 INJECTION, SOLUTION, CONCENTRATE INTRAVENOUS PRN
OUTPATIENT
Start: 2025-03-26

## 2025-02-26 RX ORDER — SODIUM CHLORIDE 9 MG/ML
INJECTION, SOLUTION INTRAVENOUS CONTINUOUS
OUTPATIENT
Start: 2025-03-26

## 2025-02-26 RX ADMIN — ZOLEDRONIC ACID 4 MG: 0.04 INJECTION, SOLUTION INTRAVENOUS at 13:15

## 2025-02-26 RX ADMIN — SODIUM CHLORIDE, PRESERVATIVE FREE 10 ML: 5 INJECTION INTRAVENOUS at 13:03

## 2025-02-26 RX ADMIN — SODIUM CHLORIDE, PRESERVATIVE FREE 10 ML: 5 INJECTION INTRAVENOUS at 13:36

## 2025-02-26 RX ADMIN — HEPARIN 500 UNITS: 100 SYRINGE at 13:47

## 2025-02-26 RX ADMIN — SODIUM CHLORIDE, PRESERVATIVE FREE 10 ML: 5 INJECTION INTRAVENOUS at 13:47

## 2025-02-26 NOTE — PROGRESS NOTES
Patient arrived for zometa infusion.  Arrives ambulatory.  Denies any complaints.  Patient was seen at chairside by Dr. Strong, order received to proceed with treatment.   Infusion completed without incident.  Discharged in stable condition.  Returns 3/12/2025 for C10D1 keytruda.

## 2025-02-26 NOTE — PROGRESS NOTES
Patient ID: Isiah Duncan, 1975, [id number], 49 y.o.  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 male. with right hip pain who fell and was brought to the ED 7/10/2024; he reports a 70lb weight loss over the last year. He has a history of schizophrenia and 40pk/yrs of smoking. He notes he has a bump on his head from his fall. He had a CT chest/abd/pelvis that shows a left lung mass as well as a bone metastases in the right femur causing pathologic fracture, as well as a CT Head showing a large scalp base mass.  Patient had a biopsy and ORIF of the right femur on July 11, 2024 which came back showing a poorly differentiated non-small cell carcinoma consistent with lung primary.  He denies any headaches, vision changes, chest pain, abdominal pain, N/V/D, swelling, bleeding, or fatigue. Patient's father who is his POA notes there is also a lesion in his left femur for which he will be seeing orthopedics about that to discuss fixation there as well.   He was seen by radiation oncology for palliative radiation treatment  Pathology from the right total hip arthroplasty metastatic poorly differentiated non-small cell carcinoma   please see below comment    There is

## 2025-03-03 ENCOUNTER — HOSPITAL ENCOUNTER (OUTPATIENT)
Dept: WOUND CARE | Age: 50
Discharge: HOME | End: 2025-03-03
Payer: MEDICARE

## 2025-03-03 VITALS
TEMPERATURE: 96.4 F | BODY MASS INDEX: 23.19 KG/M2 | WEIGHT: 175 LBS | SYSTOLIC BLOOD PRESSURE: 96 MMHG | RESPIRATION RATE: 18 BRPM | DIASTOLIC BLOOD PRESSURE: 64 MMHG | HEIGHT: 73 IN | HEART RATE: 95 BPM

## 2025-03-03 DIAGNOSIS — S01.00XS OPEN WOUND OF SCALP, UNSPECIFIED OPEN WOUND TYPE, SEQUELA: Primary | ICD-10-CM

## 2025-03-03 PROCEDURE — 99213 OFFICE O/P EST LOW 20 MIN: CPT

## 2025-03-03 RX ORDER — LIDOCAINE 50 MG/G
OINTMENT TOPICAL ONCE
OUTPATIENT
Start: 2025-03-03 | End: 2025-03-03

## 2025-03-03 RX ORDER — LIDOCAINE HYDROCHLORIDE 40 MG/ML
SOLUTION TOPICAL ONCE
OUTPATIENT
Start: 2025-03-03 | End: 2025-03-03

## 2025-03-03 RX ORDER — SILVER SULFADIAZINE 10 MG/G
CREAM TOPICAL ONCE
OUTPATIENT
Start: 2025-03-03 | End: 2025-03-03

## 2025-03-03 RX ORDER — SODIUM CHLOR/HYPOCHLOROUS ACID 0.033 %
SOLUTION, IRRIGATION IRRIGATION ONCE
OUTPATIENT
Start: 2025-03-03 | End: 2025-03-03

## 2025-03-03 RX ORDER — BETAMETHASONE DIPROPIONATE 0.5 MG/G
CREAM TOPICAL ONCE
OUTPATIENT
Start: 2025-03-03 | End: 2025-03-03

## 2025-03-03 RX ORDER — BACITRACIN ZINC AND POLYMYXIN B SULFATE 500; 1000 [USP'U]/G; [USP'U]/G
OINTMENT TOPICAL ONCE
OUTPATIENT
Start: 2025-03-03 | End: 2025-03-03

## 2025-03-03 RX ORDER — NEOMYCIN/BACITRACIN/POLYMYXINB 3.5-400-5K
OINTMENT (GRAM) TOPICAL ONCE
OUTPATIENT
Start: 2025-03-03 | End: 2025-03-03

## 2025-03-03 RX ORDER — GENTAMICIN SULFATE 1 MG/G
OINTMENT TOPICAL ONCE
OUTPATIENT
Start: 2025-03-03 | End: 2025-03-03

## 2025-03-03 RX ORDER — MUPIROCIN 20 MG/G
OINTMENT TOPICAL ONCE
OUTPATIENT
Start: 2025-03-03 | End: 2025-03-03

## 2025-03-03 RX ORDER — LIDOCAINE 40 MG/G
CREAM TOPICAL ONCE
OUTPATIENT
Start: 2025-03-03 | End: 2025-03-03

## 2025-03-03 RX ORDER — LIDOCAINE HYDROCHLORIDE 20 MG/ML
JELLY TOPICAL ONCE
OUTPATIENT
Start: 2025-03-03 | End: 2025-03-03

## 2025-03-03 RX ORDER — GINSENG 100 MG
CAPSULE ORAL ONCE
OUTPATIENT
Start: 2025-03-03 | End: 2025-03-03

## 2025-03-03 RX ORDER — CLOBETASOL PROPIONATE 0.5 MG/G
OINTMENT TOPICAL ONCE
OUTPATIENT
Start: 2025-03-03 | End: 2025-03-03

## 2025-03-03 RX ORDER — TRIAMCINOLONE ACETONIDE 1 MG/G
OINTMENT TOPICAL ONCE
OUTPATIENT
Start: 2025-03-03 | End: 2025-03-03

## 2025-03-03 ASSESSMENT — PAIN SCALES - GENERAL: PAINLEVEL_OUTOF10: 0

## 2025-03-03 NOTE — DISCHARGE INSTRUCTIONS
Excessive cancellation will call for dismissal from program.  If you experience any of the following, please call the Wound Care Center during business hours:  [phone]     * Increase in Pain  * Temperature over 101  * Increase in drainage from your wound  * Drainage with a foul odor  * Bleeding  * Increase in swelling  * Need for compression bandage changes due to slippage, breakthrough drainage.     If you need medical attention outside of the business hours of the Wound Care Centers please contact your PCP or go to the nearest emergency room.     The information contained in the After Visit Summary has been reviewed with me, the patient and/or responsible adult, by my health care provider(s). I had the opportunity to ask questions regarding this information. I have elected to receive;      []After Visit Summary  [x]Comprehensive Discharge Instruction        Patient signature______________________________________Date:_______  Electronically signed by Rosa Giang RN on 3/3/2025 at 3:26 PM  Electronically signed by Francoise Lord MD on 3/3/2025 at 2:22 PM

## 2025-03-03 NOTE — PROGRESS NOTES
want to wait 6 months post radiation and make sure that he is free of disease on the scalp prior to any reconstruction.  They will also coordinate with neurosurgery and need an evaluation by neurosurgery at [facility].  Patient has to stop smoking prior to any surgical intervention.  I recommended follow-up with primary care to see if patient can be started on medications such as Wellbutrin.  Patient is on a nicotine patch at this time  I encouraged the patient and the family to call the [facility] for a follow-up appointment.  Patient can be scheduled anytime after March as the 6 months   Post radiation will be in March     Electronically signed by:  Francoise Lord MD 3/3/2025

## 2025-03-12 ENCOUNTER — HOSPITAL ENCOUNTER (OUTPATIENT)
Dept: INFUSION THERAPY | Age: 50
Discharge: HOME | End: 2025-03-12
Payer: MEDICARE

## 2025-03-12 ENCOUNTER — OFFICE VISIT (OUTPATIENT)
Dept: ONCOLOGY | Age: 50
End: 2025-03-12
Payer: MEDICARE

## 2025-03-12 VITALS
HEIGHT: 73 IN | OXYGEN SATURATION: 100 % | DIASTOLIC BLOOD PRESSURE: 82 MMHG | HEART RATE: 90 BPM | BODY MASS INDEX: 22.82 KG/M2 | WEIGHT: 172.2 LBS | SYSTOLIC BLOOD PRESSURE: 117 MMHG

## 2025-03-12 DIAGNOSIS — Z79.899 LONG TERM USE OF DRUG: ICD-10-CM

## 2025-03-12 DIAGNOSIS — C34.90 NON-SMALL CELL LUNG CANCER, UNSPECIFIED LATERALITY (HCC): ICD-10-CM

## 2025-03-12 DIAGNOSIS — C79.51 MALIGNANT NEOPLASM METASTATIC TO BONE (HCC): Primary | ICD-10-CM

## 2025-03-12 DIAGNOSIS — C79.51 MALIGNANT NEOPLASM METASTATIC TO BONE (HCC): ICD-10-CM

## 2025-03-12 DIAGNOSIS — C34.90 NON-SMALL CELL LUNG CANCER, UNSPECIFIED LATERALITY (HCC): Primary | ICD-10-CM

## 2025-03-12 LAB
ALBUMIN SERPL-MCNC: 4.3 G/DL (ref 3.5–5.2)
ALBUMIN/GLOB SERPL: 1.7 {RATIO} (ref 1–2.5)
ALP SERPL-CCNC: 109 U/L (ref 40–129)
ALT SERPL-CCNC: 15 U/L (ref 5–41)
AMYLASE SERPL-CCNC: 93 U/L (ref 28–100)
ANION GAP SERPL CALCULATED.3IONS-SCNC: 11 MMOL/L (ref 9–17)
AST SERPL-CCNC: 13 U/L
BASOPHILS # BLD: 0 K/UL (ref 0–0.2)
BASOPHILS NFR BLD: 1 % (ref 0–2)
BILIRUB SERPL-MCNC: 0.5 MG/DL (ref 0.3–1.2)
BUN SERPL-MCNC: 16 MG/DL (ref 6–20)
CALCIUM SERPL-MCNC: 9 MG/DL (ref 8.6–10.4)
CHLORIDE SERPL-SCNC: 103 MMOL/L (ref 98–107)
CO2 SERPL-SCNC: 26 MMOL/L (ref 20–31)
CORTIS SERPL-MCNC: 18.8 UG/DL (ref 2.5–19.5)
CORTISOL COLLECTION INFO: NORMAL
CREAT SERPL-MCNC: 0.8 MG/DL (ref 0.7–1.2)
EOSINOPHIL # BLD: 0.2 K/UL (ref 0–0.4)
EOSINOPHILS RELATIVE PERCENT: 4 % (ref 1–4)
ERYTHROCYTE [DISTWIDTH] IN BLOOD BY AUTOMATED COUNT: 13.6 % (ref 12.5–15.4)
GFR, ESTIMATED: >90 ML/MIN/1.73M2
GLUCOSE SERPL-MCNC: 130 MG/DL (ref 70–99)
HCT VFR BLD AUTO: 34.5 % (ref 41–53)
HGB BLD-MCNC: 11.9 G/DL (ref 13.5–17.5)
LIPASE SERPL-CCNC: 35 U/L (ref 13–60)
LYMPHOCYTES NFR BLD: 1.2 K/UL (ref 1–4.8)
LYMPHOCYTES RELATIVE PERCENT: 19 % (ref 24–44)
MCH RBC QN AUTO: 30.6 PG (ref 26–34)
MCHC RBC AUTO-ENTMCNC: 34.7 G/DL (ref 31–37)
MCV RBC AUTO: 88.4 FL (ref 80–100)
MONOCYTES NFR BLD: 0.4 K/UL (ref 0.1–1.2)
MONOCYTES NFR BLD: 7 % (ref 2–11)
NEUTROPHILS NFR BLD: 69 % (ref 36–66)
NEUTS SEG NFR BLD: 4.7 K/UL (ref 1.8–7.7)
PLATELET # BLD AUTO: 201 K/UL (ref 140–450)
PMV BLD AUTO: 6.8 FL (ref 6–12)
POTASSIUM SERPL-SCNC: 4 MMOL/L (ref 3.7–5.3)
PROT SERPL-MCNC: 6.9 G/DL (ref 6.4–8.3)
RBC # BLD AUTO: 3.9 M/UL (ref 4.5–5.9)
SODIUM SERPL-SCNC: 140 MMOL/L (ref 135–144)
TSH SERPL DL<=0.05 MIU/L-ACNC: 2.32 UIU/ML (ref 0.3–5)
WBC OTHER # BLD: 6.6 K/UL (ref 3.5–11)

## 2025-03-12 PROCEDURE — 85025 COMPLETE CBC W/AUTO DIFF WBC: CPT

## 2025-03-12 PROCEDURE — 99214 OFFICE O/P EST MOD 30 MIN: CPT | Performed by: INTERNAL MEDICINE

## 2025-03-12 PROCEDURE — 1036F TOBACCO NON-USER: CPT | Performed by: INTERNAL MEDICINE

## 2025-03-12 PROCEDURE — 2500000003 HC RX 250 WO HCPCS: Performed by: INTERNAL MEDICINE

## 2025-03-12 PROCEDURE — 80053 COMPREHEN METABOLIC PANEL: CPT

## 2025-03-12 PROCEDURE — 6360000002 HC RX W HCPCS: Performed by: INTERNAL MEDICINE

## 2025-03-12 PROCEDURE — 83690 ASSAY OF LIPASE: CPT

## 2025-03-12 PROCEDURE — 82150 ASSAY OF AMYLASE: CPT

## 2025-03-12 PROCEDURE — 2580000003 HC RX 258: Performed by: INTERNAL MEDICINE

## 2025-03-12 PROCEDURE — G8420 CALC BMI NORM PARAMETERS: HCPCS | Performed by: INTERNAL MEDICINE

## 2025-03-12 PROCEDURE — 82533 TOTAL CORTISOL: CPT

## 2025-03-12 PROCEDURE — 99211 OFF/OP EST MAY X REQ PHY/QHP: CPT | Performed by: INTERNAL MEDICINE

## 2025-03-12 PROCEDURE — G8427 DOCREV CUR MEDS BY ELIG CLIN: HCPCS | Performed by: INTERNAL MEDICINE

## 2025-03-12 PROCEDURE — 84443 ASSAY THYROID STIM HORMONE: CPT

## 2025-03-12 PROCEDURE — 96413 CHEMO IV INFUSION 1 HR: CPT

## 2025-03-12 RX ORDER — EPINEPHRINE 1 MG/ML
0.3 INJECTION, SOLUTION, CONCENTRATE INTRAVENOUS PRN
Status: CANCELLED | OUTPATIENT
Start: 2025-03-12

## 2025-03-12 RX ORDER — SODIUM CHLORIDE 9 MG/ML
INJECTION, SOLUTION INTRAVENOUS CONTINUOUS
Status: CANCELLED | OUTPATIENT
Start: 2025-03-12

## 2025-03-12 RX ORDER — DIPHENHYDRAMINE HYDROCHLORIDE 50 MG/ML
50 INJECTION, SOLUTION INTRAMUSCULAR; INTRAVENOUS
Status: CANCELLED | OUTPATIENT
Start: 2025-03-12

## 2025-03-12 RX ORDER — SODIUM CHLORIDE 0.9 % (FLUSH) 0.9 %
5-40 SYRINGE (ML) INJECTION PRN
Status: DISCONTINUED | OUTPATIENT
Start: 2025-03-12 | End: 2025-03-13 | Stop reason: HOSPADM

## 2025-03-12 RX ORDER — HYDROCORTISONE SODIUM SUCCINATE 100 MG/2ML
100 INJECTION INTRAMUSCULAR; INTRAVENOUS
Status: CANCELLED | OUTPATIENT
Start: 2025-03-12

## 2025-03-12 RX ORDER — MEPERIDINE HYDROCHLORIDE 50 MG/ML
12.5 INJECTION INTRAMUSCULAR; INTRAVENOUS; SUBCUTANEOUS PRN
Status: CANCELLED | OUTPATIENT
Start: 2025-03-12

## 2025-03-12 RX ORDER — ALBUTEROL SULFATE 90 UG/1
4 INHALANT RESPIRATORY (INHALATION) PRN
Status: CANCELLED | OUTPATIENT
Start: 2025-03-12

## 2025-03-12 RX ORDER — ACETAMINOPHEN 325 MG/1
650 TABLET ORAL
Status: CANCELLED | OUTPATIENT
Start: 2025-03-12

## 2025-03-12 RX ORDER — FAMOTIDINE 10 MG/ML
20 INJECTION, SOLUTION INTRAVENOUS
Status: CANCELLED | OUTPATIENT
Start: 2025-03-12

## 2025-03-12 RX ORDER — LANOLIN ALCOHOL/MO/W.PET/CERES
400 CREAM (GRAM) TOPICAL DAILY
COMMUNITY
Start: 2025-02-06

## 2025-03-12 RX ORDER — ONDANSETRON 2 MG/ML
8 INJECTION INTRAMUSCULAR; INTRAVENOUS
Status: CANCELLED | OUTPATIENT
Start: 2025-03-12

## 2025-03-12 RX ORDER — SODIUM CHLORIDE 9 MG/ML
5-250 INJECTION, SOLUTION INTRAVENOUS PRN
Status: CANCELLED | OUTPATIENT
Start: 2025-03-12

## 2025-03-12 RX ORDER — HEPARIN 100 UNIT/ML
500 SYRINGE INTRAVENOUS PRN
Status: DISCONTINUED | OUTPATIENT
Start: 2025-03-12 | End: 2025-03-13 | Stop reason: HOSPADM

## 2025-03-12 RX ADMIN — SODIUM CHLORIDE, PRESERVATIVE FREE 10 ML: 5 INJECTION INTRAVENOUS at 13:09

## 2025-03-12 RX ADMIN — HEPARIN 500 UNITS: 100 SYRINGE at 13:09

## 2025-03-12 RX ADMIN — SODIUM CHLORIDE 200 MG: 9 INJECTION, SOLUTION INTRAVENOUS at 12:39

## 2025-03-12 RX ADMIN — SODIUM CHLORIDE, PRESERVATIVE FREE 10 ML: 5 INJECTION INTRAVENOUS at 11:13

## 2025-03-12 NOTE — PROGRESS NOTES
Pt here for C.10D1 Keytruda. Pt seen by Dr Strong prior to tx, refer to his note. Labs drawn from port and results reviewed. Pt was treated without incident and d/c'd in stable condition. Pt will return on 3-26-25 for Zometa.

## 2025-03-25 ENCOUNTER — HOSPITAL ENCOUNTER (OUTPATIENT)
Dept: CT IMAGING | Age: 50
Discharge: HOME | End: 2025-03-27
Attending: INTERNAL MEDICINE
Payer: MEDICARE

## 2025-03-25 ENCOUNTER — HOSPITAL ENCOUNTER (OUTPATIENT)
Dept: MRI IMAGING | Age: 50
Discharge: HOME | End: 2025-03-27
Attending: INTERNAL MEDICINE
Payer: MEDICARE

## 2025-03-25 DIAGNOSIS — C79.51 MALIGNANT NEOPLASM METASTATIC TO BONE (HCC): ICD-10-CM

## 2025-03-25 DIAGNOSIS — C34.90 NON-SMALL CELL LUNG CANCER, UNSPECIFIED LATERALITY (HCC): ICD-10-CM

## 2025-03-25 PROCEDURE — 70553 MRI BRAIN STEM W/O & W/DYE: CPT

## 2025-03-25 PROCEDURE — 2500000003 HC RX 250 WO HCPCS: Performed by: INTERNAL MEDICINE

## 2025-03-25 PROCEDURE — 2580000003 HC RX 258: Performed by: INTERNAL MEDICINE

## 2025-03-25 PROCEDURE — 6360000004 HC RX CONTRAST MEDICATION: Performed by: INTERNAL MEDICINE

## 2025-03-25 PROCEDURE — 71260 CT THORAX DX C+: CPT

## 2025-03-25 PROCEDURE — A9579 GAD-BASE MR CONTRAST NOS,1ML: HCPCS | Performed by: INTERNAL MEDICINE

## 2025-03-25 RX ORDER — SODIUM CHLORIDE 0.9 % (FLUSH) 0.9 %
10 SYRINGE (ML) INJECTION ONCE
Status: COMPLETED | OUTPATIENT
Start: 2025-03-25 | End: 2025-03-25

## 2025-03-25 RX ORDER — 0.9 % SODIUM CHLORIDE 0.9 %
80 INTRAVENOUS SOLUTION INTRAVENOUS ONCE
Status: COMPLETED | OUTPATIENT
Start: 2025-03-25 | End: 2025-03-25

## 2025-03-25 RX ORDER — SODIUM CHLORIDE 0.9 % (FLUSH) 0.9 %
10 SYRINGE (ML) INJECTION PRN
Status: DISCONTINUED | OUTPATIENT
Start: 2025-03-25 | End: 2025-03-28 | Stop reason: HOSPADM

## 2025-03-25 RX ORDER — IOPAMIDOL 755 MG/ML
100 INJECTION, SOLUTION INTRAVASCULAR
Status: COMPLETED | OUTPATIENT
Start: 2025-03-25 | End: 2025-03-25

## 2025-03-25 RX ADMIN — SODIUM CHLORIDE, PRESERVATIVE FREE 10 ML: 5 INJECTION INTRAVENOUS at 17:03

## 2025-03-25 RX ADMIN — IOPAMIDOL 100 ML: 755 INJECTION, SOLUTION INTRAVENOUS at 17:14

## 2025-03-25 RX ADMIN — SODIUM CHLORIDE, PRESERVATIVE FREE 10 ML: 5 INJECTION INTRAVENOUS at 17:10

## 2025-03-25 RX ADMIN — GADOTERIDOL 15 ML: 279.3 INJECTION, SOLUTION INTRAVENOUS at 17:03

## 2025-03-25 RX ADMIN — SODIUM CHLORIDE 80 ML: 9 INJECTION, SOLUTION INTRAVENOUS at 17:10

## 2025-03-26 ENCOUNTER — HOSPITAL ENCOUNTER (OUTPATIENT)
Dept: INFUSION THERAPY | Age: 50
Discharge: HOME | End: 2025-03-26
Payer: MEDICARE

## 2025-03-26 VITALS — HEART RATE: 79 BPM | TEMPERATURE: 97.5 F | SYSTOLIC BLOOD PRESSURE: 98 MMHG | DIASTOLIC BLOOD PRESSURE: 65 MMHG

## 2025-03-26 DIAGNOSIS — C34.90 NON-SMALL CELL LUNG CANCER, UNSPECIFIED LATERALITY (HCC): ICD-10-CM

## 2025-03-26 DIAGNOSIS — C79.51 METASTASIS TO BONE (HCC): Primary | ICD-10-CM

## 2025-03-26 LAB
ALBUMIN SERPL-MCNC: 4.2 G/DL (ref 3.5–5.2)
ALBUMIN/GLOB SERPL: 1.7 {RATIO} (ref 1–2.5)
ALP SERPL-CCNC: 106 U/L (ref 40–129)
ALT SERPL-CCNC: 24 U/L (ref 5–41)
ANION GAP SERPL CALCULATED.3IONS-SCNC: 11 MMOL/L (ref 9–17)
AST SERPL-CCNC: 16 U/L
BILIRUB SERPL-MCNC: 0.7 MG/DL (ref 0.3–1.2)
BUN SERPL-MCNC: 11 MG/DL (ref 6–20)
CALCIUM SERPL-MCNC: 9.1 MG/DL (ref 8.6–10.4)
CHLORIDE SERPL-SCNC: 103 MMOL/L (ref 98–107)
CO2 SERPL-SCNC: 25 MMOL/L (ref 20–31)
CREAT SERPL-MCNC: 0.9 MG/DL (ref 0.7–1.2)
GFR, ESTIMATED: >90 ML/MIN/1.73M2
GLUCOSE SERPL-MCNC: 194 MG/DL (ref 70–99)
POTASSIUM SERPL-SCNC: 4.3 MMOL/L (ref 3.7–5.3)
PROT SERPL-MCNC: 6.7 G/DL (ref 6.4–8.3)
SODIUM SERPL-SCNC: 139 MMOL/L (ref 135–144)

## 2025-03-26 PROCEDURE — 80053 COMPREHEN METABOLIC PANEL: CPT

## 2025-03-26 PROCEDURE — 96365 THER/PROPH/DIAG IV INF INIT: CPT

## 2025-03-26 PROCEDURE — 6360000002 HC RX W HCPCS: Performed by: INTERNAL MEDICINE

## 2025-03-26 PROCEDURE — 2500000003 HC RX 250 WO HCPCS: Performed by: INTERNAL MEDICINE

## 2025-03-26 RX ORDER — DIPHENHYDRAMINE HYDROCHLORIDE 50 MG/ML
50 INJECTION, SOLUTION INTRAMUSCULAR; INTRAVENOUS
OUTPATIENT
Start: 2025-04-23

## 2025-03-26 RX ORDER — SODIUM CHLORIDE 9 MG/ML
INJECTION, SOLUTION INTRAVENOUS CONTINUOUS
OUTPATIENT
Start: 2025-04-23

## 2025-03-26 RX ORDER — ACETAMINOPHEN 325 MG/1
650 TABLET ORAL
OUTPATIENT
Start: 2025-04-23

## 2025-03-26 RX ORDER — SODIUM CHLORIDE 0.9 % (FLUSH) 0.9 %
5-40 SYRINGE (ML) INJECTION PRN
Status: DISCONTINUED | OUTPATIENT
Start: 2025-03-26 | End: 2025-03-27 | Stop reason: HOSPADM

## 2025-03-26 RX ORDER — ONDANSETRON 2 MG/ML
8 INJECTION INTRAMUSCULAR; INTRAVENOUS
OUTPATIENT
Start: 2025-04-23

## 2025-03-26 RX ORDER — HEPARIN 100 UNIT/ML
500 SYRINGE INTRAVENOUS PRN
Status: DISCONTINUED | OUTPATIENT
Start: 2025-03-26 | End: 2025-03-27 | Stop reason: HOSPADM

## 2025-03-26 RX ORDER — ZOLEDRONIC ACID 0.04 MG/ML
4 INJECTION, SOLUTION INTRAVENOUS ONCE
OUTPATIENT
Start: 2025-04-23 | End: 2025-04-23

## 2025-03-26 RX ORDER — SODIUM CHLORIDE 0.9 % (FLUSH) 0.9 %
5-40 SYRINGE (ML) INJECTION PRN
OUTPATIENT
Start: 2025-04-23

## 2025-03-26 RX ORDER — HEPARIN 100 UNIT/ML
500 SYRINGE INTRAVENOUS PRN
OUTPATIENT
Start: 2025-04-23

## 2025-03-26 RX ORDER — ALBUTEROL SULFATE 90 UG/1
4 INHALANT RESPIRATORY (INHALATION) PRN
OUTPATIENT
Start: 2025-04-23

## 2025-03-26 RX ORDER — ZOLEDRONIC ACID 0.04 MG/ML
4 INJECTION, SOLUTION INTRAVENOUS ONCE
Status: COMPLETED | OUTPATIENT
Start: 2025-03-26 | End: 2025-03-26

## 2025-03-26 RX ORDER — SODIUM CHLORIDE 9 MG/ML
5-250 INJECTION, SOLUTION INTRAVENOUS PRN
Status: DISCONTINUED | OUTPATIENT
Start: 2025-03-26 | End: 2025-03-27 | Stop reason: HOSPADM

## 2025-03-26 RX ORDER — SODIUM CHLORIDE 9 MG/ML
5-250 INJECTION, SOLUTION INTRAVENOUS PRN
OUTPATIENT
Start: 2025-04-23

## 2025-03-26 RX ORDER — FAMOTIDINE 10 MG/ML
20 INJECTION, SOLUTION INTRAVENOUS
OUTPATIENT
Start: 2025-04-23

## 2025-03-26 RX ORDER — HYDROCORTISONE SODIUM SUCCINATE 100 MG/2ML
100 INJECTION INTRAMUSCULAR; INTRAVENOUS
OUTPATIENT
Start: 2025-04-23

## 2025-03-26 RX ORDER — EPINEPHRINE 1 MG/ML
0.3 INJECTION, SOLUTION, CONCENTRATE INTRAVENOUS PRN
OUTPATIENT
Start: 2025-04-23

## 2025-03-26 RX ADMIN — HEPARIN 500 UNITS: 100 SYRINGE at 13:53

## 2025-03-26 RX ADMIN — ZOLEDRONIC ACID 4 MG: 0.04 INJECTION, SOLUTION INTRAVENOUS at 13:31

## 2025-03-26 RX ADMIN — SODIUM CHLORIDE, PRESERVATIVE FREE 10 ML: 5 INJECTION INTRAVENOUS at 13:53

## 2025-03-26 RX ADMIN — SODIUM CHLORIDE, PRESERVATIVE FREE 10 ML: 5 INJECTION INTRAVENOUS at 12:59

## 2025-03-26 NOTE — PROGRESS NOTES
Patient here for zometa  Arrives ambulatory.  Denies any new complaints.  Labs drawn from port, results reviewed.  Treatment complete without incident.  Patient discharged in stable condition.  Returns 4/2/25 for C11D1.

## 2025-04-01 DIAGNOSIS — C79.51 MALIGNANT NEOPLASM METASTATIC TO BONE (HCC): Primary | ICD-10-CM

## 2025-04-01 DIAGNOSIS — C34.90 NON-SMALL CELL LUNG CANCER, UNSPECIFIED LATERALITY (HCC): ICD-10-CM

## 2025-04-01 RX ORDER — SODIUM CHLORIDE 9 MG/ML
5-250 INJECTION, SOLUTION INTRAVENOUS PRN
Status: CANCELLED | OUTPATIENT
Start: 2025-04-02

## 2025-04-01 RX ORDER — ACETAMINOPHEN 325 MG/1
650 TABLET ORAL
Status: CANCELLED | OUTPATIENT
Start: 2025-04-02

## 2025-04-01 RX ORDER — HEPARIN 100 UNIT/ML
500 SYRINGE INTRAVENOUS PRN
Status: CANCELLED | OUTPATIENT
Start: 2025-04-02

## 2025-04-01 RX ORDER — MEPERIDINE HYDROCHLORIDE 50 MG/ML
12.5 INJECTION INTRAMUSCULAR; INTRAVENOUS; SUBCUTANEOUS PRN
Status: CANCELLED | OUTPATIENT
Start: 2025-04-02

## 2025-04-01 RX ORDER — ONDANSETRON 2 MG/ML
8 INJECTION INTRAMUSCULAR; INTRAVENOUS
Status: CANCELLED | OUTPATIENT
Start: 2025-04-02

## 2025-04-01 RX ORDER — SODIUM CHLORIDE 0.9 % (FLUSH) 0.9 %
5-40 SYRINGE (ML) INJECTION PRN
Status: CANCELLED | OUTPATIENT
Start: 2025-04-02

## 2025-04-01 RX ORDER — FAMOTIDINE 10 MG/ML
20 INJECTION, SOLUTION INTRAVENOUS
Status: CANCELLED | OUTPATIENT
Start: 2025-04-02

## 2025-04-01 RX ORDER — DIPHENHYDRAMINE HYDROCHLORIDE 50 MG/ML
50 INJECTION, SOLUTION INTRAMUSCULAR; INTRAVENOUS
Status: CANCELLED | OUTPATIENT
Start: 2025-04-02

## 2025-04-01 RX ORDER — HYDROCORTISONE SODIUM SUCCINATE 100 MG/2ML
100 INJECTION INTRAMUSCULAR; INTRAVENOUS
Status: CANCELLED | OUTPATIENT
Start: 2025-04-02

## 2025-04-01 RX ORDER — EPINEPHRINE 1 MG/ML
0.3 INJECTION, SOLUTION, CONCENTRATE INTRAVENOUS PRN
Status: CANCELLED | OUTPATIENT
Start: 2025-04-02

## 2025-04-01 RX ORDER — ALBUTEROL SULFATE 90 UG/1
4 INHALANT RESPIRATORY (INHALATION) PRN
Status: CANCELLED | OUTPATIENT
Start: 2025-04-02

## 2025-04-01 RX ORDER — SODIUM CHLORIDE 9 MG/ML
INJECTION, SOLUTION INTRAVENOUS CONTINUOUS
Status: CANCELLED | OUTPATIENT
Start: 2025-04-02

## 2025-04-02 ENCOUNTER — TELEPHONE (OUTPATIENT)
Dept: ONCOLOGY | Age: 50
End: 2025-04-02

## 2025-04-02 ENCOUNTER — HOSPITAL ENCOUNTER (OUTPATIENT)
Dept: INFUSION THERAPY | Age: 50
Discharge: HOME | End: 2025-04-02
Payer: MEDICARE

## 2025-04-02 ENCOUNTER — OFFICE VISIT (OUTPATIENT)
Dept: ONCOLOGY | Age: 50
End: 2025-04-02
Payer: MEDICARE

## 2025-04-02 VITALS
RESPIRATION RATE: 18 BRPM | HEART RATE: 83 BPM | SYSTOLIC BLOOD PRESSURE: 94 MMHG | WEIGHT: 176 LBS | TEMPERATURE: 97.4 F | BODY MASS INDEX: 23.22 KG/M2 | DIASTOLIC BLOOD PRESSURE: 65 MMHG

## 2025-04-02 DIAGNOSIS — C79.51 MALIGNANT NEOPLASM METASTATIC TO BONE (HCC): ICD-10-CM

## 2025-04-02 DIAGNOSIS — C34.90 NON-SMALL CELL LUNG CANCER, UNSPECIFIED LATERALITY (HCC): Primary | ICD-10-CM

## 2025-04-02 DIAGNOSIS — Z29.89 IMMUNOTHERAPY: ICD-10-CM

## 2025-04-02 DIAGNOSIS — R59.0 MEDIASTINAL LYMPHADENOPATHY: ICD-10-CM

## 2025-04-02 LAB
ALBUMIN SERPL-MCNC: 4.2 G/DL (ref 3.5–5.2)
ALBUMIN/GLOB SERPL: 1.4 {RATIO} (ref 1–2.5)
ALP SERPL-CCNC: 107 U/L (ref 40–129)
ALT SERPL-CCNC: 17 U/L (ref 5–41)
ANION GAP SERPL CALCULATED.3IONS-SCNC: 11 MMOL/L (ref 9–17)
AST SERPL-CCNC: 15 U/L
BASOPHILS # BLD: 0 K/UL (ref 0–0.2)
BASOPHILS NFR BLD: 1 % (ref 0–2)
BILIRUB SERPL-MCNC: 1 MG/DL (ref 0.3–1.2)
BUN SERPL-MCNC: 15 MG/DL (ref 6–20)
CALCIUM SERPL-MCNC: 9 MG/DL (ref 8.6–10.4)
CHLORIDE SERPL-SCNC: 104 MMOL/L (ref 98–107)
CO2 SERPL-SCNC: 25 MMOL/L (ref 20–31)
CREAT SERPL-MCNC: 1 MG/DL (ref 0.7–1.2)
EOSINOPHIL # BLD: 0.2 K/UL (ref 0–0.4)
EOSINOPHILS RELATIVE PERCENT: 3 % (ref 1–4)
ERYTHROCYTE [DISTWIDTH] IN BLOOD BY AUTOMATED COUNT: 13.4 % (ref 12.5–15.4)
GFR, ESTIMATED: >90 ML/MIN/1.73M2
GLUCOSE SERPL-MCNC: 119 MG/DL (ref 70–99)
HCT VFR BLD AUTO: 35.5 % (ref 41–53)
HGB BLD-MCNC: 12.4 G/DL (ref 13.5–17.5)
LYMPHOCYTES NFR BLD: 1.2 K/UL (ref 1–4.8)
LYMPHOCYTES RELATIVE PERCENT: 19 % (ref 24–44)
MCH RBC QN AUTO: 30.4 PG (ref 26–34)
MCHC RBC AUTO-ENTMCNC: 34.9 G/DL (ref 31–37)
MCV RBC AUTO: 87.1 FL (ref 80–100)
MONOCYTES NFR BLD: 0.4 K/UL (ref 0.1–1.2)
MONOCYTES NFR BLD: 7 % (ref 2–11)
NEUTROPHILS NFR BLD: 70 % (ref 36–66)
NEUTS SEG NFR BLD: 4.4 K/UL (ref 1.8–7.7)
PLATELET # BLD AUTO: 184 K/UL (ref 140–450)
PMV BLD AUTO: 6.7 FL (ref 6–12)
POTASSIUM SERPL-SCNC: 4.1 MMOL/L (ref 3.7–5.3)
PROT SERPL-MCNC: 7.1 G/DL (ref 6.4–8.3)
RBC # BLD AUTO: 4.07 M/UL (ref 4.5–5.9)
SODIUM SERPL-SCNC: 140 MMOL/L (ref 135–144)
TSH SERPL DL<=0.05 MIU/L-ACNC: 1.67 UIU/ML (ref 0.3–5)
WBC OTHER # BLD: 6.3 K/UL (ref 3.5–11)

## 2025-04-02 PROCEDURE — 2580000003 HC RX 258: Performed by: INTERNAL MEDICINE

## 2025-04-02 PROCEDURE — G8428 CUR MEDS NOT DOCUMENT: HCPCS | Performed by: INTERNAL MEDICINE

## 2025-04-02 PROCEDURE — 96413 CHEMO IV INFUSION 1 HR: CPT

## 2025-04-02 PROCEDURE — 99215 OFFICE O/P EST HI 40 MIN: CPT | Performed by: INTERNAL MEDICINE

## 2025-04-02 PROCEDURE — 85025 COMPLETE CBC W/AUTO DIFF WBC: CPT

## 2025-04-02 PROCEDURE — 6360000002 HC RX W HCPCS: Performed by: INTERNAL MEDICINE

## 2025-04-02 PROCEDURE — 1036F TOBACCO NON-USER: CPT | Performed by: INTERNAL MEDICINE

## 2025-04-02 PROCEDURE — 84443 ASSAY THYROID STIM HORMONE: CPT

## 2025-04-02 PROCEDURE — G8420 CALC BMI NORM PARAMETERS: HCPCS | Performed by: INTERNAL MEDICINE

## 2025-04-02 PROCEDURE — 80053 COMPREHEN METABOLIC PANEL: CPT

## 2025-04-02 PROCEDURE — 2500000003 HC RX 250 WO HCPCS: Performed by: INTERNAL MEDICINE

## 2025-04-02 RX ORDER — SODIUM CHLORIDE 0.9 % (FLUSH) 0.9 %
5-40 SYRINGE (ML) INJECTION PRN
Status: DISCONTINUED | OUTPATIENT
Start: 2025-04-02 | End: 2025-04-03 | Stop reason: HOSPADM

## 2025-04-02 RX ORDER — HEPARIN 100 UNIT/ML
500 SYRINGE INTRAVENOUS PRN
Status: DISCONTINUED | OUTPATIENT
Start: 2025-04-02 | End: 2025-04-03 | Stop reason: HOSPADM

## 2025-04-02 RX ADMIN — HEPARIN 500 UNITS: 100 SYRINGE at 15:29

## 2025-04-02 RX ADMIN — SODIUM CHLORIDE, PRESERVATIVE FREE 10 ML: 5 INJECTION INTRAVENOUS at 15:29

## 2025-04-02 RX ADMIN — SODIUM CHLORIDE, PRESERVATIVE FREE 10 ML: 5 INJECTION INTRAVENOUS at 13:47

## 2025-04-02 RX ADMIN — SODIUM CHLORIDE 200 MG: 9 INJECTION, SOLUTION INTRAVENOUS at 15:01

## 2025-04-02 NOTE — PROGRESS NOTES
Pt here for C.11D.1 keytruda.  Arrives ambulatory.  Denies any new complaints.  Labs drawn from port, results reviewed.  Pt was seen by , Dr Strong off today.Order rec'd to proceed with tx.  Tx complete without incident.  Pt d/c'd in stable condition.  Returns 4/23 for tx.

## 2025-04-02 NOTE — PROGRESS NOTES
Patient ID: Isiah Duncan, 1975, [id number], 49 y.o.  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 male. with right hip pain who fell and was brought to the ED 7/10/2024; he reports a 70lb weight loss over the last year. He has a history of schizophrenia and 40pk/yrs of smoking. He notes he has a bump on his head from his fall. He had a CT chest/abd/pelvis that shows a left lung mass as well as a bone metastases in the right femur causing pathologic fracture, as well as a CT Head showing a large scalp base mass.  Patient had a biopsy and ORIF of the right femur on July 11, 2024 which came back showing a poorly differentiated non-small cell carcinoma consistent with lung primary.  He denies any headaches, vision changes, chest pain, abdominal pain, N/V/D, swelling, bleeding, or fatigue. Patient's father who is his POA notes there is also a lesion in his left femur for which he will be seeing orthopedics about that to discuss fixation there as well.   He was seen by radiation oncology for palliative radiation treatment  Pathology from the right total hip arthroplasty metastatic poorly differentiated non-small cell carcinoma   please see below comment    There is

## 2025-04-02 NOTE — TELEPHONE ENCOUNTER
Name: Isiah Duncan  : 1975  MRN: [id number]    Oncology Navigation Follow-Up Note    Contact Type:  Telephone    Notes: Navigator met with pt. And father along with Dr. Cole and pt. Needing PET for further evaluation of chest nodules. Plan to get PET prior to RV. No barriers to care noted. Pt. Has F/U with [facility] plastics.      Electronically signed by Fiona Villafana RN on 2025 at 3:52 PM

## 2025-04-06 NOTE — DISCHARGE INSTRUCTIONS
swelling  * Need for compression bandage changes due to slippage, breakthrough drainage.     If you need medical attention outside of the business hours of the Wound Care Centers please contact your PCP or go to the nearest emergency room.     The information contained in the After Visit Summary has been reviewed with me, the patient and/or responsible adult, by my health care provider(s). I had the opportunity to ask questions regarding this information. I have elected to receive;      []After Visit Summary  [x]Comprehensive Discharge Instruction        Patient signature______________________________________Date:_______  Electronically signed by Rosa Giang RN on 4/7/2025 at 1:43 PM    Electronically signed by Francoise Lord MD on 4/7/2025 at 1:24 PM

## 2025-04-07 ENCOUNTER — HOSPITAL ENCOUNTER (OUTPATIENT)
Dept: WOUND CARE | Age: 50
Discharge: HOME | End: 2025-04-07
Payer: MEDICARE

## 2025-04-07 VITALS
DIASTOLIC BLOOD PRESSURE: 86 MMHG | BODY MASS INDEX: 23.33 KG/M2 | WEIGHT: 176 LBS | RESPIRATION RATE: 18 BRPM | SYSTOLIC BLOOD PRESSURE: 120 MMHG | HEIGHT: 73 IN | TEMPERATURE: 97 F | HEART RATE: 89 BPM

## 2025-04-07 DIAGNOSIS — S01.00XS OPEN WOUND OF SCALP, UNSPECIFIED OPEN WOUND TYPE, SEQUELA: Primary | ICD-10-CM

## 2025-04-07 PROCEDURE — 97597 DBRDMT OPN WND 1ST 20 CM/<: CPT | Performed by: PLASTIC SURGERY

## 2025-04-07 PROCEDURE — 97597 DBRDMT OPN WND 1ST 20 CM/<: CPT

## 2025-04-07 RX ORDER — LIDOCAINE HYDROCHLORIDE 40 MG/ML
SOLUTION TOPICAL ONCE
OUTPATIENT
Start: 2025-04-07 | End: 2025-04-07

## 2025-04-07 RX ORDER — MUPIROCIN 20 MG/G
OINTMENT TOPICAL ONCE
OUTPATIENT
Start: 2025-04-07 | End: 2025-04-07

## 2025-04-07 RX ORDER — BETAMETHASONE DIPROPIONATE 0.5 MG/G
CREAM TOPICAL ONCE
OUTPATIENT
Start: 2025-04-07 | End: 2025-04-07

## 2025-04-07 RX ORDER — LIDOCAINE 40 MG/G
CREAM TOPICAL ONCE
OUTPATIENT
Start: 2025-04-07 | End: 2025-04-07

## 2025-04-07 RX ORDER — LIDOCAINE HYDROCHLORIDE 20 MG/ML
JELLY TOPICAL ONCE
OUTPATIENT
Start: 2025-04-07 | End: 2025-04-07

## 2025-04-07 RX ORDER — GINSENG 100 MG
CAPSULE ORAL ONCE
OUTPATIENT
Start: 2025-04-07 | End: 2025-04-07

## 2025-04-07 RX ORDER — BACITRACIN ZINC AND POLYMYXIN B SULFATE 500; 1000 [USP'U]/G; [USP'U]/G
OINTMENT TOPICAL ONCE
OUTPATIENT
Start: 2025-04-07 | End: 2025-04-07

## 2025-04-07 RX ORDER — CLOBETASOL PROPIONATE 0.5 MG/G
OINTMENT TOPICAL ONCE
OUTPATIENT
Start: 2025-04-07 | End: 2025-04-07

## 2025-04-07 RX ORDER — LIDOCAINE HYDROCHLORIDE 40 MG/ML
SOLUTION TOPICAL ONCE
Status: COMPLETED | OUTPATIENT
Start: 2025-04-07 | End: 2025-04-07

## 2025-04-07 RX ORDER — SODIUM CHLOR/HYPOCHLOROUS ACID 0.033 %
SOLUTION, IRRIGATION IRRIGATION ONCE
OUTPATIENT
Start: 2025-04-07 | End: 2025-04-07

## 2025-04-07 RX ORDER — SILVER SULFADIAZINE 10 MG/G
CREAM TOPICAL ONCE
OUTPATIENT
Start: 2025-04-07 | End: 2025-04-07

## 2025-04-07 RX ORDER — GENTAMICIN SULFATE 1 MG/G
OINTMENT TOPICAL ONCE
OUTPATIENT
Start: 2025-04-07 | End: 2025-04-07

## 2025-04-07 RX ORDER — LIDOCAINE 50 MG/G
OINTMENT TOPICAL ONCE
OUTPATIENT
Start: 2025-04-07 | End: 2025-04-07

## 2025-04-07 RX ORDER — NEOMYCIN/BACITRACIN/POLYMYXINB 3.5-400-5K
OINTMENT (GRAM) TOPICAL ONCE
OUTPATIENT
Start: 2025-04-07 | End: 2025-04-07

## 2025-04-07 RX ORDER — TRIAMCINOLONE ACETONIDE 1 MG/G
OINTMENT TOPICAL ONCE
OUTPATIENT
Start: 2025-04-07 | End: 2025-04-07

## 2025-04-07 RX ADMIN — LIDOCAINE HYDROCHLORIDE 5 ML: 40 SOLUTION TOPICAL at 13:13

## 2025-04-07 ASSESSMENT — PAIN SCALES - GENERAL: PAINLEVEL_OUTOF10: 0

## 2025-04-07 NOTE — PROGRESS NOTES
[facility]       Progress Note and Procedure Note      Progress note     Chief Complaint   Patient presents with    Wound Check     scalp        HPI:   Isiah Duncan is a 49 y.o. male who presents wound ulcer evaluation.      History of Wound Context:   The patient presents with a scalp wound.  Patient presented to the ED with a right hip pain on 7/10/2024.  Patient had a 70 pound weight loss.  Patient has a history of schizophrenia and a 4D pack per year smoker.  Patient noted a bump on his head from his fall.  Patient had a CT of the chest abdomen and pelvis showed left lung mass with bone metastasis in the right femur causing a pathological fracture and the CT of the head showed a large scalp base mass.  Patient had an ORIF of the right femur with biopsy which showed non-small cell carcinoma consistent with a lung primary.  Patient has a history of radiation treatment to the right femur.  As per the family the patient also had radiation to skull.    Wound/Ulcer Pain Timing/Severity: none  Quality of pain: N/A  Severity:  0 / 10   Modifying Factors: None  Associated Signs/Symptoms: none    Ulcer Identification:  Ulcer Type: non-healing surgical  Contributing Factors: none    Patient has stopped smoking.  Patient has completed His radiation treatment September    Medications:     Current Outpatient Medications   Medication Sig Dispense Refill    magnesium oxide (MAG-OX) 400 (240 Mg) MG tablet Take 1 tablet by mouth daily      Multiple Vitamins-Minerals (MULTIVITAMIN ADULTS) TABS Take 1 tablet by mouth daily      Vitamin D (CHOLECALCIFEROL) 25 MCG (1000 UT) TABS tablet Take 1 tablet by mouth daily 60 tablet 0    acetaminophen (TYLENOL) 500 MG tablet Take 2 tablets by mouth every 6 hours as needed for Pain 112 tablet 0    docusate sodium (COLACE) 100 MG capsule Take 1 capsule by mouth 2 times daily as needed for Constipation 20 capsule 0    gabapentin (NEURONTIN) 100 MG capsule

## 2025-04-17 ENCOUNTER — TELEPHONE (OUTPATIENT)
Dept: ONCOLOGY | Age: 50
End: 2025-04-17

## 2025-04-17 NOTE — TELEPHONE ENCOUNTER
Name: Isiah Duncan  : 1975  MRN: [id number]    Oncology Navigation Follow-Up Note    Contact Type:  Telephone    Notes: Navigator reviewing chart and PET not scheduled.  Please assist pt. With scheduling of PET prior to MO F/U       Electronically signed by Fiona Villafana RN on 2025 at 3:25 PM

## 2025-04-18 NOTE — TELEPHONE ENCOUNTER
Earl with [employer] called and left message for patient/guardian to call scheduling back.

## 2025-04-23 ENCOUNTER — HOSPITAL ENCOUNTER (OUTPATIENT)
Dept: INFUSION THERAPY | Age: 50
Discharge: HOME | End: 2025-04-23
Payer: MEDICARE

## 2025-04-23 ENCOUNTER — OFFICE VISIT (OUTPATIENT)
Dept: ONCOLOGY | Age: 50
End: 2025-04-23
Payer: MEDICARE

## 2025-04-23 VITALS
DIASTOLIC BLOOD PRESSURE: 69 MMHG | BODY MASS INDEX: 22.93 KG/M2 | WEIGHT: 173.8 LBS | HEART RATE: 76 BPM | SYSTOLIC BLOOD PRESSURE: 99 MMHG

## 2025-04-23 DIAGNOSIS — C79.51 METASTASIS TO BONE (HCC): ICD-10-CM

## 2025-04-23 DIAGNOSIS — C79.51 MALIGNANT NEOPLASM METASTATIC TO BONE (HCC): ICD-10-CM

## 2025-04-23 DIAGNOSIS — C34.90 NON-SMALL CELL LUNG CANCER, UNSPECIFIED LATERALITY (HCC): Primary | ICD-10-CM

## 2025-04-23 DIAGNOSIS — C79.51 MALIGNANT NEOPLASM METASTATIC TO BONE (HCC): Primary | ICD-10-CM

## 2025-04-23 DIAGNOSIS — Z79.899 LONG TERM USE OF DRUG: ICD-10-CM

## 2025-04-23 DIAGNOSIS — C34.90 NON-SMALL CELL LUNG CANCER, UNSPECIFIED LATERALITY (HCC): ICD-10-CM

## 2025-04-23 LAB
ALBUMIN SERPL-MCNC: 4.2 G/DL (ref 3.5–5.2)
ALBUMIN/GLOB SERPL: 1.6 {RATIO} (ref 1–2.5)
ALP SERPL-CCNC: 88 U/L (ref 40–129)
ALT SERPL-CCNC: 12 U/L (ref 5–41)
AMYLASE SERPL-CCNC: 76 U/L (ref 28–100)
ANION GAP SERPL CALCULATED.3IONS-SCNC: 12 MMOL/L (ref 9–17)
AST SERPL-CCNC: 14 U/L
BASOPHILS # BLD: 0 K/UL (ref 0–0.2)
BASOPHILS NFR BLD: 1 % (ref 0–2)
BILIRUB SERPL-MCNC: 0.8 MG/DL (ref 0.3–1.2)
BUN SERPL-MCNC: 21 MG/DL (ref 6–20)
CALCIUM SERPL-MCNC: 9.4 MG/DL (ref 8.6–10.4)
CHLORIDE SERPL-SCNC: 106 MMOL/L (ref 98–107)
CO2 SERPL-SCNC: 24 MMOL/L (ref 20–31)
CORTIS SERPL-MCNC: 7.2 UG/DL (ref 2.5–19.5)
CORTISOL COLLECTION INFO: NORMAL
CREAT SERPL-MCNC: 0.9 MG/DL (ref 0.7–1.2)
EOSINOPHIL # BLD: 0.2 K/UL (ref 0–0.4)
EOSINOPHILS RELATIVE PERCENT: 4 % (ref 1–4)
ERYTHROCYTE [DISTWIDTH] IN BLOOD BY AUTOMATED COUNT: 13.9 % (ref 12.5–15.4)
GFR, ESTIMATED: >90 ML/MIN/1.73M2
GLUCOSE SERPL-MCNC: 116 MG/DL (ref 70–99)
HCT VFR BLD AUTO: 36 % (ref 41–53)
HGB BLD-MCNC: 12.5 G/DL (ref 13.5–17.5)
LIPASE SERPL-CCNC: 30 U/L (ref 13–60)
LYMPHOCYTES NFR BLD: 1.1 K/UL (ref 1–4.8)
LYMPHOCYTES RELATIVE PERCENT: 21 % (ref 24–44)
MCH RBC QN AUTO: 29.8 PG (ref 26–34)
MCHC RBC AUTO-ENTMCNC: 34.6 G/DL (ref 31–37)
MCV RBC AUTO: 86 FL (ref 80–100)
MONOCYTES NFR BLD: 0.4 K/UL (ref 0.1–1.2)
MONOCYTES NFR BLD: 7 % (ref 2–11)
NEUTROPHILS NFR BLD: 67 % (ref 36–66)
NEUTS SEG NFR BLD: 3.5 K/UL (ref 1.8–7.7)
PLATELET # BLD AUTO: 180 K/UL (ref 140–450)
PMV BLD AUTO: 6.9 FL (ref 6–12)
POTASSIUM SERPL-SCNC: 4.2 MMOL/L (ref 3.7–5.3)
PROT SERPL-MCNC: 6.8 G/DL (ref 6.4–8.3)
RBC # BLD AUTO: 4.19 M/UL (ref 4.5–5.9)
SODIUM SERPL-SCNC: 142 MMOL/L (ref 135–144)
TSH SERPL DL<=0.05 MIU/L-ACNC: 1.91 UIU/ML (ref 0.3–5)
WBC OTHER # BLD: 5.3 K/UL (ref 3.5–11)

## 2025-04-23 PROCEDURE — 83690 ASSAY OF LIPASE: CPT

## 2025-04-23 PROCEDURE — 99214 OFFICE O/P EST MOD 30 MIN: CPT | Performed by: INTERNAL MEDICINE

## 2025-04-23 PROCEDURE — 85025 COMPLETE CBC W/AUTO DIFF WBC: CPT

## 2025-04-23 PROCEDURE — 80053 COMPREHEN METABOLIC PANEL: CPT

## 2025-04-23 PROCEDURE — 96413 CHEMO IV INFUSION 1 HR: CPT

## 2025-04-23 PROCEDURE — 96367 TX/PROPH/DG ADDL SEQ IV INF: CPT

## 2025-04-23 PROCEDURE — 1036F TOBACCO NON-USER: CPT | Performed by: INTERNAL MEDICINE

## 2025-04-23 PROCEDURE — G8428 CUR MEDS NOT DOCUMENT: HCPCS | Performed by: INTERNAL MEDICINE

## 2025-04-23 PROCEDURE — 2580000003 HC RX 258: Performed by: INTERNAL MEDICINE

## 2025-04-23 PROCEDURE — 82533 TOTAL CORTISOL: CPT

## 2025-04-23 PROCEDURE — G8420 CALC BMI NORM PARAMETERS: HCPCS | Performed by: INTERNAL MEDICINE

## 2025-04-23 PROCEDURE — 6360000002 HC RX W HCPCS: Performed by: INTERNAL MEDICINE

## 2025-04-23 PROCEDURE — 2500000003 HC RX 250 WO HCPCS: Performed by: INTERNAL MEDICINE

## 2025-04-23 PROCEDURE — 84443 ASSAY THYROID STIM HORMONE: CPT

## 2025-04-23 PROCEDURE — 82150 ASSAY OF AMYLASE: CPT

## 2025-04-23 RX ORDER — EPINEPHRINE 1 MG/ML
0.3 INJECTION, SOLUTION, CONCENTRATE INTRAVENOUS PRN
Status: CANCELLED | OUTPATIENT
Start: 2025-04-23

## 2025-04-23 RX ORDER — SODIUM CHLORIDE 9 MG/ML
INJECTION, SOLUTION INTRAVENOUS CONTINUOUS
OUTPATIENT
Start: 2025-05-21

## 2025-04-23 RX ORDER — ZOLEDRONIC ACID 0.04 MG/ML
4 INJECTION, SOLUTION INTRAVENOUS ONCE
Status: COMPLETED | OUTPATIENT
Start: 2025-04-23 | End: 2025-04-23

## 2025-04-23 RX ORDER — DIPHENHYDRAMINE HYDROCHLORIDE 50 MG/ML
50 INJECTION, SOLUTION INTRAMUSCULAR; INTRAVENOUS
Status: CANCELLED | OUTPATIENT
Start: 2025-04-23

## 2025-04-23 RX ORDER — SODIUM CHLORIDE 9 MG/ML
5-250 INJECTION, SOLUTION INTRAVENOUS PRN
Status: CANCELLED | OUTPATIENT
Start: 2025-04-23

## 2025-04-23 RX ORDER — ONDANSETRON 2 MG/ML
8 INJECTION INTRAMUSCULAR; INTRAVENOUS
Status: CANCELLED | OUTPATIENT
Start: 2025-04-23

## 2025-04-23 RX ORDER — HYDROCORTISONE SODIUM SUCCINATE 100 MG/2ML
100 INJECTION INTRAMUSCULAR; INTRAVENOUS
Status: CANCELLED | OUTPATIENT
Start: 2025-04-23

## 2025-04-23 RX ORDER — SODIUM CHLORIDE 0.9 % (FLUSH) 0.9 %
5-40 SYRINGE (ML) INJECTION PRN
Status: DISCONTINUED | OUTPATIENT
Start: 2025-04-23 | End: 2025-04-24 | Stop reason: HOSPADM

## 2025-04-23 RX ORDER — HEPARIN 100 UNIT/ML
500 SYRINGE INTRAVENOUS PRN
Status: DISCONTINUED | OUTPATIENT
Start: 2025-04-23 | End: 2025-04-24 | Stop reason: HOSPADM

## 2025-04-23 RX ORDER — SODIUM CHLORIDE 0.9 % (FLUSH) 0.9 %
5-40 SYRINGE (ML) INJECTION PRN
OUTPATIENT
Start: 2025-05-21

## 2025-04-23 RX ORDER — ZOLEDRONIC ACID 0.04 MG/ML
4 INJECTION, SOLUTION INTRAVENOUS ONCE
OUTPATIENT
Start: 2025-05-21 | End: 2025-05-21

## 2025-04-23 RX ORDER — FAMOTIDINE 10 MG/ML
20 INJECTION, SOLUTION INTRAVENOUS
Status: CANCELLED | OUTPATIENT
Start: 2025-04-23

## 2025-04-23 RX ORDER — ALBUTEROL SULFATE 90 UG/1
4 INHALANT RESPIRATORY (INHALATION) PRN
OUTPATIENT
Start: 2025-05-21

## 2025-04-23 RX ORDER — SODIUM CHLORIDE 9 MG/ML
5-250 INJECTION, SOLUTION INTRAVENOUS PRN
Status: DISCONTINUED | OUTPATIENT
Start: 2025-04-23 | End: 2025-04-24 | Stop reason: HOSPADM

## 2025-04-23 RX ORDER — DIPHENHYDRAMINE HYDROCHLORIDE 50 MG/ML
50 INJECTION, SOLUTION INTRAMUSCULAR; INTRAVENOUS
OUTPATIENT
Start: 2025-05-21

## 2025-04-23 RX ORDER — FAMOTIDINE 10 MG/ML
20 INJECTION, SOLUTION INTRAVENOUS
OUTPATIENT
Start: 2025-05-21

## 2025-04-23 RX ORDER — MEPERIDINE HYDROCHLORIDE 50 MG/ML
12.5 INJECTION INTRAMUSCULAR; INTRAVENOUS; SUBCUTANEOUS PRN
Status: CANCELLED | OUTPATIENT
Start: 2025-04-23

## 2025-04-23 RX ORDER — EPINEPHRINE 1 MG/ML
0.3 INJECTION, SOLUTION, CONCENTRATE INTRAVENOUS PRN
OUTPATIENT
Start: 2025-05-21

## 2025-04-23 RX ORDER — SODIUM CHLORIDE 9 MG/ML
5-250 INJECTION, SOLUTION INTRAVENOUS PRN
OUTPATIENT
Start: 2025-05-21

## 2025-04-23 RX ORDER — ACETAMINOPHEN 325 MG/1
650 TABLET ORAL
Status: CANCELLED | OUTPATIENT
Start: 2025-04-23

## 2025-04-23 RX ORDER — SODIUM CHLORIDE 9 MG/ML
INJECTION, SOLUTION INTRAVENOUS CONTINUOUS
Status: CANCELLED | OUTPATIENT
Start: 2025-04-23

## 2025-04-23 RX ORDER — HEPARIN 100 UNIT/ML
500 SYRINGE INTRAVENOUS PRN
OUTPATIENT
Start: 2025-05-21

## 2025-04-23 RX ORDER — ACETAMINOPHEN 325 MG/1
650 TABLET ORAL
OUTPATIENT
Start: 2025-05-21

## 2025-04-23 RX ORDER — ALBUTEROL SULFATE 90 UG/1
4 INHALANT RESPIRATORY (INHALATION) PRN
Status: CANCELLED | OUTPATIENT
Start: 2025-04-23

## 2025-04-23 RX ORDER — HYDROCORTISONE SODIUM SUCCINATE 100 MG/2ML
100 INJECTION INTRAMUSCULAR; INTRAVENOUS
OUTPATIENT
Start: 2025-05-21

## 2025-04-23 RX ORDER — ONDANSETRON 2 MG/ML
8 INJECTION INTRAMUSCULAR; INTRAVENOUS
OUTPATIENT
Start: 2025-05-21

## 2025-04-23 RX ADMIN — SODIUM CHLORIDE 200 MG: 9 INJECTION, SOLUTION INTRAVENOUS at 14:20

## 2025-04-23 RX ADMIN — SODIUM CHLORIDE, PRESERVATIVE FREE 10 ML: 5 INJECTION INTRAVENOUS at 13:04

## 2025-04-23 RX ADMIN — SODIUM CHLORIDE 150 ML/HR: 0.9 INJECTION, SOLUTION INTRAVENOUS at 13:52

## 2025-04-23 RX ADMIN — HEPARIN 500 UNITS: 100 SYRINGE at 14:51

## 2025-04-23 RX ADMIN — SODIUM CHLORIDE, PRESERVATIVE FREE 10 ML: 5 INJECTION INTRAVENOUS at 14:51

## 2025-04-23 RX ADMIN — ZOLEDRONIC ACID 4 MG: 0.04 INJECTION, SOLUTION INTRAVENOUS at 13:53

## 2025-04-23 NOTE — PROGRESS NOTES
sphenoid sinus.  Mucous retention  cysts within both maxillary sinuses.  Trace bilateral mastoid effusions.    BONES/SOFT TISSUES: Postsurgical changes from a biparietal craniotomy.  There  is mild soft tissue enhancement along the peripheral aspects of the surgical  bed.  This could represent scar versus granulation tissue.  The bone marrow  signal otherwise demonstrates no acute abnormality.  Impression: 1. No acute intracranial abnormality. No acute infarct.  2. No evidence of intracranial metastatic disease.  3. Postsurgical changes from a biparietal craniotomy with involvement of the  overlying scalp.  Soft tissue enhancement along the peripheral aspect of the  surgical bed.  This may represent scar versus granulation tissue.  Recommend  continued follow-up.  4. Minimal global parenchymal volume loss with minimal chronic microvascular  ischemic changes.             ASSESSMENT:      Left hilar mass  Postobstructive pneumonia  Left scapular mass  Right lower lobe nodule  Destructive metastatic bony lesion  Pathological fracture of the right femoral neck  Schizophrenia           PLAN:     I reviewed labs, imaging studies, related electronic medical records including outside records and discussed the diagnosis, prognosis and treatment recommendations   I reviewed the surgical pathology results, discuss goals of care and NCCN guidelines with patient and family    49-year-old man with schizophrenia and his father is POA to have pathologic fracture of the right femur status post ORIF and pathology confirmed metastatic non-small cell carcinoma and tumor origin confirmed squamous cell carcinoma, discussed with the father/POA the natural history of lung cancer in general and in this case in particular where it is metastatic and the goal of treatment will be palliative rather than curative, we discussed chemoimmunotherapy versus immunotherapy only however due to decalcified bone biopsy/sampling no NGS was done and

## 2025-04-23 NOTE — PROGRESS NOTES
Pt here for C12D1 keytruda and zometa.  Arrives ambulatory.  Denies any new complaints.  Labs drawn from port, results reviewed.  Pt was seen by Dr. Strong, order rec'd to proceed with tx.  Tx complete without incident.  Pt d/c'd in stable condition.  Returns 5/14/25 for MD visit and C13D1 keytruda.

## 2025-05-05 ENCOUNTER — HOSPITAL ENCOUNTER (OUTPATIENT)
Dept: WOUND CARE | Age: 50
Discharge: HOME | End: 2025-05-05
Payer: MEDICARE

## 2025-05-05 VITALS
WEIGHT: 173 LBS | HEART RATE: 83 BPM | BODY MASS INDEX: 22.93 KG/M2 | SYSTOLIC BLOOD PRESSURE: 95 MMHG | DIASTOLIC BLOOD PRESSURE: 67 MMHG | RESPIRATION RATE: 18 BRPM | HEIGHT: 73 IN | TEMPERATURE: 96.8 F

## 2025-05-05 DIAGNOSIS — S01.00XS OPEN WOUND OF SCALP, UNSPECIFIED OPEN WOUND TYPE, SEQUELA: Primary | ICD-10-CM

## 2025-05-05 PROCEDURE — 99213 OFFICE O/P EST LOW 20 MIN: CPT | Performed by: PLASTIC SURGERY

## 2025-05-05 PROCEDURE — 99213 OFFICE O/P EST LOW 20 MIN: CPT

## 2025-05-05 RX ORDER — GENTAMICIN SULFATE 1 MG/G
OINTMENT TOPICAL ONCE
OUTPATIENT
Start: 2025-05-05 | End: 2025-05-05

## 2025-05-05 RX ORDER — LIDOCAINE HYDROCHLORIDE 40 MG/ML
SOLUTION TOPICAL ONCE
Status: COMPLETED | OUTPATIENT
Start: 2025-05-05 | End: 2025-05-05

## 2025-05-05 RX ORDER — SODIUM CHLOR/HYPOCHLOROUS ACID 0.033 %
SOLUTION, IRRIGATION IRRIGATION ONCE
OUTPATIENT
Start: 2025-05-05 | End: 2025-05-05

## 2025-05-05 RX ORDER — NEOMYCIN/BACITRACIN/POLYMYXINB 3.5-400-5K
OINTMENT (GRAM) TOPICAL ONCE
OUTPATIENT
Start: 2025-05-05 | End: 2025-05-05

## 2025-05-05 RX ORDER — BETAMETHASONE DIPROPIONATE 0.5 MG/G
CREAM TOPICAL ONCE
OUTPATIENT
Start: 2025-05-05 | End: 2025-05-05

## 2025-05-05 RX ORDER — LIDOCAINE 50 MG/G
OINTMENT TOPICAL ONCE
OUTPATIENT
Start: 2025-05-05 | End: 2025-05-05

## 2025-05-05 RX ORDER — SILVER SULFADIAZINE 10 MG/G
CREAM TOPICAL ONCE
OUTPATIENT
Start: 2025-05-05 | End: 2025-05-05

## 2025-05-05 RX ORDER — LIDOCAINE HYDROCHLORIDE 40 MG/ML
SOLUTION TOPICAL ONCE
OUTPATIENT
Start: 2025-05-05 | End: 2025-05-05

## 2025-05-05 RX ORDER — TRIAMCINOLONE ACETONIDE 1 MG/G
OINTMENT TOPICAL ONCE
OUTPATIENT
Start: 2025-05-05 | End: 2025-05-05

## 2025-05-05 RX ORDER — LIDOCAINE HYDROCHLORIDE 20 MG/ML
JELLY TOPICAL ONCE
OUTPATIENT
Start: 2025-05-05 | End: 2025-05-05

## 2025-05-05 RX ORDER — LIDOCAINE 40 MG/G
CREAM TOPICAL ONCE
OUTPATIENT
Start: 2025-05-05 | End: 2025-05-05

## 2025-05-05 RX ORDER — CLOBETASOL PROPIONATE 0.5 MG/G
OINTMENT TOPICAL ONCE
OUTPATIENT
Start: 2025-05-05 | End: 2025-05-05

## 2025-05-05 RX ORDER — GINSENG 100 MG
CAPSULE ORAL ONCE
OUTPATIENT
Start: 2025-05-05 | End: 2025-05-05

## 2025-05-05 RX ORDER — MUPIROCIN 20 MG/G
OINTMENT TOPICAL ONCE
OUTPATIENT
Start: 2025-05-05 | End: 2025-05-05

## 2025-05-05 RX ORDER — BACITRACIN ZINC AND POLYMYXIN B SULFATE 500; 1000 [USP'U]/G; [USP'U]/G
OINTMENT TOPICAL ONCE
OUTPATIENT
Start: 2025-05-05 | End: 2025-05-05

## 2025-05-05 RX ADMIN — LIDOCAINE HYDROCHLORIDE 5 ML: 40 SOLUTION TOPICAL at 14:09

## 2025-05-05 ASSESSMENT — PAIN SCALES - GENERAL: PAINLEVEL_OUTOF10: 0

## 2025-05-05 NOTE — PROGRESS NOTES
Wound Care Supplies      Supply Company:     Other [employer]      Ordering Center:     [facility]  [address] [facility] [address]  [phone]  WOUND CARE Dept: [phone]   FAX NUMBER [unfilled]    Patient Information:      Isiah Duncan  [address] [address]   [phone]   : 1975  AGE: 50 y.o.     GENDER: male   TODAYS DATE:  2025    Insurance:      PRIMARY INSURANCE:  Plan: MEDICARE PART A AND B  Coverage: MEDICARE  Effective Date: 2017  [id number] - (Medicare)    SECONDARY INSURANCE:  Plan:   Coverage:   Effective Date:   @SECApplitsGROUPNUM@    [unfilled]   [unfilled]     Patient Wound Information:      Problem List Items Addressed This Visit          Other    Open wound of scalp - Primary    Relevant Orders    Initiate Outpatient Wound Care Protocol       WOUNDS REQUIRING DRESSING SUPPLIES:     Wound 24 Head Upper wound #1 scalp (Active)   Wound Image   25 140   Wound Etiology Other 25 140   Dressing Status New drainage noted;Old drainage noted 25 140   Wound Cleansed Cleansed with saline 25 140   Dressing/Treatment Other (comment) 25 1356   Wound Length (cm) 2.5 cm 25 1405   Wound Width (cm) 2 cm 25 1405   Wound Depth (cm) 0.2 cm 25 140   Wound Surface Area (cm^2) 5 cm^2 25 140   Change in Wound Size % (l*w) -32.28 25 1405   Wound Volume (cm^3) 1 cm^3 25 140   Wound Healing % 67 25 1405   Post-Procedure Length (cm) 2.5 cm 25 1405   Post-Procedure Width (cm) 2 cm 25 140   Post-Procedure Depth (cm) 0.2 cm 25 1405   Post-Procedure Surface Area (cm^2) 5 cm^2 25 140   Post-Procedure Volume (cm^3) 1 cm^3 25 1405   Wound Assessment Slough;Pink/red 25 1405   Drainage Amount Moderate (25-50%) 25 1405   Drainage Description Yellow 25 140   Odor None 25 140   Lupe-wound Assessment Intact 25 140   Margins Epibole 
Female   Other Topics Concern    Not on file   Social History Narrative    Not on file     Social Drivers of Health     Financial Resource Strain: Not on file   Food Insecurity: No Food Insecurity (10/27/2024)    Hunger Vital Sign     Worried About Running Out of Food in the Last Year: Never true     Ran Out of Food in the Last Year: Never true   Transportation Needs: No Transportation Needs (10/27/2024)    PRAPARE - Transportation     Lack of Transportation (Medical): No     Lack of Transportation (Non-Medical): No   Physical Activity: Not on file   Stress: Not on file   Social Connections: Not on file   Intimate Partner Violence: Not on file   Housing Stability: Low Risk  (10/27/2024)    Housing Stability Vital Sign     Unable to Pay for Housing in the Last Year: No     Number of Times Moved in the Last Year: 1     Homeless in the Last Year: No     Family History   Problem Relation Age of Onset    Diabetes Father     Substance Abuse Brother      Physical Exam:   BP 95/67   Pulse 83   Temp 96.8 °F (36 °C) (Tympanic)   Resp 18   Ht 1.854 m (6' 1\")   Wt 78.5 kg (173 lb)   BMI 22.82 kg/m²    Body mass index is 22.82 kg/m².  Physical Exam   Nursing note and vitals reviewed.  Constitutional: Oriented to person, place, and time. Appears well-developed and well-nourished. No distress.   Head: Normocephalic and atraumatic.   Eyes: Conjunctivae and EOM are normal.   Pulmonary/Chest: Effort normal. No respiratory distress.   Neurological: Alert and oriented to person, place, and time.   Psychiatric: Normal mood and affect. Behavior is normal    Post Debridement Measurements:  Wound/Ulcer Descriptions are Pre Debridement except measurements:    Wound 11/04/24 Head Upper wound #1 scalp (Active)   Wound Image   05/05/25 1405   Wound Etiology Other 05/05/25 1405   Dressing Status New drainage noted;Old drainage noted 05/05/25 1405   Wound Cleansed Cleansed with saline 05/05/25 1405   Dressing/Treatment Other (comment)

## 2025-05-05 NOTE — DISCHARGE INSTRUCTIONS
[facility]  C[facility]                            Visit  Discharge Instructions / Physician Orders  DATE: 5/5/25     Home Care:[facility]- PLEASE SEND SUPPLIES      SUPPLIES ORDERED THRU:HOME CARE TO SUPPLY  DUE TO MEDICARE GUIDELINES                 DATE LAST SUPPLIED     Wound Location:  Scalp     Cleanse with: Liquid antibacterial soap and water, rinse well      Dressing Orders:  Primary dressing-Scalp lesion: cleanse with saline applied to gauze, fill/cover the open wound with Opticell Ag gelling fiber, secure with  an Optifoam 6x6\" foam (if possible). Change daily and prn saturation (Orders from Hospital)     Frequency:  Daily     Additional Orders: Increase protein to diet (meat, cheese, eggs, fish, peanut butter, nuts and beans)  Multivitamin daily      patient to have follow up appointments with [facility] neurosurgery and plastics in July     [facility] continue follow up.and neuro surgery.  OFFLOADING [] YES  TYPE:                  [x] NA     Weekly wound care visits until determined otherwise.     Antibiotic therapy-wound care related YES [] NO [x] NA[]     MY CHART []     Smart Device  []      HYPERBARIC TREATMENT-                TREATMENT #                          Your next appointment with the Wound Care Center is 4 WEEKS WITH DR. LARA                                                                                                   (Please note your next appointment above and if you are unable to keep, kindly give a 24 hour notice. Thank you.)  If more than 15 min late we cannot guarantee you will be seen due to clinician schedule  Per Policy, Excessive cancellation will call for dismissal from program.  If you experience any of the following, please call the Wound Care Center during business hours:  [phone]     * Increase in Pain  * Temperature over 101  * Increase in drainage from your wound  * Drainage with a foul odor  * Bleeding  * Increase

## 2025-05-08 ENCOUNTER — HOSPITAL ENCOUNTER (OUTPATIENT)
Dept: NUCLEAR MEDICINE | Age: 50
Discharge: HOME | End: 2025-05-10
Attending: INTERNAL MEDICINE
Payer: MEDICARE

## 2025-05-08 DIAGNOSIS — C34.90 NON-SMALL CELL LUNG CANCER, UNSPECIFIED LATERALITY (HCC): ICD-10-CM

## 2025-05-08 DIAGNOSIS — Z29.89 IMMUNOTHERAPY: ICD-10-CM

## 2025-05-08 DIAGNOSIS — R59.0 MEDIASTINAL LYMPHADENOPATHY: ICD-10-CM

## 2025-05-08 LAB — GLUCOSE BLD-MCNC: 121 MG/DL (ref 75–110)

## 2025-05-08 PROCEDURE — 3430000000 HC RX DIAGNOSTIC RADIOPHARMACEUTICAL: Performed by: INTERNAL MEDICINE

## 2025-05-08 PROCEDURE — 82947 ASSAY GLUCOSE BLOOD QUANT: CPT

## 2025-05-08 PROCEDURE — 78815 PET IMAGE W/CT SKULL-THIGH: CPT

## 2025-05-08 PROCEDURE — A9609 HC RX DIAGNOSTIC RADIOPHARMACEUTICAL: HCPCS | Performed by: INTERNAL MEDICINE

## 2025-05-08 PROCEDURE — 2500000003 HC RX 250 WO HCPCS: Performed by: INTERNAL MEDICINE

## 2025-05-08 RX ORDER — SODIUM CHLORIDE 0.9 % (FLUSH) 0.9 %
10 SYRINGE (ML) INJECTION ONCE
Status: COMPLETED | OUTPATIENT
Start: 2025-05-08 | End: 2025-05-08

## 2025-05-08 RX ORDER — FLUDEOXYGLUCOSE F 18 200 MCI/ML
10 INJECTION, SOLUTION INTRAVENOUS
Status: COMPLETED | OUTPATIENT
Start: 2025-05-08 | End: 2025-05-08

## 2025-05-08 RX ADMIN — FLUDEOXYGLUCOSE F 18 10.36 MILLICURIE: 200 INJECTION, SOLUTION INTRAVENOUS at 12:36

## 2025-05-08 RX ADMIN — SODIUM CHLORIDE, PRESERVATIVE FREE 10 ML: 5 INJECTION INTRAVENOUS at 12:36

## 2025-05-14 ENCOUNTER — TELEPHONE (OUTPATIENT)
Age: 50
End: 2025-05-14

## 2025-05-14 NOTE — PROGRESS NOTES
daily PRN 1 each 2    propranolol (INDERAL) 20 MG tablet Take 1 tablet by mouth 2 times daily      INVEGA TRINZA 546 MG/1.75ML ALEXSANDER IM injection       metFORMIN (GLUCOPHAGE) 1000 MG tablet Take 1 tablet by mouth 2 times daily      atorvastatin (LIPITOR) 80 MG tablet Take 1 tablet by mouth nightly at bedtime.      famotidine (PEPCID) 20 MG tablet Take 1 tablet by mouth 2 times daily 60 tablet 3    Vitamin D (CHOLECALCIFEROL) 25 MCG (1000 UT) TABS tablet Take 1 tablet by mouth daily 60 tablet 0    gabapentin (NEURONTIN) 100 MG capsule Take 1 capsule by mouth 3 times daily for 14 days. 42 capsule 0    traZODone (DESYREL) 100 MG tablet Take 1 tablet by mouth nightly as needed for Sleep 7 tablet 0     No current facility-administered medications for this visit.       Social History     Socioeconomic History    Marital status: Single     Spouse name: Not on file    Number of children: Not on file    Years of education: Not on file    Highest education level: Not on file   Occupational History    Not on file   Tobacco Use    Smoking status: Former     Current packs/day: 2.00     Average packs/day: 2.0 packs/day for 20.0 years (40.0 ttl pk-yrs)     Types: Cigarettes    Smokeless tobacco: Never   Vaping Use    Vaping status: Never Used   Substance and Sexual Activity    Alcohol use: No    Drug use: No    Sexual activity: Not Currently     Partners: Female   Other Topics Concern    Not on file   Social History Narrative    Not on file     Social Drivers of Health     Financial Resource Strain: Not on file   Food Insecurity: No Food Insecurity (10/27/2024)    Hunger Vital Sign     Worried About Running Out of Food in the Last Year: Never true     Ran Out of Food in the Last Year: Never true   Transportation Needs: No Transportation Needs (10/27/2024)    PRAPARE - Transportation     Lack of Transportation (Medical): No     Lack of Transportation (Non-Medical): No   Physical Activity: Not on file   Stress: Not on file   Social

## 2025-05-14 NOTE — TELEPHONE ENCOUNTER
Patient no showed for RO, MO , and Infusion today 5/14/25. I called and left message for patient's guardian, Max to call back to reschedule.

## 2025-05-19 ENCOUNTER — TELEPHONE (OUTPATIENT)
Dept: INFUSION THERAPY | Age: 50
End: 2025-05-19

## 2025-05-19 NOTE — TELEPHONE ENCOUNTER
LVM in regards to MD appointment added for Wednesday 5/21 at 1115 with treatment to follow. Awaiting call back.

## 2025-05-28 ENCOUNTER — OFFICE VISIT (OUTPATIENT)
Age: 50
End: 2025-05-28
Payer: MEDICARE

## 2025-05-28 ENCOUNTER — HOSPITAL ENCOUNTER (OUTPATIENT)
Dept: INFUSION THERAPY | Age: 50
Discharge: HOME | End: 2025-05-28
Payer: MEDICARE

## 2025-05-28 VITALS
OXYGEN SATURATION: 95 % | HEIGHT: 73 IN | BODY MASS INDEX: 24.44 KG/M2 | WEIGHT: 184.4 LBS | SYSTOLIC BLOOD PRESSURE: 116 MMHG | HEART RATE: 78 BPM | DIASTOLIC BLOOD PRESSURE: 80 MMHG

## 2025-05-28 DIAGNOSIS — Z79.899 LONG TERM USE OF DRUG: ICD-10-CM

## 2025-05-28 DIAGNOSIS — C79.51 METASTASIS TO BONE (HCC): ICD-10-CM

## 2025-05-28 DIAGNOSIS — C34.90 NON-SMALL CELL LUNG CANCER, UNSPECIFIED LATERALITY (HCC): ICD-10-CM

## 2025-05-28 DIAGNOSIS — C79.51 MALIGNANT NEOPLASM METASTATIC TO BONE (HCC): Primary | ICD-10-CM

## 2025-05-28 LAB
ALBUMIN SERPL-MCNC: 4.3 G/DL (ref 3.5–5.2)
ALBUMIN/GLOB SERPL: 1.8 {RATIO} (ref 1–2.5)
ALP SERPL-CCNC: 87 U/L (ref 40–129)
ALT SERPL-CCNC: 25 U/L (ref 10–50)
AMYLASE SERPL-CCNC: 89 U/L (ref 28–100)
ANION GAP SERPL CALCULATED.3IONS-SCNC: 13 MMOL/L (ref 9–16)
AST SERPL-CCNC: 19 U/L (ref 10–50)
BASOPHILS # BLD: 0 K/UL (ref 0–0.2)
BASOPHILS NFR BLD: 1 % (ref 0–2)
BILIRUB SERPL-MCNC: 1.1 MG/DL (ref 0–1.2)
BUN SERPL-MCNC: 23 MG/DL (ref 6–20)
CALCIUM SERPL-MCNC: 9.5 MG/DL (ref 8.6–10.4)
CHLORIDE SERPL-SCNC: 101 MMOL/L (ref 98–107)
CO2 SERPL-SCNC: 25 MMOL/L (ref 20–31)
CREAT SERPL-MCNC: 0.9 MG/DL (ref 0.7–1.2)
EOSINOPHIL # BLD: 0.5 K/UL (ref 0–0.4)
EOSINOPHILS RELATIVE PERCENT: 8 % (ref 1–4)
ERYTHROCYTE [DISTWIDTH] IN BLOOD BY AUTOMATED COUNT: 14.5 % (ref 12.5–15.4)
GFR, ESTIMATED: >90 ML/MIN/1.73M2
GLUCOSE SERPL-MCNC: 112 MG/DL (ref 74–99)
HCT VFR BLD AUTO: 36.6 % (ref 41–53)
HGB BLD-MCNC: 12.4 G/DL (ref 13.5–17.5)
LIPASE SERPL-CCNC: 24 U/L (ref 13–60)
LYMPHOCYTES NFR BLD: 1.3 K/UL (ref 1–4.8)
LYMPHOCYTES RELATIVE PERCENT: 22 % (ref 24–44)
MCH RBC QN AUTO: 29.1 PG (ref 26–34)
MCHC RBC AUTO-ENTMCNC: 33.8 G/DL (ref 31–37)
MCV RBC AUTO: 86.1 FL (ref 80–100)
MONOCYTES NFR BLD: 0.5 K/UL (ref 0.1–1.2)
MONOCYTES NFR BLD: 9 % (ref 2–11)
NEUTROPHILS NFR BLD: 60 % (ref 36–66)
NEUTS SEG NFR BLD: 3.7 K/UL (ref 1.8–7.7)
PLATELET # BLD AUTO: 176 K/UL (ref 140–450)
PMV BLD AUTO: 6.9 FL (ref 6–12)
POTASSIUM SERPL-SCNC: 4.3 MMOL/L (ref 3.7–5.3)
PROT SERPL-MCNC: 6.7 G/DL (ref 6.6–8.7)
RBC # BLD AUTO: 4.25 M/UL (ref 4.5–5.9)
SODIUM SERPL-SCNC: 139 MMOL/L (ref 136–145)
TSH SERPL DL<=0.05 MIU/L-ACNC: 1.61 UIU/ML (ref 0.27–4.2)
WBC OTHER # BLD: 6.1 K/UL (ref 3.5–11)

## 2025-05-28 PROCEDURE — 6360000002 HC RX W HCPCS: Performed by: INTERNAL MEDICINE

## 2025-05-28 PROCEDURE — G8427 DOCREV CUR MEDS BY ELIG CLIN: HCPCS | Performed by: INTERNAL MEDICINE

## 2025-05-28 PROCEDURE — 82150 ASSAY OF AMYLASE: CPT

## 2025-05-28 PROCEDURE — 96367 TX/PROPH/DG ADDL SEQ IV INF: CPT

## 2025-05-28 PROCEDURE — 2500000003 HC RX 250 WO HCPCS: Performed by: INTERNAL MEDICINE

## 2025-05-28 PROCEDURE — 3017F COLORECTAL CA SCREEN DOC REV: CPT | Performed by: INTERNAL MEDICINE

## 2025-05-28 PROCEDURE — 2580000003 HC RX 258: Performed by: INTERNAL MEDICINE

## 2025-05-28 PROCEDURE — 1036F TOBACCO NON-USER: CPT | Performed by: INTERNAL MEDICINE

## 2025-05-28 PROCEDURE — 85025 COMPLETE CBC W/AUTO DIFF WBC: CPT

## 2025-05-28 PROCEDURE — 99214 OFFICE O/P EST MOD 30 MIN: CPT | Performed by: INTERNAL MEDICINE

## 2025-05-28 PROCEDURE — 82533 TOTAL CORTISOL: CPT

## 2025-05-28 PROCEDURE — 96413 CHEMO IV INFUSION 1 HR: CPT

## 2025-05-28 PROCEDURE — 80053 COMPREHEN METABOLIC PANEL: CPT

## 2025-05-28 PROCEDURE — 99213 OFFICE O/P EST LOW 20 MIN: CPT | Performed by: INTERNAL MEDICINE

## 2025-05-28 PROCEDURE — G8420 CALC BMI NORM PARAMETERS: HCPCS | Performed by: INTERNAL MEDICINE

## 2025-05-28 PROCEDURE — 84443 ASSAY THYROID STIM HORMONE: CPT

## 2025-05-28 PROCEDURE — 83690 ASSAY OF LIPASE: CPT

## 2025-05-28 RX ORDER — SODIUM CHLORIDE 9 MG/ML
5-250 INJECTION, SOLUTION INTRAVENOUS PRN
OUTPATIENT
Start: 2025-06-15

## 2025-05-28 RX ORDER — ZOLEDRONIC ACID 0.04 MG/ML
4 INJECTION, SOLUTION INTRAVENOUS ONCE
Status: COMPLETED | OUTPATIENT
Start: 2025-05-28 | End: 2025-05-28

## 2025-05-28 RX ORDER — DIPHENHYDRAMINE HYDROCHLORIDE 50 MG/ML
50 INJECTION, SOLUTION INTRAMUSCULAR; INTRAVENOUS
OUTPATIENT
Start: 2025-06-15

## 2025-05-28 RX ORDER — HEPARIN 100 UNIT/ML
500 SYRINGE INTRAVENOUS PRN
Status: DISCONTINUED | OUTPATIENT
Start: 2025-05-28 | End: 2025-05-29 | Stop reason: HOSPADM

## 2025-05-28 RX ORDER — MEPERIDINE HYDROCHLORIDE 50 MG/ML
12.5 INJECTION INTRAMUSCULAR; INTRAVENOUS; SUBCUTANEOUS PRN
OUTPATIENT
Start: 2025-05-28

## 2025-05-28 RX ORDER — DIPHENHYDRAMINE HYDROCHLORIDE 50 MG/ML
50 INJECTION, SOLUTION INTRAMUSCULAR; INTRAVENOUS
OUTPATIENT
Start: 2025-05-28

## 2025-05-28 RX ORDER — SODIUM CHLORIDE 9 MG/ML
5-250 INJECTION, SOLUTION INTRAVENOUS PRN
OUTPATIENT
Start: 2025-05-28

## 2025-05-28 RX ORDER — ACETAMINOPHEN 325 MG/1
650 TABLET ORAL
OUTPATIENT
Start: 2025-05-28

## 2025-05-28 RX ORDER — SODIUM CHLORIDE 9 MG/ML
5-250 INJECTION, SOLUTION INTRAVENOUS PRN
Status: CANCELLED | OUTPATIENT
Start: 2025-05-28

## 2025-05-28 RX ORDER — SODIUM CHLORIDE 9 MG/ML
5-250 INJECTION, SOLUTION INTRAVENOUS PRN
Status: DISCONTINUED | OUTPATIENT
Start: 2025-05-28 | End: 2025-05-29 | Stop reason: HOSPADM

## 2025-05-28 RX ORDER — FAMOTIDINE 10 MG/ML
20 INJECTION, SOLUTION INTRAVENOUS
OUTPATIENT
Start: 2025-06-15

## 2025-05-28 RX ORDER — SODIUM CHLORIDE 0.9 % (FLUSH) 0.9 %
5-40 SYRINGE (ML) INJECTION PRN
Status: CANCELLED | OUTPATIENT
Start: 2025-05-28

## 2025-05-28 RX ORDER — ALBUTEROL SULFATE 90 UG/1
4 INHALANT RESPIRATORY (INHALATION) PRN
OUTPATIENT
Start: 2025-06-15

## 2025-05-28 RX ORDER — ACETAMINOPHEN 325 MG/1
650 TABLET ORAL
OUTPATIENT
Start: 2025-06-15

## 2025-05-28 RX ORDER — ALBUTEROL SULFATE 90 UG/1
4 INHALANT RESPIRATORY (INHALATION) PRN
OUTPATIENT
Start: 2025-05-28

## 2025-05-28 RX ORDER — SODIUM CHLORIDE 0.9 % (FLUSH) 0.9 %
5-40 SYRINGE (ML) INJECTION PRN
Status: DISCONTINUED | OUTPATIENT
Start: 2025-05-28 | End: 2025-05-29 | Stop reason: HOSPADM

## 2025-05-28 RX ORDER — SODIUM CHLORIDE 0.9 % (FLUSH) 0.9 %
5-40 SYRINGE (ML) INJECTION PRN
OUTPATIENT
Start: 2025-06-15

## 2025-05-28 RX ORDER — SODIUM CHLORIDE 9 MG/ML
INJECTION, SOLUTION INTRAVENOUS CONTINUOUS
OUTPATIENT
Start: 2025-06-15

## 2025-05-28 RX ORDER — FAMOTIDINE 10 MG/ML
20 INJECTION, SOLUTION INTRAVENOUS
OUTPATIENT
Start: 2025-05-28

## 2025-05-28 RX ORDER — HYDROCORTISONE SODIUM SUCCINATE 100 MG/2ML
100 INJECTION INTRAMUSCULAR; INTRAVENOUS
OUTPATIENT
Start: 2025-06-15

## 2025-05-28 RX ORDER — ONDANSETRON 2 MG/ML
8 INJECTION INTRAMUSCULAR; INTRAVENOUS
OUTPATIENT
Start: 2025-05-28

## 2025-05-28 RX ORDER — EPINEPHRINE 1 MG/ML
0.3 INJECTION, SOLUTION, CONCENTRATE INTRAVENOUS PRN
OUTPATIENT
Start: 2025-05-28

## 2025-05-28 RX ORDER — EPINEPHRINE 1 MG/ML
0.3 INJECTION, SOLUTION, CONCENTRATE INTRAVENOUS PRN
OUTPATIENT
Start: 2025-06-15

## 2025-05-28 RX ORDER — HEPARIN SODIUM (PORCINE) LOCK FLUSH IV SOLN 100 UNIT/ML 100 UNIT/ML
500 SOLUTION INTRAVENOUS PRN
Status: CANCELLED | OUTPATIENT
Start: 2025-05-28

## 2025-05-28 RX ORDER — HEPARIN 100 UNIT/ML
500 SYRINGE INTRAVENOUS PRN
OUTPATIENT
Start: 2025-06-15

## 2025-05-28 RX ORDER — HYDROCORTISONE SODIUM SUCCINATE 100 MG/2ML
100 INJECTION INTRAMUSCULAR; INTRAVENOUS
OUTPATIENT
Start: 2025-05-28

## 2025-05-28 RX ORDER — ZOLEDRONIC ACID 0.04 MG/ML
4 INJECTION, SOLUTION INTRAVENOUS ONCE
OUTPATIENT
Start: 2025-06-15 | End: 2025-06-15

## 2025-05-28 RX ORDER — SODIUM CHLORIDE 9 MG/ML
INJECTION, SOLUTION INTRAVENOUS CONTINUOUS
OUTPATIENT
Start: 2025-05-28

## 2025-05-28 RX ORDER — ONDANSETRON 2 MG/ML
8 INJECTION INTRAMUSCULAR; INTRAVENOUS
OUTPATIENT
Start: 2025-06-15

## 2025-05-28 RX ADMIN — HEPARIN 500 UNITS: 100 SYRINGE at 16:43

## 2025-05-28 RX ADMIN — SODIUM CHLORIDE 200 MG: 9 INJECTION, SOLUTION INTRAVENOUS at 16:16

## 2025-05-28 RX ADMIN — ZOLEDRONIC ACID 4 MG: 0.04 INJECTION, SOLUTION INTRAVENOUS at 15:27

## 2025-05-28 RX ADMIN — SODIUM CHLORIDE, PRESERVATIVE FREE 10 ML: 5 INJECTION INTRAVENOUS at 16:43

## 2025-05-28 RX ADMIN — SODIUM CHLORIDE 200 ML/HR: 0.9 INJECTION, SOLUTION INTRAVENOUS at 15:25

## 2025-05-28 NOTE — PROGRESS NOTES
right lower lobe lung nodule noted> PET/CT was done and there is no activity and no evidence of cancer elsewhere> will continue Keytruda and have a follow-up PET/CT in 3 months  Normocytic anemia related to chemotherapy> hemoglobin stable  Tobacco smoking> smoking cessation  Continue Zometa for bone metastasis  RTC in 3 weeks      Patient's conditions were taken into consideration when deciding risk-benefit for therapy.  Patient is at high risk for drug interaction risk of complications.  Given multiple comorbid conditions patient is at high risk for complication from intervention, risk of hospitalization, medical interaction overall life expectancy.  NCCN guidelines were reviewed and discussed with the patient.  The diagnosis and care plan were discussed with the patient in detail. I discussed the natural history of the disease, prognosis, risks and goals of therapy and answered all the patients questions to the best of my ability.  Patient expressed understanding and was in agreement.      Calin Strong MD                         This note is created with the assistance of a speech recognition program.  While intending to generate a document that actually reflects the content of the visit, the document can still have some errors including those of syntax and sound a like substitutions which may escape proof reading.  It such instances, actual meaning can be extrapolated by contextual diversion.                                                     I spent a total of 45 minutes on the date of the service which included preparing to see the patient, face-to-face patient care, completing clinical documentation, obtaining and/or reviewing separately obtained history, performing a medically appropriate examination, counseling and educating the patient/family/caregiver and ordering medications, tests, or procedures.

## 2025-05-28 NOTE — PROGRESS NOTES
Patient arrives ambulatory for keytruda C13D1 and zometa  Pt denies complaints or concerns  Pt seen by Dr Strong , Orders to proceed with tx  Labs drawn via med port and reviewed, within normal limits for tx  Patient tolerated tx without incident and discharged in stable condition  Next appointment 6/18 for Keytruda C14D1

## 2025-05-29 LAB
CORTIS SERPL-MCNC: 8.5 UG/DL (ref 2.5–19.5)
CORTISOL COLLECTION INFO: NORMAL

## 2025-06-06 ENCOUNTER — TELEPHONE (OUTPATIENT)
Dept: RADIATION ONCOLOGY | Age: 50
End: 2025-06-06

## 2025-06-06 NOTE — TELEPHONE ENCOUNTER
Pt was no show for follow up on 5/14/25. I called pts guardian, Max, to reschedule when pt is here for his infusion on 6/18/25. No answer, I left detailed messg to c/b

## 2025-06-15 NOTE — DISCHARGE INSTRUCTIONS
[facility] WOUND and HYPERBAR[facility] TREATMENT  CENTER                            Visit  Discharge Instructions / Physician Orders  DATE: 6/16/25     Home Care:[facility]- PLEASE SEND SUPPLIES      SUPPLIES ORDERED THRU:HOME CARE TO SUPPLY  DUE TO MEDICARE GUIDELINES                 DATE LAST SUPPLIED     Wound Location:  Scalp     Cleanse with: Liquid antibacterial soap and water, rinse well      Dressing Orders:  Primary dressing-Scalp lesion: cleanse with saline applied fibracol to wound secure with  an silicone foam dressing     Frequency:  Daily     Additional Orders: Increase protein to diet (meat, cheese, eggs, fish, peanut butter, nuts and beans)  Multivitamin daily     Patient following up with [facility] next week     [facility] continue follow up.and neuro surgery.  OFFLOADING [] YES  TYPE:                  [x] NA     Weekly wound care visits until determined otherwise.     Antibiotic therapy-wound care related YES [] NO [x] NA[]     MY CHART []     Smart Device  []      HYPERBARIC TREATMENT-                TREATMENT #                          Your next appointment with the Wound Care Center is 4 WEEKS WITH DR. LARA                                                                                                   (Please note your next appointment above and if you are unable to keep, kindly give a 24 hour notice. Thank you.)  If more than 15 min late we cannot guarantee you will be seen due to clinician schedule  Per Policy, Excessive cancellation will call for dismissal from program.  If you experience any of the following, please call the Wound Care Center during business hours:  [phone]     * Increase in Pain  * Temperature over 101  * Increase in drainage from your wound  * Drainage with a foul odor  * Bleeding  * Increase in swelling  * Need for compression bandage changes due to slippage, breakthrough drainage.     If you need medical attention outside of the business hours of

## 2025-06-16 ENCOUNTER — HOSPITAL ENCOUNTER (OUTPATIENT)
Dept: WOUND CARE | Age: 50
Discharge: HOME | End: 2025-06-16
Payer: MEDICARE

## 2025-06-16 DIAGNOSIS — S01.00XS OPEN WOUND OF SCALP, UNSPECIFIED OPEN WOUND TYPE, SEQUELA: Primary | ICD-10-CM

## 2025-06-16 PROCEDURE — 99213 OFFICE O/P EST LOW 20 MIN: CPT | Performed by: PLASTIC SURGERY

## 2025-06-16 PROCEDURE — 99213 OFFICE O/P EST LOW 20 MIN: CPT

## 2025-06-16 RX ORDER — BETAMETHASONE DIPROPIONATE 0.5 MG/G
CREAM TOPICAL ONCE
OUTPATIENT
Start: 2025-06-16 | End: 2025-06-16

## 2025-06-16 RX ORDER — SODIUM CHLOR/HYPOCHLOROUS ACID 0.033 %
SOLUTION, IRRIGATION IRRIGATION ONCE
OUTPATIENT
Start: 2025-06-16 | End: 2025-06-16

## 2025-06-16 RX ORDER — LIDOCAINE 40 MG/G
CREAM TOPICAL ONCE
OUTPATIENT
Start: 2025-06-16 | End: 2025-06-16

## 2025-06-16 RX ORDER — BACITRACIN ZINC AND POLYMYXIN B SULFATE 500; 1000 [USP'U]/G; [USP'U]/G
OINTMENT TOPICAL ONCE
OUTPATIENT
Start: 2025-06-16 | End: 2025-06-16

## 2025-06-16 RX ORDER — GINSENG 100 MG
CAPSULE ORAL ONCE
OUTPATIENT
Start: 2025-06-16 | End: 2025-06-16

## 2025-06-16 RX ORDER — LIDOCAINE HYDROCHLORIDE 40 MG/ML
SOLUTION TOPICAL ONCE
Status: COMPLETED | OUTPATIENT
Start: 2025-06-16 | End: 2025-06-16

## 2025-06-16 RX ORDER — GENTAMICIN SULFATE 1 MG/G
OINTMENT TOPICAL ONCE
OUTPATIENT
Start: 2025-06-16 | End: 2025-06-16

## 2025-06-16 RX ORDER — LIDOCAINE HYDROCHLORIDE 40 MG/ML
SOLUTION TOPICAL ONCE
OUTPATIENT
Start: 2025-06-16 | End: 2025-06-16

## 2025-06-16 RX ORDER — SILVER SULFADIAZINE 10 MG/G
CREAM TOPICAL ONCE
OUTPATIENT
Start: 2025-06-16 | End: 2025-06-16

## 2025-06-16 RX ORDER — TRIAMCINOLONE ACETONIDE 1 MG/G
OINTMENT TOPICAL ONCE
OUTPATIENT
Start: 2025-06-16 | End: 2025-06-16

## 2025-06-16 RX ORDER — LIDOCAINE HYDROCHLORIDE 20 MG/ML
JELLY TOPICAL ONCE
OUTPATIENT
Start: 2025-06-16 | End: 2025-06-16

## 2025-06-16 RX ORDER — CLOBETASOL PROPIONATE 0.5 MG/G
OINTMENT TOPICAL ONCE
OUTPATIENT
Start: 2025-06-16 | End: 2025-06-16

## 2025-06-16 RX ORDER — NEOMYCIN/BACITRACIN/POLYMYXINB 3.5-400-5K
OINTMENT (GRAM) TOPICAL ONCE
OUTPATIENT
Start: 2025-06-16 | End: 2025-06-16

## 2025-06-16 RX ORDER — LIDOCAINE 50 MG/G
OINTMENT TOPICAL ONCE
OUTPATIENT
Start: 2025-06-16 | End: 2025-06-16

## 2025-06-16 RX ORDER — MUPIROCIN 2 %
OINTMENT (GRAM) TOPICAL ONCE
OUTPATIENT
Start: 2025-06-16 | End: 2025-06-16

## 2025-06-16 RX ADMIN — LIDOCAINE HYDROCHLORIDE 5 ML: 40 SOLUTION TOPICAL at 14:02

## 2025-06-16 NOTE — PROGRESS NOTES
disintegrating tablet Take 1 tablet by mouth 3 times daily as needed for Nausea or Vomiting 60 tablet 3    traZODone (DESYREL) 100 MG tablet Take 1 tablet by mouth nightly as needed for Sleep 7 tablet 0    propranolol (INDERAL) 20 MG tablet Take 1 tablet by mouth 2 times daily      INVEGA TRINZA 546 MG/1.75ML ALEXSANDER IM injection       metFORMIN (GLUCOPHAGE) 1000 MG tablet Take 1 tablet by mouth 2 times daily      atorvastatin (LIPITOR) 80 MG tablet Take 1 tablet by mouth nightly at bedtime.      famotidine (PEPCID) 20 MG tablet Take 1 tablet by mouth 2 times daily 60 tablet 3    gabapentin (NEURONTIN) 100 MG capsule Take 1 capsule by mouth 3 times daily for 14 days. (Patient not taking: Reported on 6/16/2025) 42 capsule 0    lidocaine-prilocaine (EMLA) 2.5-2.5 % cream Apply topically 45-60 mins prior to needle poke daily PRN (Patient not taking: Reported on 6/16/2025) 1 each 2     No current facility-administered medications for this encounter.      Allergies:   No Known Allergies  Review of Systems:   Constitutional: Negative for fever, chills, fatigue and unexpected weight change.   HENT: Negative for hearing loss, sore throat and facial swelling.    Eyes: Negative for pain and discharge.   Respiratory:  patient with history of non-small cell lung cancer  Cardiovascular: Negative for chest pain.   Gastrointestinal: Negative for nausea, vomiting, diarrhea and constipation.   Skin: Negative for pallor and rash.   Neurological: Negative for seizures, syncope and numbness.   Hematological: Patient with history of anemia  Psychiatric/Behavioral: Patient with a history of schizophrenia.    Endocrine: Patient with history of diabetes  Past Medical History:   Diagnosis Date    Anemia     Custody issue 10/20/2014    Max Duncan ( father ) is court appointed legal guaridan    Diabetes (HCC)     H/O falling     History of recent hospitalization 07/09/2024    til 7/18/2024 : [facility]    Hypertension     Leg fracture, right

## 2025-06-18 ENCOUNTER — OFFICE VISIT (OUTPATIENT)
Age: 50
End: 2025-06-18
Payer: MEDICARE

## 2025-06-18 ENCOUNTER — HOSPITAL ENCOUNTER (OUTPATIENT)
Dept: INFUSION THERAPY | Age: 50
Discharge: HOME | End: 2025-06-18
Payer: MEDICARE

## 2025-06-18 ENCOUNTER — TELEPHONE (OUTPATIENT)
Age: 50
End: 2025-06-18

## 2025-06-18 VITALS
DIASTOLIC BLOOD PRESSURE: 66 MMHG | HEART RATE: 72 BPM | BODY MASS INDEX: 24.91 KG/M2 | SYSTOLIC BLOOD PRESSURE: 100 MMHG | TEMPERATURE: 97 F | RESPIRATION RATE: 16 BRPM | WEIGHT: 188.8 LBS

## 2025-06-18 DIAGNOSIS — C34.90 NON-SMALL CELL LUNG CANCER, UNSPECIFIED LATERALITY (HCC): ICD-10-CM

## 2025-06-18 DIAGNOSIS — Z79.899 LONG TERM USE OF DRUG: ICD-10-CM

## 2025-06-18 DIAGNOSIS — C79.51 MALIGNANT NEOPLASM METASTATIC TO BONE (HCC): Primary | ICD-10-CM

## 2025-06-18 LAB
ALBUMIN SERPL-MCNC: 4.4 G/DL (ref 3.5–5.2)
ALBUMIN/GLOB SERPL: 2 {RATIO} (ref 1–2.5)
ALP SERPL-CCNC: 88 U/L (ref 40–129)
ALT SERPL-CCNC: 25 U/L (ref 10–50)
AMYLASE SERPL-CCNC: 77 U/L (ref 28–100)
ANION GAP SERPL CALCULATED.3IONS-SCNC: 11 MMOL/L (ref 9–16)
AST SERPL-CCNC: 18 U/L (ref 10–50)
BASOPHILS # BLD: 0 K/UL (ref 0–0.2)
BASOPHILS NFR BLD: 1 % (ref 0–2)
BILIRUB SERPL-MCNC: 1.2 MG/DL (ref 0–1.2)
BUN SERPL-MCNC: 17 MG/DL (ref 6–20)
CALCIUM SERPL-MCNC: 9.2 MG/DL (ref 8.6–10.4)
CHLORIDE SERPL-SCNC: 103 MMOL/L (ref 98–107)
CO2 SERPL-SCNC: 26 MMOL/L (ref 20–31)
CORTIS SERPL-MCNC: 16.1 UG/DL (ref 2.5–19.5)
CORTISOL COLLECTION INFO: NORMAL
CREAT SERPL-MCNC: 1 MG/DL (ref 0.7–1.2)
EOSINOPHIL # BLD: 0.7 K/UL (ref 0–0.4)
EOSINOPHILS RELATIVE PERCENT: 13 % (ref 1–4)
ERYTHROCYTE [DISTWIDTH] IN BLOOD BY AUTOMATED COUNT: 14 % (ref 12.5–15.4)
GFR, ESTIMATED: >90 ML/MIN/1.73M2
GLUCOSE SERPL-MCNC: 161 MG/DL (ref 74–99)
HCT VFR BLD AUTO: 36.4 % (ref 41–53)
HGB BLD-MCNC: 12.5 G/DL (ref 13.5–17.5)
LIPASE SERPL-CCNC: 26 U/L (ref 13–60)
LYMPHOCYTES NFR BLD: 1 K/UL (ref 1–4.8)
LYMPHOCYTES RELATIVE PERCENT: 19 % (ref 24–44)
MCH RBC QN AUTO: 29.7 PG (ref 26–34)
MCHC RBC AUTO-ENTMCNC: 34.3 G/DL (ref 31–37)
MCV RBC AUTO: 86.5 FL (ref 80–100)
MONOCYTES NFR BLD: 0.4 K/UL (ref 0.1–1.2)
MONOCYTES NFR BLD: 7 % (ref 2–11)
NEUTROPHILS NFR BLD: 60 % (ref 36–66)
NEUTS SEG NFR BLD: 3.2 K/UL (ref 1.8–7.7)
PLATELET # BLD AUTO: 159 K/UL (ref 140–450)
PMV BLD AUTO: 6.8 FL (ref 6–12)
POTASSIUM SERPL-SCNC: 4 MMOL/L (ref 3.7–5.3)
PROT SERPL-MCNC: 6.6 G/DL (ref 6.6–8.7)
RBC # BLD AUTO: 4.21 M/UL (ref 4.5–5.9)
SODIUM SERPL-SCNC: 140 MMOL/L (ref 136–145)
TSH SERPL DL<=0.05 MIU/L-ACNC: 1.48 UIU/ML (ref 0.27–4.2)
WBC OTHER # BLD: 5.3 K/UL (ref 3.5–11)

## 2025-06-18 PROCEDURE — 36415 COLL VENOUS BLD VENIPUNCTURE: CPT

## 2025-06-18 PROCEDURE — 84443 ASSAY THYROID STIM HORMONE: CPT

## 2025-06-18 PROCEDURE — 2580000003 HC RX 258: Performed by: INTERNAL MEDICINE

## 2025-06-18 PROCEDURE — 80053 COMPREHEN METABOLIC PANEL: CPT

## 2025-06-18 PROCEDURE — 82150 ASSAY OF AMYLASE: CPT

## 2025-06-18 PROCEDURE — 82533 TOTAL CORTISOL: CPT

## 2025-06-18 PROCEDURE — 2500000003 HC RX 250 WO HCPCS: Performed by: INTERNAL MEDICINE

## 2025-06-18 PROCEDURE — 99213 OFFICE O/P EST LOW 20 MIN: CPT | Performed by: INTERNAL MEDICINE

## 2025-06-18 PROCEDURE — 83690 ASSAY OF LIPASE: CPT

## 2025-06-18 PROCEDURE — 99214 OFFICE O/P EST MOD 30 MIN: CPT | Performed by: INTERNAL MEDICINE

## 2025-06-18 PROCEDURE — 6360000002 HC RX W HCPCS: Performed by: INTERNAL MEDICINE

## 2025-06-18 PROCEDURE — 3017F COLORECTAL CA SCREEN DOC REV: CPT | Performed by: INTERNAL MEDICINE

## 2025-06-18 PROCEDURE — G8427 DOCREV CUR MEDS BY ELIG CLIN: HCPCS | Performed by: INTERNAL MEDICINE

## 2025-06-18 PROCEDURE — 85025 COMPLETE CBC W/AUTO DIFF WBC: CPT

## 2025-06-18 PROCEDURE — 1036F TOBACCO NON-USER: CPT | Performed by: INTERNAL MEDICINE

## 2025-06-18 PROCEDURE — G8420 CALC BMI NORM PARAMETERS: HCPCS | Performed by: INTERNAL MEDICINE

## 2025-06-18 PROCEDURE — 96413 CHEMO IV INFUSION 1 HR: CPT

## 2025-06-18 RX ORDER — MEPERIDINE HYDROCHLORIDE 50 MG/ML
12.5 INJECTION INTRAMUSCULAR; INTRAVENOUS; SUBCUTANEOUS PRN
Status: CANCELLED | OUTPATIENT
Start: 2025-06-18

## 2025-06-18 RX ORDER — SODIUM CHLORIDE 0.9 % (FLUSH) 0.9 %
5-40 SYRINGE (ML) INJECTION PRN
Status: DISCONTINUED | OUTPATIENT
Start: 2025-06-18 | End: 2025-06-19 | Stop reason: HOSPADM

## 2025-06-18 RX ORDER — DIPHENHYDRAMINE HYDROCHLORIDE 50 MG/ML
50 INJECTION, SOLUTION INTRAMUSCULAR; INTRAVENOUS
Status: CANCELLED | OUTPATIENT
Start: 2025-06-18

## 2025-06-18 RX ORDER — ALBUTEROL SULFATE 90 UG/1
4 INHALANT RESPIRATORY (INHALATION) PRN
Status: CANCELLED | OUTPATIENT
Start: 2025-06-18

## 2025-06-18 RX ORDER — ACETAMINOPHEN 325 MG/1
650 TABLET ORAL
Status: CANCELLED | OUTPATIENT
Start: 2025-06-18

## 2025-06-18 RX ORDER — FAMOTIDINE 10 MG/ML
20 INJECTION, SOLUTION INTRAVENOUS
Status: CANCELLED | OUTPATIENT
Start: 2025-06-18

## 2025-06-18 RX ORDER — ONDANSETRON 2 MG/ML
8 INJECTION INTRAMUSCULAR; INTRAVENOUS
Status: CANCELLED | OUTPATIENT
Start: 2025-06-18

## 2025-06-18 RX ORDER — SODIUM CHLORIDE 9 MG/ML
5-250 INJECTION, SOLUTION INTRAVENOUS PRN
Status: CANCELLED | OUTPATIENT
Start: 2025-06-18

## 2025-06-18 RX ORDER — HEPARIN 100 UNIT/ML
500 SYRINGE INTRAVENOUS PRN
Status: DISCONTINUED | OUTPATIENT
Start: 2025-06-18 | End: 2025-06-19 | Stop reason: HOSPADM

## 2025-06-18 RX ORDER — HYDROCORTISONE SODIUM SUCCINATE 100 MG/2ML
100 INJECTION INTRAMUSCULAR; INTRAVENOUS
Status: CANCELLED | OUTPATIENT
Start: 2025-06-18

## 2025-06-18 RX ORDER — SODIUM CHLORIDE 9 MG/ML
INJECTION, SOLUTION INTRAVENOUS CONTINUOUS
Status: CANCELLED | OUTPATIENT
Start: 2025-06-18

## 2025-06-18 RX ORDER — EPINEPHRINE 1 MG/ML
0.3 INJECTION, SOLUTION, CONCENTRATE INTRAVENOUS PRN
Status: CANCELLED | OUTPATIENT
Start: 2025-06-18

## 2025-06-18 RX ADMIN — HEPARIN 500 UNITS: 100 SYRINGE at 15:09

## 2025-06-18 RX ADMIN — SODIUM CHLORIDE 200 MG: 9 INJECTION, SOLUTION INTRAVENOUS at 14:37

## 2025-06-18 RX ADMIN — SODIUM CHLORIDE, PRESERVATIVE FREE 10 ML: 5 INJECTION INTRAVENOUS at 15:09

## 2025-06-18 RX ADMIN — SODIUM CHLORIDE, PRESERVATIVE FREE 10 ML: 5 INJECTION INTRAVENOUS at 12:54

## 2025-06-18 NOTE — TELEPHONE ENCOUNTER
Name: Isiah Duncan  : 1975  MRN: [id number]    Oncology Navigation Follow-Up Note    Contact Type:  Telephone    Notes: Navigator met with pt. And father face to face offering assistance. No barriers to care noted.       Electronically signed by Fiona Villafana RN on 2025 at 3:08 PM

## 2025-06-18 NOTE — PROGRESS NOTES
Patient here for C14D1 keytruda.  Arrives ambulatory.  Denies any new complaints.  Labs drawn from port, results reviewed.  Pt was seen by Dr. Strong, ordered to proceed with treatment.  Treatment complete without incident.  Patient discharged in stable condition.  Returns 6/25/25 for zometa.

## 2025-06-18 NOTE — TELEPHONE ENCOUNTER
Instructions   from Dr. Calin Strong MD    Ok for treament  Rtc in 3 weeks        Tx today  RV 7/9/25 at 1:30 pm with tx to follow

## 2025-06-25 ENCOUNTER — HOSPITAL ENCOUNTER (OUTPATIENT)
Dept: INFUSION THERAPY | Age: 50
Discharge: HOME | End: 2025-06-25
Payer: MEDICARE

## 2025-06-25 VITALS
DIASTOLIC BLOOD PRESSURE: 72 MMHG | SYSTOLIC BLOOD PRESSURE: 112 MMHG | RESPIRATION RATE: 16 BRPM | HEART RATE: 106 BPM | TEMPERATURE: 98.2 F

## 2025-06-25 DIAGNOSIS — C79.51 METASTASIS TO BONE (HCC): Primary | ICD-10-CM

## 2025-06-25 PROCEDURE — 96365 THER/PROPH/DIAG IV INF INIT: CPT

## 2025-06-25 PROCEDURE — 2500000003 HC RX 250 WO HCPCS: Performed by: INTERNAL MEDICINE

## 2025-06-25 PROCEDURE — 6360000002 HC RX W HCPCS: Performed by: INTERNAL MEDICINE

## 2025-06-25 RX ORDER — ZOLEDRONIC ACID 0.04 MG/ML
4 INJECTION, SOLUTION INTRAVENOUS ONCE
OUTPATIENT
Start: 2025-07-16 | End: 2025-07-16

## 2025-06-25 RX ORDER — HYDROCORTISONE SODIUM SUCCINATE 100 MG/2ML
100 INJECTION INTRAMUSCULAR; INTRAVENOUS
OUTPATIENT
Start: 2025-07-16

## 2025-06-25 RX ORDER — ONDANSETRON 2 MG/ML
8 INJECTION INTRAMUSCULAR; INTRAVENOUS
OUTPATIENT
Start: 2025-07-16

## 2025-06-25 RX ORDER — SODIUM CHLORIDE 0.9 % (FLUSH) 0.9 %
5-40 SYRINGE (ML) INJECTION PRN
Status: DISCONTINUED | OUTPATIENT
Start: 2025-06-25 | End: 2025-06-26 | Stop reason: HOSPADM

## 2025-06-25 RX ORDER — ZOLEDRONIC ACID 0.04 MG/ML
4 INJECTION, SOLUTION INTRAVENOUS ONCE
Status: COMPLETED | OUTPATIENT
Start: 2025-06-25 | End: 2025-06-25

## 2025-06-25 RX ORDER — ACETAMINOPHEN 325 MG/1
650 TABLET ORAL
OUTPATIENT
Start: 2025-07-16

## 2025-06-25 RX ORDER — FAMOTIDINE 10 MG/ML
20 INJECTION, SOLUTION INTRAVENOUS
OUTPATIENT
Start: 2025-07-16

## 2025-06-25 RX ORDER — HEPARIN 100 UNIT/ML
500 SYRINGE INTRAVENOUS PRN
Status: DISCONTINUED | OUTPATIENT
Start: 2025-06-25 | End: 2025-06-26 | Stop reason: HOSPADM

## 2025-06-25 RX ORDER — SODIUM CHLORIDE 0.9 % (FLUSH) 0.9 %
5-40 SYRINGE (ML) INJECTION PRN
OUTPATIENT
Start: 2025-07-16

## 2025-06-25 RX ORDER — ALBUTEROL SULFATE 90 UG/1
4 INHALANT RESPIRATORY (INHALATION) PRN
OUTPATIENT
Start: 2025-07-16

## 2025-06-25 RX ORDER — EPINEPHRINE 1 MG/ML
0.3 INJECTION, SOLUTION, CONCENTRATE INTRAVENOUS PRN
OUTPATIENT
Start: 2025-07-16

## 2025-06-25 RX ORDER — SODIUM CHLORIDE 9 MG/ML
INJECTION, SOLUTION INTRAVENOUS CONTINUOUS
OUTPATIENT
Start: 2025-07-16

## 2025-06-25 RX ORDER — HEPARIN 100 UNIT/ML
500 SYRINGE INTRAVENOUS PRN
OUTPATIENT
Start: 2025-07-16

## 2025-06-25 RX ORDER — SODIUM CHLORIDE 9 MG/ML
5-250 INJECTION, SOLUTION INTRAVENOUS PRN
OUTPATIENT
Start: 2025-07-16

## 2025-06-25 RX ORDER — DIPHENHYDRAMINE HYDROCHLORIDE 50 MG/ML
50 INJECTION, SOLUTION INTRAMUSCULAR; INTRAVENOUS
OUTPATIENT
Start: 2025-07-16

## 2025-06-25 RX ADMIN — ZOLEDRONIC ACID 4 MG: 0.04 INJECTION, SOLUTION INTRAVENOUS at 14:49

## 2025-06-25 RX ADMIN — SODIUM CHLORIDE, PRESERVATIVE FREE 10 ML: 5 INJECTION INTRAVENOUS at 15:08

## 2025-06-25 RX ADMIN — HEPARIN 500 UNITS: 100 SYRINGE at 15:09

## 2025-06-25 NOTE — PROGRESS NOTES
Patient here for zometa  Arrives ambulatory.  Denies any new complaints.  Treatment complete without incident.  Patient discharged in stable condition.  Returns 7/9/25 for MD visit and C15D1.

## 2025-07-09 ENCOUNTER — HOSPITAL ENCOUNTER (OUTPATIENT)
Dept: INFUSION THERAPY | Age: 50
Discharge: HOME | End: 2025-07-09
Payer: MEDICARE

## 2025-07-09 ENCOUNTER — TELEPHONE (OUTPATIENT)
Age: 50
End: 2025-07-09

## 2025-07-09 ENCOUNTER — OFFICE VISIT (OUTPATIENT)
Age: 50
End: 2025-07-09
Payer: MEDICARE

## 2025-07-09 VITALS
DIASTOLIC BLOOD PRESSURE: 88 MMHG | HEART RATE: 89 BPM | SYSTOLIC BLOOD PRESSURE: 132 MMHG | TEMPERATURE: 96.9 F | RESPIRATION RATE: 14 BRPM | BODY MASS INDEX: 24.74 KG/M2 | WEIGHT: 187.5 LBS | OXYGEN SATURATION: 99 %

## 2025-07-09 DIAGNOSIS — D64.9 NORMOCYTIC ANEMIA: ICD-10-CM

## 2025-07-09 DIAGNOSIS — C34.90 NON-SMALL CELL LUNG CANCER, UNSPECIFIED LATERALITY (HCC): Primary | ICD-10-CM

## 2025-07-09 DIAGNOSIS — Z79.899 LONG TERM USE OF DRUG: ICD-10-CM

## 2025-07-09 DIAGNOSIS — C79.51 MALIGNANT NEOPLASM METASTATIC TO BONE (HCC): Primary | ICD-10-CM

## 2025-07-09 DIAGNOSIS — C34.90 NON-SMALL CELL LUNG CANCER, UNSPECIFIED LATERALITY (HCC): ICD-10-CM

## 2025-07-09 DIAGNOSIS — C79.51 MALIGNANT NEOPLASM METASTATIC TO BONE (HCC): ICD-10-CM

## 2025-07-09 DIAGNOSIS — C79.51 METASTASIS TO BONE (HCC): ICD-10-CM

## 2025-07-09 DIAGNOSIS — F17.200 TOBACCO DEPENDENCE: ICD-10-CM

## 2025-07-09 LAB
ALBUMIN SERPL-MCNC: 4.5 G/DL (ref 3.5–5.2)
ALBUMIN/GLOB SERPL: 1.9 {RATIO} (ref 1–2.5)
ALP SERPL-CCNC: 92 U/L (ref 40–129)
ALT SERPL-CCNC: 17 U/L (ref 10–50)
AMYLASE SERPL-CCNC: 67 U/L (ref 28–100)
ANION GAP SERPL CALCULATED.3IONS-SCNC: 13 MMOL/L (ref 9–16)
AST SERPL-CCNC: 19 U/L (ref 10–50)
BASOPHILS # BLD: 0.1 K/UL (ref 0–0.2)
BASOPHILS NFR BLD: 1 % (ref 0–2)
BILIRUB SERPL-MCNC: 1.3 MG/DL (ref 0–1.2)
BUN SERPL-MCNC: 15 MG/DL (ref 6–20)
CALCIUM SERPL-MCNC: 9.2 MG/DL (ref 8.6–10.4)
CHLORIDE SERPL-SCNC: 103 MMOL/L (ref 98–107)
CO2 SERPL-SCNC: 23 MMOL/L (ref 20–31)
CREAT SERPL-MCNC: 1.2 MG/DL (ref 0.7–1.2)
EOSINOPHIL # BLD: 0.9 K/UL (ref 0–0.4)
EOSINOPHILS RELATIVE PERCENT: 14 % (ref 1–4)
ERYTHROCYTE [DISTWIDTH] IN BLOOD BY AUTOMATED COUNT: 14.6 % (ref 12.5–15.4)
GFR, ESTIMATED: 74 ML/MIN/1.73M2
GLUCOSE SERPL-MCNC: 172 MG/DL (ref 74–99)
HCT VFR BLD AUTO: 36.4 % (ref 41–53)
HGB BLD-MCNC: 12.7 G/DL (ref 13.5–17.5)
LIPASE SERPL-CCNC: 29 U/L (ref 13–60)
LYMPHOCYTES NFR BLD: 1.3 K/UL (ref 1–4.8)
LYMPHOCYTES RELATIVE PERCENT: 20 % (ref 24–44)
MCH RBC QN AUTO: 30.1 PG (ref 26–34)
MCHC RBC AUTO-ENTMCNC: 34.9 G/DL (ref 31–37)
MCV RBC AUTO: 86.2 FL (ref 80–100)
MONOCYTES NFR BLD: 0.5 K/UL (ref 0.1–1.2)
MONOCYTES NFR BLD: 7 % (ref 2–11)
NEUTROPHILS NFR BLD: 58 % (ref 36–66)
NEUTS SEG NFR BLD: 3.9 K/UL (ref 1.8–7.7)
PLATELET # BLD AUTO: 164 K/UL (ref 140–450)
PMV BLD AUTO: 7 FL (ref 6–12)
POTASSIUM SERPL-SCNC: 3.9 MMOL/L (ref 3.7–5.3)
PROT SERPL-MCNC: 6.9 G/DL (ref 6.6–8.7)
RBC # BLD AUTO: 4.23 M/UL (ref 4.5–5.9)
SODIUM SERPL-SCNC: 139 MMOL/L (ref 136–145)
TSH SERPL DL<=0.05 MIU/L-ACNC: 1.03 UIU/ML (ref 0.27–4.2)
WBC OTHER # BLD: 6.6 K/UL (ref 3.5–11)

## 2025-07-09 PROCEDURE — 80053 COMPREHEN METABOLIC PANEL: CPT

## 2025-07-09 PROCEDURE — 99213 OFFICE O/P EST LOW 20 MIN: CPT | Performed by: INTERNAL MEDICINE

## 2025-07-09 PROCEDURE — 83690 ASSAY OF LIPASE: CPT

## 2025-07-09 PROCEDURE — 99211 OFF/OP EST MAY X REQ PHY/QHP: CPT | Performed by: INTERNAL MEDICINE

## 2025-07-09 PROCEDURE — 85025 COMPLETE CBC W/AUTO DIFF WBC: CPT

## 2025-07-09 PROCEDURE — 82533 TOTAL CORTISOL: CPT

## 2025-07-09 PROCEDURE — 96413 CHEMO IV INFUSION 1 HR: CPT

## 2025-07-09 PROCEDURE — 2580000003 HC RX 258: Performed by: INTERNAL MEDICINE

## 2025-07-09 PROCEDURE — 84443 ASSAY THYROID STIM HORMONE: CPT

## 2025-07-09 PROCEDURE — 82150 ASSAY OF AMYLASE: CPT

## 2025-07-09 PROCEDURE — 6360000002 HC RX W HCPCS: Performed by: INTERNAL MEDICINE

## 2025-07-09 PROCEDURE — 2500000003 HC RX 250 WO HCPCS: Performed by: INTERNAL MEDICINE

## 2025-07-09 RX ORDER — EPINEPHRINE 1 MG/ML
0.3 INJECTION, SOLUTION, CONCENTRATE INTRAVENOUS PRN
Status: CANCELLED | OUTPATIENT
Start: 2025-07-09

## 2025-07-09 RX ORDER — HYDROCORTISONE SODIUM SUCCINATE 100 MG/2ML
100 INJECTION INTRAMUSCULAR; INTRAVENOUS
Status: CANCELLED | OUTPATIENT
Start: 2025-07-09

## 2025-07-09 RX ORDER — ONDANSETRON 2 MG/ML
8 INJECTION INTRAMUSCULAR; INTRAVENOUS
Status: CANCELLED | OUTPATIENT
Start: 2025-07-09

## 2025-07-09 RX ORDER — SODIUM CHLORIDE 9 MG/ML
5-250 INJECTION, SOLUTION INTRAVENOUS PRN
Status: CANCELLED | OUTPATIENT
Start: 2025-07-09

## 2025-07-09 RX ORDER — DIPHENHYDRAMINE HYDROCHLORIDE 50 MG/ML
50 INJECTION, SOLUTION INTRAMUSCULAR; INTRAVENOUS
Status: CANCELLED | OUTPATIENT
Start: 2025-07-09

## 2025-07-09 RX ORDER — ALBUTEROL SULFATE 90 UG/1
4 INHALANT RESPIRATORY (INHALATION) PRN
Status: CANCELLED | OUTPATIENT
Start: 2025-07-09

## 2025-07-09 RX ORDER — ACETAMINOPHEN 325 MG/1
650 TABLET ORAL
Status: CANCELLED | OUTPATIENT
Start: 2025-07-09

## 2025-07-09 RX ORDER — FAMOTIDINE 10 MG/ML
20 INJECTION, SOLUTION INTRAVENOUS
Status: CANCELLED | OUTPATIENT
Start: 2025-07-09

## 2025-07-09 RX ORDER — MEPERIDINE HYDROCHLORIDE 50 MG/ML
12.5 INJECTION INTRAMUSCULAR; INTRAVENOUS; SUBCUTANEOUS PRN
Status: CANCELLED | OUTPATIENT
Start: 2025-07-09

## 2025-07-09 RX ORDER — SODIUM CHLORIDE 0.9 % (FLUSH) 0.9 %
5-40 SYRINGE (ML) INJECTION PRN
Status: DISCONTINUED | OUTPATIENT
Start: 2025-07-09 | End: 2025-07-10 | Stop reason: HOSPADM

## 2025-07-09 RX ORDER — HEPARIN 100 UNIT/ML
500 SYRINGE INTRAVENOUS PRN
Status: DISCONTINUED | OUTPATIENT
Start: 2025-07-09 | End: 2025-07-10 | Stop reason: HOSPADM

## 2025-07-09 RX ORDER — SODIUM CHLORIDE 9 MG/ML
INJECTION, SOLUTION INTRAVENOUS CONTINUOUS
Status: CANCELLED | OUTPATIENT
Start: 2025-07-09

## 2025-07-09 RX ADMIN — SODIUM CHLORIDE, PRESERVATIVE FREE 10 ML: 5 INJECTION INTRAVENOUS at 15:33

## 2025-07-09 RX ADMIN — SODIUM CHLORIDE, PRESERVATIVE FREE 10 ML: 5 INJECTION INTRAVENOUS at 13:41

## 2025-07-09 RX ADMIN — SODIUM CHLORIDE 200 MG: 9 INJECTION, SOLUTION INTRAVENOUS at 15:01

## 2025-07-09 RX ADMIN — HEPARIN 500 UNITS: 100 SYRINGE at 15:33

## 2025-07-09 NOTE — PROGRESS NOTES
Pt here for C.15D.1. keytruda  Arrives ambulatory.  Denies any new complaints.  Labs drawn from port, results reviewed.  Pt was seen by Dr. Strong, order rec'd to proceed with tx.  Tx complete without incident.  Pt d/c'd in stable condition.  Returns 7/23/25 for zometa and 7/30 for office visit and C16D1.

## 2025-07-09 NOTE — TELEPHONE ENCOUNTER
Ok for treament  Rtc in 3 weeks        Patient is scheduled on 7/30 for treatment and doc appointment. Patient was given AVS.

## 2025-07-09 NOTE — PROGRESS NOTES
Patient ID: Isiah Duncan, 1975, [id number], 50 y.o.  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  PD-L1 of 0, high tumor mutation burden(16), no actionable mutation  Left scapular mass  Pathology fracture of the right femoral neck status post ORIF  CT scan shows new 1.5 cm right lower lobe noncalcified nodule> PET/CT on May 2025 was done and no activity      Oncologic treatment  chemoimmunotherapy started on August 21, 2024  Status post radiation to the scalp lesion  Scalp wound rupture> status post debridements and antibiotic which done I need to find        There is a barrier due to mentation and father who is POA the source of information and decision making and he is present during the visit      HISTORY OF PRESENT ILLNESS:    Oncologic History:    Isiah Duncan is a very pleasant 50 y.o. male. with right hip pain who fell and was brought to the ED 7/10/2024; he reports a 70lb weight loss over the last year. He has a history of schizophrenia and 40pk/yrs of smoking. He notes he has a bump on his head from his fall. He had a CT chest/abd/pelvis that shows a left lung mass as well as a bone metastases in the right femur causing pathologic fracture, as well as a CT Head showing a large scalp base mass.  Patient had a biopsy and ORIF of the right femur on July 11, 2024 which came back showing a poorly differentiated non-small cell carcinoma consistent with lung primary.  He denies any headaches, vision changes, chest pain, abdominal pain, N/V/D, swelling, bleeding, or fatigue. Patient's father who is his POA notes there is also a lesion in his left femur for which he will be seeing orthopedics about that to discuss fixation there as well.   He was seen by radiation oncology for palliative radiation treatment  Pathology from the right total hip

## 2025-07-10 LAB
CORTIS SERPL-MCNC: 9.4 UG/DL (ref 2.5–19.5)
CORTISOL COLLECTION INFO: NORMAL

## 2025-07-14 ENCOUNTER — HOSPITAL ENCOUNTER (OUTPATIENT)
Dept: WOUND CARE | Age: 50
Discharge: HOME | End: 2025-07-14
Payer: MEDICARE

## 2025-07-14 VITALS
WEIGHT: 187 LBS | HEART RATE: 93 BPM | DIASTOLIC BLOOD PRESSURE: 75 MMHG | TEMPERATURE: 97.7 F | BODY MASS INDEX: 24.78 KG/M2 | SYSTOLIC BLOOD PRESSURE: 106 MMHG | HEIGHT: 73 IN | RESPIRATION RATE: 18 BRPM

## 2025-07-14 DIAGNOSIS — S01.00XS OPEN WOUND OF SCALP, UNSPECIFIED OPEN WOUND TYPE, SEQUELA: Primary | ICD-10-CM

## 2025-07-14 PROCEDURE — 99213 OFFICE O/P EST LOW 20 MIN: CPT

## 2025-07-14 PROCEDURE — 99213 OFFICE O/P EST LOW 20 MIN: CPT | Performed by: PLASTIC SURGERY

## 2025-07-14 RX ORDER — LIDOCAINE HYDROCHLORIDE 40 MG/ML
SOLUTION TOPICAL ONCE
OUTPATIENT
Start: 2025-07-14 | End: 2025-07-14

## 2025-07-14 RX ORDER — LIDOCAINE HYDROCHLORIDE 20 MG/ML
JELLY TOPICAL ONCE
OUTPATIENT
Start: 2025-07-14 | End: 2025-07-14

## 2025-07-14 RX ORDER — MUPIROCIN 2 %
OINTMENT (GRAM) TOPICAL ONCE
OUTPATIENT
Start: 2025-07-14 | End: 2025-07-14

## 2025-07-14 RX ORDER — TRIAMCINOLONE ACETONIDE 1 MG/G
OINTMENT TOPICAL ONCE
OUTPATIENT
Start: 2025-07-14 | End: 2025-07-14

## 2025-07-14 RX ORDER — LIDOCAINE 50 MG/G
OINTMENT TOPICAL ONCE
OUTPATIENT
Start: 2025-07-14 | End: 2025-07-14

## 2025-07-14 RX ORDER — SODIUM CHLOR/HYPOCHLOROUS ACID 0.033 %
SOLUTION, IRRIGATION IRRIGATION ONCE
OUTPATIENT
Start: 2025-07-14 | End: 2025-07-14

## 2025-07-14 RX ORDER — GINSENG 100 MG
CAPSULE ORAL ONCE
OUTPATIENT
Start: 2025-07-14 | End: 2025-07-14

## 2025-07-14 RX ORDER — LIDOCAINE 40 MG/G
CREAM TOPICAL ONCE
OUTPATIENT
Start: 2025-07-14 | End: 2025-07-14

## 2025-07-14 RX ORDER — CLOBETASOL PROPIONATE 0.5 MG/G
OINTMENT TOPICAL ONCE
OUTPATIENT
Start: 2025-07-14 | End: 2025-07-14

## 2025-07-14 RX ORDER — BACITRACIN ZINC AND POLYMYXIN B SULFATE 500; 1000 [USP'U]/G; [USP'U]/G
OINTMENT TOPICAL ONCE
OUTPATIENT
Start: 2025-07-14 | End: 2025-07-14

## 2025-07-14 RX ORDER — NEOMYCIN/BACITRACIN/POLYMYXINB 3.5-400-5K
OINTMENT (GRAM) TOPICAL ONCE
OUTPATIENT
Start: 2025-07-14 | End: 2025-07-14

## 2025-07-14 RX ORDER — GENTAMICIN SULFATE 1 MG/G
OINTMENT TOPICAL ONCE
OUTPATIENT
Start: 2025-07-14 | End: 2025-07-14

## 2025-07-14 RX ORDER — BETAMETHASONE DIPROPIONATE 0.5 MG/G
CREAM TOPICAL ONCE
OUTPATIENT
Start: 2025-07-14 | End: 2025-07-14

## 2025-07-14 RX ORDER — LIDOCAINE HYDROCHLORIDE 40 MG/ML
SOLUTION TOPICAL ONCE
Status: DISCONTINUED | OUTPATIENT
Start: 2025-07-14 | End: 2025-07-15 | Stop reason: HOSPADM

## 2025-07-14 RX ORDER — SILVER SULFADIAZINE 10 MG/G
CREAM TOPICAL ONCE
OUTPATIENT
Start: 2025-07-14 | End: 2025-07-14

## 2025-07-14 ASSESSMENT — PAIN SCALES - GENERAL: PAINLEVEL_OUTOF10: 0

## 2025-07-14 NOTE — PROGRESS NOTES
[facility]       Progress Note and Procedure Note      Progress note     Chief Complaint   Patient presents with    Wound Check     scalp        HPI:   Isiah Duncan is a 50 y.o. male who presents wound ulcer evaluation.      History of Wound Context:   The patient presents with a scalp wound.  Patient presented to the ED with a right hip pain on 7/10/2024.  Patient had a 70 pound weight loss.  Patient has a history of schizophrenia and a 4D pack per year smoker.  Patient noted a bump on his head from his fall.  Patient had a CT of the chest abdomen and pelvis showed left lung mass with bone metastasis in the right femur causing a pathological fracture and the CT of the head showed a large scalp base mass.  Patient had an ORIF of the right femur with biopsy which showed non-small cell carcinoma consistent with a lung primary.  Patient has a history of radiation treatment to the right femur.  As per the family the patient also had radiation to skull.    Wound/Ulcer Pain Timing/Severity: none  Quality of pain: N/A  Severity:  0 / 10   Modifying Factors: None  Associated Signs/Symptoms: none    Ulcer Identification:  Ulcer Type: non-healing surgical  Contributing Factors: none    Patient has stopped smoking.  Patient has completed His radiation treatment September    Medications:     Current Outpatient Medications   Medication Sig Dispense Refill    magnesium oxide (MAG-OX) 400 (240 Mg) MG tablet Take 1 tablet by mouth daily      Multiple Vitamins-Minerals (MULTIVITAMIN ADULTS) TABS Take 1 tablet by mouth daily      Vitamin D (CHOLECALCIFEROL) 25 MCG (1000 UT) TABS tablet Take 1 tablet by mouth daily 60 tablet 0    docusate sodium (COLACE) 100 MG capsule Take 1 capsule by mouth 2 times daily as needed for Constipation 20 capsule 0    traZODone (DESYREL) 100 MG tablet Take 1 tablet by mouth nightly as needed for Sleep 7 tablet 0    propranolol (INDERAL) 20 MG tablet Take 1 tablet by

## 2025-07-14 NOTE — PROGRESS NOTES
Wound Care Supplies      Supply Company:   [employer]  Ordering Center:     [facility]  [address] [address]  [phone]  WOUND CARE Dept: [phone]   FAX NUMBER [phone]  PHONE [phone]    Patient Information:      Isiah Duncan  [address] [address]   [phone]   : 1975  AGE: 50 y.o.     GENDER: male   TODAYS DATE:  2025    Insurance:      PRIMARY INSURANCE:  Plan: MEDICARE PART A AND B  Coverage: MEDICARE  Effective Date: 2017  [id number] - (Medicare)    SECONDARY INSURANCE:  Plan:   Coverage:   Effective Date:   @[employer]@    @[employer]@   @[employer]@     Patient Wound Information:      Problem List Items Addressed This Visit          Other    Open wound of scalp - Primary    Relevant Medications    lidocaine (XYLOCAINE) 4 % external solution    Other Relevant Orders    Initiate Outpatient Wound Care Protocol       WOUNDS REQUIRING DRESSING SUPPLIES:     Wound 24 Head Upper wound #1 scalp (Active)   Wound Image   25 1400   Wound Etiology Other 25 1400   Dressing Status New drainage noted;Old drainage noted 25 1400   Wound Cleansed Cleansed with saline 25 1400   Dressing/Treatment Other (comment) 25 1454   Wound Length (cm) 2 cm 25 1400   Wound Width (cm) 1.5 cm 25 1400   Wound Depth (cm) 0.1 cm 25 1400   Wound Surface Area (cm^2) 3 cm^2 25 1400   Change in Wound Size % (l*w) 20.63 25 1400   Wound Volume (cm^3) 0.3 cm^3 25 1400   Wound Healing % 90 25 1400   Post-Procedure Length (cm) 2 cm 25 1400   Post-Procedure Width (cm) 1.7 cm 25 1400   Post-Procedure Depth (cm) 0.1 cm 25 1400   Post-Procedure Surface Area (cm^2) 3.4 cm^2 25 1400   Post-Procedure Volume (cm^3) 0.34 cm^3 25 1400   Wound Assessment Slough;[place]/red 25 1400   Drainage Amount Moderate (25-50%) 25 1400   Drainage Description

## 2025-07-22 ENCOUNTER — FOLLOWUP TELEPHONE ENCOUNTER (OUTPATIENT)
Dept: WOUND CARE | Age: 50
End: 2025-07-22

## 2025-07-22 NOTE — PROGRESS NOTES
Pt had scheduled [facility] apt 8/11/25. Provider unavailable that day. Called pt's guardian and apt rescheduled for 8/12/25 1300.

## 2025-07-23 ENCOUNTER — HOSPITAL ENCOUNTER (OUTPATIENT)
Dept: INFUSION THERAPY | Age: 50
Discharge: HOME | End: 2025-07-23
Payer: MEDICARE

## 2025-07-23 VITALS
TEMPERATURE: 97.5 F | RESPIRATION RATE: 16 BRPM | SYSTOLIC BLOOD PRESSURE: 133 MMHG | HEART RATE: 81 BPM | DIASTOLIC BLOOD PRESSURE: 80 MMHG

## 2025-07-23 DIAGNOSIS — C34.90 NON-SMALL CELL LUNG CANCER, UNSPECIFIED LATERALITY (HCC): ICD-10-CM

## 2025-07-23 DIAGNOSIS — C79.51 METASTASIS TO BONE (HCC): Primary | ICD-10-CM

## 2025-07-23 LAB
ALBUMIN SERPL-MCNC: 4.4 G/DL (ref 3.5–5.2)
ALBUMIN/GLOB SERPL: 1.8 {RATIO} (ref 1–2.5)
ALP SERPL-CCNC: 89 U/L (ref 40–129)
ALT SERPL-CCNC: 23 U/L (ref 10–50)
ANION GAP SERPL CALCULATED.3IONS-SCNC: 12 MMOL/L (ref 9–16)
AST SERPL-CCNC: 19 U/L (ref 10–50)
BILIRUB SERPL-MCNC: 0.8 MG/DL (ref 0–1.2)
BUN SERPL-MCNC: 18 MG/DL (ref 6–20)
CALCIUM SERPL-MCNC: 9.6 MG/DL (ref 8.6–10.4)
CHLORIDE SERPL-SCNC: 104 MMOL/L (ref 98–107)
CO2 SERPL-SCNC: 24 MMOL/L (ref 20–31)
CREAT SERPL-MCNC: 1 MG/DL (ref 0.7–1.2)
GFR, ESTIMATED: >90 ML/MIN/1.73M2
GLUCOSE SERPL-MCNC: 102 MG/DL (ref 74–99)
POTASSIUM SERPL-SCNC: 4.2 MMOL/L (ref 3.7–5.3)
PROT SERPL-MCNC: 6.8 G/DL (ref 6.6–8.7)
SODIUM SERPL-SCNC: 140 MMOL/L (ref 136–145)

## 2025-07-23 PROCEDURE — 6360000002 HC RX W HCPCS: Performed by: INTERNAL MEDICINE

## 2025-07-23 PROCEDURE — 2500000003 HC RX 250 WO HCPCS: Performed by: INTERNAL MEDICINE

## 2025-07-23 PROCEDURE — 36415 COLL VENOUS BLD VENIPUNCTURE: CPT

## 2025-07-23 PROCEDURE — 80053 COMPREHEN METABOLIC PANEL: CPT

## 2025-07-23 PROCEDURE — 96365 THER/PROPH/DIAG IV INF INIT: CPT

## 2025-07-23 RX ORDER — SODIUM CHLORIDE 0.9 % (FLUSH) 0.9 %
5-40 SYRINGE (ML) INJECTION PRN
OUTPATIENT
Start: 2025-08-13

## 2025-07-23 RX ORDER — SODIUM CHLORIDE 9 MG/ML
5-250 INJECTION, SOLUTION INTRAVENOUS PRN
OUTPATIENT
Start: 2025-08-13

## 2025-07-23 RX ORDER — ZOLEDRONIC ACID 0.04 MG/ML
4 INJECTION, SOLUTION INTRAVENOUS ONCE
OUTPATIENT
Start: 2025-08-13 | End: 2025-08-13

## 2025-07-23 RX ORDER — FAMOTIDINE 10 MG/ML
20 INJECTION, SOLUTION INTRAVENOUS
OUTPATIENT
Start: 2025-08-13

## 2025-07-23 RX ORDER — ZOLEDRONIC ACID 0.04 MG/ML
4 INJECTION, SOLUTION INTRAVENOUS ONCE
Status: COMPLETED | OUTPATIENT
Start: 2025-07-23 | End: 2025-07-23

## 2025-07-23 RX ORDER — EPINEPHRINE 1 MG/ML
0.3 INJECTION, SOLUTION, CONCENTRATE INTRAVENOUS PRN
OUTPATIENT
Start: 2025-08-13

## 2025-07-23 RX ORDER — HEPARIN 100 UNIT/ML
500 SYRINGE INTRAVENOUS PRN
Status: DISCONTINUED | OUTPATIENT
Start: 2025-07-23 | End: 2025-07-24 | Stop reason: HOSPADM

## 2025-07-23 RX ORDER — HYDROCORTISONE SODIUM SUCCINATE 100 MG/2ML
100 INJECTION INTRAMUSCULAR; INTRAVENOUS
OUTPATIENT
Start: 2025-08-13

## 2025-07-23 RX ORDER — ACETAMINOPHEN 325 MG/1
650 TABLET ORAL
OUTPATIENT
Start: 2025-08-13

## 2025-07-23 RX ORDER — ONDANSETRON 2 MG/ML
8 INJECTION INTRAMUSCULAR; INTRAVENOUS
OUTPATIENT
Start: 2025-08-13

## 2025-07-23 RX ORDER — HEPARIN 100 UNIT/ML
500 SYRINGE INTRAVENOUS PRN
OUTPATIENT
Start: 2025-08-13

## 2025-07-23 RX ORDER — SODIUM CHLORIDE 9 MG/ML
INJECTION, SOLUTION INTRAVENOUS CONTINUOUS
OUTPATIENT
Start: 2025-08-13

## 2025-07-23 RX ORDER — DIPHENHYDRAMINE HYDROCHLORIDE 50 MG/ML
50 INJECTION, SOLUTION INTRAMUSCULAR; INTRAVENOUS
OUTPATIENT
Start: 2025-08-13

## 2025-07-23 RX ORDER — ALBUTEROL SULFATE 90 UG/1
4 INHALANT RESPIRATORY (INHALATION) PRN
OUTPATIENT
Start: 2025-08-13

## 2025-07-23 RX ORDER — SODIUM CHLORIDE 0.9 % (FLUSH) 0.9 %
5-40 SYRINGE (ML) INJECTION PRN
Status: DISCONTINUED | OUTPATIENT
Start: 2025-07-23 | End: 2025-07-24 | Stop reason: HOSPADM

## 2025-07-23 RX ADMIN — HEPARIN 500 UNITS: 100 SYRINGE at 15:43

## 2025-07-23 RX ADMIN — SODIUM CHLORIDE, PRESERVATIVE FREE 10 ML: 5 INJECTION INTRAVENOUS at 14:47

## 2025-07-23 RX ADMIN — ZOLEDRONIC ACID 4 MG: 0.04 INJECTION, SOLUTION INTRAVENOUS at 15:24

## 2025-07-23 RX ADMIN — SODIUM CHLORIDE, PRESERVATIVE FREE 10 ML: 5 INJECTION INTRAVENOUS at 15:43

## 2025-07-23 NOTE — PROGRESS NOTES
Patient here for zometa  Arrives ambulatory.  Denies any new complaints.  Labs drawn from port, results reviewed.  Treatment complete without incident.  Patient discharged in stable condition.  Returns 7/30/25 for MD visit and C16D1.

## 2025-07-30 ENCOUNTER — HOSPITAL ENCOUNTER (OUTPATIENT)
Dept: INFUSION THERAPY | Age: 50
Discharge: HOME | End: 2025-07-30
Payer: MEDICARE

## 2025-07-30 ENCOUNTER — TELEPHONE (OUTPATIENT)
Age: 50
End: 2025-07-30

## 2025-07-30 ENCOUNTER — OFFICE VISIT (OUTPATIENT)
Age: 50
End: 2025-07-30
Payer: MEDICARE

## 2025-07-30 VITALS
SYSTOLIC BLOOD PRESSURE: 100 MMHG | TEMPERATURE: 97.5 F | BODY MASS INDEX: 25.17 KG/M2 | DIASTOLIC BLOOD PRESSURE: 67 MMHG | RESPIRATION RATE: 16 BRPM | HEART RATE: 74 BPM | WEIGHT: 190.8 LBS

## 2025-07-30 DIAGNOSIS — C79.51 MALIGNANT NEOPLASM METASTATIC TO BONE (HCC): Primary | ICD-10-CM

## 2025-07-30 DIAGNOSIS — Z79.899 LONG TERM USE OF DRUG: ICD-10-CM

## 2025-07-30 DIAGNOSIS — C34.90 NON-SMALL CELL LUNG CANCER, UNSPECIFIED LATERALITY (HCC): ICD-10-CM

## 2025-07-30 LAB
ALBUMIN SERPL-MCNC: 4.4 G/DL (ref 3.5–5.2)
ALBUMIN/GLOB SERPL: 1.8 {RATIO} (ref 1–2.5)
ALP SERPL-CCNC: 98 U/L (ref 40–129)
ALT SERPL-CCNC: 17 U/L (ref 10–50)
AMYLASE SERPL-CCNC: 94 U/L (ref 28–100)
ANION GAP SERPL CALCULATED.3IONS-SCNC: 13 MMOL/L (ref 9–16)
AST SERPL-CCNC: 17 U/L (ref 10–50)
BASOPHILS # BLD: 0.1 K/UL (ref 0–0.2)
BASOPHILS NFR BLD: 2 % (ref 0–2)
BILIRUB SERPL-MCNC: 0.9 MG/DL (ref 0–1.2)
BUN SERPL-MCNC: 19 MG/DL (ref 6–20)
CALCIUM SERPL-MCNC: 9.3 MG/DL (ref 8.6–10.4)
CHLORIDE SERPL-SCNC: 104 MMOL/L (ref 98–107)
CO2 SERPL-SCNC: 23 MMOL/L (ref 20–31)
CORTIS SERPL-MCNC: 11.1 UG/DL (ref 2.5–19.5)
CORTISOL COLLECTION INFO: NORMAL
CREAT SERPL-MCNC: 1 MG/DL (ref 0.7–1.2)
EOSINOPHIL # BLD: 1.2 K/UL (ref 0–0.4)
EOSINOPHILS RELATIVE PERCENT: 17 % (ref 1–4)
ERYTHROCYTE [DISTWIDTH] IN BLOOD BY AUTOMATED COUNT: 14 % (ref 12.5–15.4)
GFR, ESTIMATED: >90 ML/MIN/1.73M2
GLUCOSE SERPL-MCNC: 123 MG/DL (ref 74–99)
HCT VFR BLD AUTO: 36.7 % (ref 41–53)
HGB BLD-MCNC: 12.7 G/DL (ref 13.5–17.5)
LIPASE SERPL-CCNC: 33 U/L (ref 13–60)
LYMPHOCYTES NFR BLD: 1.2 K/UL (ref 1–4.8)
LYMPHOCYTES RELATIVE PERCENT: 17 % (ref 24–44)
MCH RBC QN AUTO: 30 PG (ref 26–34)
MCHC RBC AUTO-ENTMCNC: 34.7 G/DL (ref 31–37)
MCV RBC AUTO: 86.6 FL (ref 80–100)
MONOCYTES NFR BLD: 0.5 K/UL (ref 0.1–1.2)
MONOCYTES NFR BLD: 7 % (ref 2–11)
NEUTROPHILS NFR BLD: 57 % (ref 36–66)
NEUTS SEG NFR BLD: 3.9 K/UL (ref 1.8–7.7)
PLATELET # BLD AUTO: 182 K/UL (ref 140–450)
PMV BLD AUTO: 7.1 FL (ref 6–12)
POTASSIUM SERPL-SCNC: 4 MMOL/L (ref 3.7–5.3)
PROT SERPL-MCNC: 6.9 G/DL (ref 6.6–8.7)
RBC # BLD AUTO: 4.24 M/UL (ref 4.5–5.9)
SODIUM SERPL-SCNC: 140 MMOL/L (ref 136–145)
TSH SERPL DL<=0.05 MIU/L-ACNC: 1.88 UIU/ML (ref 0.27–4.2)
WBC OTHER # BLD: 6.8 K/UL (ref 3.5–11)

## 2025-07-30 PROCEDURE — 6360000002 HC RX W HCPCS: Performed by: INTERNAL MEDICINE

## 2025-07-30 PROCEDURE — G8427 DOCREV CUR MEDS BY ELIG CLIN: HCPCS | Performed by: INTERNAL MEDICINE

## 2025-07-30 PROCEDURE — G8419 CALC BMI OUT NRM PARAM NOF/U: HCPCS | Performed by: INTERNAL MEDICINE

## 2025-07-30 PROCEDURE — 83690 ASSAY OF LIPASE: CPT

## 2025-07-30 PROCEDURE — 80053 COMPREHEN METABOLIC PANEL: CPT

## 2025-07-30 PROCEDURE — 82150 ASSAY OF AMYLASE: CPT

## 2025-07-30 PROCEDURE — 99213 OFFICE O/P EST LOW 20 MIN: CPT | Performed by: INTERNAL MEDICINE

## 2025-07-30 PROCEDURE — 82533 TOTAL CORTISOL: CPT

## 2025-07-30 PROCEDURE — 1036F TOBACCO NON-USER: CPT | Performed by: INTERNAL MEDICINE

## 2025-07-30 PROCEDURE — 85025 COMPLETE CBC W/AUTO DIFF WBC: CPT

## 2025-07-30 PROCEDURE — 99214 OFFICE O/P EST MOD 30 MIN: CPT | Performed by: INTERNAL MEDICINE

## 2025-07-30 PROCEDURE — 84443 ASSAY THYROID STIM HORMONE: CPT

## 2025-07-30 PROCEDURE — 2500000003 HC RX 250 WO HCPCS: Performed by: INTERNAL MEDICINE

## 2025-07-30 PROCEDURE — 2580000003 HC RX 258: Performed by: INTERNAL MEDICINE

## 2025-07-30 PROCEDURE — 96413 CHEMO IV INFUSION 1 HR: CPT

## 2025-07-30 PROCEDURE — 3017F COLORECTAL CA SCREEN DOC REV: CPT | Performed by: INTERNAL MEDICINE

## 2025-07-30 RX ORDER — ACETAMINOPHEN 325 MG/1
650 TABLET ORAL
Status: CANCELLED | OUTPATIENT
Start: 2025-07-30

## 2025-07-30 RX ORDER — HEPARIN 100 UNIT/ML
500 SYRINGE INTRAVENOUS PRN
Status: DISCONTINUED | OUTPATIENT
Start: 2025-07-30 | End: 2025-07-31 | Stop reason: HOSPADM

## 2025-07-30 RX ORDER — HYDROCORTISONE SODIUM SUCCINATE 100 MG/2ML
100 INJECTION INTRAMUSCULAR; INTRAVENOUS
Status: CANCELLED | OUTPATIENT
Start: 2025-07-30

## 2025-07-30 RX ORDER — ONDANSETRON 2 MG/ML
8 INJECTION INTRAMUSCULAR; INTRAVENOUS
Status: CANCELLED | OUTPATIENT
Start: 2025-07-30

## 2025-07-30 RX ORDER — MEPERIDINE HYDROCHLORIDE 50 MG/ML
12.5 INJECTION INTRAMUSCULAR; INTRAVENOUS; SUBCUTANEOUS PRN
Status: CANCELLED | OUTPATIENT
Start: 2025-07-30

## 2025-07-30 RX ORDER — DIPHENHYDRAMINE HYDROCHLORIDE 50 MG/ML
50 INJECTION, SOLUTION INTRAMUSCULAR; INTRAVENOUS
Status: CANCELLED | OUTPATIENT
Start: 2025-07-30

## 2025-07-30 RX ORDER — SODIUM CHLORIDE 9 MG/ML
5-250 INJECTION, SOLUTION INTRAVENOUS PRN
Status: CANCELLED | OUTPATIENT
Start: 2025-07-30

## 2025-07-30 RX ORDER — EPINEPHRINE 1 MG/ML
0.3 INJECTION, SOLUTION, CONCENTRATE INTRAVENOUS PRN
Status: CANCELLED | OUTPATIENT
Start: 2025-07-30

## 2025-07-30 RX ORDER — ALBUTEROL SULFATE 90 UG/1
4 INHALANT RESPIRATORY (INHALATION) PRN
Status: CANCELLED | OUTPATIENT
Start: 2025-07-30

## 2025-07-30 RX ORDER — SODIUM CHLORIDE 0.9 % (FLUSH) 0.9 %
5-40 SYRINGE (ML) INJECTION PRN
Status: DISCONTINUED | OUTPATIENT
Start: 2025-07-30 | End: 2025-07-31 | Stop reason: HOSPADM

## 2025-07-30 RX ORDER — FAMOTIDINE 10 MG/ML
20 INJECTION, SOLUTION INTRAVENOUS
Status: CANCELLED | OUTPATIENT
Start: 2025-07-30

## 2025-07-30 RX ORDER — SODIUM CHLORIDE 9 MG/ML
INJECTION, SOLUTION INTRAVENOUS CONTINUOUS
Status: CANCELLED | OUTPATIENT
Start: 2025-07-30

## 2025-07-30 RX ADMIN — SODIUM CHLORIDE, PRESERVATIVE FREE 10 ML: 5 INJECTION INTRAVENOUS at 10:48

## 2025-07-30 RX ADMIN — HEPARIN 500 UNITS: 100 SYRINGE at 13:08

## 2025-07-30 RX ADMIN — SODIUM CHLORIDE, PRESERVATIVE FREE 10 ML: 5 INJECTION INTRAVENOUS at 13:08

## 2025-07-30 RX ADMIN — SODIUM CHLORIDE 200 MG: 9 INJECTION, SOLUTION INTRAVENOUS at 12:40

## 2025-07-30 NOTE — TELEPHONE ENCOUNTER
Instructions   from Dr. Calin Strong MD    Ok for treament  Rtc in 3 weeks      Tx today  RV during tx 8/20/25 at 1 pm

## 2025-07-30 NOTE — PROGRESS NOTES
Pt here for C16D1 Keytruda. Denies any new complaints. Labs drawn from port and results reviewed. Pt seen by Dr Strong at chair side for f/u, refer to his note. Pt was treated without incident and d/c'd in stable condition. Pt will return on 8-20-25 for C17D1 and MD f/u.

## 2025-07-30 NOTE — PROGRESS NOTES
(Patient not taking: Reported on 6/16/2025) 42 capsule 0    ondansetron (ZOFRAN-ODT) 8 MG TBDP disintegrating tablet Take 1 tablet by mouth 3 times daily as needed for Nausea or Vomiting 60 tablet 3    lidocaine-prilocaine (EMLA) 2.5-2.5 % cream Apply topically 45-60 mins prior to needle poke daily PRN 1 each 2    traZODone (DESYREL) 100 MG tablet Take 1 tablet by mouth nightly as needed for Sleep 7 tablet 0    propranolol (INDERAL) 20 MG tablet Take 1 tablet by mouth 2 times daily      INVEGA TRINZA 546 MG/1.75ML ALEXSANDER IM injection       metFORMIN (GLUCOPHAGE) 1000 MG tablet Take 1 tablet by mouth 2 times daily      atorvastatin (LIPITOR) 80 MG tablet Take 1 tablet by mouth nightly at bedtime.      famotidine (PEPCID) 20 MG tablet Take 1 tablet by mouth 2 times daily 60 tablet 3     No current facility-administered medications for this visit.     Facility-Administered Medications Ordered in Other Visits   Medication Dose Route Frequency Provider Last Rate Last Admin    sodium chloride flush 0.9 % injection 5-40 mL  5-40 mL IntraVENous PRN Calin Strong MD   10 mL at 07/30/25 1048    heparin (PF) 100 UNIT/ML injection 500 Units  500 Units IntraCATHeter PRN Calin Strong MD        pembrolizumab (KEYTRUDA) 200 mg in sodium chloride 0.9 % 100 mL IVPB  200 mg IntraVENous Once Calin Strong MD           Social History     Socioeconomic History    Marital status: Single     Spouse name: Not on file    Number of children: Not on file    Years of education: Not on file    Highest education level: Not on file   Occupational History    Not on file   Tobacco Use    Smoking status: Former     Current packs/day: 2.00     Average packs/day: 2.0 packs/day for 20.0 years (40.0 ttl pk-yrs)     Types: Cigarettes    Smokeless tobacco: Never   Vaping Use    Vaping status: Never Used   Substance and Sexual Activity    Alcohol use: No    Drug use: No    Sexual activity: Not Currently     Partners: Female   Other Topics Concern

## 2025-08-12 ENCOUNTER — HOSPITAL ENCOUNTER (OUTPATIENT)
Dept: WOUND CARE | Age: 50
Discharge: HOME | End: 2025-08-12
Payer: MEDICARE

## 2025-08-12 VITALS
BODY MASS INDEX: 25.18 KG/M2 | SYSTOLIC BLOOD PRESSURE: 104 MMHG | RESPIRATION RATE: 16 BRPM | TEMPERATURE: 97.7 F | HEIGHT: 73 IN | WEIGHT: 190 LBS | DIASTOLIC BLOOD PRESSURE: 69 MMHG | HEART RATE: 85 BPM

## 2025-08-12 DIAGNOSIS — S01.00XS OPEN WOUND OF SCALP, UNSPECIFIED OPEN WOUND TYPE, SEQUELA: Primary | ICD-10-CM

## 2025-08-12 PROCEDURE — 11042 DBRDMT SUBQ TIS 1ST 20SQCM/<: CPT

## 2025-08-12 PROCEDURE — 11042 DBRDMT SUBQ TIS 1ST 20SQCM/<: CPT | Performed by: SURGERY

## 2025-08-12 RX ORDER — NEOMYCIN/BACITRACIN/POLYMYXINB 3.5-400-5K
OINTMENT (GRAM) TOPICAL ONCE
OUTPATIENT
Start: 2025-08-12 | End: 2025-08-12

## 2025-08-12 RX ORDER — SODIUM CHLOR/HYPOCHLOROUS ACID 0.033 %
SOLUTION, IRRIGATION IRRIGATION ONCE
OUTPATIENT
Start: 2025-08-12 | End: 2025-08-12

## 2025-08-12 RX ORDER — GENTAMICIN SULFATE 1 MG/G
OINTMENT TOPICAL ONCE
OUTPATIENT
Start: 2025-08-12 | End: 2025-08-12

## 2025-08-12 RX ORDER — LIDOCAINE HYDROCHLORIDE 40 MG/ML
SOLUTION TOPICAL ONCE
OUTPATIENT
Start: 2025-08-12 | End: 2025-08-12

## 2025-08-12 RX ORDER — MUPIROCIN 2 %
OINTMENT (GRAM) TOPICAL ONCE
OUTPATIENT
Start: 2025-08-12 | End: 2025-08-12

## 2025-08-12 RX ORDER — BETAMETHASONE DIPROPIONATE 0.5 MG/G
CREAM TOPICAL ONCE
OUTPATIENT
Start: 2025-08-12 | End: 2025-08-12

## 2025-08-12 RX ORDER — LIDOCAINE HYDROCHLORIDE 20 MG/ML
JELLY TOPICAL ONCE
OUTPATIENT
Start: 2025-08-12 | End: 2025-08-12

## 2025-08-12 RX ORDER — TRIAMCINOLONE ACETONIDE 1 MG/G
OINTMENT TOPICAL ONCE
OUTPATIENT
Start: 2025-08-12 | End: 2025-08-12

## 2025-08-12 RX ORDER — LIDOCAINE 50 MG/G
OINTMENT TOPICAL ONCE
OUTPATIENT
Start: 2025-08-12 | End: 2025-08-12

## 2025-08-12 RX ORDER — SILVER SULFADIAZINE 10 MG/G
CREAM TOPICAL ONCE
OUTPATIENT
Start: 2025-08-12 | End: 2025-08-12

## 2025-08-12 RX ORDER — GINSENG 100 MG
CAPSULE ORAL ONCE
OUTPATIENT
Start: 2025-08-12 | End: 2025-08-12

## 2025-08-12 RX ORDER — LIDOCAINE HYDROCHLORIDE 40 MG/ML
SOLUTION TOPICAL ONCE
Status: COMPLETED | OUTPATIENT
Start: 2025-08-12 | End: 2025-08-12

## 2025-08-12 RX ORDER — BACITRACIN ZINC AND POLYMYXIN B SULFATE 500; 1000 [USP'U]/G; [USP'U]/G
OINTMENT TOPICAL ONCE
OUTPATIENT
Start: 2025-08-12 | End: 2025-08-12

## 2025-08-12 RX ORDER — LIDOCAINE 40 MG/G
CREAM TOPICAL ONCE
OUTPATIENT
Start: 2025-08-12 | End: 2025-08-12

## 2025-08-12 RX ORDER — CLOBETASOL PROPIONATE 0.5 MG/G
OINTMENT TOPICAL ONCE
OUTPATIENT
Start: 2025-08-12 | End: 2025-08-12

## 2025-08-12 RX ADMIN — LIDOCAINE HYDROCHLORIDE 5 ML: 40 SOLUTION TOPICAL at 13:25

## 2025-08-12 ASSESSMENT — ENCOUNTER SYMPTOMS
RHINORRHEA: 0
VOMITING: 0
DIARRHEA: 0
COUGH: 0
SHORTNESS OF BREATH: 0
NAUSEA: 0

## 2025-08-12 ASSESSMENT — PAIN SCALES - GENERAL: PAINLEVEL_OUTOF10: 0

## 2025-08-18 DIAGNOSIS — C79.51 MALIGNANT NEOPLASM METASTATIC TO BONE (HCC): Primary | ICD-10-CM

## 2025-08-20 ENCOUNTER — TELEPHONE (OUTPATIENT)
Age: 50
End: 2025-08-20

## 2025-08-20 ENCOUNTER — OFFICE VISIT (OUTPATIENT)
Age: 50
End: 2025-08-20
Payer: MEDICARE

## 2025-08-20 ENCOUNTER — HOSPITAL ENCOUNTER (OUTPATIENT)
Dept: INFUSION THERAPY | Age: 50
Discharge: HOME | End: 2025-08-20
Payer: MEDICARE

## 2025-08-20 VITALS
TEMPERATURE: 97.3 F | SYSTOLIC BLOOD PRESSURE: 99 MMHG | BODY MASS INDEX: 25.25 KG/M2 | WEIGHT: 191.4 LBS | RESPIRATION RATE: 17 BRPM | HEART RATE: 93 BPM | OXYGEN SATURATION: 98 % | DIASTOLIC BLOOD PRESSURE: 63 MMHG

## 2025-08-20 DIAGNOSIS — C79.51 MALIGNANT NEOPLASM METASTATIC TO BONE (HCC): ICD-10-CM

## 2025-08-20 DIAGNOSIS — C79.51 MALIGNANT NEOPLASM METASTATIC TO BONE (HCC): Primary | ICD-10-CM

## 2025-08-20 DIAGNOSIS — C79.51 METASTASIS TO BONE (HCC): ICD-10-CM

## 2025-08-20 DIAGNOSIS — D64.81 ANEMIA ASSOCIATED WITH CHEMOTHERAPY: ICD-10-CM

## 2025-08-20 DIAGNOSIS — C34.90 NON-SMALL CELL LUNG CANCER, UNSPECIFIED LATERALITY (HCC): Primary | ICD-10-CM

## 2025-08-20 DIAGNOSIS — T45.1X5A ANEMIA ASSOCIATED WITH CHEMOTHERAPY: ICD-10-CM

## 2025-08-20 DIAGNOSIS — C34.90 NON-SMALL CELL LUNG CANCER, UNSPECIFIED LATERALITY (HCC): ICD-10-CM

## 2025-08-20 DIAGNOSIS — Z79.899 LONG TERM USE OF DRUG: ICD-10-CM

## 2025-08-20 DIAGNOSIS — F17.200 TOBACCO DEPENDENCE: ICD-10-CM

## 2025-08-20 LAB
ALBUMIN SERPL-MCNC: 4.5 G/DL (ref 3.5–5.2)
ALBUMIN/GLOB SERPL: 2 {RATIO} (ref 1–2.5)
ALP SERPL-CCNC: 98 U/L (ref 40–129)
ALT SERPL-CCNC: 19 U/L (ref 10–50)
AMYLASE SERPL-CCNC: 88 U/L (ref 28–100)
ANION GAP SERPL CALCULATED.3IONS-SCNC: 13 MMOL/L (ref 9–16)
AST SERPL-CCNC: 19 U/L (ref 10–50)
BASOPHILS # BLD: 0.1 K/UL (ref 0–0.2)
BASOPHILS NFR BLD: 1 % (ref 0–2)
BILIRUB SERPL-MCNC: 1 MG/DL (ref 0–1.2)
BUN SERPL-MCNC: 12 MG/DL (ref 6–20)
CALCIUM SERPL-MCNC: 9.4 MG/DL (ref 8.6–10.4)
CHLORIDE SERPL-SCNC: 105 MMOL/L (ref 98–107)
CO2 SERPL-SCNC: 23 MMOL/L (ref 20–31)
CORTIS SERPL-MCNC: 13.4 UG/DL (ref 2.5–19.5)
CORTISOL COLLECTION INFO: NORMAL
CREAT SERPL-MCNC: 1 MG/DL (ref 0.7–1.2)
EOSINOPHIL # BLD: 1 K/UL (ref 0–0.4)
EOSINOPHILS RELATIVE PERCENT: 16 % (ref 1–4)
ERYTHROCYTE [DISTWIDTH] IN BLOOD BY AUTOMATED COUNT: 14.1 % (ref 12.5–15.4)
GFR, ESTIMATED: >90 ML/MIN/1.73M2
GLUCOSE SERPL-MCNC: 145 MG/DL (ref 74–99)
HCT VFR BLD AUTO: 36.8 % (ref 41–53)
HGB BLD-MCNC: 12.9 G/DL (ref 13.5–17.5)
LIPASE SERPL-CCNC: 30 U/L (ref 13–60)
LYMPHOCYTES NFR BLD: 1.3 K/UL (ref 1–4.8)
LYMPHOCYTES RELATIVE PERCENT: 22 % (ref 24–44)
MCH RBC QN AUTO: 30 PG (ref 26–34)
MCHC RBC AUTO-ENTMCNC: 34.9 G/DL (ref 31–37)
MCV RBC AUTO: 86 FL (ref 80–100)
MONOCYTES NFR BLD: 0.4 K/UL (ref 0.1–1.2)
MONOCYTES NFR BLD: 6 % (ref 2–11)
NEUTROPHILS NFR BLD: 55 % (ref 36–66)
NEUTS SEG NFR BLD: 3.4 K/UL (ref 1.8–7.7)
PLATELET # BLD AUTO: 188 K/UL (ref 140–450)
PMV BLD AUTO: 7.2 FL (ref 6–12)
POTASSIUM SERPL-SCNC: 4 MMOL/L (ref 3.7–5.3)
PROT SERPL-MCNC: 6.7 G/DL (ref 6.6–8.7)
RBC # BLD AUTO: 4.28 M/UL (ref 4.5–5.9)
SODIUM SERPL-SCNC: 141 MMOL/L (ref 136–145)
TSH SERPL DL<=0.05 MIU/L-ACNC: 1.36 UIU/ML (ref 0.27–4.2)
WBC OTHER # BLD: 6.2 K/UL (ref 3.5–11)

## 2025-08-20 PROCEDURE — 1036F TOBACCO NON-USER: CPT | Performed by: INTERNAL MEDICINE

## 2025-08-20 PROCEDURE — 80053 COMPREHEN METABOLIC PANEL: CPT

## 2025-08-20 PROCEDURE — 82533 TOTAL CORTISOL: CPT

## 2025-08-20 PROCEDURE — 85025 COMPLETE CBC W/AUTO DIFF WBC: CPT

## 2025-08-20 PROCEDURE — 83690 ASSAY OF LIPASE: CPT

## 2025-08-20 PROCEDURE — 2500000003 HC RX 250 WO HCPCS: Performed by: INTERNAL MEDICINE

## 2025-08-20 PROCEDURE — 96417 CHEMO IV INFUS EACH ADDL SEQ: CPT

## 2025-08-20 PROCEDURE — 36415 COLL VENOUS BLD VENIPUNCTURE: CPT

## 2025-08-20 PROCEDURE — 84443 ASSAY THYROID STIM HORMONE: CPT

## 2025-08-20 PROCEDURE — 96367 TX/PROPH/DG ADDL SEQ IV INF: CPT

## 2025-08-20 PROCEDURE — G8427 DOCREV CUR MEDS BY ELIG CLIN: HCPCS | Performed by: INTERNAL MEDICINE

## 2025-08-20 PROCEDURE — 99214 OFFICE O/P EST MOD 30 MIN: CPT | Performed by: INTERNAL MEDICINE

## 2025-08-20 PROCEDURE — 2580000003 HC RX 258: Performed by: INTERNAL MEDICINE

## 2025-08-20 PROCEDURE — 3017F COLORECTAL CA SCREEN DOC REV: CPT | Performed by: INTERNAL MEDICINE

## 2025-08-20 PROCEDURE — 6360000002 HC RX W HCPCS: Performed by: INTERNAL MEDICINE

## 2025-08-20 PROCEDURE — 96413 CHEMO IV INFUSION 1 HR: CPT

## 2025-08-20 PROCEDURE — G8419 CALC BMI OUT NRM PARAM NOF/U: HCPCS | Performed by: INTERNAL MEDICINE

## 2025-08-20 PROCEDURE — 82150 ASSAY OF AMYLASE: CPT

## 2025-08-20 PROCEDURE — 99213 OFFICE O/P EST LOW 20 MIN: CPT | Performed by: INTERNAL MEDICINE

## 2025-08-20 RX ORDER — EPINEPHRINE 1 MG/ML
0.3 INJECTION, SOLUTION, CONCENTRATE INTRAVENOUS PRN
Status: CANCELLED | OUTPATIENT
Start: 2025-08-20

## 2025-08-20 RX ORDER — HEPARIN 100 UNIT/ML
500 SYRINGE INTRAVENOUS PRN
OUTPATIENT
Start: 2025-09-10

## 2025-08-20 RX ORDER — FAMOTIDINE 10 MG/ML
20 INJECTION, SOLUTION INTRAVENOUS
OUTPATIENT
Start: 2025-09-10

## 2025-08-20 RX ORDER — SODIUM CHLORIDE 0.9 % (FLUSH) 0.9 %
5-40 SYRINGE (ML) INJECTION PRN
OUTPATIENT
Start: 2025-09-10

## 2025-08-20 RX ORDER — ACETAMINOPHEN 325 MG/1
650 TABLET ORAL
OUTPATIENT
Start: 2025-09-10

## 2025-08-20 RX ORDER — SODIUM CHLORIDE 9 MG/ML
5-250 INJECTION, SOLUTION INTRAVENOUS PRN
Status: CANCELLED | OUTPATIENT
Start: 2025-08-20

## 2025-08-20 RX ORDER — ONDANSETRON 2 MG/ML
8 INJECTION INTRAMUSCULAR; INTRAVENOUS
OUTPATIENT
Start: 2025-09-10

## 2025-08-20 RX ORDER — SODIUM CHLORIDE 9 MG/ML
INJECTION, SOLUTION INTRAVENOUS CONTINUOUS
OUTPATIENT
Start: 2025-09-10

## 2025-08-20 RX ORDER — HEPARIN 100 UNIT/ML
500 SYRINGE INTRAVENOUS PRN
Status: DISCONTINUED | OUTPATIENT
Start: 2025-08-20 | End: 2025-08-21 | Stop reason: HOSPADM

## 2025-08-20 RX ORDER — ACETAMINOPHEN 325 MG/1
650 TABLET ORAL
Status: CANCELLED | OUTPATIENT
Start: 2025-08-20

## 2025-08-20 RX ORDER — SODIUM CHLORIDE 9 MG/ML
5-250 INJECTION, SOLUTION INTRAVENOUS PRN
OUTPATIENT
Start: 2025-09-10

## 2025-08-20 RX ORDER — EPINEPHRINE 1 MG/ML
0.3 INJECTION, SOLUTION, CONCENTRATE INTRAVENOUS PRN
OUTPATIENT
Start: 2025-09-10

## 2025-08-20 RX ORDER — ALBUTEROL SULFATE 90 UG/1
4 INHALANT RESPIRATORY (INHALATION) PRN
OUTPATIENT
Start: 2025-09-10

## 2025-08-20 RX ORDER — DIPHENHYDRAMINE HYDROCHLORIDE 50 MG/ML
50 INJECTION, SOLUTION INTRAMUSCULAR; INTRAVENOUS
Status: CANCELLED | OUTPATIENT
Start: 2025-08-20

## 2025-08-20 RX ORDER — ALBUTEROL SULFATE 90 UG/1
4 INHALANT RESPIRATORY (INHALATION) PRN
Status: CANCELLED | OUTPATIENT
Start: 2025-08-20

## 2025-08-20 RX ORDER — HYDROCORTISONE SODIUM SUCCINATE 100 MG/2ML
100 INJECTION INTRAMUSCULAR; INTRAVENOUS
OUTPATIENT
Start: 2025-09-10

## 2025-08-20 RX ORDER — ZOLEDRONIC ACID 0.04 MG/ML
4 INJECTION, SOLUTION INTRAVENOUS ONCE
Status: COMPLETED | OUTPATIENT
Start: 2025-08-20 | End: 2025-08-20

## 2025-08-20 RX ORDER — FAMOTIDINE 10 MG/ML
20 INJECTION, SOLUTION INTRAVENOUS
Status: CANCELLED | OUTPATIENT
Start: 2025-08-20

## 2025-08-20 RX ORDER — HYDROCORTISONE SODIUM SUCCINATE 100 MG/2ML
100 INJECTION INTRAMUSCULAR; INTRAVENOUS
Status: CANCELLED | OUTPATIENT
Start: 2025-08-20

## 2025-08-20 RX ORDER — SODIUM CHLORIDE 0.9 % (FLUSH) 0.9 %
5-40 SYRINGE (ML) INJECTION PRN
Status: DISCONTINUED | OUTPATIENT
Start: 2025-08-20 | End: 2025-08-21 | Stop reason: HOSPADM

## 2025-08-20 RX ORDER — ZOLEDRONIC ACID 0.04 MG/ML
4 INJECTION, SOLUTION INTRAVENOUS ONCE
OUTPATIENT
Start: 2025-09-10 | End: 2025-09-10

## 2025-08-20 RX ORDER — MEPERIDINE HYDROCHLORIDE 50 MG/ML
12.5 INJECTION INTRAMUSCULAR; INTRAVENOUS; SUBCUTANEOUS PRN
Status: CANCELLED | OUTPATIENT
Start: 2025-08-20

## 2025-08-20 RX ORDER — ONDANSETRON 2 MG/ML
8 INJECTION INTRAMUSCULAR; INTRAVENOUS
Status: CANCELLED | OUTPATIENT
Start: 2025-08-20

## 2025-08-20 RX ORDER — DIPHENHYDRAMINE HYDROCHLORIDE 50 MG/ML
50 INJECTION, SOLUTION INTRAMUSCULAR; INTRAVENOUS
OUTPATIENT
Start: 2025-09-10

## 2025-08-20 RX ORDER — SODIUM CHLORIDE 9 MG/ML
INJECTION, SOLUTION INTRAVENOUS CONTINUOUS
Status: CANCELLED | OUTPATIENT
Start: 2025-08-20

## 2025-08-20 RX ADMIN — HEPARIN 500 UNITS: 100 SYRINGE at 15:55

## 2025-08-20 RX ADMIN — SODIUM CHLORIDE 200 MG: 9 INJECTION, SOLUTION INTRAVENOUS at 15:26

## 2025-08-20 RX ADMIN — SODIUM CHLORIDE, PRESERVATIVE FREE 10 ML: 5 INJECTION INTRAVENOUS at 12:58

## 2025-08-20 RX ADMIN — SODIUM CHLORIDE, PRESERVATIVE FREE 10 ML: 5 INJECTION INTRAVENOUS at 15:55

## 2025-08-20 RX ADMIN — ZOLEDRONIC ACID 4 MG: 0.04 INJECTION, SOLUTION INTRAVENOUS at 14:53

## (undated) DEVICE — KIT SYR 1ML 2ML TRAUM TRAUMCEM V+

## (undated) DEVICE — GLOVE ORTHO 8   MSG9480

## (undated) DEVICE — SUTURE MONOCRYL SZ 3-0 L27IN ABSRB UD L24MM PS-1 3/8 CIR PRIM Y936H

## (undated) DEVICE — COVER,MAYO STAND,STERILE: Brand: MEDLINE

## (undated) DEVICE — STOCKINETTE,IMPERVIOUS,12X48,STERILE: Brand: MEDLINE

## (undated) DEVICE — STRYKER PERFORMANCE SERIES SAGITTAL BLADE: Brand: STRYKER PERFORMANCE SERIES

## (undated) DEVICE — APPLICATOR MEDICATED 26 CC SOLUTION HI LT ORNG CHLORAPREP

## (undated) DEVICE — GLOVE ORANGE PI 8   MSG9080

## (undated) DEVICE — GOWN,AURORA,NONREINFORCED,LARGE: Brand: MEDLINE

## (undated) DEVICE — SURGICAL SUCTION CONNECTING TUBE WITH MALE CONNECTOR AND SUCTION CLAMP, 2 FT. LONG (.6 M), 5 MM I.D.: Brand: CONMED

## (undated) DEVICE — YANKAUER,FLEXIBLE HANDLE,REGLR CAPACITY: Brand: MEDLINE INDUSTRIES, INC.

## (undated) DEVICE — STRAP,POSITIONING,KNEE/BODY,FOAM,4X60": Brand: MEDLINE

## (undated) DEVICE — BIT DRL L145MM DIA4.2MM NONSTERILE 3 FLUT NDL PNT QUIK CPL

## (undated) DEVICE — C-ARMOR C-ARM EQUIPMENT COVERS CLEAR STERILE UNIVERSAL FIT 12 PER CASE: Brand: C-ARMOR

## (undated) DEVICE — SVMMC ORTH SPL DRP PK

## (undated) DEVICE — 450 ML BOTTLE OF 0.05% CHLORHEXIDINE GLUCONATE IN 99.95% STERILE WATER FOR IRRIGATION, USP AND APPLICATOR.: Brand: IRRISEPT ANTIMICROBIAL WOUND LAVAGE

## (undated) DEVICE — BANDAGE,GAUZE,BULKEE II,4.5"X4.1YD,STRL: Brand: MEDLINE

## (undated) DEVICE — SUTURE VICRYL SZ 1 L27IN ABSRB VLT L26MM CT-2 1/2 CIR J335H

## (undated) DEVICE — CANNULA INJ FOR TFNA SYS STRL TRAUMACEM V+

## (undated) DEVICE — 1LYRTR 16FR10ML100%SIL UMS SNP: Brand: MEDLINE INDUSTRIES, INC.

## (undated) DEVICE — CEMENT MIXING SYSTEM WITH FEMORAL BREAKWAY NOZZLE: Brand: REVOLUTION

## (undated) DEVICE — ZIPPERED TOGA, 2X LARGE: Brand: FLYTE

## (undated) DEVICE — GUIDEWIRE ORTH L400MM DIA3.2MM FOR TFN

## (undated) DEVICE — SUTURE MONOCRYL SZ 2-0 L27IN ABSRB UD SH L26MM TAPERPOINT NDL Y417H

## (undated) DEVICE — BIT DRL L500MM DIA6X9MM CANN STP L QUIK CPL FOR DH DC TFN

## (undated) DEVICE — BANDAGE COBAN 6 IN WND 6INX5YD FOAM

## (undated) DEVICE — APPLICATOR MEDICATED 10.5 CC SOLUTION HI LT ORNG CHLORAPREP

## (undated) DEVICE — STRAP ARMBRD W1.5XL32IN FOAM STR YET SFT W/ HK AND LOOP

## (undated) DEVICE — ZIPPERED TOGA, X-LARGE: Brand: FLYTE

## (undated) DEVICE — 3M™ IOBAN™ 2 ANTIMICROBIAL INCISE DRAPE 6650EZ: Brand: IOBAN™ 2

## (undated) DEVICE — KIT BONE GRAFT HARVESTING 20 MM STRL

## (undated) DEVICE — SUTURE ETHIBOND EXCEL SZ 5 L30IN NONABSORBABLE GRN L48MM CCS MB47G

## (undated) DEVICE — SOLUTION IRRIG 3000ML 0.9% SOD CHL USP UROMATIC PLAS CONT

## (undated) DEVICE — TUBING, SUCTION, 9/32" X 20', STRAIGHT: Brand: MEDLINE INDUSTRIES, INC.

## (undated) DEVICE — 1016 S-DRAPE IRRIG POUCH 10/BOX: Brand: STERI-DRAPE™

## (undated) DEVICE — Device

## (undated) DEVICE — SUTURE PDS II SZ 0 L27IN ABSRB VLT L36MM CT-1 1/2 CIR Z340H

## (undated) DEVICE — DRAPE,U/ SHT,SPLIT,PLAS,STERIL: Brand: MEDLINE

## (undated) DEVICE — DRESSING FOAM W4XL4IN AG SIL FACE BORD IONIC ANTIMIC ADH

## (undated) DEVICE — BANDAGE COMPR W6INXL15YD WHT BGE POLY COT WV E HK LOOP CLSR

## (undated) DEVICE — CYSTO/BLADDER IRRIGATION SET, REGULATING CLAMP

## (undated) DEVICE — BLADE,CARBON-STEEL,10,STRL,DISPOSABLE: Brand: MEDLINE

## (undated) DEVICE — HANDPIECE SET WITH COAXIAL HIGH FLOW TIP AND SUCTION TUBE: Brand: INTERPULSE

## (undated) DEVICE — MEDIA CONTRAST ISOVUE  300 10X50ML

## (undated) DEVICE — 6619 2 PTNT ISO SYS INCISE AREA&LT;(&GT;&&LT;)&GT;P: Brand: STERI-DRAPE™ IOBAN™ 2

## (undated) DEVICE — BIT DRL L300MM DIA10MM CANN TAPR L QUIK CPL FOR DH DC TFN

## (undated) DEVICE — SUTURE ETHILON SZ 2-0 L18IN NONABSORBABLE BLK L26MM PS 3/8 585H

## (undated) DEVICE — 3M™ IOBAN™ 2 ANTIMICROBIAL INCISE DRAPE 6651EZ: Brand: IOBAN™ 2

## (undated) DEVICE — NEEDLE SUT SZ 3 MAYO 1 2 CIR TAPR PNT DISPOSABLE

## (undated) DEVICE — BIT DRL L266MM DIA16MM CANN L QUIK CPL FOR PROX FEM NAILING

## (undated) DEVICE — ELECTRODE PT RET AD L9FT HI MOIST COND ADH HYDRGEL CORDED